# Patient Record
Sex: FEMALE | Race: WHITE | NOT HISPANIC OR LATINO | Employment: OTHER | URBAN - METROPOLITAN AREA
[De-identification: names, ages, dates, MRNs, and addresses within clinical notes are randomized per-mention and may not be internally consistent; named-entity substitution may affect disease eponyms.]

---

## 2017-04-19 ENCOUNTER — TRANSCRIBE ORDERS (OUTPATIENT)
Dept: ADMINISTRATIVE | Facility: HOSPITAL | Age: 72
End: 2017-04-19

## 2017-04-19 DIAGNOSIS — R20.0 NUMBNESS OF LEGS: Primary | ICD-10-CM

## 2017-04-19 DIAGNOSIS — R20.0 NUMBNESS OF FEET: ICD-10-CM

## 2017-04-25 ENCOUNTER — HOSPITAL ENCOUNTER (OUTPATIENT)
Dept: NEUROLOGY | Facility: AMBULATORY SURGERY CENTER | Age: 72
Discharge: HOME | End: 2017-04-25
Payer: MEDICARE

## 2017-04-25 DIAGNOSIS — G62.9 POLYNEUROPATHY: ICD-10-CM

## 2017-04-25 DIAGNOSIS — R20.0 NUMBNESS OF FEET: ICD-10-CM

## 2017-04-25 DIAGNOSIS — R20.0 NUMBNESS OF LEGS: ICD-10-CM

## 2017-04-25 PROCEDURE — 95910 NRV CNDJ TEST 7-8 STUDIES: CPT

## 2017-04-25 PROCEDURE — 95886 MUSC TEST DONE W/N TEST COMP: CPT

## 2017-05-14 ENCOUNTER — HOSPITAL ENCOUNTER (OUTPATIENT)
Dept: RADIOLOGY | Facility: HOSPITAL | Age: 72
Discharge: HOME | End: 2017-05-14
Attending: FAMILY MEDICINE
Payer: MEDICARE

## 2017-05-14 DIAGNOSIS — M54.50 LUMBAR PAIN: ICD-10-CM

## 2017-05-14 PROCEDURE — 72148 MRI LUMBAR SPINE W/O DYE: CPT

## 2018-05-04 ENCOUNTER — HOSPITAL ENCOUNTER (OUTPATIENT)
Facility: HOSPITAL | Age: 73
Setting detail: OUTPATIENT SURGERY
Discharge: HOME | End: 2018-05-04
Attending: COLON & RECTAL SURGERY | Admitting: COLON & RECTAL SURGERY
Payer: MEDICARE

## 2018-05-04 ENCOUNTER — ANESTHESIA EVENT (OUTPATIENT)
Dept: GASTROENTEROLOGY | Facility: HOSPITAL | Age: 73
End: 2018-05-04
Payer: MEDICARE

## 2018-05-04 ENCOUNTER — ANESTHESIA (OUTPATIENT)
Dept: GASTROENTEROLOGY | Facility: HOSPITAL | Age: 73
End: 2018-05-04
Payer: MEDICARE

## 2018-05-04 VITALS
RESPIRATION RATE: 20 BRPM | BODY MASS INDEX: 30.24 KG/M2 | HEART RATE: 68 BPM | WEIGHT: 150 LBS | SYSTOLIC BLOOD PRESSURE: 116 MMHG | TEMPERATURE: 97.8 F | OXYGEN SATURATION: 99 % | DIASTOLIC BLOOD PRESSURE: 58 MMHG | HEIGHT: 59 IN

## 2018-05-04 DIAGNOSIS — K62.5 HEMORRHAGE OF RECTUM AND ANUS: ICD-10-CM

## 2018-05-04 DIAGNOSIS — K62.89 ANAL OR RECTAL PAIN: ICD-10-CM

## 2018-05-04 PROCEDURE — 88305 TISSUE EXAM BY PATHOLOGIST: CPT | Performed by: PATHOLOGY

## 2018-05-04 RX ORDER — SODIUM CHLORIDE 9 MG/ML
100 INJECTION, SOLUTION INTRAVENOUS CONTINUOUS
Status: CANCELLED | OUTPATIENT
Start: 2018-05-04

## 2018-05-04 RX ORDER — ACETAMINOPHEN 325 MG/1
650 TABLET ORAL 2 TIMES DAILY
COMMUNITY
End: 2021-12-01 | Stop reason: HOSPADM

## 2018-05-04 RX ORDER — SODIUM CHLORIDE 9 MG/ML
INJECTION, SOLUTION INTRAVENOUS CONTINUOUS PRN
Status: DISCONTINUED | OUTPATIENT
Start: 2018-05-04 | End: 2018-05-04 | Stop reason: SURG

## 2018-05-04 RX ORDER — MONTELUKAST SODIUM 10 MG/1
10 TABLET ORAL
COMMUNITY

## 2018-05-04 RX ORDER — GABAPENTIN 300 MG/1
100 CAPSULE ORAL 2 TIMES DAILY
Status: ON HOLD | COMMUNITY
End: 2022-07-27 | Stop reason: ALTCHOICE

## 2018-05-04 RX ORDER — PROPOFOL 10 MG/ML
INJECTION, EMULSION INTRAVENOUS AS NEEDED
Status: DISCONTINUED | OUTPATIENT
Start: 2018-05-04 | End: 2018-05-04 | Stop reason: SURG

## 2018-05-04 RX ORDER — PROPOFOL 10 MG/ML
INJECTION, EMULSION INTRAVENOUS CONTINUOUS PRN
Status: DISCONTINUED | OUTPATIENT
Start: 2018-05-04 | End: 2018-05-04 | Stop reason: SURG

## 2018-05-04 RX ADMIN — SODIUM CHLORIDE: 0.9 INJECTION, SOLUTION INTRAVENOUS at 11:05

## 2018-05-04 RX ADMIN — SODIUM CHLORIDE: 0.9 INJECTION, SOLUTION INTRAVENOUS at 10:32

## 2018-05-04 RX ADMIN — PROPOFOL 60 MG: 10 INJECTION, EMULSION INTRAVENOUS at 10:35

## 2018-05-04 RX ADMIN — PROPOFOL 70 MCG/KG/MIN: 10 INJECTION, EMULSION INTRAVENOUS at 10:36

## 2018-05-04 RX ADMIN — PROPOFOL 30 MG: 10 INJECTION, EMULSION INTRAVENOUS at 10:48

## 2018-05-04 NOTE — H&P
History and Physical   Colon and Rectal Surgery   Jerrica Chatterjee 75 y o  female MRN: [id number]  Unit/Bed#: [facility] Encounter: [id number]  05/04/18   10:22 AM      No chief complaint on file  History of Present Illness   HPI:  Jerrica Chatterjee is a 75 y o  female who presents with h/o polyp  Historical Information   Past Medical History:   Diagnosis Date    Chronic kidney disease     Horse shoe kidney    Colitis     Hyperlipidemia      Past Surgical History:   Procedure Laterality Date    BACK SURGERY      2 rods placed in back    COLON SURGERY      JOINT REPLACEMENT Bilateral        Meds/Allergies     Prescriptions Prior to Admission   Medication    acetaminophen (TYLENOL) 325 mg tablet    gabapentin (NEURONTIN) 300 mg capsule    montelukast (SINGULAIR) 10 mg tablet       No current facility-administered medications for this encounter  No Known Allergies      Social History   History   Alcohol Use    Yes     Comment: once in awhile     History   Drug Use No     History   Smoking Status    Former Smoker   Smokeless Tobacco    Never Used         Family History: History reviewed  No pertinent family history  Objective     Current Vitals:   Blood Pressure: 136/65 (05/04/18 0903)  Pulse: 69 (05/04/18 0903)  Temperature: 97 8 °F (36 6 °C) (05/04/18 0903)  Temp Source: Temporal (05/04/18 0903)  Respirations: 20 (05/04/18 0903)  Height: 4' 11" (149 9 cm) (05/04/18 0903)  Weight - Scale: 68 kg (150 lb) (05/04/18 0903)  SpO2: 98 % (05/04/18 0903)  No intake or output data in the 24 hours ending 05/04/18 1022    Physical Exam:  General: No acute distress  Eyes: Normal   ENT: Normal   Neck: No JVD  Pulm: Normal in A&P  CV: NSR no murmur  Abdomen: Soft and normal on palpation, no mass, no tenderness, no guarding  Rectal: Normal sphincter tone, no perianal skin lesions  Extremities: Normal  Lymphatics: Normal        Lab Results: I have personally reviewed pertinent lab results  Imaging:  I have personally reviewed pertinent reports  Patient was consented by myself for procedure as explained earlier with all the risks and benefits described  All questions answered  ASSESSMENT:  Marcia Lock is a 75 y o  female who presents with h/o polyp        PLAN:    colonoscopy

## 2018-05-04 NOTE — OP NOTE
**** GI/ENDOSCOPY REPORT ****     PATIENT NAME: Mathew Soler ------ VISIT ID:     INTRODUCTION: Colonoscopy - A 75 female patient presents for an outpatient   Colonoscopy at [address] [place] [address]  PREVIOUS COLONOSCOPY:     INDICATIONS: Screening for personal history of polyps  CONSENT:  The benefits, risks, and alternatives to the procedure were   discussed and informed consent was obtained from the patient  PREPARATION: EKG, pulse, pulse oximetry and blood pressure were monitored   throughout the procedure  The patient was identified by myself both   verbally and by visual inspection of ID band  ASA Classification: Class 2   - Patient has mild to moderate systemic disturbance that may or may not be   related to the disorder requiring surgery  Airway Assessment   Classification: Airway class 1 - Visualization of the soft palate, fauces,   uvula, and posterior pillars  MEDICATIONS: Anesthesia-check records     RECTAL EXAM: Normal sphincter tone  No external hemorrhoids  No internal   hemorrhoids  PROCEDURE:  The endoscope was passed without difficulty through the anus   under direct visualization and advanced to the cecum, confirmed by   photographs  The quality of the preparation was  The scope was withdrawn   and the mucosa was carefully examined  The views were good  The patient's   toleration of the procedure was good  Cecal Intubation Time: 5 minutes(s)   Scope Withdrawal Time: 17 minutes(s)     FINDINGS:  Two sessile polyps, measuring between 5 and 10 mm in size, were   found in the hepatic flexure  All polyps were removed by hot biopsy   polypectomy and placed in jar 1  A single frond-like/villous, sessile   polyp, measuring between 5 and 10 mm in size, was found in the distal   transverse colon  The polyp was removed by hot biopsy polypectomy and   placed in jar 2  A single sessile polyp, measuring between 5 and 10 mm in   size, was found in the splenic flexure   The polyp was removed by hot   biopsy polypectomy and placed in jar 3  A single frond-like/villous,   sessile polyp, measuring between 5 and 10 mm in size, was found in the   descending colon  The polyp was removed by hot biopsy polypectomy and   placed in jar 4  There was evidence of mild diverticulosis in the sigmoid   colon  Otherwise, the colon appeared to be normal  Appendiceal opening   identified     COMPLICATIONS: There were no complications  IMPRESSIONS: Two sessile polyps found in the hepatic flexure; all polyps   removed by hot biopsy polypectomy  A single frond-like/villous, sessile   polyp found in the distal transverse colon; removed by hot biopsy   polypectomy  A single sessile polyp found in the splenic flexure; removed   by hot biopsy polypectomy  A single frond-like/villous, sessile polyp   found in the descending colon; removed by hot biopsy polypectomy  Mild   diverticulosis found in the sigmoid colon  Otherwise normal colonoscopy  RECOMMENDATIONS: Colonoscopy recommended in 3 years  Continue current   medications  Start high fiber diet  Call if any signs or symptoms of   abdominal pain, bleeding or diverticulitis  Daily: 81 mg aspirin, Vit  D,   five fruits daily, and exercise  Call if you are having any problems: Dr Matthews Factor [phone]  ESTIMATED BLOOD LOSS:     PATHOLOGY SPECIMENS: Two polyps removed by hot biopsy polypectomy (jar 1)     Single polyp removed by hot biopsy polypectomy (jar 2)   Single polyp   removed by hot biopsy polypectomy (jar 3)   Single polyp removed by hot   biopsy polypectomy (jar 4)    Yes     PROCEDURE CODES: 56149: Colonoscopy with removal of tumor(s), polyp(s), or   other lesion(s) by hot biopsy forceps or bipolar cautery     ICD-9 Codes: V12 72 Personal history of colonic polyps 211 3 Benign   neoplasm of colon 211 3 Benign neoplasm of colon 211 3 Benign neoplasm of   colon 211 3 Benign neoplasm of colon 562 10 Diverticulosis of colon   (without mention of hemorrhage)     ICD-10 Codes: Z86 010 Personal history of colonic polyps K63 5 Polyp of   colon K63 5 Polyp of colon K63 5 Polyp of colon K63 5 Polyp of colon K57   Diverticular disease of intestine     PERFORMED BY: ADRIEL Colin  on 05/04/2018  Version 1, electronically signed by ADRIEL Colin  on   05/04/2018 at 11:17

## 2018-05-04 NOTE — ANESTHESIA PREPROCEDURE EVALUATION
Review of Systems/Medical History  Patient summary reviewed        Cardiovascular  Hyperlipidemia,    Pulmonary       GI/Hepatic            Endo/Other     GYN       Hematology   Musculoskeletal       Neurology   Psychology           Physical Exam    Airway    Mallampati score: II  TM Distance: >3 FB  Neck ROM: full     Dental       Cardiovascular      Pulmonary      Other Findings        Anesthesia Plan  ASA Score- 2     Anesthesia Type- IV sedation with anesthesia with ASA Monitors  Additional Monitors:   Airway Plan:         Plan Factors-    Induction- intravenous  Postoperative Plan-     Informed Consent- Anesthetic plan and risks discussed with patient  Small amount of black coffee and jello to take pills this morning  More than 3 hours before  procedure  No dairy in coffee or any solids in jello  Will go ahead

## 2018-10-09 ENCOUNTER — TRANSCRIBE ORDERS (OUTPATIENT)
Dept: ADMINISTRATIVE | Facility: HOSPITAL | Age: 73
End: 2018-10-09

## 2018-10-09 DIAGNOSIS — M25.551 RIGHT HIP PAIN: Primary | ICD-10-CM

## 2018-10-11 ENCOUNTER — APPOINTMENT (OUTPATIENT)
Dept: LAB | Facility: HOSPITAL | Age: 73
End: 2018-10-11
Payer: MEDICARE

## 2018-10-11 DIAGNOSIS — M25.551 RIGHT HIP PAIN: ICD-10-CM

## 2018-10-11 LAB
CRP SERPL QL: <3 MG/L
ERYTHROCYTE [DISTWIDTH] IN BLOOD BY AUTOMATED COUNT: 12.8 % (ref 11.6–15.1)
ERYTHROCYTE [SEDIMENTATION RATE] IN BLOOD: 18 MM/HOUR (ref 0–20)
HCT VFR BLD AUTO: 39.9 % (ref 34.8–46.1)
HGB BLD-MCNC: 12.6 G/DL (ref 11.5–15.4)
MCH RBC QN AUTO: 31.3 PG (ref 26.8–34.3)
MCHC RBC AUTO-ENTMCNC: 31.6 G/DL (ref 31.4–37.4)
MCV RBC AUTO: 99 FL (ref 82–98)
PLATELET # BLD AUTO: 273 THOUSANDS/UL (ref 149–390)
PMV BLD AUTO: 8.8 FL (ref 8.9–12.7)
RBC # BLD AUTO: 4.03 MILLION/UL (ref 3.81–5.12)
URATE SERPL-MCNC: 3.9 MG/DL (ref 2–6.8)
WBC # BLD AUTO: 5.94 THOUSAND/UL (ref 4.31–10.16)

## 2018-10-11 PROCEDURE — 85027 COMPLETE CBC AUTOMATED: CPT

## 2018-10-11 PROCEDURE — 86430 RHEUMATOID FACTOR TEST QUAL: CPT

## 2018-10-11 PROCEDURE — 86618 LYME DISEASE ANTIBODY: CPT

## 2018-10-11 PROCEDURE — 86140 C-REACTIVE PROTEIN: CPT

## 2018-10-11 PROCEDURE — 85652 RBC SED RATE AUTOMATED: CPT

## 2018-10-11 PROCEDURE — 36415 COLL VENOUS BLD VENIPUNCTURE: CPT

## 2018-10-11 PROCEDURE — 86038 ANTINUCLEAR ANTIBODIES: CPT

## 2018-10-11 PROCEDURE — 84550 ASSAY OF BLOOD/URIC ACID: CPT

## 2018-10-12 LAB
B BURGDOR IGG SER IA-ACNC: 0.08
B BURGDOR IGM SER IA-ACNC: 0.35
RHEUMATOID FACT SER QL LA: NEGATIVE
RYE IGE QN: NEGATIVE

## 2018-11-11 ENCOUNTER — HOSPITAL ENCOUNTER (EMERGENCY)
Facility: HOSPITAL | Age: 73
Discharge: HOME | End: 2018-11-11
Attending: EMERGENCY MEDICINE | Admitting: EMERGENCY MEDICINE
Payer: MEDICARE

## 2018-11-11 ENCOUNTER — APPOINTMENT (EMERGENCY)
Dept: RADIOLOGY | Facility: HOSPITAL | Age: 73
End: 2018-11-11
Payer: MEDICARE

## 2018-11-11 VITALS
BODY MASS INDEX: 28.47 KG/M2 | HEIGHT: 60 IN | HEART RATE: 85 BPM | OXYGEN SATURATION: 98 % | TEMPERATURE: 97.4 F | SYSTOLIC BLOOD PRESSURE: 144 MMHG | DIASTOLIC BLOOD PRESSURE: 65 MMHG | WEIGHT: 145 LBS | RESPIRATION RATE: 16 BRPM

## 2018-11-11 DIAGNOSIS — M54.12 CERVICAL RADICULOPATHY: Primary | ICD-10-CM

## 2018-11-11 PROCEDURE — 72040 X-RAY EXAM NECK SPINE 2-3 VW: CPT

## 2018-11-11 PROCEDURE — 99283 EMERGENCY DEPT VISIT LOW MDM: CPT

## 2018-11-11 PROCEDURE — 73070 X-RAY EXAM OF ELBOW: CPT

## 2018-11-11 RX ORDER — METHYLPREDNISOLONE 4 MG/1
TABLET ORAL
Qty: 21 TABLET | Refills: 0 | Status: SHIPPED | OUTPATIENT
Start: 2018-11-11 | End: 2019-02-15

## 2018-11-11 NOTE — DISCHARGE INSTRUCTIONS
Cervical Radiculopathy   WHAT YOU NEED TO KNOW:   Cervical radiculopathy is a painful condition that happens when a spinal nerve in your neck is pinched or irritated  DISCHARGE INSTRUCTIONS:   Medicines: You may need any of the following:  · NSAIDs  help decrease swelling and pain  This medicine can be bought without a doctor's order  This medicine can cause stomach bleeding or kidney problems in certain people  If you take blood thinner medicine, always ask your healthcare provider if NSAIDs are safe for you  Always read the medicine label and follow the directions on it before using this medicine  · Prescription pain medicine  helps decrease pain  Do not wait until the pain is severe before you take this medicine  · Steroids  help decrease pain and swelling  These may be given as a pill or as an injection in your neck  You may need more than 1 injection if your symptoms do not improve after the first treatment  · Take your medicine as directed  Contact your healthcare provider if you think your medicine is not helping or if you have side effects  Tell him of her if you are allergic to any medicine  Keep a list of the medicines, vitamins, and herbs you take  Include the amounts, and when and why you take them  Bring the list or the pill bottles to follow-up visits  Carry your medicine list with you in case of an emergency  Follow up with your healthcare provider or spine specialist as directed:  Write down your questions so you remember to ask them during your visits  Physical therapy:  Your healthcare provider may suggest physical therapy to stretch and strengthen your muscles  Your physical therapist can teach you how to improve your posture and the way you hold your neck  He may also teach you how to be safely active and avoid further injury  He can also help you develop an exercise program that is safe for your back and neck  Self-care:   · Ice  helps decrease swelling and pain   Ice may also help prevent tissue damage  Use an ice pack, or put crushed ice in a plastic bag  Cover it with a towel and place it on your neck for 15 to 20 minutes every hour or as directed  · Rest  when you feel it is needed  Slowly start to do more each day  Return to your daily activities as directed  · Wear a soft collar  You may be given a soft collar to support your neck while you sleep  Wear the soft collar only as directed  · Do light stretches and regular exercise  Your healthcare provider may suggest light stretches to help decrease stiffness in your neck and arm as you recover  After your pain is controlled, you may benefit from regular exercise  Ask what type of exercise is safe for your back and neck  · Review your work area  A comfortable work area can help prevent neck strain  Ask your employer for an ergonomic review to check the position of your desk, chair, phone, and computer  Make any necessary adjustments for your comfort  Contact your healthcare provider or spine specialist if:   · You have a fever  · You are losing weight without trying  · Your pain is worse, even with medicine  · One or both hands feel more numb than before, or you cannot move your fingers well  · You have questions or concerns about your condition or care  © 2017 [employer]  Information is for End User's use only and may not be sold, redistributed or otherwise used for commercial purposes  All illustrations and images included in CareNotes® are the copyrighted property of [employer]  or Ted Oliveira  The above information is an  only  It is not intended as medical advice for individual conditions or treatments  Talk to your doctor, nurse or pharmacist before following any medical regimen to see if it is safe and effective for you

## 2018-11-11 NOTE — ED PROVIDER NOTES
History  Chief Complaint   Patient presents with    Arm Injury     Pt was on a cruise and injured left arm 1 month ago (10/16/18)  Has been unsing ice, tylenol and heating pad for pain and is not getting relief  Pt had xray on ship and was told it was negatinve  16-year-old female presents to the emergency department with complaints of left-sided arm pain  States she has been having pain in the left arm over the past month since injuring the arm on a cruise ship  Patient states that she was being wheeled onto the ship when the elevator door started to close and the person willing her chair wheeled her arm to the door  Did have x-rays at the time which were normal but patient is requesting to have new x-rays taken today  Has been applying ice as needed as well as taking Tylenol and using heating pad  States the pain is sometimes sharp in nature and radiates into his shoulder and neck as well as to the fingers  She denies chest pain  States she did not have any pain in the neck or arm prior to injury occurring  History provided by:  Patient   used: No        Prior to Admission Medications   Prescriptions Last Dose Informant Patient Reported? Taking?   acetaminophen (TYLENOL) 325 mg tablet   Yes No   Sig: Take 650 mg by mouth 2 (two) times a day   gabapentin (NEURONTIN) 300 mg capsule   Yes No   Sig: Take 100 mg by mouth 2 (two) times a day   montelukast (SINGULAIR) 10 mg tablet   Yes No   Sig: Take 10 mg by mouth daily      Facility-Administered Medications: None       Past Medical History:   Diagnosis Date    Chronic kidney disease     Horse shoe kidney    Colitis     Hyperlipidemia        Past Surgical History:   Procedure Laterality Date    BACK SURGERY      2 rods placed in back    COLON SURGERY      COLONOSCOPY N/A 5/4/2018    Procedure: COLONOSCOPY;  Surgeon: Sam Bacon MD;  Location: [facility];   Service: Colorectal    JOINT REPLACEMENT Bilateral History reviewed  No pertinent family history  I have reviewed and agree with the history as documented  Social History   Substance Use Topics    Smoking status: Former Smoker    Smokeless tobacco: Never Used    Alcohol use Yes      Comment: once in awhile        Review of Systems   Constitutional: Negative for chills and fever  HENT: Negative for congestion, dental problem and sore throat  Respiratory: Negative for cough  Cardiovascular: Negative for chest pain  Gastrointestinal: Negative for abdominal pain  Musculoskeletal: Positive for neck pain  Negative for back pain  Arm pain, Elbow pain   Skin: Negative for rash and wound  All other systems reviewed and are negative  Physical Exam  Physical Exam   Constitutional: She is oriented to person, place, and time  Vital signs are normal  She appears well-developed and well-nourished  HENT:   Head: Normocephalic and atraumatic  Cardiovascular: Normal rate and regular rhythm  Pulmonary/Chest: Effort normal and breath sounds normal  No respiratory distress  She has no wheezes  She has no rhonchi  She has no rales  Musculoskeletal:        Left elbow: She exhibits normal range of motion, no swelling, no effusion, no deformity and no laceration  Tenderness found  Medial epicondyle and lateral epicondyle tenderness noted  Cervical back: She exhibits decreased range of motion (with rotation to the left), tenderness and pain  She exhibits no bony tenderness, no swelling, no edema, no deformity, no laceration, no spasm and normal pulse  Neurological: She is alert and oriented to person, place, and time  Skin: Skin is warm and dry  Psychiatric: She has a normal mood and affect  Her behavior is normal    Nursing note and vitals reviewed        Vital Signs  ED Triage Vitals [11/11/18 1146]   Temperature Pulse Respirations Blood Pressure SpO2   (!) 97 4 °F (36 3 °C) 85 16 144/65 98 %      Temp Source Heart Rate Source Patient Position - Orthostatic VS BP Location FiO2 (%)   Tympanic Monitor Sitting Right arm --      Pain Score       5           Vitals:    11/11/18 1146   BP: 144/65   Pulse: 85   Patient Position - Orthostatic VS: Sitting       Visual Acuity      ED Medications  Medications - No data to display    Diagnostic Studies  Results Reviewed     None                 XR elbow 2 vw LEFT   ED Interpretation by Suki Soto PA-C (11/11 1245)   Degenerative changes  No compression fracture  XR cervical spine 2 or 3 views   ED Interpretation by Suki Soto PA-C (11/11 1245)   Degenerative changes  No fracture  Procedures  Procedures       Phone Contacts  ED Phone Contact    ED Course           Identification of Seniors at Risk      Most Recent Value   (ISAR) Identification of Seniors at Risk   Before the illness or injury that brought you to the Emergency, did you need someone to help you on a regular basis? 0 Filed at: 11/11/2018 1148   In the last 24 hours, have you needed more help than usual?  0 Filed at: 11/11/2018 1148   Have you been hospitalized for one or more nights during the past 6 months? 0 Filed at: 11/11/2018 1148   In general, do you see well?  0 [glasses] Filed at: 11/11/2018 1148   In general, do you have serious problems with your memory? 0 Filed at: 11/11/2018 1148   Do you take more than three different medications every day?  0 Filed at: 11/11/2018 1148   ISAR Score  0 Filed at: 11/11/2018 1148                          MDM  Number of Diagnoses or Management Options  Diagnosis management comments: Differential diagnosis includes but not limited to:  Medial epicondylitis, lateral epicondylitis, bursitis, radicular pain, cervical strain, cervical disc disease, arthritis           Amount and/or Complexity of Data Reviewed  Tests in the radiology section of CPT®: ordered      CritCare Time    Disposition  Final diagnoses:   Cervical radiculopathy     Time reflects when diagnosis was documented in both MDM as applicable and the Disposition within this note     Time User Action Codes Description Comment    11/11/2018 12:32 PM Shar Ninochristina Add [M54 12] Cervical radiculopathy       ED Disposition     None      Follow-up Information     Follow up With Specialties Details Why Contact Info Additional Information    Noni Alston DO Family Medicine Schedule an appointment as soon as possible for a visit  [address] [place] [address] [address]       [address] Orthopedic Surgery Schedule an appointment as soon as possible for a visit  [address] [facility], [id number]          Patient's Medications   Discharge Prescriptions    METHYLPREDNISOLONE (MEDROL) 4 MG TABLET    6 tb po on day 1; 5 tb po on day 2; 4 tb po on day 3; 3 tb po on day 4; 2 tb po on day 5; 1 tb po on day 6       Start Date: 11/11/2018End Date: --       Order Dose: --       Quantity: 21 tablet    Refills: 0     No discharge procedures on file      ED Provider  Electronically Signed by           Macarena Holcomb PA-C  11/11/18 2367

## 2019-01-24 ENCOUNTER — HOSPITAL ENCOUNTER (OUTPATIENT)
Dept: RADIOLOGY | Facility: HOSPITAL | Age: 74
Discharge: HOME | End: 2019-01-24
Payer: MEDICARE

## 2019-01-24 DIAGNOSIS — M25.551 RIGHT HIP PAIN: ICD-10-CM

## 2019-01-24 PROCEDURE — 78306 BONE IMAGING WHOLE BODY: CPT

## 2019-01-24 PROCEDURE — A9503 TC99M MEDRONATE: HCPCS

## 2019-02-15 ENCOUNTER — HOSPITAL ENCOUNTER (EMERGENCY)
Facility: HOSPITAL | Age: 74
Discharge: HOME | End: 2019-02-15
Attending: EMERGENCY MEDICINE | Admitting: EMERGENCY MEDICINE
Payer: MEDICARE

## 2019-02-15 ENCOUNTER — APPOINTMENT (EMERGENCY)
Dept: RADIOLOGY | Facility: HOSPITAL | Age: 74
End: 2019-02-15
Payer: MEDICARE

## 2019-02-15 VITALS
HEART RATE: 89 BPM | BODY MASS INDEX: 30.33 KG/M2 | WEIGHT: 154 LBS | DIASTOLIC BLOOD PRESSURE: 71 MMHG | OXYGEN SATURATION: 94 % | TEMPERATURE: 97.7 F | SYSTOLIC BLOOD PRESSURE: 163 MMHG | RESPIRATION RATE: 18 BRPM

## 2019-02-15 DIAGNOSIS — M79.671 RIGHT FOOT PAIN: ICD-10-CM

## 2019-02-15 DIAGNOSIS — S92.351A DISPLACED FRACTURE OF FIFTH METATARSAL BONE, RIGHT FOOT, INITIAL ENCOUNTER FOR CLOSED FRACTURE: Primary | ICD-10-CM

## 2019-02-15 PROCEDURE — 73630 X-RAY EXAM OF FOOT: CPT

## 2019-02-15 PROCEDURE — 99283 EMERGENCY DEPT VISIT LOW MDM: CPT

## 2019-02-15 PROCEDURE — 73610 X-RAY EXAM OF ANKLE: CPT

## 2019-02-15 RX ORDER — ACETAMINOPHEN 325 MG/1
975 TABLET ORAL ONCE
Status: COMPLETED | OUTPATIENT
Start: 2019-02-15 | End: 2019-02-15

## 2019-02-15 RX ORDER — TRAMADOL HYDROCHLORIDE 50 MG/1
50 TABLET ORAL EVERY 6 HOURS PRN
Qty: 8 TABLET | Refills: 0 | Status: SHIPPED | OUTPATIENT
Start: 2019-02-15 | End: 2019-02-25

## 2019-02-15 RX ADMIN — ACETAMINOPHEN 975 MG: 325 TABLET, FILM COATED ORAL at 18:07

## 2019-02-15 NOTE — ED PROVIDER NOTES
History  Chief Complaint   Patient presents with    Fall     Pt fell trying to go to the restroom this morning around 0400  Pt only c/o R leg pain  Pt denies any other injuries  19-year-old female with no pertinent past medical history who is presenting with right foot pain  Patient reports that she woke up this morning with a cramp in her right leg  This is a chronic issue for her  While she was walking to the bathroom, she felt significant pain in her right foot  She then lost her balance and fell into the wall  She did not fall to the ground  She reports hitting her head on a mirror  No loss of consciousness  Patient was able to ambulate to the bathroom with the assistance of her   Patient tried soaking her foot and also took over-the-counter pain medications without relief of her symptoms  Patient denies any back pain, hip pain, knee pain, or ankle pain  She is not aware of any specific trauma to her right foot  Review of systems otherwise negative  Assessment and plan:  19-year-old female presenting with right foot pain  On examination, she has significant tenderness and ecchymosis over the lateral metatarsals  Will plan to check an x-ray of the right foot  Prior to Admission Medications   Prescriptions Last Dose Informant Patient Reported?  Taking?   acetaminophen (TYLENOL) 325 mg tablet   Yes Yes   Sig: Take 650 mg by mouth 2 (two) times a day   gabapentin (NEURONTIN) 300 mg capsule   Yes Yes   Sig: Take 100 mg by mouth 2 (two) times a day   montelukast (SINGULAIR) 10 mg tablet   Yes Yes   Sig: Take 10 mg by mouth daily      Facility-Administered Medications: None       Past Medical History:   Diagnosis Date    Chronic kidney disease     Horse shoe kidney    Colitis     Hyperlipidemia        Past Surgical History:   Procedure Laterality Date    BACK SURGERY      2 rods placed in back    COLON SURGERY      COLONOSCOPY N/A 5/4/2018    Procedure: COLONOSCOPY;  Surgeon: Lenin Alvarado MD;  Location: [facility]; Service: Colorectal    JOINT REPLACEMENT Bilateral        History reviewed  No pertinent family history  I have reviewed and agree with the history as documented  Social History     Tobacco Use    Smoking status: Former Smoker    Smokeless tobacco: Never Used   Substance Use Topics    Alcohol use: Yes     Comment: once in [employer] Drug use: No        Review of Systems   Constitutional: Negative for diaphoresis, fever and unexpected weight change  HENT: Negative for congestion, rhinorrhea and sore throat  Eyes: Negative for pain, discharge and visual disturbance  Respiratory: Negative for cough, shortness of breath and wheezing  Cardiovascular: Negative for chest pain, palpitations and leg swelling  Gastrointestinal: Negative for abdominal pain, blood in stool, constipation, diarrhea, nausea and vomiting  Genitourinary: Negative for dysuria, flank pain and hematuria  Musculoskeletal: Negative for arthralgias and joint swelling  Right foot pain  Skin: Negative for rash and wound  Allergic/Immunologic: Negative for environmental allergies and food allergies  Neurological: Negative for dizziness, seizures, weakness and numbness  Hematological: Negative for adenopathy  Psychiatric/Behavioral: Negative for confusion and hallucinations  Physical Exam  ED Triage Vitals [02/15/19 1737]   Temperature Pulse Respirations Blood Pressure SpO2   97 7 °F (36 5 °C) 89 18 163/71 94 %      Temp Source Heart Rate Source Patient Position - Orthostatic VS BP Location FiO2 (%)   Tympanic Monitor Sitting Right arm --      Pain Score       --           Orthostatic Vital Signs  Vitals:    02/15/19 1737   BP: 163/71   Pulse: 89   Patient Position - Orthostatic VS: Sitting       Physical Exam   Constitutional: She is oriented to person, place, and time  She appears well-developed and well-nourished  No distress     HENT:   Head: Normocephalic and atraumatic  Right Ear: External ear normal    Left Ear: External ear normal    No signs of head trauma  Eyes: Pupils are equal, round, and reactive to light  Conjunctivae and EOM are normal    Neck: Normal range of motion  Neck supple  No midline cervical spine tenderness  Cardiovascular: Normal rate, regular rhythm and normal heart sounds  No murmur heard  Pulmonary/Chest: Effort normal and breath sounds normal  No respiratory distress  She has no wheezes  She has no rales  Abdominal: Soft  Bowel sounds are normal  She exhibits no distension  There is no tenderness  There is no guarding  Musculoskeletal: She exhibits edema and tenderness  She exhibits no deformity  Patient has tenderness to palpation of over the mid 4th and 5th metatarsals  There is associated edema and ecchymosis  Neurological: She is alert and oriented to person, place, and time  Patient is alert and oriented to time, person, place, and situation  Speech is fluent with no aphasia or dysarthria  CN II-XII are intact  Strength is 5/5 in the upper and lower extremities bilaterally  Sensation grossly intact  No dysmetria on finger to nose testing  No pronator drift  Skin: Skin is warm and dry  Capillary refill takes less than 2 seconds  Psychiatric: She has a normal mood and affect  Her behavior is normal  Thought content normal    Nursing note and vitals reviewed  ED Medications  Medications   acetaminophen (TYLENOL) tablet 975 mg (975 mg Oral Given 2/15/19 1807)       Diagnostic Studies  Results Reviewed     None                 XR foot 3+ views RIGHT   ED Interpretation by Danial Beck MD (02/15 2000)   Fracture of the 5th metatarsal       XR ankle 3+ views RIGHT   ED Interpretation by Danial Beck MD (02/15 1959)   Osteopenia  No fracture on my reading              Procedures  Procedures      Phone Consults  ED Phone Contact    ED Course           Identification of Seniors at Risk      Most Recent Value   (ISAR) Identification of Seniors at Risk   Before the illness or injury that brought you to the Emergency, did you need someone to help you on a regular basis? 0 Filed at: 02/15/2019 1741   In the last 24 hours, have you needed more help than usual?  0 Filed at: 02/15/2019 1741   Have you been hospitalized for one or more nights during the past 6 months? 0 Filed at: 02/15/2019 1741   In general, do you see well?  0 Filed at: 02/15/2019 1741   In general, do you have serious problems with your memory? 0 Filed at: 02/15/2019 1741   Do you take more than three different medications every day? 1 Filed at: 02/15/2019 1741   ISAR Score  1 Filed at: 02/15/2019 1741                          MDM  Number of Diagnoses or Management Options  Displaced fracture of fifth metatarsal bone, right foot, initial encounter for closed fracture: new and requires workup  Right foot pain: new and requires workup  Diagnosis management comments:     Right foot x-ray demonstrated a fracture of the distal 5th metatarsal with some displacement  Advised patient to remain nonweightbearing on the affected extremity  Patient has several walkers at home that she stated she would use  Provided patient with a surgical shoe for the right foot  Provided Podiatry follow-up and advised her to call to make an appointment for within 1 week  Return precautions were discussed with the patient  Patient verbalized understanding of the diagnosis, plan for treatment, need for follow-up, and return precautions         Amount and/or Complexity of Data Reviewed  Tests in the radiology section of CPT®: ordered and reviewed  Decide to obtain previous medical records or to obtain history from someone other than the patient: yes  Review and summarize past medical records: yes  Independent visualization of images, tracings, or specimens: yes    Risk of Complications, Morbidity, and/or Mortality  Presenting problems: low  Diagnostic procedures: minimal  Management options: minimal    Patient Progress  Patient progress: improved      Disposition  Final diagnoses:   Displaced fracture of fifth metatarsal bone, right foot, initial encounter for closed fracture   Right foot pain     Time reflects when diagnosis was documented in both MDM as applicable and the Disposition within this note     Time User Action Codes Description Comment    2/15/2019  8:16 PM Fine Land Add [R82 193F] Displaced fracture of fifth metatarsal bone, right foot, initial encounter for closed fracture     2/15/2019  8:16 PM Avis Talavera Add [R36 042] Right foot pain       ED Disposition     ED Disposition Condition Date/Time Comment    Discharge Good Fri Feb 15, 2019  8:16 PM Wendie Urbina discharge to home/self care  Follow-up Information     Follow up With Specialties Details Why Contact Info Additional Information    [facility] Podiatry Call in 1 day Please call Podiatry for follow-up within 1 week  [facility] Emergency Department Emergency Medicine Go to  If symptoms worsen  [address]  [phone]  ED, [address], [address] [facility], [address]          Discharge Medication List as of 2/15/2019  8:18 PM      START taking these medications    Details   traMADol (ULTRAM) 50 mg tablet Take 1 tablet (50 mg total) by mouth every 6 (six) hours as needed for moderate pain for up to 10 days, Starting Fri 2/15/2019, Until Mon 2/25/2019, Print         CONTINUE these medications which have NOT CHANGED    Details   acetaminophen (TYLENOL) 325 mg tablet Take 650 mg by mouth 2 (two) times a day, Historical Med      gabapentin (NEURONTIN) 300 mg capsule Take 100 mg by mouth 2 (two) times a day, Historical Med      montelukast (SINGULAIR) 10 mg tablet Take 10 mg by mouth daily, Historical Med           No discharge procedures on file      ED Provider  Attending physically available and evaluated Waldo Lopez I managed the patient along with the ED Attending      Electronically Signed by         Mckenna Bacon MD  02/16/19 9974

## 2019-02-15 NOTE — ED ATTENDING ATTESTATION
I, Bharat Flores DO, saw and evaluated the patient  I have discussed the patient with the resident/non-physician practitioner and agree with the resident's/non-physician practitioner's findings, Plan of Care, and MDM as documented in the resident's/non-physician practitioner's note, except where noted  All available labs and Radiology studies were reviewed  At this point I agree with the current assessment done in the Emergency Department  I have conducted an independent evaluation of this patient a history and physical is as follows:      Critical Care Time  Procedures     75 yr old fem to the ED for right foot injury while walking in her home  Now with pain in the  Foot and rad up the leg  Exm; ecchy and tender over 4/5th MT  No malleolar tenderness  Some mid to proximal fib tender  Pln xray

## 2019-02-16 NOTE — DISCHARGE INSTRUCTIONS
Please return to the ED with uncontrolled pain, worsening pain, fever, chills, or any other concerning symptoms  Please call Podiatry office and make an appointment for next week for evaluation  Avoid weight-bearing on right foot  Please use one of your walkers at home to ambulate

## 2019-04-19 ENCOUNTER — TRANSCRIBE ORDERS (OUTPATIENT)
Dept: LAB | Facility: HOSPITAL | Age: 74
End: 2019-04-19

## 2019-04-19 ENCOUNTER — APPOINTMENT (OUTPATIENT)
Dept: LAB | Facility: HOSPITAL | Age: 74
End: 2019-04-19
Attending: FAMILY MEDICINE
Payer: MEDICARE

## 2019-04-19 DIAGNOSIS — E78.49 OTHER HYPERLIPIDEMIA: Primary | ICD-10-CM

## 2019-04-19 DIAGNOSIS — E78.49 OTHER HYPERLIPIDEMIA: ICD-10-CM

## 2019-04-19 LAB
ALBUMIN SERPL BCP-MCNC: 3.6 G/DL (ref 3.5–5)
ALP SERPL-CCNC: 59 U/L (ref 46–116)
ALT SERPL W P-5'-P-CCNC: 22 U/L (ref 12–78)
ANION GAP SERPL CALCULATED.3IONS-SCNC: 2 MMOL/L (ref 4–13)
AST SERPL W P-5'-P-CCNC: 18 U/L (ref 5–45)
BASOPHILS # BLD AUTO: 0.06 THOUSANDS/ΜL (ref 0–0.1)
BASOPHILS NFR BLD AUTO: 1 % (ref 0–1)
BILIRUB SERPL-MCNC: 0.5 MG/DL (ref 0.2–1)
BUN SERPL-MCNC: 20 MG/DL (ref 5–25)
CALCIUM SERPL-MCNC: 8.3 MG/DL (ref 8.3–10.1)
CHLORIDE SERPL-SCNC: 107 MMOL/L (ref 100–108)
CHOLEST SERPL-MCNC: 158 MG/DL (ref 50–200)
CO2 SERPL-SCNC: 29 MMOL/L (ref 21–32)
CREAT SERPL-MCNC: 0.51 MG/DL (ref 0.6–1.3)
EOSINOPHIL # BLD AUTO: 0.41 THOUSAND/ΜL (ref 0–0.61)
EOSINOPHIL NFR BLD AUTO: 7 % (ref 0–6)
ERYTHROCYTE [DISTWIDTH] IN BLOOD BY AUTOMATED COUNT: 12.7 % (ref 11.6–15.1)
GFR SERPL CREATININE-BSD FRML MDRD: 94 ML/MIN/1.73SQ M
GLUCOSE P FAST SERPL-MCNC: 87 MG/DL (ref 65–99)
HCT VFR BLD AUTO: 39 % (ref 34.8–46.1)
HDLC SERPL-MCNC: 36 MG/DL (ref 40–60)
HGB BLD-MCNC: 12.2 G/DL (ref 11.5–15.4)
IMM GRANULOCYTES # BLD AUTO: 0.01 THOUSAND/UL (ref 0–0.2)
IMM GRANULOCYTES NFR BLD AUTO: 0 % (ref 0–2)
LDLC SERPL CALC-MCNC: 76 MG/DL (ref 0–100)
LYMPHOCYTES # BLD AUTO: 1.82 THOUSANDS/ΜL (ref 0.6–4.47)
LYMPHOCYTES NFR BLD AUTO: 32 % (ref 14–44)
MCH RBC QN AUTO: 31.4 PG (ref 26.8–34.3)
MCHC RBC AUTO-ENTMCNC: 31.3 G/DL (ref 31.4–37.4)
MCV RBC AUTO: 100 FL (ref 82–98)
MONOCYTES # BLD AUTO: 0.9 THOUSAND/ΜL (ref 0.17–1.22)
MONOCYTES NFR BLD AUTO: 16 % (ref 4–12)
NEUTROPHILS # BLD AUTO: 2.56 THOUSANDS/ΜL (ref 1.85–7.62)
NEUTS SEG NFR BLD AUTO: 44 % (ref 43–75)
NONHDLC SERPL-MCNC: 122 MG/DL
NRBC BLD AUTO-RTO: 0 /100 WBCS
PLATELET # BLD AUTO: 262 THOUSANDS/UL (ref 149–390)
PMV BLD AUTO: 9.1 FL (ref 8.9–12.7)
POTASSIUM SERPL-SCNC: 4.4 MMOL/L (ref 3.5–5.3)
PROT SERPL-MCNC: 6.9 G/DL (ref 6.4–8.2)
RBC # BLD AUTO: 3.89 MILLION/UL (ref 3.81–5.12)
SODIUM SERPL-SCNC: 138 MMOL/L (ref 136–145)
TRIGL SERPL-MCNC: 232 MG/DL
TSH SERPL DL<=0.05 MIU/L-ACNC: 2.77 UIU/ML (ref 0.36–3.74)
WBC # BLD AUTO: 5.76 THOUSAND/UL (ref 4.31–10.16)

## 2019-04-19 PROCEDURE — 80061 LIPID PANEL: CPT

## 2019-04-19 PROCEDURE — 36415 COLL VENOUS BLD VENIPUNCTURE: CPT

## 2019-04-19 PROCEDURE — 84443 ASSAY THYROID STIM HORMONE: CPT

## 2019-04-19 PROCEDURE — 85025 COMPLETE CBC W/AUTO DIFF WBC: CPT

## 2019-04-19 PROCEDURE — 80053 COMPREHEN METABOLIC PANEL: CPT

## 2019-06-27 ENCOUNTER — APPOINTMENT (EMERGENCY)
Dept: RADIOLOGY | Facility: HOSPITAL | Age: 74
End: 2019-06-27
Payer: MEDICARE

## 2019-06-27 ENCOUNTER — HOSPITAL ENCOUNTER (EMERGENCY)
Facility: HOSPITAL | Age: 74
Discharge: HOME | End: 2019-06-27
Attending: EMERGENCY MEDICINE | Admitting: EMERGENCY MEDICINE
Payer: MEDICARE

## 2019-06-27 VITALS
BODY MASS INDEX: 29.57 KG/M2 | SYSTOLIC BLOOD PRESSURE: 102 MMHG | DIASTOLIC BLOOD PRESSURE: 58 MMHG | TEMPERATURE: 98.2 F | OXYGEN SATURATION: 95 % | WEIGHT: 150.13 LBS | RESPIRATION RATE: 18 BRPM | HEART RATE: 72 BPM

## 2019-06-27 DIAGNOSIS — M25.552 ACUTE HIP PAIN, LEFT: Primary | ICD-10-CM

## 2019-06-27 PROCEDURE — 99283 EMERGENCY DEPT VISIT LOW MDM: CPT

## 2019-06-27 PROCEDURE — 99284 EMERGENCY DEPT VISIT MOD MDM: CPT | Performed by: PHYSICIAN ASSISTANT

## 2019-06-27 PROCEDURE — 72100 X-RAY EXAM L-S SPINE 2/3 VWS: CPT

## 2019-06-27 PROCEDURE — 73502 X-RAY EXAM HIP UNI 2-3 VIEWS: CPT

## 2019-06-27 RX ORDER — ACETAMINOPHEN 325 MG/1
325 TABLET ORAL EVERY 6 HOURS PRN
Qty: 30 TABLET | Refills: 0 | Status: SHIPPED | OUTPATIENT
Start: 2019-06-27 | End: 2022-08-01

## 2019-06-27 NOTE — DISCHARGE INSTRUCTIONS
Please refer to the attached information for strict return instructions  If symptoms worsen or new symptoms develop please return to the Er  Please follow up with orthopedics as soon as possible

## 2019-06-28 NOTE — ED PROVIDER NOTES
History  Chief Complaint   Patient presents with    Hip Pain     pt reports she had b/l hip replacement several years ago, left hip has been bothering her for about 3 weeks, difficulty ambulating     Kaylin Guillen is a 74 yo F, w distant history of bilateral hip arthroplasty and lumbar spine surgery, presenting with 3 weeks of L lateral and posterior hip pain that has occurred intermittently with ambulation and certain movements  States pain occasionally radiates down lateral L thigh to level of knee  Pt denies recent trauma or injury to region  No falls, no known inciting event  Pt denies history of osteoporosis  Describes chronic bilateral lower extremity radiculopathy, but denies new numbness/weakness  Pt ambulatory at home and in ED  Denies significant pain while at rest  States she has tried Aleve at home for this pain without significant relief  History provided by:  Patient   used: No    Hip Pain   Location:  L hip  Onset quality:  Gradual  Duration:  3 weeks  Timing:  Intermittent  Progression:  Waxing and waning  Chronicity:  New  Context:  Ambulation, movement  Relieved by:  Rest  Ineffective treatments:  Aleve  Associated symptoms: no abdominal pain, no chest pain, no congestion, no cough, no diarrhea, no fever, no nausea, no rash, no rhinorrhea, no shortness of breath, no sore throat, no vomiting and no wheezing        Prior to Admission Medications   Prescriptions Last Dose Informant Patient Reported?  Taking?   acetaminophen (TYLENOL) 325 mg tablet   Yes Yes   Sig: Take 650 mg by mouth 2 (two) times a day   gabapentin (NEURONTIN) 300 mg capsule   Yes Yes   Sig: Take 100 mg by mouth 2 (two) times a day   montelukast (SINGULAIR) 10 mg tablet   Yes Yes   Sig: Take 10 mg by mouth daily      Facility-Administered Medications: None       Past Medical History:   Diagnosis Date    Chronic kidney disease     Horse shoe kidney    Colitis     Hyperlipidemia        Past Surgical History: Procedure Laterality Date    BACK SURGERY      2 rods placed in back    COLON SURGERY      COLONOSCOPY N/A 5/4/2018    Procedure: COLONOSCOPY;  Surgeon: Ira Hilliard MD;  Location: [facility]; Service: Colorectal    JOINT REPLACEMENT Bilateral        History reviewed  No pertinent family history  I have reviewed and agree with the history as documented  Social History     Tobacco Use    Smoking status: Former Smoker    Smokeless tobacco: Never Used   Substance Use Topics    Alcohol use: Yes     Comment: once in [employer] Drug use: No        Review of Systems   Constitutional: Negative for activity change, chills and fever  HENT: Negative for congestion, rhinorrhea and sore throat  Eyes: Negative for photophobia and visual disturbance  Respiratory: Negative for cough, chest tightness, shortness of breath and wheezing  Cardiovascular: Negative for chest pain and palpitations  Gastrointestinal: Negative for abdominal pain, constipation, diarrhea, nausea and vomiting  No loss of bowel/bladder control   Genitourinary: Negative for difficulty urinating, enuresis, flank pain, frequency, hematuria and urgency  Musculoskeletal: Positive for arthralgias  Negative for back pain, gait problem, neck pain and neck stiffness  Skin: Negative for rash and wound  Neurological: Negative for dizziness, tremors, weakness, light-headedness and numbness  Physical Exam  Physical Exam   Constitutional: She is oriented to person, place, and time  She appears well-developed and well-nourished  No distress  HENT:   Head: Normocephalic and atraumatic  Right Ear: External ear normal    Left Ear: External ear normal    Nose: Nose normal    Mouth/Throat: Oropharynx is clear and moist  No oropharyngeal exudate  Eyes: Pupils are equal, round, and reactive to light  Conjunctivae and EOM are normal    Neck: Normal range of motion  Neck supple     Cardiovascular: Normal rate, regular rhythm, normal heart sounds and intact distal pulses  Exam reveals no gallop and no friction rub  No murmur heard  Pulmonary/Chest: Effort normal and breath sounds normal  No respiratory distress  She has no wheezes  She has no rales  Abdominal: Soft  She exhibits no distension and no mass  There is no tenderness  There is no guarding  Musculoskeletal: She exhibits tenderness  She exhibits no edema or deformity  No internal/external rotation or shortening of L leg as pt lies in bed; Normal passive and active ROM L hip in flexion/extension, abduction; slightly limited ROM L hip in passive and active abduction and internal/extenral rotation due to pain    TTP over L SI joint and PSIS; TTP over L greater trochanter; no deformity or edema, no discoloration or bruising   Lymphadenopathy:     She has no cervical adenopathy  Neurological: She is alert and oriented to person, place, and time  She displays normal reflexes  No sensory deficit  She exhibits normal muscle tone  Coordination normal    Skin: Skin is warm and dry  Capillary refill takes less than 2 seconds  No rash noted  She is not diaphoretic  No erythema  No pallor  Psychiatric: She has a normal mood and affect  Her behavior is normal  Judgment and thought content normal    Nursing note and vitals reviewed        Vital Signs  ED Triage Vitals [06/27/19 1541]   Temperature Pulse Respirations Blood Pressure SpO2   98 2 °F (36 8 °C) 78 18 107/61 94 %      Temp Source Heart Rate Source Patient Position - Orthostatic VS BP Location FiO2 (%)   Oral Monitor Sitting Right arm --      Pain Score       8           Vitals:    06/27/19 1541 06/27/19 1730   BP: 107/61 102/58   Pulse: 78 72   Patient Position - Orthostatic VS: Sitting Sitting         Visual Acuity      ED Medications  Medications - No data to display    Diagnostic Studies  Results Reviewed     None                 XR lumbar spine 2 or 3 views   Final Result by Gem Decker MD (06/27 1701) Intact fixation hardware  Moderate degenerative changes  No acute fracture  Workstation performed: [id number]         XR hip/pelv 2-3 vws left if performed   Final Result by Imelda Vasquez MD (06/27 1700)      Bilateral total hip arthroplasties without apparent complications  No acute fracture  Workstation performed: [id number]                    Procedures  Procedures       ED Course           Identification of Seniors at Risk      Most Recent Value   (ISAR) Identification of Seniors at Risk   Before the illness or injury that brought you to the Emergency, did you need someone to help you on a regular basis? 0 Filed at: 06/27/2019 1543   In the last 24 hours, have you needed more help than usual?  0 Filed at: 06/27/2019 1543   Have you been hospitalized for one or more nights during the past 6 months? 0 Filed at: 06/27/2019 1543   In general, do you see well?  0 Filed at: 06/27/2019 1543   In general, do you have serious problems with your memory? 0 Filed at: 06/27/2019 1543   Do you take more than three different medications every day? 1 Filed at: 06/27/2019 1543   ISAR Score  1 Filed at: 06/27/2019 1543                          MDM  Number of Diagnoses or Management Options  Acute hip pain, left:   Diagnosis management comments: Ddx includes not limited to: acute fracture, dislocation, arthritis/degenerative changes, hardware malfunction, SI joint dysfunction, trochanteric bursitis, IT band syndrome    No acute fractures, hardware in place on xray hip/pelvis, lumbar  Hx of bilateral LE neuropathy but no new neurologic symptoms or deficit by exam  Slightly limited ROM L hip in abduction/internal rotation, TTP lateral hip and L SI/PSIS region  Hx of CKD, will avoid chronic use of NSAIDs  Rx tylenol, topical voltaren  F/u with orthopedics  Pt understands return indications         Amount and/or Complexity of Data Reviewed  Tests in the radiology section of CPT®: ordered and reviewed    Patient Progress  Patient progress: stable      Disposition  Final diagnoses:   Acute hip pain, left     Time reflects when diagnosis was documented in both MDM as applicable and the Disposition within this note     Time User Action Codes Description Comment    6/27/2019  5:45 PM Jarrett Edge Add [M25 552] Acute hip pain, left       ED Disposition     ED Disposition Condition Date/Time Comment    Discharge Stable u Jun 27, 2019  5:45 PM Andrew Oliveros discharge to home/self care  Follow-up Information     Follow up With Specialties Details Why Contact Info Additional [address] [facility]  May also follow up with [facility]/orthopedic physician of your choice  [address] [facility], [address]          Discharge Medication List as of 6/27/2019  5:51 PM      START taking these medications    Details   !! acetaminophen (TYLENOL) 325 mg tablet Take 1 tablet (325 mg total) by mouth every 6 (six) hours as needed for mild pain or moderate pain, Starting Thu 6/27/2019, Print      diclofenac sodium (VOLTAREN) 1 % Apply 2 g topically 3 (three) times a day as needed (Pain), Starting Thu 6/27/2019, Print       !! - Potential duplicate medications found  Please discuss with provider  CONTINUE these medications which have NOT CHANGED    Details   !! acetaminophen (TYLENOL) 325 mg tablet Take 650 mg by mouth 2 (two) times a day, Historical Med      gabapentin (NEURONTIN) 300 mg capsule Take 100 mg by mouth 2 (two) times a day, Historical Med      montelukast (SINGULAIR) 10 mg tablet Take 10 mg by mouth daily, Historical Med       !! - Potential duplicate medications found  Please discuss with provider  No discharge procedures on file      ED Provider  Electronically Signed by           Monty Mcwilliams MAIKEL  06/28/19 1344

## 2019-08-11 ENCOUNTER — HOSPITAL ENCOUNTER (EMERGENCY)
Facility: HOSPITAL | Age: 74
Discharge: HOME | End: 2019-08-11
Attending: EMERGENCY MEDICINE | Admitting: EMERGENCY MEDICINE
Payer: MEDICARE

## 2019-08-11 ENCOUNTER — APPOINTMENT (EMERGENCY)
Dept: RADIOLOGY | Facility: HOSPITAL | Age: 74
End: 2019-08-11
Payer: MEDICARE

## 2019-08-11 VITALS
DIASTOLIC BLOOD PRESSURE: 60 MMHG | TEMPERATURE: 97.8 F | SYSTOLIC BLOOD PRESSURE: 120 MMHG | WEIGHT: 155 LBS | BODY MASS INDEX: 30.53 KG/M2 | RESPIRATION RATE: 20 BRPM | HEART RATE: 63 BPM | OXYGEN SATURATION: 99 %

## 2019-08-11 DIAGNOSIS — R11.0 NAUSEA: Primary | ICD-10-CM

## 2019-08-11 DIAGNOSIS — K59.00 CONSTIPATION: ICD-10-CM

## 2019-08-11 LAB
ALBUMIN SERPL BCP-MCNC: 4 G/DL (ref 3.5–5)
ALP SERPL-CCNC: 63 U/L (ref 46–116)
ALT SERPL W P-5'-P-CCNC: 27 U/L (ref 12–78)
ANION GAP SERPL CALCULATED.3IONS-SCNC: 4 MMOL/L (ref 4–13)
AST SERPL W P-5'-P-CCNC: 20 U/L (ref 5–45)
ATRIAL RATE: 65 BPM
BASOPHILS # BLD AUTO: 0.07 THOUSANDS/ΜL (ref 0–0.1)
BASOPHILS NFR BLD AUTO: 1 % (ref 0–1)
BILIRUB SERPL-MCNC: 0.42 MG/DL (ref 0.2–1)
BILIRUB UR QL STRIP: NEGATIVE
BUN SERPL-MCNC: 12 MG/DL (ref 5–25)
CALCIUM SERPL-MCNC: 9.3 MG/DL (ref 8.3–10.1)
CHLORIDE SERPL-SCNC: 106 MMOL/L (ref 100–108)
CLARITY UR: CLEAR
CO2 SERPL-SCNC: 29 MMOL/L (ref 21–32)
COLOR UR: YELLOW
COLOR, POC: NORMAL
CREAT SERPL-MCNC: 0.51 MG/DL (ref 0.6–1.3)
EOSINOPHIL # BLD AUTO: 0.5 THOUSAND/ΜL (ref 0–0.61)
EOSINOPHIL NFR BLD AUTO: 5 % (ref 0–6)
ERYTHROCYTE [DISTWIDTH] IN BLOOD BY AUTOMATED COUNT: 13.1 % (ref 11.6–15.1)
GFR SERPL CREATININE-BSD FRML MDRD: 94 ML/MIN/1.73SQ M
GLUCOSE SERPL-MCNC: 92 MG/DL (ref 65–140)
GLUCOSE UR STRIP-MCNC: NEGATIVE MG/DL
HCT VFR BLD AUTO: 42.1 % (ref 34.8–46.1)
HGB BLD-MCNC: 13.6 G/DL (ref 11.5–15.4)
HGB UR QL STRIP.AUTO: NEGATIVE
IMM GRANULOCYTES # BLD AUTO: 0.02 THOUSAND/UL (ref 0–0.2)
IMM GRANULOCYTES NFR BLD AUTO: 0 % (ref 0–2)
KETONES UR STRIP-MCNC: NEGATIVE MG/DL
LEUKOCYTE ESTERASE UR QL STRIP: NEGATIVE
LIPASE SERPL-CCNC: 92 U/L (ref 73–393)
LYMPHOCYTES # BLD AUTO: 2.04 THOUSANDS/ΜL (ref 0.6–4.47)
LYMPHOCYTES NFR BLD AUTO: 22 % (ref 14–44)
MCH RBC QN AUTO: 31.6 PG (ref 26.8–34.3)
MCHC RBC AUTO-ENTMCNC: 32.3 G/DL (ref 31.4–37.4)
MCV RBC AUTO: 98 FL (ref 82–98)
MONOCYTES # BLD AUTO: 0.89 THOUSAND/ΜL (ref 0.17–1.22)
MONOCYTES NFR BLD AUTO: 9 % (ref 4–12)
NEUTROPHILS # BLD AUTO: 5.92 THOUSANDS/ΜL (ref 1.85–7.62)
NEUTS SEG NFR BLD AUTO: 63 % (ref 43–75)
NITRITE UR QL STRIP: NEGATIVE
NRBC BLD AUTO-RTO: 0 /100 WBCS
P AXIS: 14 DEGREES
PH UR STRIP.AUTO: 7.5 [PH] (ref 4.5–8)
PLATELET # BLD AUTO: 279 THOUSANDS/UL (ref 149–390)
PMV BLD AUTO: 8.8 FL (ref 8.9–12.7)
POTASSIUM SERPL-SCNC: 4.5 MMOL/L (ref 3.5–5.3)
PR INTERVAL: 184 MS
PROT SERPL-MCNC: 7.7 G/DL (ref 6.4–8.2)
PROT UR STRIP-MCNC: NEGATIVE MG/DL
QRS AXIS: 81 DEGREES
QRSD INTERVAL: 74 MS
QT INTERVAL: 442 MS
QTC INTERVAL: 459 MS
RBC # BLD AUTO: 4.3 MILLION/UL (ref 3.81–5.12)
SODIUM SERPL-SCNC: 139 MMOL/L (ref 136–145)
SP GR UR STRIP.AUTO: 1.01 (ref 1–1.03)
T WAVE AXIS: 4 DEGREES
TROPONIN I SERPL-MCNC: <0.02 NG/ML
UROBILINOGEN UR QL STRIP.AUTO: 0.2 E.U./DL
VENTRICULAR RATE: 65 BPM
WBC # BLD AUTO: 9.44 THOUSAND/UL (ref 4.31–10.16)

## 2019-08-11 PROCEDURE — 84484 ASSAY OF TROPONIN QUANT: CPT | Performed by: EMERGENCY MEDICINE

## 2019-08-11 PROCEDURE — 96374 THER/PROPH/DIAG INJ IV PUSH: CPT

## 2019-08-11 PROCEDURE — 99284 EMERGENCY DEPT VISIT MOD MDM: CPT | Performed by: EMERGENCY MEDICINE

## 2019-08-11 PROCEDURE — 83690 ASSAY OF LIPASE: CPT | Performed by: EMERGENCY MEDICINE

## 2019-08-11 PROCEDURE — 99284 EMERGENCY DEPT VISIT MOD MDM: CPT

## 2019-08-11 PROCEDURE — 85025 COMPLETE CBC W/AUTO DIFF WBC: CPT | Performed by: EMERGENCY MEDICINE

## 2019-08-11 PROCEDURE — 80053 COMPREHEN METABOLIC PANEL: CPT | Performed by: EMERGENCY MEDICINE

## 2019-08-11 PROCEDURE — 74177 CT ABD & PELVIS W/CONTRAST: CPT

## 2019-08-11 PROCEDURE — 36415 COLL VENOUS BLD VENIPUNCTURE: CPT | Performed by: EMERGENCY MEDICINE

## 2019-08-11 PROCEDURE — 93005 ELECTROCARDIOGRAM TRACING: CPT

## 2019-08-11 PROCEDURE — 93010 ELECTROCARDIOGRAM REPORT: CPT | Performed by: INTERNAL MEDICINE

## 2019-08-11 PROCEDURE — 81003 URINALYSIS AUTO W/O SCOPE: CPT

## 2019-08-11 PROCEDURE — 96361 HYDRATE IV INFUSION ADD-ON: CPT

## 2019-08-11 RX ORDER — NAPROXEN SODIUM 220 MG
220 TABLET ORAL DAILY
COMMUNITY
End: 2020-06-07 | Stop reason: HOSPADM

## 2019-08-11 RX ORDER — SIMVASTATIN 20 MG
20 TABLET ORAL
COMMUNITY

## 2019-08-11 RX ORDER — PREGABALIN 100 MG/1
100 CAPSULE ORAL 2 TIMES DAILY
COMMUNITY

## 2019-08-11 RX ORDER — ONDANSETRON 2 MG/ML
4 INJECTION INTRAMUSCULAR; INTRAVENOUS ONCE
Status: COMPLETED | OUTPATIENT
Start: 2019-08-11 | End: 2019-08-11

## 2019-08-11 RX ORDER — ONDANSETRON 4 MG/1
4 TABLET, ORALLY DISINTEGRATING ORAL EVERY 6 HOURS PRN
Qty: 20 TABLET | Refills: 0 | Status: ON HOLD | OUTPATIENT
Start: 2019-08-11 | End: 2022-08-01 | Stop reason: CLARIF

## 2019-08-11 RX ADMIN — ONDANSETRON 4 MG: 2 INJECTION INTRAMUSCULAR; INTRAVENOUS at 11:27

## 2019-08-11 RX ADMIN — IOHEXOL 85 ML: 350 INJECTION, SOLUTION INTRAVENOUS at 12:19

## 2019-08-11 RX ADMIN — SODIUM CHLORIDE 1000 ML: 0.9 INJECTION, SOLUTION INTRAVENOUS at 11:26

## 2019-08-11 NOTE — ED ATTENDING ATTESTATION
Jaqueline Hicks MD, saw and evaluated the patient  I have discussed the patient with the resident/non-physician practitioner and agree with the resident's/non-physician practitioner's findings, Plan of Care, and MDM as documented in the resident's/non-physician practitioner's note, except where noted  All available labs and Radiology studies were reviewed  I was present for key portions of any procedure(s) performed by the resident/non-physician practitioner and I was immediately available to provide assistance  At this point I agree with the current assessment done in the Emergency Department  I have conducted an independent evaluation of this patient a history and physical is as follows:      Critical Care Time  Procedures     74 yo female with hld, ckd, colitis, colectomy, choley presents with crampy abdominal pain, nausea and unable to vomit  Pt with chest pain  Pt with small bm this morning but no gas since yesterday  Pt with generalized abdominal pain  Pt with chills, no fever  No sob  Pt with suprapubic pain for dysuria  Pt takes miralax  Vss, afebrile, lungs cta, rrr, abdomen soft distended, tender generalized, no rebound  Labs, urine, ct a/p, ivf, zofran,pain meds

## 2019-08-11 NOTE — DISCHARGE INSTRUCTIONS
Please continue to take Mirilax for your constipation  Please be sure to eat plenty of fruits and vegetables as these will help your bowel habits  You should follow-up with a GI doctor here locally, we have given you a referral to Dr Gayle Dumont who's office may be able to accommodate you as a new patient  You may take zofran which we have given you a prescription for as needed for nausea  You were found to have a recurrent ventral hernia on your CT scan  Please see our general surgery clinic for evaluation for this, as you may need an operation to repair it  Please also follow-up with your PCP to discuss your visit today  Please return to the ED if you experience severe abdominal pain, inability to pass flatus or severe constipation that doesn't respond to Mirilax, rectal bleeding or black/bloody stools, chest pain, difficulty breathing, or other symptoms that are concerning to you

## 2019-08-11 NOTE — ED PROVIDER NOTES
History  Chief Complaint   Patient presents with    Abdominal Pain     "i think my intestines are shutting down " pt c/o n/v/d and abd pain x2 days  76 yo F with h/o colitis s/p colectomy 7 years prior, cholecystectomy, HTN, HLD, CKD, presenting for crampy abdominal pain since this morning associated with nausea and belching  Is unable to vomit even after trying  Last bowel movement was this morning and was very small, is still passing flatus  These symptoms were concerning to her for a bowel obstruction, which is why patient is presenting today  Takes Mirilax every night to keep regular, and usually has a bowel movement a day  Overnight does remember feeling some chills, but no fevers reported  No recent diet changes, chest pain, SOB, dysuria, hematuria, or bloody/black stools  History provided by:  Patient   used: No    Abdominal Pain   Pain location:  Suprapubic  Pain quality: cramping    Pain quality: not aching    Pain radiates to:  Does not radiate  Pain severity:  Moderate  Onset quality:  Gradual  Duration:  1 day  Timing:  Intermittent  Progression:  Unchanged  Chronicity:  New  Context: retching    Context: not awakening from sleep, not diet changes, not eating and not suspicious food intake    Relieved by:  Nothing  Worsened by:  Nothing  Ineffective treatments: Bowel activity, not moving, belching, flatus, lying down and eating  Associated symptoms: constipation, flatus, hematuria and nausea    Associated symptoms: no anorexia, no chest pain, no chills, no cough, no diarrhea, no dysuria, no fatigue, no fever, no melena, no shortness of breath, no sore throat and no vomiting    Risk factors: multiple surgeries    Risk factors: no alcohol abuse, no aspirin use, not elderly, no NSAID use, not obese and no recent hospitalization        Prior to Admission Medications   Prescriptions Last Dose Informant Patient Reported?  Taking?   acetaminophen (TYLENOL) 325 mg tablet Not Taking at Unknown time  Yes No   Sig: Take 650 mg by mouth 2 (two) times a day   acetaminophen (TYLENOL) 325 mg tablet Not Taking at Unknown time  No No   Sig: Take 1 tablet (325 mg total) by mouth every 6 (six) hours as needed for mild pain or moderate pain   Patient not taking: Reported on 2019   diclofenac sodium (VOLTAREN) 1 % Not Taking at Unknown time  No No   Sig: Apply 2 g topically 3 (three) times a day as needed (Pain)   Patient not taking: Reported on 2019   gabapentin (NEURONTIN) 300 mg capsule Not Taking at Unknown time  Yes No   Sig: Take 100 mg by mouth 2 (two) times a day   montelukast (SINGULAIR) 10 mg tablet 2019 at Unknown time  Yes Yes   Sig: Take 10 mg by mouth daily   naproxen sodium (ALEVE) 220 MG tablet 2019 at Unknown time  Yes Yes   Sig: Take 220 mg by mouth daily   pregabalin (LYRICA) 75 mg capsule 2019 at Unknown time  Yes Yes   Sig: Take 75 mg by mouth 2 (two) times a day   simvastatin (ZOCOR) 20 mg tablet 8/10/2019 at Unknown time  Yes Yes   Sig: Take 20 mg by mouth daily at bedtime      Facility-Administered Medications: None       Past Medical History:   Diagnosis Date    Chronic kidney disease     Horse shoe kidney    Colitis     Hyperlipidemia        Past Surgical History:   Procedure Laterality Date    BACK SURGERY      2 rods placed in back    COLON SURGERY      COLONOSCOPY N/A 2018    Procedure: COLONOSCOPY;  Surgeon: Pau Catherine MD;  Location: [facility]; Service: Colorectal    JOINT REPLACEMENT Bilateral        History reviewed  No pertinent family history  I have reviewed and agree with the history as documented      Social History     Tobacco Use    Smoking status: Former Smoker     Last attempt to quit: 1970     Years since quittin 6    Smokeless tobacco: Never Used   Substance Use Topics    Alcohol use: Yes     Comment: once in awhile    Drug use: No        Review of Systems   Constitutional: Negative for chills, fatigue and fever    HENT: Negative for sore throat  Eyes: Negative for redness and visual disturbance  Respiratory: Negative for cough and shortness of breath  Cardiovascular: Negative for chest pain, palpitations and leg swelling  Gastrointestinal: Positive for abdominal pain, constipation, flatus and nausea  Negative for anorexia, diarrhea, melena and vomiting  Genitourinary: Positive for hematuria  Negative for difficulty urinating, dysuria and pelvic pain  Musculoskeletal: Negative for back pain  Skin: Negative for rash  Neurological: Negative for syncope, weakness and headaches  Psychiatric/Behavioral: Negative for confusion  Physical Exam  ED Triage Vitals   Temperature Pulse Respirations Blood Pressure SpO2   08/11/19 1030 08/11/19 1030 08/11/19 1030 08/11/19 1030 08/11/19 1030   97 8 °F (36 6 °C) 68 18 134/71 97 %      Temp Source Heart Rate Source Patient Position - Orthostatic VS BP Location FiO2 (%)   08/11/19 1030 08/11/19 1030 08/11/19 1030 08/11/19 1407 --   Oral Monitor Sitting Right arm       Pain Score       08/11/19 1030       No Pain             Orthostatic Vital Signs  Vitals:    08/11/19 1030 08/11/19 1407   BP: 134/71 120/60   Pulse: 68 63   Patient Position - Orthostatic VS: Sitting Lying       Physical Exam   Constitutional: She is oriented to person, place, and time  She appears well-developed and well-nourished  No distress  HENT:   Head: Normocephalic and atraumatic  Eyes: Pupils are equal, round, and reactive to light  Conjunctivae are normal    Neck: Normal range of motion  Cardiovascular: Normal rate, regular rhythm and normal heart sounds  No murmur heard  Pulmonary/Chest: Effort normal and breath sounds normal  No respiratory distress  She has no wheezes  She has no rales  Abdominal: Soft  Bowel sounds are normal  There is tenderness (generalilzed TTP)  Musculoskeletal: Normal range of motion     Neurological: She is alert and oriented to person, place, and time  No cranial nerve deficit or sensory deficit  She exhibits normal muscle tone  Coordination normal    Skin: Skin is warm and dry  No rash noted  Psychiatric: She has a normal mood and affect  Nursing note and vitals reviewed        ED Medications  Medications   ondansetron (ZOFRAN) injection 4 mg (4 mg Intravenous Given 8/11/19 1127)   sodium chloride 0 9 % bolus 1,000 mL (0 mL Intravenous Stopped 8/11/19 1407)   iohexol (OMNIPAQUE) 350 MG/ML injection (MULTI-DOSE) 85 mL (85 mL Intravenous Given 8/11/19 1219)       Diagnostic Studies  Results Reviewed     Procedure Component Value Units Date/Time    POCT urinalysis dipstick [[id number]]  (Normal) Resulted:  08/11/19 1300    Lab Status:  Final result Specimen:  Urine Updated:  08/11/19 1301     Color, UA see results    ED Urine Macroscopic [[id number]] Collected:  08/11/19 1248    Lab Status:  Final result Specimen:  Urine Updated:  08/11/19 1243     Color, UA Yellow     Clarity, UA Clear     pH, UA 7 5     Leukocytes, UA Negative     Nitrite, UA Negative     Protein, UA Negative mg/dl      Glucose, UA Negative mg/dl      Ketones, UA Negative mg/dl      Urobilinogen, UA 0 2 E U /dl      Bilirubin, UA Negative     Blood, UA Negative     Specific Gravity, UA 1 015    Narrative:       CLINITEK RESULT    Comprehensive metabolic panel [[id number]]  (Abnormal) Collected:  08/11/19 1133    Lab Status:  Final result Specimen:  Blood from Arm, Left Updated:  08/11/19 1202     Sodium 139 mmol/L      Potassium 4 5 mmol/L      Chloride 106 mmol/L      CO2 29 mmol/L      ANION GAP 4 mmol/L      BUN 12 mg/dL      Creatinine 0 51 mg/dL      Glucose 92 mg/dL      Calcium 9 3 mg/dL      AST 20 U/L      ALT 27 U/L      Alkaline Phosphatase 63 U/L      Total Protein 7 7 g/dL      Albumin 4 0 g/dL      Total Bilirubin 0 42 mg/dL      eGFR 94 ml/min/1 73sq m     Narrative:       [place] guidelines for Chronic Kidney Disease (CKD):     Stage 1 with normal or high GFR (GFR > 90 mL/min/1 73 square meters)    Stage 2 Mild CKD (GFR = 60-89 mL/min/1 73 square meters)    Stage 3A Moderate CKD (GFR = 45-59 mL/min/1 73 square meters)    Stage 3B Moderate CKD (GFR = 30-44 mL/min/1 73 square meters)    Stage 4 Severe CKD (GFR = 15-29 mL/min/1 73 square meters)    Stage 5 End Stage CKD (GFR <15 mL/min/1 73 square meters)  Note: GFR calculation is accurate only with a steady state creatinine    Lipase [[id number]]  (Normal) Collected:  08/11/19 1133    Lab Status:  Final result Specimen:  Blood from Arm, Left Updated:  08/11/19 1202     Lipase 92 u/L     Troponin I [[id number]]  (Normal) Collected:  08/11/19 1133    Lab Status:  Final result Specimen:  Blood from Arm, Left Updated:  08/11/19 1201     Troponin I <0 02 ng/mL     CBC and differential [[id number]]  (Abnormal) Collected:  08/11/19 1133    Lab Status:  Final result Specimen:  Blood from Arm, Left Updated:  08/11/19 1144     WBC 9 44 Thousand/uL      RBC 4 30 Million/uL      Hemoglobin 13 6 g/dL      Hematocrit 42 1 %      MCV 98 fL      MCH 31 6 pg      MCHC 32 3 g/dL      RDW 13 1 %      MPV 8 8 fL      Platelets 488 Thousands/uL      nRBC 0 /100 WBCs      Neutrophils Relative 63 %      Immat GRANS % 0 %      Lymphocytes Relative 22 %      Monocytes Relative 9 %      Eosinophils Relative 5 %      Basophils Relative 1 %      Neutrophils Absolute 5 92 Thousands/µL      Immature Grans Absolute 0 02 Thousand/uL      Lymphocytes Absolute 2 04 Thousands/µL      Monocytes Absolute 0 89 Thousand/µL      Eosinophils Absolute 0 50 Thousand/µL      Basophils Absolute 0 07 Thousands/µL                  CT abdomen pelvis with contrast   Final Result by Deven Valenzuela MD (08/11 1258)      Previous ventral hernia repair with recurrent hernia in the right lower quadrant containing small bowel but without induration or obstruction  Indeterminate left adrenal gland nodule measuring 1 3 cm   Dedicated nonemergent adrenal gland protocol CT scan could be obtained for further characterization  Horseshoe kidney  No hydronephrosis  Workstation performed: [id number]               Procedures  ECG 12 Lead Documentation Only  Date/Time: 8/11/2019 11:41 AM  Performed by: Hna Hewitt MD  Authorized by: Han Hewitt MD     Indications / Diagnosis:  Nausea  ECG reviewed by me, the ED Provider: yes    Patient location:  ED  Previous ECG:     Previous ECG:  Unavailable    Comparison to cardiac monitor: No    Interpretation:     Interpretation: non-specific    Rate:     ECG rate assessment: normal    Rhythm:     Rhythm: sinus rhythm    Ectopy:     Ectopy: none    QRS:     QRS axis:  Normal  Conduction:     Conduction: normal    ST segments:     ST segments:  Normal  T waves:     T waves: flattening and inverted      Flattening:  AVF, V4, V5 and V6    Inverted:  III and aVR  Comments:      Normal sinus with nonspecific T wave changes  ED Course           Identification of Seniors at Risk      Most Recent Value   (ISAR) Identification of Seniors at Risk   Before the illness or injury that brought you to the Emergency, did you need someone to help you on a regular basis? 0 Filed at: 08/11/2019 1109   In the last 24 hours, have you needed more help than usual?  0 Filed at: 08/11/2019 1109   Have you been hospitalized for one or more nights during the past 6 months? 0 Filed at: 08/11/2019 1109   In general, do you see well? 1 Filed at: 08/11/2019 1109   In general, do you have serious problems with your memory? 0 Filed at: 08/11/2019 1109   Do you take more than three different medications every day?   1 Filed at: 08/11/2019 1109   ISAR Score  2 Filed at: 08/11/2019 1109                          MDM  Number of Diagnoses or Management Options  Constipation: minor  Nausea: minor  Diagnosis management comments: 76 yo F with h/o colectomy and open cholecystectomy presenting for nausea, small bowel movement this morning, and some crampy abdominal pain most consistent with mild constipation given negative workup and improvement of symptoms with zofran and IVF  -CT abd/pelvis: neg for obstruction or severe fecal burden, however incidental ventral hernia (non-obstructing) at previous repair site seen  Patient informed and will follow-up with general surgery for this    -Adequate response to IVF and zofran while in ED   -New to area without GI follow-up here, given follow-up contact information and will make appointment for as soon as possible to establish care and manage ongoing constipation  -Given zofran prescription as needed for nausea at home  -Continue Mirilax daily to prevent constipation    -Discharged to home with return precautions discussed and in discharge paperwork          Amount and/or Complexity of Data Reviewed  Clinical lab tests: ordered and reviewed  Tests in the radiology section of CPT®: ordered and reviewed  Tests in the medicine section of CPT®: ordered and reviewed  Decide to obtain previous medical records or to obtain history from someone other than the patient: yes  Obtain history from someone other than the patient: yes  Review and summarize past medical records: yes  Discuss the patient with other providers: yes  Independent visualization of images, tracings, or specimens: yes    Risk of Complications, Morbidity, and/or Mortality  Presenting problems: low  Diagnostic procedures: minimal  Management options: minimal    Patient Progress  Patient progress: resolved      Disposition  Final diagnoses:   Nausea   Constipation     Time reflects when diagnosis was documented in both MDM as applicable and the Disposition within this note     Time User Action Codes Description Comment    8/11/2019  1:32 PM Guadalupe Kimble Add [R11 0] Nausea     8/11/2019  1:32 PM Guadalupe Kimble Add [K59 00] Constipation       ED Disposition     ED Disposition Condition Date/Time Comment    Discharge Good Sun Aug 11, 2019  1:32 PM Doc Proper discharge to home/self care  Follow-up Information     Follow up With Specialties Details Why Contact Info Additional [address] [facility] [address] General Surgery Schedule an appointment as soon as possible for a visit in 2 days To be evaluated for ventral hernia shown on your CT scan  [address] [facility] [address], [address] [place] [address] Sagar Ma, [address] [place] [address], [id number]    James Villalta MD Colon and Rectal Surgery Schedule an appointment as soon as possible for a visit in 1 week For reevaluation as we discussed  [address] [facility]       Victoria Rashid DO Family Medicine Schedule an appointment as soon as possible for a visit in 1 week For reevaluation as we discussed  [address]             Discharge Medication List as of 8/11/2019  1:44 PM      START taking these medications    Details   ondansetron (ZOFRAN-ODT) 4 mg disintegrating tablet Take 1 tablet (4 mg total) by mouth every 6 (six) hours as needed for nausea or vomiting, Starting Sun 8/11/2019, Print         CONTINUE these medications which have NOT CHANGED    Details   montelukast (SINGULAIR) 10 mg tablet Take 10 mg by mouth daily, Historical Med      !! acetaminophen (TYLENOL) 325 mg tablet Take 650 mg by mouth 2 (two) times a day, Historical Med      !! acetaminophen (TYLENOL) 325 mg tablet Take 1 tablet (325 mg total) by mouth every 6 (six) hours as needed for mild pain or moderate pain, Starting u 6/27/2019, Print      diclofenac sodium (VOLTAREN) 1 % Apply 2 g topically 3 (three) times a day as needed (Pain), Starting u 6/27/2019, Print      gabapentin (NEURONTIN) 300 mg capsule Take 100 mg by mouth 2 (two) times a day, Historical Med       !! - Potential duplicate medications found  Please discuss with provider          No discharge procedures on file  ED Provider  Attending physically available and evaluated Tico Darnell I managed the patient along with the ED Attending      Electronically Signed by         Fiona Taylor MD  08/11/19 0381

## 2019-10-14 ENCOUNTER — TRANSCRIBE ORDERS (OUTPATIENT)
Dept: ADMINISTRATIVE | Facility: HOSPITAL | Age: 74
End: 2019-10-14

## 2019-10-14 DIAGNOSIS — R07.9 CHEST PAIN, UNSPECIFIED TYPE: Primary | ICD-10-CM

## 2019-10-17 ENCOUNTER — HOSPITAL ENCOUNTER (OUTPATIENT)
Dept: NON INVASIVE DIAGNOSTICS | Facility: CLINIC | Age: 74
Discharge: HOME | End: 2019-10-17
Payer: MEDICARE

## 2019-10-17 DIAGNOSIS — R07.9 CHEST PAIN, UNSPECIFIED TYPE: ICD-10-CM

## 2019-10-17 LAB
CHEST PAIN STATEMENT: NORMAL
MAX DIASTOLIC BP: 70 MMHG
MAX HEART RATE: 118 BPM
MAX PREDICTED HEART RATE: 145 BPM
MAX. SYSTOLIC BP: 152 MMHG
PROTOCOL NAME: NORMAL
REASON FOR TERMINATION: NORMAL
TARGET HR FORMULA: NORMAL
TEST INDICATION: NORMAL
TIME IN EXERCISE PHASE: NORMAL

## 2019-10-17 PROCEDURE — 78452 HT MUSCLE IMAGE SPECT MULT: CPT | Performed by: INTERNAL MEDICINE

## 2019-10-17 PROCEDURE — 93018 CV STRESS TEST I&R ONLY: CPT | Performed by: INTERNAL MEDICINE

## 2019-10-17 PROCEDURE — A9502 TC99M TETROFOSMIN: HCPCS

## 2019-10-17 PROCEDURE — 93017 CV STRESS TEST TRACING ONLY: CPT

## 2019-10-17 PROCEDURE — 78452 HT MUSCLE IMAGE SPECT MULT: CPT

## 2019-10-17 PROCEDURE — 93016 CV STRESS TEST SUPVJ ONLY: CPT | Performed by: INTERNAL MEDICINE

## 2019-10-17 RX ADMIN — REGADENOSON 0.4 MG: 0.08 INJECTION, SOLUTION INTRAVENOUS at 13:35

## 2020-05-28 ENCOUNTER — HOSPITAL ENCOUNTER (EMERGENCY)
Facility: HOSPITAL | Age: 75
Discharge: HOME | End: 2020-05-28
Attending: EMERGENCY MEDICINE | Admitting: EMERGENCY MEDICINE
Payer: MEDICARE

## 2020-05-28 ENCOUNTER — APPOINTMENT (EMERGENCY)
Dept: RADIOLOGY | Facility: HOSPITAL | Age: 75
End: 2020-05-28
Payer: MEDICARE

## 2020-05-28 VITALS
WEIGHT: 155 LBS | HEIGHT: 60 IN | HEART RATE: 66 BPM | OXYGEN SATURATION: 97 % | TEMPERATURE: 98.6 F | BODY MASS INDEX: 30.43 KG/M2 | RESPIRATION RATE: 18 BRPM | DIASTOLIC BLOOD PRESSURE: 63 MMHG | SYSTOLIC BLOOD PRESSURE: 120 MMHG

## 2020-05-28 DIAGNOSIS — M54.9 BACK PAIN: Primary | ICD-10-CM

## 2020-05-28 LAB
ANION GAP SERPL CALCULATED.3IONS-SCNC: 2 MMOL/L (ref 4–13)
BACTERIA UR QL AUTO: NORMAL /HPF
BASOPHILS # BLD AUTO: 0.06 THOUSANDS/ΜL (ref 0–0.1)
BASOPHILS NFR BLD AUTO: 1 % (ref 0–1)
BILIRUB UR QL STRIP: NEGATIVE
BUN SERPL-MCNC: 9 MG/DL (ref 5–25)
CALCIUM SERPL-MCNC: 8.8 MG/DL (ref 8.3–10.1)
CHLORIDE SERPL-SCNC: 104 MMOL/L (ref 100–108)
CLARITY UR: CLEAR
CO2 SERPL-SCNC: 29 MMOL/L (ref 21–32)
COLOR UR: YELLOW
CREAT SERPL-MCNC: 0.57 MG/DL (ref 0.6–1.3)
EOSINOPHIL # BLD AUTO: 0.07 THOUSAND/ΜL (ref 0–0.61)
EOSINOPHIL NFR BLD AUTO: 1 % (ref 0–6)
ERYTHROCYTE [DISTWIDTH] IN BLOOD BY AUTOMATED COUNT: 13.5 % (ref 11.6–15.1)
GFR SERPL CREATININE-BSD FRML MDRD: 90 ML/MIN/1.73SQ M
GLUCOSE SERPL-MCNC: 87 MG/DL (ref 65–140)
GLUCOSE UR STRIP-MCNC: NEGATIVE MG/DL
HCT VFR BLD AUTO: 42.7 % (ref 34.8–46.1)
HGB BLD-MCNC: 14 G/DL (ref 11.5–15.4)
HGB UR QL STRIP.AUTO: NEGATIVE
IMM GRANULOCYTES # BLD AUTO: 0.01 THOUSAND/UL (ref 0–0.2)
IMM GRANULOCYTES NFR BLD AUTO: 0 % (ref 0–2)
KETONES UR STRIP-MCNC: NEGATIVE MG/DL
LEUKOCYTE ESTERASE UR QL STRIP: ABNORMAL
LYMPHOCYTES # BLD AUTO: 1.59 THOUSANDS/ΜL (ref 0.6–4.47)
LYMPHOCYTES NFR BLD AUTO: 28 % (ref 14–44)
MCH RBC QN AUTO: 31.9 PG (ref 26.8–34.3)
MCHC RBC AUTO-ENTMCNC: 32.8 G/DL (ref 31.4–37.4)
MCV RBC AUTO: 97 FL (ref 82–98)
MONOCYTES # BLD AUTO: 1.19 THOUSAND/ΜL (ref 0.17–1.22)
MONOCYTES NFR BLD AUTO: 21 % (ref 4–12)
NEUTROPHILS # BLD AUTO: 2.75 THOUSANDS/ΜL (ref 1.85–7.62)
NEUTS SEG NFR BLD AUTO: 49 % (ref 43–75)
NITRITE UR QL STRIP: NEGATIVE
NON-SQ EPI CELLS URNS QL MICRO: NORMAL /HPF
NRBC BLD AUTO-RTO: 0 /100 WBCS
PH UR STRIP.AUTO: 7 [PH] (ref 4.5–8)
PLATELET # BLD AUTO: 255 THOUSANDS/UL (ref 149–390)
PMV BLD AUTO: 8.9 FL (ref 8.9–12.7)
POTASSIUM SERPL-SCNC: 4.2 MMOL/L (ref 3.5–5.3)
PROT UR STRIP-MCNC: NEGATIVE MG/DL
RBC # BLD AUTO: 4.39 MILLION/UL (ref 3.81–5.12)
RBC #/AREA URNS AUTO: NORMAL /HPF
SODIUM SERPL-SCNC: 135 MMOL/L (ref 136–145)
SP GR UR STRIP.AUTO: 1.01 (ref 1–1.03)
UROBILINOGEN UR QL STRIP.AUTO: 0.2 E.U./DL
WBC # BLD AUTO: 5.67 THOUSAND/UL (ref 4.31–10.16)
WBC #/AREA URNS AUTO: NORMAL /HPF

## 2020-05-28 PROCEDURE — 51798 US URINE CAPACITY MEASURE: CPT

## 2020-05-28 PROCEDURE — 80048 BASIC METABOLIC PNL TOTAL CA: CPT | Performed by: EMERGENCY MEDICINE

## 2020-05-28 PROCEDURE — 99284 EMERGENCY DEPT VISIT MOD MDM: CPT

## 2020-05-28 PROCEDURE — 74176 CT ABD & PELVIS W/O CONTRAST: CPT

## 2020-05-28 PROCEDURE — 36415 COLL VENOUS BLD VENIPUNCTURE: CPT | Performed by: EMERGENCY MEDICINE

## 2020-05-28 PROCEDURE — 96375 TX/PRO/DX INJ NEW DRUG ADDON: CPT

## 2020-05-28 PROCEDURE — 85025 COMPLETE CBC W/AUTO DIFF WBC: CPT | Performed by: EMERGENCY MEDICINE

## 2020-05-28 PROCEDURE — 96374 THER/PROPH/DIAG INJ IV PUSH: CPT

## 2020-05-28 PROCEDURE — 99284 EMERGENCY DEPT VISIT MOD MDM: CPT | Performed by: EMERGENCY MEDICINE

## 2020-05-28 PROCEDURE — 81001 URINALYSIS AUTO W/SCOPE: CPT

## 2020-05-28 RX ORDER — NAPROXEN 375 MG/1
375 TABLET ORAL 2 TIMES DAILY WITH MEALS
Qty: 20 TABLET | Refills: 0 | Status: ON HOLD | OUTPATIENT
Start: 2020-05-28 | End: 2022-08-01 | Stop reason: CLARIF

## 2020-05-28 RX ORDER — KETOROLAC TROMETHAMINE 30 MG/ML
15 INJECTION, SOLUTION INTRAMUSCULAR; INTRAVENOUS ONCE
Status: COMPLETED | OUTPATIENT
Start: 2020-05-28 | End: 2020-05-28

## 2020-05-28 RX ORDER — ACETAMINOPHEN 325 MG/1
975 TABLET ORAL ONCE
Status: COMPLETED | OUTPATIENT
Start: 2020-05-28 | End: 2020-05-28

## 2020-05-28 RX ORDER — OXYCODONE HYDROCHLORIDE 5 MG/1
5 TABLET ORAL EVERY 6 HOURS PRN
Qty: 10 TABLET | Refills: 0 | Status: SHIPPED | OUTPATIENT
Start: 2020-05-28 | End: 2021-12-01 | Stop reason: HOSPADM

## 2020-05-28 RX ORDER — LIDOCAINE 50 MG/G
1 PATCH TOPICAL ONCE
Status: DISCONTINUED | OUTPATIENT
Start: 2020-05-28 | End: 2020-05-28 | Stop reason: HOSPADM

## 2020-05-28 RX ORDER — FENTANYL CITRATE 50 UG/ML
50 INJECTION, SOLUTION INTRAMUSCULAR; INTRAVENOUS ONCE
Status: COMPLETED | OUTPATIENT
Start: 2020-05-28 | End: 2020-05-28

## 2020-05-28 RX ORDER — LIDOCAINE 50 MG/G
1 PATCH TOPICAL DAILY
Qty: 6 PATCH | Refills: 0 | Status: SHIPPED | OUTPATIENT
Start: 2020-05-28

## 2020-05-28 RX ADMIN — LIDOCAINE 1 PATCH: 50 PATCH TOPICAL at 11:12

## 2020-05-28 RX ADMIN — KETOROLAC TROMETHAMINE 15 MG: 30 INJECTION, SOLUTION INTRAMUSCULAR at 12:30

## 2020-05-28 RX ADMIN — ACETAMINOPHEN 975 MG: 325 TABLET ORAL at 11:11

## 2020-05-28 RX ADMIN — FENTANYL CITRATE 50 MCG: 50 INJECTION INTRAMUSCULAR; INTRAVENOUS at 11:30

## 2020-06-06 ENCOUNTER — HOSPITAL ENCOUNTER (OUTPATIENT)
Facility: HOSPITAL | Age: 75
Setting detail: OBSERVATION
Discharge: HOME | End: 2020-06-07
Attending: EMERGENCY MEDICINE | Admitting: INTERNAL MEDICINE
Payer: MEDICARE

## 2020-06-06 ENCOUNTER — APPOINTMENT (EMERGENCY)
Dept: RADIOLOGY | Facility: HOSPITAL | Age: 75
End: 2020-06-06
Payer: MEDICARE

## 2020-06-06 DIAGNOSIS — R11.0 NAUSEA: ICD-10-CM

## 2020-06-06 DIAGNOSIS — R10.84 GENERALIZED ABDOMINAL PAIN: ICD-10-CM

## 2020-06-06 DIAGNOSIS — U07.1 COVID-19: Primary | ICD-10-CM

## 2020-06-06 DIAGNOSIS — R07.9 CHEST PAIN, UNSPECIFIED TYPE: ICD-10-CM

## 2020-06-06 LAB
ALBUMIN SERPL BCP-MCNC: 2.9 G/DL (ref 3.5–5)
ALP SERPL-CCNC: 50 U/L (ref 46–116)
ALT SERPL W P-5'-P-CCNC: 44 U/L (ref 12–78)
ANION GAP SERPL CALCULATED.3IONS-SCNC: 5 MMOL/L (ref 4–13)
AST SERPL W P-5'-P-CCNC: 57 U/L (ref 5–45)
ATRIAL RATE: 71 BPM
ATRIAL RATE: 75 BPM
BACTERIA UR QL AUTO: ABNORMAL /HPF
BASOPHILS # BLD AUTO: 0.01 THOUSANDS/ΜL (ref 0–0.1)
BASOPHILS NFR BLD AUTO: 0 % (ref 0–1)
BILIRUB SERPL-MCNC: 0.31 MG/DL (ref 0.2–1)
BILIRUB UR QL STRIP: NEGATIVE
BUN SERPL-MCNC: 13 MG/DL (ref 5–25)
CALCIUM SERPL-MCNC: 8.6 MG/DL (ref 8.3–10.1)
CHLORIDE SERPL-SCNC: 103 MMOL/L (ref 100–108)
CLARITY UR: CLEAR
CO2 SERPL-SCNC: 27 MMOL/L (ref 21–32)
COLOR UR: YELLOW
CREAT SERPL-MCNC: 0.59 MG/DL (ref 0.6–1.3)
EOSINOPHIL # BLD AUTO: 0.01 THOUSAND/ΜL (ref 0–0.61)
EOSINOPHIL NFR BLD AUTO: 0 % (ref 0–6)
ERYTHROCYTE [DISTWIDTH] IN BLOOD BY AUTOMATED COUNT: 13.2 % (ref 11.6–15.1)
GFR SERPL CREATININE-BSD FRML MDRD: 89 ML/MIN/1.73SQ M
GLUCOSE SERPL-MCNC: 98 MG/DL (ref 65–140)
GLUCOSE UR STRIP-MCNC: NEGATIVE MG/DL
HCT VFR BLD AUTO: 37.6 % (ref 34.8–46.1)
HGB BLD-MCNC: 12.2 G/DL (ref 11.5–15.4)
HGB UR QL STRIP.AUTO: NEGATIVE
HYALINE CASTS #/AREA URNS LPF: ABNORMAL /LPF
IMM GRANULOCYTES # BLD AUTO: 0.03 THOUSAND/UL (ref 0–0.2)
IMM GRANULOCYTES NFR BLD AUTO: 1 % (ref 0–2)
KETONES UR STRIP-MCNC: NEGATIVE MG/DL
LEUKOCYTE ESTERASE UR QL STRIP: ABNORMAL
LIPASE SERPL-CCNC: 73 U/L (ref 73–393)
LYMPHOCYTES # BLD AUTO: 1.57 THOUSANDS/ΜL (ref 0.6–4.47)
LYMPHOCYTES NFR BLD AUTO: 25 % (ref 14–44)
MCH RBC QN AUTO: 31 PG (ref 26.8–34.3)
MCHC RBC AUTO-ENTMCNC: 32.4 G/DL (ref 31.4–37.4)
MCV RBC AUTO: 95 FL (ref 82–98)
MONOCYTES # BLD AUTO: 0.67 THOUSAND/ΜL (ref 0.17–1.22)
MONOCYTES NFR BLD AUTO: 11 % (ref 4–12)
NEUTROPHILS # BLD AUTO: 4.01 THOUSANDS/ΜL (ref 1.85–7.62)
NEUTS SEG NFR BLD AUTO: 63 % (ref 43–75)
NITRITE UR QL STRIP: NEGATIVE
NON-SQ EPI CELLS URNS QL MICRO: ABNORMAL /HPF
NRBC BLD AUTO-RTO: 0 /100 WBCS
NT-PROBNP SERPL-MCNC: 327 PG/ML
P AXIS: 1 DEGREES
P AXIS: 48 DEGREES
PH UR STRIP.AUTO: 6 [PH] (ref 4.5–8)
PLATELET # BLD AUTO: 221 THOUSANDS/UL (ref 149–390)
PMV BLD AUTO: 9.2 FL (ref 8.9–12.7)
POTASSIUM SERPL-SCNC: 4.6 MMOL/L (ref 3.5–5.3)
PR INTERVAL: 168 MS
PR INTERVAL: 170 MS
PROCALCITONIN SERPL-MCNC: <0.05 NG/ML
PROT SERPL-MCNC: 7 G/DL (ref 6.4–8.2)
PROT UR STRIP-MCNC: ABNORMAL MG/DL
QRS AXIS: 2 DEGREES
QRS AXIS: 8 DEGREES
QRSD INTERVAL: 78 MS
QRSD INTERVAL: 80 MS
QT INTERVAL: 374 MS
QT INTERVAL: 412 MS
QTC INTERVAL: 417 MS
QTC INTERVAL: 447 MS
RBC # BLD AUTO: 3.94 MILLION/UL (ref 3.81–5.12)
RBC #/AREA URNS AUTO: ABNORMAL /HPF
SARS-COV-2 RNA RESP QL NAA+PROBE: POSITIVE
SODIUM SERPL-SCNC: 135 MMOL/L (ref 136–145)
SP GR UR STRIP.AUTO: <=1.005 (ref 1–1.03)
T WAVE AXIS: -4 DEGREES
T WAVE AXIS: 9 DEGREES
TROPONIN I SERPL-MCNC: 0.03 NG/ML
TROPONIN I SERPL-MCNC: <0.02 NG/ML
UROBILINOGEN UR QL STRIP.AUTO: 0.2 E.U./DL
VENTRICULAR RATE: 71 BPM
VENTRICULAR RATE: 75 BPM
WBC # BLD AUTO: 6.3 THOUSAND/UL (ref 4.31–10.16)
WBC #/AREA URNS AUTO: ABNORMAL /HPF

## 2020-06-06 PROCEDURE — 93005 ELECTROCARDIOGRAM TRACING: CPT

## 2020-06-06 PROCEDURE — 81001 URINALYSIS AUTO W/SCOPE: CPT

## 2020-06-06 PROCEDURE — 84484 ASSAY OF TROPONIN QUANT: CPT | Performed by: INTERNAL MEDICINE

## 2020-06-06 PROCEDURE — 74174 CTA ABD&PLVS W/CONTRAST: CPT

## 2020-06-06 PROCEDURE — 71275 CT ANGIOGRAPHY CHEST: CPT

## 2020-06-06 PROCEDURE — 80053 COMPREHEN METABOLIC PANEL: CPT | Performed by: EMERGENCY MEDICINE

## 2020-06-06 PROCEDURE — 83520 IMMUNOASSAY QUANT NOS NONAB: CPT | Performed by: EMERGENCY MEDICINE

## 2020-06-06 PROCEDURE — 83880 ASSAY OF NATRIURETIC PEPTIDE: CPT | Performed by: EMERGENCY MEDICINE

## 2020-06-06 PROCEDURE — 85025 COMPLETE CBC W/AUTO DIFF WBC: CPT | Performed by: EMERGENCY MEDICINE

## 2020-06-06 PROCEDURE — 94762 N-INVAS EAR/PLS OXIMTRY CONT: CPT

## 2020-06-06 PROCEDURE — 84145 PROCALCITONIN (PCT): CPT | Performed by: INTERNAL MEDICINE

## 2020-06-06 PROCEDURE — 84484 ASSAY OF TROPONIN QUANT: CPT | Performed by: EMERGENCY MEDICINE

## 2020-06-06 PROCEDURE — 83520 IMMUNOASSAY QUANT NOS NONAB: CPT | Performed by: INTERNAL MEDICINE

## 2020-06-06 PROCEDURE — 36415 COLL VENOUS BLD VENIPUNCTURE: CPT

## 2020-06-06 PROCEDURE — 99285 EMERGENCY DEPT VISIT HI MDM: CPT | Performed by: EMERGENCY MEDICINE

## 2020-06-06 PROCEDURE — 99285 EMERGENCY DEPT VISIT HI MDM: CPT

## 2020-06-06 PROCEDURE — 93010 ELECTROCARDIOGRAM REPORT: CPT | Performed by: INTERNAL MEDICINE

## 2020-06-06 PROCEDURE — U0003 INFECTIOUS AGENT DETECTION BY NUCLEIC ACID (DNA OR RNA); SEVERE ACUTE RESPIRATORY SYNDROME CORONAVIRUS 2 (SARS-COV-2) (CORONAVIRUS DISEASE [COVID-19]), AMPLIFIED PROBE TECHNIQUE, MAKING USE OF HIGH THROUGHPUT TECHNOLOGIES AS DESCRIBED BY CMS-2020-01-R: HCPCS | Performed by: EMERGENCY MEDICINE

## 2020-06-06 PROCEDURE — 83690 ASSAY OF LIPASE: CPT | Performed by: EMERGENCY MEDICINE

## 2020-06-06 RX ORDER — MONTELUKAST SODIUM 10 MG/1
10 TABLET ORAL DAILY
Status: DISCONTINUED | OUTPATIENT
Start: 2020-06-07 | End: 2020-06-07 | Stop reason: HOSPADM

## 2020-06-06 RX ORDER — ACETAMINOPHEN 325 MG/1
650 TABLET ORAL 2 TIMES DAILY
Status: DISCONTINUED | OUTPATIENT
Start: 2020-06-06 | End: 2020-06-07 | Stop reason: HOSPADM

## 2020-06-06 RX ORDER — MELATONIN
2000 DAILY
Status: DISCONTINUED | OUTPATIENT
Start: 2020-06-07 | End: 2020-06-06

## 2020-06-06 RX ORDER — LIDOCAINE 50 MG/G
1 PATCH TOPICAL DAILY
Status: DISCONTINUED | OUTPATIENT
Start: 2020-06-07 | End: 2020-06-07 | Stop reason: HOSPADM

## 2020-06-06 RX ORDER — ONDANSETRON 2 MG/ML
4 INJECTION INTRAMUSCULAR; INTRAVENOUS EVERY 6 HOURS PRN
Status: DISCONTINUED | OUTPATIENT
Start: 2020-06-06 | End: 2020-06-07

## 2020-06-06 RX ORDER — OXYCODONE HYDROCHLORIDE 10 MG/1
10 TABLET ORAL EVERY 4 HOURS PRN
Status: DISCONTINUED | OUTPATIENT
Start: 2020-06-06 | End: 2020-06-07 | Stop reason: HOSPADM

## 2020-06-06 RX ORDER — GABAPENTIN 100 MG/1
100 CAPSULE ORAL 2 TIMES DAILY
Status: DISCONTINUED | OUTPATIENT
Start: 2020-06-06 | End: 2020-06-07 | Stop reason: HOSPADM

## 2020-06-06 RX ORDER — OXYCODONE HYDROCHLORIDE 5 MG/1
5 TABLET ORAL EVERY 6 HOURS PRN
Status: DISCONTINUED | OUTPATIENT
Start: 2020-06-06 | End: 2020-06-07 | Stop reason: HOSPADM

## 2020-06-06 RX ORDER — ONDANSETRON 4 MG/1
4 TABLET, ORALLY DISINTEGRATING ORAL EVERY 6 HOURS PRN
Status: DISCONTINUED | OUTPATIENT
Start: 2020-06-06 | End: 2020-06-07 | Stop reason: HOSPADM

## 2020-06-06 RX ORDER — MELATONIN
2000 DAILY
Status: DISCONTINUED | OUTPATIENT
Start: 2020-06-07 | End: 2020-06-07 | Stop reason: HOSPADM

## 2020-06-06 RX ORDER — PRAVASTATIN SODIUM 40 MG
40 TABLET ORAL
Status: DISCONTINUED | OUTPATIENT
Start: 2020-06-07 | End: 2020-06-07 | Stop reason: HOSPADM

## 2020-06-06 RX ADMIN — ACETAMINOPHEN 650 MG: 325 TABLET ORAL at 21:28

## 2020-06-06 RX ADMIN — GABAPENTIN 100 MG: 100 CAPSULE ORAL at 21:27

## 2020-06-06 RX ADMIN — IOHEXOL 100 ML: 350 INJECTION, SOLUTION INTRAVENOUS at 15:40

## 2020-06-07 VITALS
SYSTOLIC BLOOD PRESSURE: 114 MMHG | WEIGHT: 160 LBS | OXYGEN SATURATION: 98 % | RESPIRATION RATE: 16 BRPM | HEIGHT: 59 IN | HEART RATE: 62 BPM | BODY MASS INDEX: 32.25 KG/M2 | DIASTOLIC BLOOD PRESSURE: 51 MMHG | TEMPERATURE: 97.6 F

## 2020-06-07 PROBLEM — E78.2 MIXED HYPERLIPIDEMIA: Status: ACTIVE | Noted: 2020-06-07

## 2020-06-07 PROBLEM — M48.00 SPINAL STENOSIS: Status: ACTIVE | Noted: 2020-06-07

## 2020-06-07 PROBLEM — R07.89 OTHER CHEST PAIN: Status: ACTIVE | Noted: 2020-06-07

## 2020-06-07 PROBLEM — U07.1 COVID-19: Status: ACTIVE | Noted: 2020-06-07

## 2020-06-07 LAB
ALBUMIN SERPL BCP-MCNC: 2.7 G/DL (ref 3.5–5)
ALP SERPL-CCNC: 52 U/L (ref 46–116)
ALT SERPL W P-5'-P-CCNC: 45 U/L (ref 12–78)
ANION GAP SERPL CALCULATED.3IONS-SCNC: 6 MMOL/L (ref 4–13)
AST SERPL W P-5'-P-CCNC: 41 U/L (ref 5–45)
BASOPHILS # BLD MANUAL: 0 THOUSAND/UL (ref 0–0.1)
BASOPHILS NFR MAR MANUAL: 0 % (ref 0–1)
BILIRUB SERPL-MCNC: 0.41 MG/DL (ref 0.2–1)
BUN SERPL-MCNC: 10 MG/DL (ref 5–25)
CALCIUM SERPL-MCNC: 8.6 MG/DL (ref 8.3–10.1)
CHLORIDE SERPL-SCNC: 106 MMOL/L (ref 100–108)
CO2 SERPL-SCNC: 26 MMOL/L (ref 21–32)
CREAT SERPL-MCNC: 0.5 MG/DL (ref 0.6–1.3)
EOSINOPHIL # BLD MANUAL: 0.05 THOUSAND/UL (ref 0–0.4)
EOSINOPHIL NFR BLD MANUAL: 1 % (ref 0–6)
ERYTHROCYTE [DISTWIDTH] IN BLOOD BY AUTOMATED COUNT: 13.4 % (ref 11.6–15.1)
GFR SERPL CREATININE-BSD FRML MDRD: 94 ML/MIN/1.73SQ M
GLUCOSE SERPL-MCNC: 85 MG/DL (ref 65–140)
HCT VFR BLD AUTO: 38.7 % (ref 34.8–46.1)
HGB BLD-MCNC: 12.3 G/DL (ref 11.5–15.4)
LYMPHOCYTES # BLD AUTO: 0.51 THOUSAND/UL (ref 0.6–4.47)
LYMPHOCYTES # BLD AUTO: 11 % (ref 14–44)
MAGNESIUM SERPL-MCNC: 2.5 MG/DL (ref 1.6–2.6)
MCH RBC QN AUTO: 30.6 PG (ref 26.8–34.3)
MCHC RBC AUTO-ENTMCNC: 31.8 G/DL (ref 31.4–37.4)
MCV RBC AUTO: 96 FL (ref 82–98)
MONOCYTES # BLD AUTO: 0.37 THOUSAND/UL (ref 0–1.22)
MONOCYTES NFR BLD: 8 % (ref 4–12)
NEUTROPHILS # BLD MANUAL: 3.11 THOUSAND/UL (ref 1.85–7.62)
NEUTS SEG NFR BLD AUTO: 67 % (ref 43–75)
NRBC BLD AUTO-RTO: 0 /100 WBCS
PHOSPHATE SERPL-MCNC: 3.3 MG/DL (ref 2.3–4.1)
PLATELET # BLD AUTO: 244 THOUSANDS/UL (ref 149–390)
PLATELET BLD QL SMEAR: ADEQUATE
PMV BLD AUTO: 9.2 FL (ref 8.9–12.7)
POTASSIUM SERPL-SCNC: 3.9 MMOL/L (ref 3.5–5.3)
PROT SERPL-MCNC: 6.9 G/DL (ref 6.4–8.2)
RBC # BLD AUTO: 4.02 MILLION/UL (ref 3.81–5.12)
RBC MORPH BLD: NORMAL
SODIUM SERPL-SCNC: 138 MMOL/L (ref 136–145)
TROPONIN I SERPL-MCNC: <0.02 NG/ML
VARIANT LYMPHS # BLD AUTO: 13 %
WBC # BLD AUTO: 4.64 THOUSAND/UL (ref 4.31–10.16)

## 2020-06-07 PROCEDURE — 85007 BL SMEAR W/DIFF WBC COUNT: CPT | Performed by: INTERNAL MEDICINE

## 2020-06-07 PROCEDURE — 94762 N-INVAS EAR/PLS OXIMTRY CONT: CPT

## 2020-06-07 PROCEDURE — 99238 HOSP IP/OBS DSCHRG MGMT 30/<: CPT | Performed by: INTERNAL MEDICINE

## 2020-06-07 PROCEDURE — 84100 ASSAY OF PHOSPHORUS: CPT | Performed by: INTERNAL MEDICINE

## 2020-06-07 PROCEDURE — 80053 COMPREHEN METABOLIC PANEL: CPT | Performed by: INTERNAL MEDICINE

## 2020-06-07 PROCEDURE — 83735 ASSAY OF MAGNESIUM: CPT | Performed by: INTERNAL MEDICINE

## 2020-06-07 PROCEDURE — 85027 COMPLETE CBC AUTOMATED: CPT | Performed by: INTERNAL MEDICINE

## 2020-06-07 PROCEDURE — 84484 ASSAY OF TROPONIN QUANT: CPT | Performed by: INTERNAL MEDICINE

## 2020-06-07 RX ORDER — MELATONIN
2000 DAILY
Qty: 30 TABLET | Refills: 0 | Status: ON HOLD | OUTPATIENT
Start: 2020-06-08 | End: 2022-08-01 | Stop reason: CLARIF

## 2020-06-07 RX ORDER — ONDANSETRON 2 MG/ML
4 INJECTION INTRAMUSCULAR; INTRAVENOUS EVERY 6 HOURS PRN
Status: DISCONTINUED | OUTPATIENT
Start: 2020-06-07 | End: 2020-06-07 | Stop reason: HOSPADM

## 2020-06-07 RX ADMIN — ACETAMINOPHEN 650 MG: 325 TABLET ORAL at 09:40

## 2020-06-07 RX ADMIN — LIDOCAINE 1 PATCH: 50 PATCH TOPICAL at 09:40

## 2020-06-07 RX ADMIN — MELATONIN 2000 UNITS: at 09:40

## 2020-06-07 RX ADMIN — GABAPENTIN 100 MG: 100 CAPSULE ORAL at 09:40

## 2020-06-07 RX ADMIN — ENOXAPARIN SODIUM 40 MG: 40 INJECTION SUBCUTANEOUS at 09:40

## 2020-06-07 RX ADMIN — MONTELUKAST 10 MG: 10 TABLET, FILM COATED ORAL at 09:40

## 2020-06-09 LAB
IL6 SERPL-MCNC: 38.9 PG/ML (ref 0–15.5)
IL6 SERPL-MCNC: 61.5 PG/ML (ref 0–15.5)

## 2021-02-03 ENCOUNTER — IMMUNIZATIONS (OUTPATIENT)
Dept: FAMILY MEDICINE CLINIC | Facility: HOSPITAL | Age: 76
End: 2021-02-03

## 2021-02-03 DIAGNOSIS — Z23 ENCOUNTER FOR IMMUNIZATION: Primary | ICD-10-CM

## 2021-02-03 PROCEDURE — 91300 SARS-COV-2 / COVID-19 MRNA VACCINE (PFIZER-BIONTECH) 30 MCG: CPT

## 2021-02-03 PROCEDURE — 0001A SARS-COV-2 / COVID-19 MRNA VACCINE (PFIZER-BIONTECH) 30 MCG: CPT

## 2021-02-24 ENCOUNTER — IMMUNIZATIONS (OUTPATIENT)
Dept: FAMILY MEDICINE CLINIC | Facility: HOSPITAL | Age: 76
End: 2021-02-24

## 2021-02-24 DIAGNOSIS — Z23 ENCOUNTER FOR IMMUNIZATION: Primary | ICD-10-CM

## 2021-02-24 PROCEDURE — 91300 SARS-COV-2 / COVID-19 MRNA VACCINE (PFIZER-BIONTECH) 30 MCG: CPT

## 2021-02-24 PROCEDURE — 0002A SARS-COV-2 / COVID-19 MRNA VACCINE (PFIZER-BIONTECH) 30 MCG: CPT

## 2021-02-26 ENCOUNTER — HOSPITAL ENCOUNTER (EMERGENCY)
Facility: HOSPITAL | Age: 76
Discharge: HOME | End: 2021-02-27
Attending: EMERGENCY MEDICINE
Payer: MEDICARE

## 2021-02-26 DIAGNOSIS — K57.92 DIVERTICULITIS: Primary | ICD-10-CM

## 2021-02-26 DIAGNOSIS — R10.9 ABDOMINAL PAIN: ICD-10-CM

## 2021-02-26 LAB
ALBUMIN SERPL BCP-MCNC: 3.8 G/DL (ref 3.5–5)
ALP SERPL-CCNC: 61 U/L (ref 46–116)
ALT SERPL W P-5'-P-CCNC: 27 U/L (ref 12–78)
ANION GAP SERPL CALCULATED.3IONS-SCNC: 2 MMOL/L (ref 4–13)
AST SERPL W P-5'-P-CCNC: 24 U/L (ref 5–45)
BASOPHILS # BLD AUTO: 0.04 THOUSANDS/ΜL (ref 0–0.1)
BASOPHILS NFR BLD AUTO: 0 % (ref 0–1)
BILIRUB SERPL-MCNC: 0.39 MG/DL (ref 0.2–1)
BUN SERPL-MCNC: 12 MG/DL (ref 5–25)
CALCIUM SERPL-MCNC: 9.3 MG/DL (ref 8.3–10.1)
CHLORIDE SERPL-SCNC: 105 MMOL/L (ref 100–108)
CO2 SERPL-SCNC: 31 MMOL/L (ref 21–32)
CREAT SERPL-MCNC: 0.58 MG/DL (ref 0.6–1.3)
EOSINOPHIL # BLD AUTO: 0.11 THOUSAND/ΜL (ref 0–0.61)
EOSINOPHIL NFR BLD AUTO: 1 % (ref 0–6)
ERYTHROCYTE [DISTWIDTH] IN BLOOD BY AUTOMATED COUNT: 12.4 % (ref 11.6–15.1)
GFR SERPL CREATININE-BSD FRML MDRD: 89 ML/MIN/1.73SQ M
GLUCOSE SERPL-MCNC: 111 MG/DL (ref 65–140)
HCT VFR BLD AUTO: 43.9 % (ref 34.8–46.1)
HGB BLD-MCNC: 14.2 G/DL (ref 11.5–15.4)
IMM GRANULOCYTES # BLD AUTO: 0.05 THOUSAND/UL (ref 0–0.2)
IMM GRANULOCYTES NFR BLD AUTO: 0 % (ref 0–2)
LIPASE SERPL-CCNC: 63 U/L (ref 73–393)
LYMPHOCYTES # BLD AUTO: 1.1 THOUSANDS/ΜL (ref 0.6–4.47)
LYMPHOCYTES NFR BLD AUTO: 10 % (ref 14–44)
MCH RBC QN AUTO: 31.8 PG (ref 26.8–34.3)
MCHC RBC AUTO-ENTMCNC: 32.3 G/DL (ref 31.4–37.4)
MCV RBC AUTO: 98 FL (ref 82–98)
MONOCYTES # BLD AUTO: 0.71 THOUSAND/ΜL (ref 0.17–1.22)
MONOCYTES NFR BLD AUTO: 6 % (ref 4–12)
NEUTROPHILS # BLD AUTO: 9.53 THOUSANDS/ΜL (ref 1.85–7.62)
NEUTS SEG NFR BLD AUTO: 83 % (ref 43–75)
NRBC BLD AUTO-RTO: 0 /100 WBCS
PLATELET # BLD AUTO: 299 THOUSANDS/UL (ref 149–390)
PMV BLD AUTO: 8.8 FL (ref 8.9–12.7)
POTASSIUM SERPL-SCNC: 4.2 MMOL/L (ref 3.5–5.3)
PROT SERPL-MCNC: 7.6 G/DL (ref 6.4–8.2)
RBC # BLD AUTO: 4.46 MILLION/UL (ref 3.81–5.12)
SODIUM SERPL-SCNC: 138 MMOL/L (ref 136–145)
WBC # BLD AUTO: 11.54 THOUSAND/UL (ref 4.31–10.16)

## 2021-02-26 PROCEDURE — 96375 TX/PRO/DX INJ NEW DRUG ADDON: CPT

## 2021-02-26 PROCEDURE — 83690 ASSAY OF LIPASE: CPT | Performed by: EMERGENCY MEDICINE

## 2021-02-26 PROCEDURE — 96374 THER/PROPH/DIAG INJ IV PUSH: CPT

## 2021-02-26 PROCEDURE — 99284 EMERGENCY DEPT VISIT MOD MDM: CPT

## 2021-02-26 PROCEDURE — 80053 COMPREHEN METABOLIC PANEL: CPT | Performed by: EMERGENCY MEDICINE

## 2021-02-26 PROCEDURE — 36415 COLL VENOUS BLD VENIPUNCTURE: CPT | Performed by: EMERGENCY MEDICINE

## 2021-02-26 PROCEDURE — 99285 EMERGENCY DEPT VISIT HI MDM: CPT | Performed by: EMERGENCY MEDICINE

## 2021-02-26 PROCEDURE — 85025 COMPLETE CBC W/AUTO DIFF WBC: CPT | Performed by: EMERGENCY MEDICINE

## 2021-02-26 RX ORDER — ONDANSETRON 2 MG/ML
4 INJECTION INTRAMUSCULAR; INTRAVENOUS ONCE
Status: COMPLETED | OUTPATIENT
Start: 2021-02-26 | End: 2021-02-26

## 2021-02-26 RX ORDER — MORPHINE SULFATE 4 MG/ML
4 INJECTION, SOLUTION INTRAMUSCULAR; INTRAVENOUS ONCE
Status: COMPLETED | OUTPATIENT
Start: 2021-02-26 | End: 2021-02-26

## 2021-02-26 RX ADMIN — IOHEXOL 50 ML: 240 INJECTION, SOLUTION INTRATHECAL; INTRAVASCULAR; INTRAVENOUS; ORAL at 22:34

## 2021-02-26 RX ADMIN — ONDANSETRON 4 MG: 2 INJECTION INTRAMUSCULAR; INTRAVENOUS at 20:39

## 2021-02-26 RX ADMIN — MORPHINE SULFATE 4 MG: 4 INJECTION INTRAVENOUS at 20:39

## 2021-02-27 ENCOUNTER — APPOINTMENT (EMERGENCY)
Dept: RADIOLOGY | Facility: HOSPITAL | Age: 76
End: 2021-02-27
Payer: MEDICARE

## 2021-02-27 VITALS
OXYGEN SATURATION: 98 % | SYSTOLIC BLOOD PRESSURE: 150 MMHG | RESPIRATION RATE: 20 BRPM | TEMPERATURE: 97.6 F | BODY MASS INDEX: 32.25 KG/M2 | HEART RATE: 73 BPM | WEIGHT: 160 LBS | DIASTOLIC BLOOD PRESSURE: 73 MMHG | HEIGHT: 59 IN

## 2021-02-27 PROCEDURE — G1004 CDSM NDSC: HCPCS

## 2021-02-27 PROCEDURE — 74177 CT ABD & PELVIS W/CONTRAST: CPT

## 2021-02-27 RX ORDER — AMOXICILLIN AND CLAVULANATE POTASSIUM 875; 125 MG/1; MG/1
1 TABLET, FILM COATED ORAL ONCE
Status: COMPLETED | OUTPATIENT
Start: 2021-02-27 | End: 2021-02-27

## 2021-02-27 RX ORDER — AMOXICILLIN AND CLAVULANATE POTASSIUM 875; 125 MG/1; MG/1
1 TABLET, FILM COATED ORAL EVERY 12 HOURS
Qty: 20 TABLET | Refills: 0 | Status: SHIPPED | OUTPATIENT
Start: 2021-02-27 | End: 2021-03-09

## 2021-02-27 RX ADMIN — IOHEXOL 100 ML: 350 INJECTION, SOLUTION INTRAVENOUS at 00:09

## 2021-02-27 RX ADMIN — AMOXICILLIN AND CLAVULANATE POTASSIUM 1 TABLET: 875; 125 TABLET, FILM COATED ORAL at 01:15

## 2021-02-27 NOTE — ED CARE HANDOFF
Emergency Department Sign Out Note        Sign out and transfer of care from Rocio Montoya  See Separate Emergency Department note  The patient, Rozena Libman, was evaluated by the previous provider for abdominal pain  Workup Completed:  CTAP and labs    ED Course / Workup Pending (followup): Patient is currently feeling better  CT abdomen pelvis with contrast   Final Result      Thickening of scattered loops of small bowel with surrounding inflammatory changes likely representing enteritis (infectious versus inflammatory less likely ischemic)  Follow-up with gastroenterology is recommended  Colonic diverticulosis with minimal inflammation in the pelvis which may represent acute diverticulitis  The study was marked in [facility] for immediate notification              Workstation performed: [id number]           Results Reviewed     Procedure Component Value Units Date/Time    CMP [[id number]]  (Abnormal) Collected: 02/26/21 2038    Lab Status: Final result Specimen: Blood from Arm, Left Updated: 02/26/21 2114     Sodium 138 mmol/L      Potassium 4 2 mmol/L      Chloride 105 mmol/L      CO2 31 mmol/L      ANION GAP 2 mmol/L      BUN 12 mg/dL      Creatinine 0 58 mg/dL      Glucose 111 mg/dL      Calcium 9 3 mg/dL      AST 24 U/L      ALT 27 U/L      Alkaline Phosphatase 61 U/L      Total Protein 7 6 g/dL      Albumin 3 8 g/dL      Total Bilirubin 0 39 mg/dL      eGFR 89 ml/min/1 73sq m     Narrative:      [place] guidelines for Chronic Kidney Disease (CKD):     Stage 1 with normal or high GFR (GFR > 90 mL/min/1 73 square meters)    Stage 2 Mild CKD (GFR = 60-89 mL/min/1 73 square meters)    Stage 3A Moderate CKD (GFR = 45-59 mL/min/1 73 square meters)    Stage 3B Moderate CKD (GFR = 30-44 mL/min/1 73 square meters)    Stage 4 Severe CKD (GFR = 15-29 mL/min/1 73 square meters)    Stage 5 End Stage CKD (GFR <15 mL/min/1 73 square meters)  Note: GFR calculation is accurate only with a steady state creatinine    Lipase [[id number]]  (Abnormal) Collected: 02/26/21 2038    Lab Status: Final result Specimen: Blood from Arm, Left Updated: 02/26/21 2114     Lipase 63 u/L     CBC and differential [[id number]]  (Abnormal) Collected: 02/26/21 2038    Lab Status: Final result Specimen: Blood from Arm, Left Updated: 02/26/21 2108     WBC 11 54 Thousand/uL      RBC 4 46 Million/uL      Hemoglobin 14 2 g/dL      Hematocrit 43 9 %      MCV 98 fL      MCH 31 8 pg      MCHC 32 3 g/dL      RDW 12 4 %      MPV 8 8 fL      Platelets 221 Thousands/uL      nRBC 0 /100 WBCs      Neutrophils Relative 83 %      Immat GRANS % 0 %      Lymphocytes Relative 10 %      Monocytes Relative 6 %      Eosinophils Relative 1 %      Basophils Relative 0 %      Neutrophils Absolute 9 53 Thousands/µL      Immature Grans Absolute 0 05 Thousand/uL      Lymphocytes Absolute 1 10 Thousands/µL      Monocytes Absolute 0 71 Thousand/µL      Eosinophils Absolute 0 11 Thousand/µL      Basophils Absolute 0 04 Thousands/µL                                           ED Course as of Feb 27 0052 Fri Feb 26, 2021   2351 68 yo F with h/o SBO with small intestine resection in 2011; symptoms today reminiscent of SBO; CTAP pending        Procedures  MDM abdominal pain resolved; possible diverticulitis on CTAP; will DC on oral antibiotics    Disposition  Final diagnoses:   None     ED Disposition     None      Follow-up Information    None       Patient's Medications   Discharge Prescriptions    No medications on file     No discharge procedures on file         ED Provider  Electronically Signed by     Loraine Beth MD  02/27/21 6907

## 2021-02-27 NOTE — ED ATTENDING ATTESTATION
2/26/2021  ISebastián DO, saw and evaluated the patient  I have discussed the patient with the resident/non-physician practitioner and agree with the resident's/non-physician practitioner's findings, Plan of Care, and MDM as documented in the resident's/non-physician practitioner's note, except where noted  All available labs and Radiology studies were reviewed  I was present for key portions of any procedure(s) performed by the resident/non-physician practitioner and I was immediately available to provide assistance  At this point I agree with the current assessment done in the Emergency Department  I have conducted an independent evaluation of this patient a history and physical is as follows:    Patient presents for evaluation of diffuse abdominal pain associated with nausea and forced vomiting for the past couple of days  She states the symptoms feel similar to when she had bowel obstruction in 2011 for which she had 6 ft of small bowel resected in [place]  She does not recall any mass or inciting cause  Any PO intake increases pain and nausea  She has been dry heaving and forces herself to vomit to decompress her stomach for increased comfort  Pain started after having a watery, explosive episode of diarrhea  She does not think there was any blood in it  She has not had a bowel movement since then and has no urge  She has noted some blood in her vomit  No change in pain with urination  No recent travel or similar sick contacts  ROS: Denies f/c, CP, SOB or dysuria  12 system ROS o/w negative  PE: NAD, appears mildly uncomfortable, alert; PERRL, EOMI; MMM, no posterior oropharyngeal exudate, edema or erythema; HRR, no murmur; lungs CTA w/o w/r/r, POx 97% on RA (nl); abdomen soft, mildly distended, moderate TTP over lower abdomen w/o r/g/r or other peritoneal signs, higher pitched BS in all 4 quadrants; (-) LE edema, FROM extremities x4; skin p/w/d; CNs appear GI/NF, oriented  DDx: Abdominal pain - small-bowel obstruction, ileus, less likely bowel ischemia, acute appendicitis or pancreatitis or  A/P: Will check CT a/p, abdominal labs, treat symptoms, reevaluate for further work up and disposition            ED Course         Critical Care Time  Procedures

## 2021-02-27 NOTE — ED PROVIDER NOTES
History  Chief Complaint   Patient presents with    Abdominal Pain     pt reports abd pain  hx of SBO     69-year-old female with history of CKD, hyperlipidemia, multiple bowel obstructions, prior bowel resection, presents for abdominal pain and nausea and vomiting  Patient says pain started last night, constant since onset, localized across lower abdomen, worse in left lower quadrant, sharp, bloating/distended sensation, feels identical to prior bowel obstruction, nonradiating, no modifying factors  She has also had nausea with several episodes of vomiting since last night  Says last episode had small streak of bright red blood  No coffee-ground emesis  She had some loose stools yesterday  Last bowel movement was last night  Today she is not passing flatus  Denies fever, chills, cough, chest pain, SOB, headache, any other complaints  Prior to Admission Medications   Prescriptions Last Dose Informant Patient Reported?  Taking?   acetaminophen (TYLENOL) 325 mg tablet   Yes No   Sig: Take 650 mg by mouth 2 (two) times a day   acetaminophen (TYLENOL) 325 mg tablet   No No   Sig: Take 1 tablet (325 mg total) by mouth every 6 (six) hours as needed for mild pain or moderate pain   Patient not taking: Reported on 8/11/2019   cholecalciferol (VITAMIN D3) 1,000 units tablet   No No   Sig: Take 2 tablets (2,000 Units total) by mouth daily   gabapentin (NEURONTIN) 300 mg capsule   Yes No   Sig: Take 100 mg by mouth 2 (two) times a day   lidocaine (LIDODERM) 5 %   No No   Sig: Apply 1 patch topically daily Remove & Discard patch within 12 hours or as directed by MD   montelukast (SINGULAIR) 10 mg tablet   Yes No   Sig: Take 10 mg by mouth daily   naproxen (NAPROSYN) 375 mg tablet   No No   Sig: Take 1 tablet (375 mg total) by mouth 2 (two) times a day with meals   ondansetron (ZOFRAN-ODT) 4 mg disintegrating tablet   No No   Sig: Take 1 tablet (4 mg total) by mouth every 6 (six) hours as needed for nausea or vomiting   oxyCODONE (ROXICODONE) 5 mg immediate release tablet   No No   Sig: Take 1 tablet (5 mg total) by mouth every 6 (six) hours as needed for moderate pain for up to 10 dosesMax Daily Amount: 20 mg   pregabalin (LYRICA) 75 mg capsule   Yes No   Sig: Take 75 mg by mouth 2 (two) times a day   simvastatin (ZOCOR) 20 mg tablet   Yes No   Sig: Take 20 mg by mouth daily at bedtime      Facility-Administered Medications: None       Past Medical History:   Diagnosis Date    Chronic kidney disease     Horse shoe kidney    Colitis     Hyperlipidemia        Past Surgical History:   Procedure Laterality Date    BACK SURGERY      2 rods placed in back    COLON SURGERY      COLONOSCOPY N/A 2018    Procedure: COLONOSCOPY;  Surgeon: Jude Saleem MD;  Location: [facility]; Service: Colorectal    JOINT REPLACEMENT Bilateral        No family history on file  I have reviewed and agree with the history as documented  E-Cigarette/Vaping    E-Cigarette Use Never User      E-Cigarette/Vaping Substances    Nicotine No     THC No     CBD No     Flavoring No     Other No     Unknown No      Social History     Tobacco Use    Smoking status: Former Smoker     Quit date:      Years since quittin 1    Smokeless tobacco: Never Used   Substance Use Topics    Alcohol use: Yes     Comment: once in [employer] Drug use: No        Review of Systems   Constitutional: Negative  Negative for chills and fever  HENT: Negative  Negative for rhinorrhea  Eyes: Negative  Respiratory: Negative  Negative for cough and shortness of breath  Cardiovascular: Negative  Negative for chest pain and leg swelling  Gastrointestinal: Positive for abdominal distention, abdominal pain, diarrhea, nausea and vomiting  Genitourinary: Negative  Negative for dysuria, flank pain and frequency  Musculoskeletal: Negative  Negative for back pain and neck pain  Skin: Negative  Negative for rash     Neurological: Negative  Negative for light-headedness and headaches  All other systems reviewed and are negative  Physical Exam  ED Triage Vitals [02/26/21 1717]   Temperature Pulse Respirations Blood Pressure SpO2   97 7 °F (36 5 °C) 82 18 147/96 96 %      Temp Source Heart Rate Source Patient Position - Orthostatic VS BP Location FiO2 (%)   Tympanic Monitor Sitting Left arm --      Pain Score       8             Orthostatic Vital Signs  Vitals:    02/26/21 1909 02/26/21 2103 02/26/21 2110 02/26/21 2245   BP: 168/67 95/52 100/55 150/73   Pulse: 75 81  73   Patient Position - Orthostatic VS: Sitting Sitting  Sitting       Physical Exam  Vitals signs and nursing note reviewed  Constitutional:       General: She is not in acute distress  Appearance: She is well-developed  She is obese  HENT:      Head: Normocephalic and atraumatic  Mouth/Throat:      Mouth: Mucous membranes are moist    Eyes:      Pupils: Pupils are equal, round, and reactive to light  Neck:      Musculoskeletal: Normal range of motion and neck supple  Cardiovascular:      Rate and Rhythm: Normal rate and regular rhythm  Pulses:           Radial pulses are 2+ on the right side and 2+ on the left side  Heart sounds: Normal heart sounds  No murmur  No friction rub  No gallop  Pulmonary:      Effort: Pulmonary effort is normal  No respiratory distress  Breath sounds: Normal breath sounds  No stridor  No wheezing, rhonchi or rales  Abdominal:      General: Bowel sounds are decreased  There is distension  Palpations: Abdomen is soft  Tenderness: There is abdominal tenderness in the right lower quadrant, suprapubic area and left lower quadrant  There is no right CVA tenderness, left CVA tenderness, guarding or rebound  Negative signs include Babb's sign  Musculoskeletal: Normal range of motion  General: No swelling or tenderness  Right lower leg: No edema  Left lower leg: No edema     Skin: General: Skin is warm and dry  Capillary Refill: Capillary refill takes less than 2 seconds  Neurological:      Mental Status: She is alert and oriented to person, place, and time  Cranial Nerves: No cranial nerve deficit        Comments: Clear fluent speech         ED Medications  Medications   amoxicillin-clavulanate (AUGMENTIN) 875-125 mg per tablet 1 tablet (has no administration in time range)   ondansetron (ZOFRAN) injection 4 mg (4 mg Intravenous Given 2/26/21 2039)   morphine (PF) 4 mg/mL injection 4 mg (4 mg Intravenous Given 2/26/21 2039)   iohexol (OMNIPAQUE) 240 MG/ML solution 50 mL (50 mL Oral Given 2/26/21 2234)   iohexol (OMNIPAQUE) 350 MG/ML injection (MULTI-DOSE) 100 mL (100 mL Intravenous Given 2/27/21 0009)       Diagnostic Studies  Results Reviewed     Procedure Component Value Units Date/Time    CMP [[id number]]  (Abnormal) Collected: 02/26/21 2038    Lab Status: Final result Specimen: Blood from Arm, Left Updated: 02/26/21 2114     Sodium 138 mmol/L      Potassium 4 2 mmol/L      Chloride 105 mmol/L      CO2 31 mmol/L      ANION GAP 2 mmol/L      BUN 12 mg/dL      Creatinine 0 58 mg/dL      Glucose 111 mg/dL      Calcium 9 3 mg/dL      AST 24 U/L      ALT 27 U/L      Alkaline Phosphatase 61 U/L      Total Protein 7 6 g/dL      Albumin 3 8 g/dL      Total Bilirubin 0 39 mg/dL      eGFR 89 ml/min/1 73sq m     Narrative:      [place] guidelines for Chronic Kidney Disease (CKD):     Stage 1 with normal or high GFR (GFR > 90 mL/min/1 73 square meters)    Stage 2 Mild CKD (GFR = 60-89 mL/min/1 73 square meters)    Stage 3A Moderate CKD (GFR = 45-59 mL/min/1 73 square meters)    Stage 3B Moderate CKD (GFR = 30-44 mL/min/1 73 square meters)    Stage 4 Severe CKD (GFR = 15-29 mL/min/1 73 square meters)    Stage 5 End Stage CKD (GFR <15 mL/min/1 73 square meters)  Note: GFR calculation is accurate only with a steady state creatinine    Lipase [[id number]] (Abnormal) Collected: 02/26/21 2038    Lab Status: Final result Specimen: Blood from Arm, Left Updated: 02/26/21 2114     Lipase 63 u/L     CBC and differential [[id number]]  (Abnormal) Collected: 02/26/21 2038    Lab Status: Final result Specimen: Blood from Arm, Left Updated: 02/26/21 2108     WBC 11 54 Thousand/uL      RBC 4 46 Million/uL      Hemoglobin 14 2 g/dL      Hematocrit 43 9 %      MCV 98 fL      MCH 31 8 pg      MCHC 32 3 g/dL      RDW 12 4 %      MPV 8 8 fL      Platelets 086 Thousands/uL      nRBC 0 /100 WBCs      Neutrophils Relative 83 %      Immat GRANS % 0 %      Lymphocytes Relative 10 %      Monocytes Relative 6 %      Eosinophils Relative 1 %      Basophils Relative 0 %      Neutrophils Absolute 9 53 Thousands/µL      Immature Grans Absolute 0 05 Thousand/uL      Lymphocytes Absolute 1 10 Thousands/µL      Monocytes Absolute 0 71 Thousand/µL      Eosinophils Absolute 0 11 Thousand/µL      Basophils Absolute 0 04 Thousands/µL                  CT abdomen pelvis with contrast   Final Result by Tahmina Ross DO (02/27 0032)      Thickening of scattered loops of small bowel with surrounding inflammatory changes likely representing enteritis (infectious versus inflammatory less likely ischemic)  Follow-up with gastroenterology is recommended  Colonic diverticulosis with minimal inflammation in the pelvis which may represent acute diverticulitis  The study was marked in [facility] for immediate notification  Workstation performed: [id number]               Procedures  Procedures      ED Course                                       MDM  Number of Diagnoses or Management Options  Abdominal pain:   Diverticulitis:   Diagnosis management comments: 76-year-old female with history of CKD, hyperlipidemia, multiple bowel obstructions, prior bowel resection, presents for abdominal pain and nausea and vomiting    Within the differential consider bowel obstruction, diverticulitis, appendicitis, gastroenteritis, colitis  Will obtain abdominal labs and imaging to evaluate  Will treat symptomatically  Final assessment:  Workup reveals possible diverticulitis without evidence of obstruction  Will send home on Augmentin with colorectal follow-up  Patient understands that she is a complicated patient due to surgical history and at risk for developing complications from diverticulitis   She understands she must return if she develops worsening symptoms  Strict ED return precautions provided should symptoms worsen and patient can otherwise follow up outpatient  Patient expresses an understanding and agreement with the plan and remains in good condition for discharge  Amount and/or Complexity of Data Reviewed  Clinical lab tests: reviewed        Disposition  Final diagnoses:   Diverticulitis   Abdominal pain     Time reflects when diagnosis was documented in both MDM as applicable and the Disposition within this note     Time User Action Codes Description Comment    2/27/2021 12:54 AM Luz Baxter Add [K57 92] Diverticulitis     2/27/2021 12:54 AM Luz Baxter Add [R10 9] Abdominal pain       ED Disposition     ED Disposition Condition Date/Time Comment    Discharge Stable Sat Feb 27, 2021  1:10 AM Fernando Xiong discharge to home/self care              Follow-up Information     Follow up With Specialties Details Why Contact Info Additional Information    Law Conti MD Colon and Rectal Surgery Call in 1 day To make an appointment [address] [facility]  [address] [facility] Emergency Department Emergency Medicine Go to  If symptoms worsen [address]  [address] Emergency Department, [address] [place] [address], [address]   [phone]          Patient's Medications   Discharge Prescriptions    AMOXICILLIN-CLAVULANATE (AUGMENTIN) 875-125 MG PER TABLET    Take 1 tablet by mouth every 12 (twelve) hours for 10 days       Start Date: 2/27/2021 End Date: 3/9/2021       Order Dose: 1 tablet       Quantity: 20 tablet    Refills: 0     No discharge procedures on file  PDMP Review     None           ED Provider  Attending physically available and evaluated Tonio Almonte I managed the patient along with the ED Attending      Electronically Signed by         Delbert Durand MD  02/27/21 0110

## 2021-09-24 ENCOUNTER — HOSPITAL ENCOUNTER (EMERGENCY)
Facility: HOSPITAL | Age: 76
Discharge: HOME | End: 2021-09-25
Attending: EMERGENCY MEDICINE | Admitting: EMERGENCY MEDICINE
Payer: MEDICARE

## 2021-09-24 ENCOUNTER — APPOINTMENT (EMERGENCY)
Dept: RADIOLOGY | Facility: HOSPITAL | Age: 76
End: 2021-09-24
Payer: MEDICARE

## 2021-09-24 VITALS
TEMPERATURE: 97.2 F | HEIGHT: 60 IN | BODY MASS INDEX: 31.41 KG/M2 | OXYGEN SATURATION: 97 % | DIASTOLIC BLOOD PRESSURE: 61 MMHG | SYSTOLIC BLOOD PRESSURE: 128 MMHG | RESPIRATION RATE: 16 BRPM | HEART RATE: 67 BPM | WEIGHT: 160 LBS

## 2021-09-24 DIAGNOSIS — K56.609 SMALL BOWEL OBSTRUCTION (HCC): Primary | ICD-10-CM

## 2021-09-24 LAB
ALBUMIN SERPL BCP-MCNC: 4.2 G/DL (ref 3.5–5)
ALP SERPL-CCNC: 71 U/L (ref 46–116)
ALT SERPL W P-5'-P-CCNC: 30 U/L (ref 12–78)
ANION GAP SERPL CALCULATED.3IONS-SCNC: 4 MMOL/L (ref 4–13)
AST SERPL W P-5'-P-CCNC: 25 U/L (ref 5–45)
BASOPHILS # BLD AUTO: 0.04 THOUSANDS/ΜL (ref 0–0.1)
BASOPHILS NFR BLD AUTO: 0 % (ref 0–1)
BILIRUB SERPL-MCNC: 0.55 MG/DL (ref 0.2–1)
BUN SERPL-MCNC: 18 MG/DL (ref 5–25)
CALCIUM SERPL-MCNC: 10.4 MG/DL (ref 8.3–10.1)
CHLORIDE SERPL-SCNC: 103 MMOL/L (ref 100–108)
CO2 SERPL-SCNC: 30 MMOL/L (ref 21–32)
CREAT SERPL-MCNC: 0.54 MG/DL (ref 0.6–1.3)
EOSINOPHIL # BLD AUTO: 0.03 THOUSAND/ΜL (ref 0–0.61)
EOSINOPHIL NFR BLD AUTO: 0 % (ref 0–6)
ERYTHROCYTE [DISTWIDTH] IN BLOOD BY AUTOMATED COUNT: 13.5 % (ref 11.6–15.1)
GFR SERPL CREATININE-BSD FRML MDRD: 91 ML/MIN/1.73SQ M
GLUCOSE SERPL-MCNC: 140 MG/DL (ref 65–140)
HCT VFR BLD AUTO: 45.4 % (ref 34.8–46.1)
HGB BLD-MCNC: 14.7 G/DL (ref 11.5–15.4)
IMM GRANULOCYTES # BLD AUTO: 0.06 THOUSAND/UL (ref 0–0.2)
IMM GRANULOCYTES NFR BLD AUTO: 1 % (ref 0–2)
LACTATE SERPL-SCNC: 1.1 MMOL/L (ref 0.5–2)
LIPASE SERPL-CCNC: 79 U/L (ref 73–393)
LYMPHOCYTES # BLD AUTO: 0.94 THOUSANDS/ΜL (ref 0.6–4.47)
LYMPHOCYTES NFR BLD AUTO: 8 % (ref 14–44)
MCH RBC QN AUTO: 31.1 PG (ref 26.8–34.3)
MCHC RBC AUTO-ENTMCNC: 32.4 G/DL (ref 31.4–37.4)
MCV RBC AUTO: 96 FL (ref 82–98)
MONOCYTES # BLD AUTO: 0.68 THOUSAND/ΜL (ref 0.17–1.22)
MONOCYTES NFR BLD AUTO: 6 % (ref 4–12)
NEUTROPHILS # BLD AUTO: 10.47 THOUSANDS/ΜL (ref 1.85–7.62)
NEUTS SEG NFR BLD AUTO: 85 % (ref 43–75)
NRBC BLD AUTO-RTO: 0 /100 WBCS
PLATELET # BLD AUTO: 275 THOUSANDS/UL (ref 149–390)
PMV BLD AUTO: 8.9 FL (ref 8.9–12.7)
POTASSIUM SERPL-SCNC: 3.9 MMOL/L (ref 3.5–5.3)
PROT SERPL-MCNC: 8.8 G/DL (ref 6.4–8.2)
RBC # BLD AUTO: 4.72 MILLION/UL (ref 3.81–5.12)
SODIUM SERPL-SCNC: 137 MMOL/L (ref 136–145)
WBC # BLD AUTO: 12.22 THOUSAND/UL (ref 4.31–10.16)

## 2021-09-24 PROCEDURE — 93005 ELECTROCARDIOGRAM TRACING: CPT

## 2021-09-24 PROCEDURE — 83605 ASSAY OF LACTIC ACID: CPT | Performed by: EMERGENCY MEDICINE

## 2021-09-24 PROCEDURE — 99284 EMERGENCY DEPT VISIT MOD MDM: CPT

## 2021-09-24 PROCEDURE — 96375 TX/PRO/DX INJ NEW DRUG ADDON: CPT

## 2021-09-24 PROCEDURE — 74177 CT ABD & PELVIS W/CONTRAST: CPT

## 2021-09-24 PROCEDURE — 99285 EMERGENCY DEPT VISIT HI MDM: CPT | Performed by: EMERGENCY MEDICINE

## 2021-09-24 PROCEDURE — 36415 COLL VENOUS BLD VENIPUNCTURE: CPT | Performed by: EMERGENCY MEDICINE

## 2021-09-24 PROCEDURE — 85025 COMPLETE CBC W/AUTO DIFF WBC: CPT | Performed by: EMERGENCY MEDICINE

## 2021-09-24 PROCEDURE — 96366 THER/PROPH/DIAG IV INF ADDON: CPT

## 2021-09-24 PROCEDURE — 96365 THER/PROPH/DIAG IV INF INIT: CPT

## 2021-09-24 PROCEDURE — 80053 COMPREHEN METABOLIC PANEL: CPT | Performed by: EMERGENCY MEDICINE

## 2021-09-24 PROCEDURE — 83690 ASSAY OF LIPASE: CPT | Performed by: EMERGENCY MEDICINE

## 2021-09-24 RX ORDER — SODIUM CHLORIDE, SODIUM GLUCONATE, SODIUM ACETATE, POTASSIUM CHLORIDE, MAGNESIUM CHLORIDE, SODIUM PHOSPHATE, DIBASIC, AND POTASSIUM PHOSPHATE .53; .5; .37; .037; .03; .012; .00082 G/100ML; G/100ML; G/100ML; G/100ML; G/100ML; G/100ML; G/100ML
1000 INJECTION, SOLUTION INTRAVENOUS ONCE
Status: COMPLETED | OUTPATIENT
Start: 2021-09-24 | End: 2021-09-25

## 2021-09-24 RX ORDER — ONDANSETRON 2 MG/ML
4 INJECTION INTRAMUSCULAR; INTRAVENOUS ONCE
Status: COMPLETED | OUTPATIENT
Start: 2021-09-24 | End: 2021-09-24

## 2021-09-24 RX ORDER — FENTANYL CITRATE 50 UG/ML
25 INJECTION, SOLUTION INTRAMUSCULAR; INTRAVENOUS ONCE
Status: COMPLETED | OUTPATIENT
Start: 2021-09-24 | End: 2021-09-24

## 2021-09-24 RX ADMIN — FENTANYL CITRATE 25 MCG: 0.05 INJECTION, SOLUTION INTRAMUSCULAR; INTRAVENOUS at 22:04

## 2021-09-24 RX ADMIN — IOHEXOL 100 ML: 350 INJECTION, SOLUTION INTRAVENOUS at 23:22

## 2021-09-24 RX ADMIN — ONDANSETRON 4 MG: 2 INJECTION INTRAMUSCULAR; INTRAVENOUS at 22:04

## 2021-09-24 RX ADMIN — SODIUM CHLORIDE, SODIUM GLUCONATE, SODIUM ACETATE, POTASSIUM CHLORIDE, MAGNESIUM CHLORIDE, SODIUM PHOSPHATE, DIBASIC, AND POTASSIUM PHOSPHATE 1000 ML: .53; .5; .37; .037; .03; .012; .00082 INJECTION, SOLUTION INTRAVENOUS at 22:03

## 2021-09-25 LAB
ATRIAL RATE: 64 BPM
P AXIS: 47 DEGREES
PR INTERVAL: 170 MS
QRS AXIS: 85 DEGREES
QRSD INTERVAL: 78 MS
QT INTERVAL: 466 MS
QTC INTERVAL: 480 MS
T WAVE AXIS: -20 DEGREES
VENTRICULAR RATE: 64 BPM

## 2021-09-25 PROCEDURE — 93010 ELECTROCARDIOGRAM REPORT: CPT | Performed by: INTERNAL MEDICINE

## 2021-09-25 PROCEDURE — 96366 THER/PROPH/DIAG IV INF ADDON: CPT

## 2021-09-25 PROCEDURE — 99204 OFFICE O/P NEW MOD 45 MIN: CPT | Performed by: SURGERY

## 2021-09-26 ENCOUNTER — APPOINTMENT (EMERGENCY)
Dept: RADIOLOGY | Facility: HOSPITAL | Age: 76
End: 2021-09-26
Payer: MEDICARE

## 2021-09-26 ENCOUNTER — HOSPITAL ENCOUNTER (EMERGENCY)
Facility: HOSPITAL | Age: 76
Discharge: HOME | End: 2021-09-26
Attending: EMERGENCY MEDICINE | Admitting: EMERGENCY MEDICINE
Payer: MEDICARE

## 2021-09-26 VITALS
BODY MASS INDEX: 32.25 KG/M2 | HEART RATE: 56 BPM | TEMPERATURE: 98.3 F | SYSTOLIC BLOOD PRESSURE: 157 MMHG | OXYGEN SATURATION: 97 % | HEIGHT: 59 IN | DIASTOLIC BLOOD PRESSURE: 65 MMHG | WEIGHT: 160 LBS | RESPIRATION RATE: 18 BRPM

## 2021-09-26 DIAGNOSIS — R10.9 ABDOMINAL PAIN: ICD-10-CM
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
## 2021-09-26 DIAGNOSIS — K59.00 CONSTIPATION: Primary | ICD-10-CM

## 2021-09-26 LAB
ANION GAP SERPL CALCULATED.3IONS-SCNC: 2 MMOL/L (ref 4–13)
BASOPHILS # BLD AUTO: 0.04 THOUSANDS/ΜL (ref 0–0.1)
BASOPHILS NFR BLD AUTO: 1 % (ref 0–1)
BUN SERPL-MCNC: 11 MG/DL (ref 5–25)
CALCIUM SERPL-MCNC: 9 MG/DL (ref 8.3–10.1)
CHLORIDE SERPL-SCNC: 107 MMOL/L (ref 100–108)
CO2 SERPL-SCNC: 31 MMOL/L (ref 21–32)
CREAT SERPL-MCNC: 0.46 MG/DL (ref 0.6–1.3)
EOSINOPHIL # BLD AUTO: 0.17 THOUSAND/ΜL (ref 0–0.61)
EOSINOPHIL NFR BLD AUTO: 3 % (ref 0–6)
ERYTHROCYTE [DISTWIDTH] IN BLOOD BY AUTOMATED COUNT: 13.5 % (ref 11.6–15.1)
GFR SERPL CREATININE-BSD FRML MDRD: 96 ML/MIN/1.73SQ M
GLUCOSE SERPL-MCNC: 90 MG/DL (ref 65–140)
HCT VFR BLD AUTO: 40.7 % (ref 34.8–46.1)
HGB BLD-MCNC: 13 G/DL (ref 11.5–15.4)
IMM GRANULOCYTES # BLD AUTO: 0.01 THOUSAND/UL (ref 0–0.2)
IMM GRANULOCYTES NFR BLD AUTO: 0 % (ref 0–2)
LACTATE SERPL-SCNC: 1.3 MMOL/L (ref 0.5–2)
LYMPHOCYTES # BLD AUTO: 1.87 THOUSANDS/ΜL (ref 0.6–4.47)
LYMPHOCYTES NFR BLD AUTO: 28 % (ref 14–44)
MAGNESIUM SERPL-MCNC: 2.5 MG/DL (ref 1.6–2.6)
MCH RBC QN AUTO: 31.4 PG (ref 26.8–34.3)
MCHC RBC AUTO-ENTMCNC: 31.9 G/DL (ref 31.4–37.4)
MCV RBC AUTO: 98 FL (ref 82–98)
MONOCYTES # BLD AUTO: 0.82 THOUSAND/ΜL (ref 0.17–1.22)
MONOCYTES NFR BLD AUTO: 12 % (ref 4–12)
NEUTROPHILS # BLD AUTO: 3.76 THOUSANDS/ΜL (ref 1.85–7.62)
NEUTS SEG NFR BLD AUTO: 56 % (ref 43–75)
NRBC BLD AUTO-RTO: 0 /100 WBCS
PHOSPHATE SERPL-MCNC: 2.8 MG/DL (ref 2.3–4.1)
PLATELET # BLD AUTO: 251 THOUSANDS/UL (ref 149–390)
PMV BLD AUTO: 8.8 FL (ref 8.9–12.7)
POTASSIUM SERPL-SCNC: 4.3 MMOL/L (ref 3.5–5.3)
RBC # BLD AUTO: 4.14 MILLION/UL (ref 3.81–5.12)
SODIUM SERPL-SCNC: 140 MMOL/L (ref 136–145)
WBC # BLD AUTO: 6.67 THOUSAND/UL (ref 4.31–10.16)

## 2021-09-26 PROCEDURE — 85025 COMPLETE CBC W/AUTO DIFF WBC: CPT

## 2021-09-26 PROCEDURE — 99213 OFFICE O/P EST LOW 20 MIN: CPT | Performed by: SURGERY

## 2021-09-26 PROCEDURE — 83605 ASSAY OF LACTIC ACID: CPT

## 2021-09-26 PROCEDURE — 80048 BASIC METABOLIC PNL TOTAL CA: CPT

## 2021-09-26 PROCEDURE — 36415 COLL VENOUS BLD VENIPUNCTURE: CPT

## 2021-09-26 PROCEDURE — 84100 ASSAY OF PHOSPHORUS: CPT

## 2021-09-26 PROCEDURE — 83735 ASSAY OF MAGNESIUM: CPT

## 2021-09-26 PROCEDURE — 99284 EMERGENCY DEPT VISIT MOD MDM: CPT

## 2021-09-26 PROCEDURE — 74018 RADEX ABDOMEN 1 VIEW: CPT

## 2021-09-26 PROCEDURE — 99284 EMERGENCY DEPT VISIT MOD MDM: CPT | Performed by: EMERGENCY MEDICINE

## 2021-09-26 RX ORDER — POLYETHYLENE GLYCOL 3350 17 G/17G
17 POWDER, FOR SOLUTION ORAL DAILY
Qty: 119 G | Refills: 0 | Status: ON HOLD | OUTPATIENT
Start: 2021-09-26 | End: 2021-11-27 | Stop reason: ALTCHOICE

## 2021-09-26 RX ORDER — DOCUSATE SODIUM 250 MG
250 CAPSULE ORAL DAILY
Qty: 7 CAPSULE | Refills: 0 | Status: SHIPPED | OUTPATIENT
Start: 2021-09-26 | End: 2021-12-01 | Stop reason: HOSPADM

## 2021-11-26 ENCOUNTER — HOSPITAL ENCOUNTER (INPATIENT)
Facility: HOSPITAL | Age: 76
LOS: 4 days | Discharge: HOME HEALTH | DRG: 336 | End: 2021-12-01
Attending: EMERGENCY MEDICINE | Admitting: SURGERY
Payer: MEDICARE

## 2021-11-26 ENCOUNTER — APPOINTMENT (EMERGENCY)
Dept: RADIOLOGY | Facility: HOSPITAL | Age: 76
DRG: 336 | End: 2021-11-26
Payer: MEDICARE

## 2021-11-26 DIAGNOSIS — R07.9 CHEST PAIN, UNSPECIFIED: ICD-10-CM

## 2021-11-26 DIAGNOSIS — K56.609 SMALL BOWEL OBSTRUCTION (HCC): Primary | ICD-10-CM

## 2021-11-26 DIAGNOSIS — R77.8 ELEVATED TROPONIN: ICD-10-CM

## 2021-11-26 LAB
ALBUMIN SERPL BCP-MCNC: 3.8 G/DL (ref 3.5–5)
ALP SERPL-CCNC: 70 U/L (ref 46–116)
ALT SERPL W P-5'-P-CCNC: 23 U/L (ref 12–78)
ANION GAP SERPL CALCULATED.3IONS-SCNC: 5 MMOL/L (ref 4–13)
AST SERPL W P-5'-P-CCNC: 19 U/L (ref 5–45)
ATRIAL RATE: 84 BPM
BASOPHILS # BLD AUTO: 0.03 THOUSANDS/ΜL (ref 0–0.1)
BASOPHILS NFR BLD AUTO: 0 % (ref 0–1)
BILIRUB SERPL-MCNC: 0.67 MG/DL (ref 0.2–1)
BUN SERPL-MCNC: 12 MG/DL (ref 5–25)
CALCIUM SERPL-MCNC: 10.2 MG/DL (ref 8.3–10.1)
CARDIAC TROPONIN I PNL SERPL HS: 17 NG/L
CHLORIDE SERPL-SCNC: 103 MMOL/L (ref 100–108)
CO2 SERPL-SCNC: 28 MMOL/L (ref 21–32)
CREAT SERPL-MCNC: 0.58 MG/DL (ref 0.6–1.3)
EOSINOPHIL # BLD AUTO: 0.18 THOUSAND/ΜL (ref 0–0.61)
EOSINOPHIL NFR BLD AUTO: 2 % (ref 0–6)
ERYTHROCYTE [DISTWIDTH] IN BLOOD BY AUTOMATED COUNT: 12.9 % (ref 11.6–15.1)
GFR SERPL CREATININE-BSD FRML MDRD: 89 ML/MIN/1.73SQ M
GLUCOSE SERPL-MCNC: 112 MG/DL (ref 65–140)
HCT VFR BLD AUTO: 43.2 % (ref 34.8–46.1)
HGB BLD-MCNC: 13.8 G/DL (ref 11.5–15.4)
IMM GRANULOCYTES # BLD AUTO: 0.04 THOUSAND/UL (ref 0–0.2)
IMM GRANULOCYTES NFR BLD AUTO: 0 % (ref 0–2)
LYMPHOCYTES # BLD AUTO: 1.79 THOUSANDS/ΜL (ref 0.6–4.47)
LYMPHOCYTES NFR BLD AUTO: 16 % (ref 14–44)
MCH RBC QN AUTO: 30.9 PG (ref 26.8–34.3)
MCHC RBC AUTO-ENTMCNC: 31.9 G/DL (ref 31.4–37.4)
MCV RBC AUTO: 97 FL (ref 82–98)
MONOCYTES # BLD AUTO: 1.14 THOUSAND/ΜL (ref 0.17–1.22)
MONOCYTES NFR BLD AUTO: 11 % (ref 4–12)
NEUTROPHILS # BLD AUTO: 7.71 THOUSANDS/ΜL (ref 1.85–7.62)
NEUTS SEG NFR BLD AUTO: 71 % (ref 43–75)
NRBC BLD AUTO-RTO: 0 /100 WBCS
P AXIS: 82 DEGREES
PLATELET # BLD AUTO: 302 THOUSANDS/UL (ref 149–390)
PMV BLD AUTO: 9.1 FL (ref 8.9–12.7)
POTASSIUM SERPL-SCNC: 4 MMOL/L (ref 3.5–5.3)
PR INTERVAL: 160 MS
PROT SERPL-MCNC: 8.1 G/DL (ref 6.4–8.2)
QRS AXIS: 83 DEGREES
QRSD INTERVAL: 80 MS
QT INTERVAL: 406 MS
QTC INTERVAL: 479 MS
RBC # BLD AUTO: 4.47 MILLION/UL (ref 3.81–5.12)
SODIUM SERPL-SCNC: 136 MMOL/L (ref 136–145)
T WAVE AXIS: 14 DEGREES
VENTRICULAR RATE: 84 BPM
WBC # BLD AUTO: 10.89 THOUSAND/UL (ref 4.31–10.16)

## 2021-11-26 PROCEDURE — 96374 THER/PROPH/DIAG INJ IV PUSH: CPT

## 2021-11-26 PROCEDURE — 93005 ELECTROCARDIOGRAM TRACING: CPT

## 2021-11-26 PROCEDURE — 99285 EMERGENCY DEPT VISIT HI MDM: CPT | Performed by: EMERGENCY MEDICINE

## 2021-11-26 PROCEDURE — 80053 COMPREHEN METABOLIC PANEL: CPT | Performed by: EMERGENCY MEDICINE

## 2021-11-26 PROCEDURE — 36415 COLL VENOUS BLD VENIPUNCTURE: CPT

## 2021-11-26 PROCEDURE — 99285 EMERGENCY DEPT VISIT HI MDM: CPT

## 2021-11-26 PROCEDURE — 96375 TX/PRO/DX INJ NEW DRUG ADDON: CPT

## 2021-11-26 PROCEDURE — 1123F ACP DISCUSS/DSCN MKR DOCD: CPT | Performed by: INTERNAL MEDICINE

## 2021-11-26 PROCEDURE — 74177 CT ABD & PELVIS W/CONTRAST: CPT

## 2021-11-26 PROCEDURE — 85025 COMPLETE CBC W/AUTO DIFF WBC: CPT | Performed by: EMERGENCY MEDICINE

## 2021-11-26 PROCEDURE — G1004 CDSM NDSC: HCPCS

## 2021-11-26 PROCEDURE — 93010 ELECTROCARDIOGRAM REPORT: CPT | Performed by: INTERNAL MEDICINE

## 2021-11-26 PROCEDURE — 84484 ASSAY OF TROPONIN QUANT: CPT | Performed by: EMERGENCY MEDICINE

## 2021-11-26 PROCEDURE — 71045 X-RAY EXAM CHEST 1 VIEW: CPT

## 2021-11-26 RX ORDER — FENTANYL CITRATE 50 UG/ML
25 INJECTION, SOLUTION INTRAMUSCULAR; INTRAVENOUS ONCE
Status: COMPLETED | OUTPATIENT
Start: 2021-11-26 | End: 2021-11-26

## 2021-11-26 RX ORDER — ONDANSETRON 2 MG/ML
4 INJECTION INTRAMUSCULAR; INTRAVENOUS ONCE
Status: COMPLETED | OUTPATIENT
Start: 2021-11-26 | End: 2021-11-26

## 2021-11-26 RX ADMIN — IOHEXOL 100 ML: 350 INJECTION, SOLUTION INTRAVENOUS at 22:17

## 2021-11-26 RX ADMIN — ONDANSETRON 4 MG: 2 INJECTION INTRAMUSCULAR; INTRAVENOUS at 22:36

## 2021-11-26 RX ADMIN — FENTANYL CITRATE 25 MCG: 50 INJECTION INTRAMUSCULAR; INTRAVENOUS at 22:36

## 2021-11-27 ENCOUNTER — ANESTHESIA EVENT (INPATIENT)
Dept: PERIOP | Facility: HOSPITAL | Age: 76
DRG: 336 | End: 2021-11-27
Payer: MEDICARE

## 2021-11-27 ENCOUNTER — ANESTHESIA (INPATIENT)
Dept: PERIOP | Facility: HOSPITAL | Age: 76
DRG: 336 | End: 2021-11-27
Payer: MEDICARE

## 2021-11-27 PROBLEM — K56.51 INTESTINAL ADHESIONS WITH PARTIAL OBSTRUCTION (HCC): Status: ACTIVE | Noted: 2021-11-27

## 2021-11-27 LAB
2HR DELTA HS TROPONIN: 1 NG/L
4HR DELTA HS TROPONIN: 3 NG/L
ABO GROUP BLD: NORMAL
ABO GROUP BLD: NORMAL
ANION GAP SERPL CALCULATED.3IONS-SCNC: 7 MMOL/L (ref 4–13)
APTT PPP: 27 SECONDS (ref 23–37)
BASOPHILS # BLD AUTO: 0.04 THOUSANDS/ΜL (ref 0–0.1)
BASOPHILS NFR BLD AUTO: 0 % (ref 0–1)
BLD GP AB SCN SERPL QL: NEGATIVE
BUN SERPL-MCNC: 9 MG/DL (ref 5–25)
CALCIUM SERPL-MCNC: 9.4 MG/DL (ref 8.3–10.1)
CARDIAC TROPONIN I PNL SERPL HS: 18 NG/L
CARDIAC TROPONIN I PNL SERPL HS: 20 NG/L
CHLORIDE SERPL-SCNC: 106 MMOL/L (ref 100–108)
CO2 SERPL-SCNC: 26 MMOL/L (ref 21–32)
CREAT SERPL-MCNC: 0.58 MG/DL (ref 0.6–1.3)
EOSINOPHIL # BLD AUTO: 0.14 THOUSAND/ΜL (ref 0–0.61)
EOSINOPHIL NFR BLD AUTO: 2 % (ref 0–6)
ERYTHROCYTE [DISTWIDTH] IN BLOOD BY AUTOMATED COUNT: 13.2 % (ref 11.6–15.1)
GFR SERPL CREATININE-BSD FRML MDRD: 89 ML/MIN/1.73SQ M
GLUCOSE SERPL-MCNC: 101 MG/DL (ref 65–140)
GLUCOSE SERPL-MCNC: 110 MG/DL (ref 65–140)
GLUCOSE SERPL-MCNC: 130 MG/DL (ref 65–140)
GLUCOSE SERPL-MCNC: 149 MG/DL (ref 65–140)
HCT VFR BLD AUTO: 40.3 % (ref 34.8–46.1)
HGB BLD-MCNC: 13.4 G/DL (ref 11.5–15.4)
IMM GRANULOCYTES # BLD AUTO: 0.04 THOUSAND/UL (ref 0–0.2)
IMM GRANULOCYTES NFR BLD AUTO: 0 % (ref 0–2)
INR PPP: 1.01 (ref 0.84–1.19)
LACTATE SERPL-SCNC: 1 MMOL/L (ref 0.5–2)
LYMPHOCYTES # BLD AUTO: 1.92 THOUSANDS/ΜL (ref 0.6–4.47)
LYMPHOCYTES NFR BLD AUTO: 21 % (ref 14–44)
MAGNESIUM SERPL-MCNC: 2.2 MG/DL (ref 1.6–2.6)
MCH RBC QN AUTO: 31.2 PG (ref 26.8–34.3)
MCHC RBC AUTO-ENTMCNC: 33.3 G/DL (ref 31.4–37.4)
MCV RBC AUTO: 94 FL (ref 82–98)
MONOCYTES # BLD AUTO: 1.28 THOUSAND/ΜL (ref 0.17–1.22)
MONOCYTES NFR BLD AUTO: 14 % (ref 4–12)
NEUTROPHILS # BLD AUTO: 5.56 THOUSANDS/ΜL (ref 1.85–7.62)
NEUTS SEG NFR BLD AUTO: 63 % (ref 43–75)
NRBC BLD AUTO-RTO: 0 /100 WBCS
PHOSPHATE SERPL-MCNC: 4.3 MG/DL (ref 2.3–4.1)
PLATELET # BLD AUTO: 276 THOUSANDS/UL (ref 149–390)
PMV BLD AUTO: 8.8 FL (ref 8.9–12.7)
POTASSIUM SERPL-SCNC: 4.1 MMOL/L (ref 3.5–5.3)
PROTHROMBIN TIME: 12.9 SECONDS (ref 11.6–14.5)
RBC # BLD AUTO: 4.29 MILLION/UL (ref 3.81–5.12)
RH BLD: POSITIVE
RH BLD: POSITIVE
SODIUM SERPL-SCNC: 139 MMOL/L (ref 136–145)
SPECIMEN EXPIRATION DATE: NORMAL
WBC # BLD AUTO: 8.98 THOUSAND/UL (ref 4.31–10.16)

## 2021-11-27 PROCEDURE — 86900 BLOOD TYPING SEROLOGIC ABO: CPT | Performed by: STUDENT IN AN ORGANIZED HEALTH CARE EDUCATION/TRAINING PROGRAM

## 2021-11-27 PROCEDURE — 85025 COMPLETE CBC W/AUTO DIFF WBC: CPT | Performed by: STUDENT IN AN ORGANIZED HEALTH CARE EDUCATION/TRAINING PROGRAM

## 2021-11-27 PROCEDURE — 86850 RBC ANTIBODY SCREEN: CPT | Performed by: STUDENT IN AN ORGANIZED HEALTH CARE EDUCATION/TRAINING PROGRAM

## 2021-11-27 PROCEDURE — 80048 BASIC METABOLIC PNL TOTAL CA: CPT | Performed by: STUDENT IN AN ORGANIZED HEALTH CARE EDUCATION/TRAINING PROGRAM

## 2021-11-27 PROCEDURE — 36415 COLL VENOUS BLD VENIPUNCTURE: CPT

## 2021-11-27 PROCEDURE — 84100 ASSAY OF PHOSPHORUS: CPT | Performed by: STUDENT IN AN ORGANIZED HEALTH CARE EDUCATION/TRAINING PROGRAM

## 2021-11-27 PROCEDURE — 0WPF0JZ REMOVAL OF SYNTHETIC SUBSTITUTE FROM ABDOMINAL WALL, OPEN APPROACH: ICD-10-PCS | Performed by: SURGERY

## 2021-11-27 PROCEDURE — 86901 BLOOD TYPING SEROLOGIC RH(D): CPT | Performed by: STUDENT IN AN ORGANIZED HEALTH CARE EDUCATION/TRAINING PROGRAM

## 2021-11-27 PROCEDURE — 85610 PROTHROMBIN TIME: CPT | Performed by: STUDENT IN AN ORGANIZED HEALTH CARE EDUCATION/TRAINING PROGRAM

## 2021-11-27 PROCEDURE — 99222 1ST HOSP IP/OBS MODERATE 55: CPT | Performed by: SURGERY

## 2021-11-27 PROCEDURE — 83735 ASSAY OF MAGNESIUM: CPT | Performed by: STUDENT IN AN ORGANIZED HEALTH CARE EDUCATION/TRAINING PROGRAM

## 2021-11-27 PROCEDURE — NC001 PR NO CHARGE: Performed by: SURGERY

## 2021-11-27 PROCEDURE — 85730 THROMBOPLASTIN TIME PARTIAL: CPT | Performed by: STUDENT IN AN ORGANIZED HEALTH CARE EDUCATION/TRAINING PROGRAM

## 2021-11-27 PROCEDURE — 44005 FREEING OF BOWEL ADHESION: CPT | Performed by: SURGERY

## 2021-11-27 PROCEDURE — 84484 ASSAY OF TROPONIN QUANT: CPT | Performed by: EMERGENCY MEDICINE

## 2021-11-27 PROCEDURE — 83605 ASSAY OF LACTIC ACID: CPT | Performed by: STUDENT IN AN ORGANIZED HEALTH CARE EDUCATION/TRAINING PROGRAM

## 2021-11-27 PROCEDURE — 82948 REAGENT STRIP/BLOOD GLUCOSE: CPT

## 2021-11-27 PROCEDURE — 0DN80ZZ RELEASE SMALL INTESTINE, OPEN APPROACH: ICD-10-PCS | Performed by: SURGERY

## 2021-11-27 RX ORDER — ACETAMINOPHEN 160 MG/5ML
650 SUSPENSION, ORAL (FINAL DOSE FORM) ORAL EVERY 4 HOURS PRN
Status: DISCONTINUED | OUTPATIENT
Start: 2021-11-27 | End: 2021-11-29

## 2021-11-27 RX ORDER — MORPHINE SULFATE 4 MG/ML
2 INJECTION, SOLUTION INTRAMUSCULAR; INTRAVENOUS
Status: DISCONTINUED | OUTPATIENT
Start: 2021-11-27 | End: 2021-11-27 | Stop reason: HOSPADM

## 2021-11-27 RX ORDER — HEPARIN SODIUM 5000 [USP'U]/ML
5000 INJECTION, SOLUTION INTRAVENOUS; SUBCUTANEOUS EVERY 8 HOURS SCHEDULED
Status: DISCONTINUED | OUTPATIENT
Start: 2021-11-27 | End: 2021-12-01 | Stop reason: HOSPADM

## 2021-11-27 RX ORDER — SUCCINYLCHOLINE/SOD CL,ISO/PF 100 MG/5ML
SYRINGE (ML) INTRAVENOUS AS NEEDED
Status: DISCONTINUED | OUTPATIENT
Start: 2021-11-27 | End: 2021-11-27

## 2021-11-27 RX ORDER — LIDOCAINE HYDROCHLORIDE 10 MG/ML
INJECTION, SOLUTION EPIDURAL; INFILTRATION; INTRACAUDAL; PERINEURAL AS NEEDED
Status: DISCONTINUED | OUTPATIENT
Start: 2021-11-27 | End: 2021-11-27

## 2021-11-27 RX ORDER — FENTANYL CITRATE 50 UG/ML
INJECTION, SOLUTION INTRAMUSCULAR; INTRAVENOUS AS NEEDED
Status: DISCONTINUED | OUTPATIENT
Start: 2021-11-27 | End: 2021-11-27

## 2021-11-27 RX ORDER — ROCURONIUM BROMIDE 10 MG/ML
INJECTION, SOLUTION INTRAVENOUS AS NEEDED
Status: DISCONTINUED | OUTPATIENT
Start: 2021-11-27 | End: 2021-11-27

## 2021-11-27 RX ORDER — ALBUTEROL SULFATE 2.5 MG/3ML
2.5 SOLUTION RESPIRATORY (INHALATION) ONCE AS NEEDED
Status: DISCONTINUED | OUTPATIENT
Start: 2021-11-27 | End: 2021-11-27 | Stop reason: HOSPADM

## 2021-11-27 RX ORDER — GLYCOPYRROLATE 0.2 MG/ML
INJECTION INTRAMUSCULAR; INTRAVENOUS AS NEEDED
Status: DISCONTINUED | OUTPATIENT
Start: 2021-11-27 | End: 2021-11-27

## 2021-11-27 RX ORDER — DEXAMETHASONE SODIUM PHOSPHATE 10 MG/ML
INJECTION, SOLUTION INTRAMUSCULAR; INTRAVENOUS AS NEEDED
Status: DISCONTINUED | OUTPATIENT
Start: 2021-11-27 | End: 2021-11-27

## 2021-11-27 RX ORDER — PROPOFOL 10 MG/ML
INJECTION, EMULSION INTRAVENOUS AS NEEDED
Status: DISCONTINUED | OUTPATIENT
Start: 2021-11-27 | End: 2021-11-27

## 2021-11-27 RX ORDER — MAGNESIUM HYDROXIDE 1200 MG/15ML
LIQUID ORAL AS NEEDED
Status: DISCONTINUED | OUTPATIENT
Start: 2021-11-27 | End: 2021-11-27 | Stop reason: HOSPADM

## 2021-11-27 RX ORDER — MORPHINE SULFATE 10 MG/ML
6 INJECTION, SOLUTION INTRAMUSCULAR; INTRAVENOUS EVERY 4 HOURS PRN
Status: DISCONTINUED | OUTPATIENT
Start: 2021-11-27 | End: 2021-11-29

## 2021-11-27 RX ORDER — PROMETHAZINE HYDROCHLORIDE 25 MG/ML
12.5 INJECTION, SOLUTION INTRAMUSCULAR; INTRAVENOUS ONCE AS NEEDED
Status: DISCONTINUED | OUTPATIENT
Start: 2021-11-27 | End: 2021-11-27 | Stop reason: HOSPADM

## 2021-11-27 RX ORDER — NEOSTIGMINE METHYLSULFATE 1 MG/ML
INJECTION INTRAVENOUS AS NEEDED
Status: DISCONTINUED | OUTPATIENT
Start: 2021-11-27 | End: 2021-11-27

## 2021-11-27 RX ORDER — SODIUM CHLORIDE, SODIUM LACTATE, POTASSIUM CHLORIDE, CALCIUM CHLORIDE 600; 310; 30; 20 MG/100ML; MG/100ML; MG/100ML; MG/100ML
125 INJECTION, SOLUTION INTRAVENOUS CONTINUOUS
Status: DISCONTINUED | OUTPATIENT
Start: 2021-11-27 | End: 2021-11-28

## 2021-11-27 RX ORDER — LABETALOL 20 MG/4 ML (5 MG/ML) INTRAVENOUS SYRINGE
5
Status: DISCONTINUED | OUTPATIENT
Start: 2021-11-27 | End: 2021-11-27 | Stop reason: HOSPADM

## 2021-11-27 RX ORDER — FENTANYL CITRATE/PF 50 MCG/ML
25 SYRINGE (ML) INJECTION
Status: COMPLETED | OUTPATIENT
Start: 2021-11-27 | End: 2021-11-27

## 2021-11-27 RX ORDER — SODIUM CHLORIDE 9 MG/ML
INJECTION, SOLUTION INTRAVENOUS CONTINUOUS PRN
Status: DISCONTINUED | OUTPATIENT
Start: 2021-11-27 | End: 2021-11-27

## 2021-11-27 RX ORDER — MORPHINE SULFATE 4 MG/ML
4 INJECTION, SOLUTION INTRAMUSCULAR; INTRAVENOUS EVERY 4 HOURS PRN
Status: DISCONTINUED | OUTPATIENT
Start: 2021-11-27 | End: 2021-11-29

## 2021-11-27 RX ORDER — ONDANSETRON 2 MG/ML
INJECTION INTRAMUSCULAR; INTRAVENOUS AS NEEDED
Status: DISCONTINUED | OUTPATIENT
Start: 2021-11-27 | End: 2021-11-27

## 2021-11-27 RX ORDER — ACETAMINOPHEN 325 MG/1
650 TABLET ORAL EVERY 6 HOURS PRN
Status: DISCONTINUED | OUTPATIENT
Start: 2021-11-27 | End: 2021-11-28

## 2021-11-27 RX ORDER — ONDANSETRON 2 MG/ML
4 INJECTION INTRAMUSCULAR; INTRAVENOUS ONCE AS NEEDED
Status: DISCONTINUED | OUTPATIENT
Start: 2021-11-27 | End: 2021-11-27 | Stop reason: HOSPADM

## 2021-11-27 RX ORDER — MEPERIDINE HYDROCHLORIDE 25 MG/ML
12.5 INJECTION INTRAMUSCULAR; INTRAVENOUS; SUBCUTANEOUS ONCE
Status: DISCONTINUED | OUTPATIENT
Start: 2021-11-27 | End: 2021-11-27 | Stop reason: HOSPADM

## 2021-11-27 RX ADMIN — SODIUM CHLORIDE: 0.9 INJECTION, SOLUTION INTRAVENOUS at 09:19

## 2021-11-27 RX ADMIN — HEPARIN SODIUM 5000 UNITS: 5000 INJECTION INTRAVENOUS; SUBCUTANEOUS at 13:15

## 2021-11-27 RX ADMIN — FENTANYL CITRATE 50 MCG: 50 INJECTION INTRAMUSCULAR; INTRAVENOUS at 09:15

## 2021-11-27 RX ADMIN — NEOSTIGMINE METHYLSULFATE 3 MG: 1 INJECTION INTRAVENOUS at 10:40

## 2021-11-27 RX ADMIN — Medication 25 MCG: at 11:21

## 2021-11-27 RX ADMIN — SODIUM CHLORIDE, SODIUM LACTATE, POTASSIUM CHLORIDE, AND CALCIUM CHLORIDE 125 ML/HR: .6; .31; .03; .02 INJECTION, SOLUTION INTRAVENOUS at 00:38

## 2021-11-27 RX ADMIN — FENTANYL CITRATE 50 MCG: 50 INJECTION INTRAMUSCULAR; INTRAVENOUS at 09:46

## 2021-11-27 RX ADMIN — SODIUM CHLORIDE, SODIUM LACTATE, POTASSIUM CHLORIDE, AND CALCIUM CHLORIDE 125 ML/HR: .6; .31; .03; .02 INJECTION, SOLUTION INTRAVENOUS at 21:07

## 2021-11-27 RX ADMIN — Medication 100 MG: at 09:16

## 2021-11-27 RX ADMIN — MORPHINE SULFATE 2 MG: 2 INJECTION, SOLUTION INTRAMUSCULAR; INTRAVENOUS at 13:15

## 2021-11-27 RX ADMIN — PHENYLEPHRINE HYDROCHLORIDE 50 MCG/MIN: 10 INJECTION INTRAVENOUS at 09:33

## 2021-11-27 RX ADMIN — GLYCOPYRROLATE 0.4 MG: 0.2 INJECTION, SOLUTION INTRAMUSCULAR; INTRAVENOUS at 10:40

## 2021-11-27 RX ADMIN — Medication 25 MCG: at 11:00

## 2021-11-27 RX ADMIN — Medication 25 MCG: at 11:59

## 2021-11-27 RX ADMIN — ONDANSETRON 4 MG: 2 INJECTION INTRAMUSCULAR; INTRAVENOUS at 10:19

## 2021-11-27 RX ADMIN — DEXAMETHASONE SODIUM PHOSPHATE 10 MG: 10 INJECTION, SOLUTION INTRAMUSCULAR; INTRAVENOUS at 09:50

## 2021-11-27 RX ADMIN — SODIUM CHLORIDE, SODIUM LACTATE, POTASSIUM CHLORIDE, AND CALCIUM CHLORIDE 125 ML/HR: .6; .31; .03; .02 INJECTION, SOLUTION INTRAVENOUS at 12:00

## 2021-11-27 RX ADMIN — Medication 25 MCG: at 11:07

## 2021-11-27 RX ADMIN — CEFOXITIN SODIUM 2000 MG: 1 POWDER, FOR SOLUTION INTRAVENOUS at 09:19

## 2021-11-27 RX ADMIN — ROCURONIUM BROMIDE 30 MG: 50 INJECTION, SOLUTION INTRAVENOUS at 09:24

## 2021-11-27 RX ADMIN — ROCURONIUM BROMIDE 10 MG: 50 INJECTION, SOLUTION INTRAVENOUS at 09:56

## 2021-11-27 RX ADMIN — LIDOCAINE HYDROCHLORIDE 50 MG: 10 INJECTION, SOLUTION EPIDURAL; INFILTRATION; INTRACAUDAL; PERINEURAL at 09:16

## 2021-11-27 RX ADMIN — PROPOFOL 120 MG: 10 INJECTION, EMULSION INTRAVENOUS at 09:16

## 2021-11-27 RX ADMIN — MORPHINE SULFATE 2 MG: 2 INJECTION, SOLUTION INTRAMUSCULAR; INTRAVENOUS at 21:07

## 2021-11-27 RX ADMIN — HEPARIN SODIUM 5000 UNITS: 5000 INJECTION INTRAVENOUS; SUBCUTANEOUS at 21:10

## 2021-11-28 LAB
ANION GAP SERPL CALCULATED.3IONS-SCNC: 5 MMOL/L (ref 4–13)
BASOPHILS # BLD AUTO: 0.01 THOUSANDS/ΜL (ref 0–0.1)
BASOPHILS NFR BLD AUTO: 0 % (ref 0–1)
BUN SERPL-MCNC: 9 MG/DL (ref 5–25)
CALCIUM SERPL-MCNC: 8.6 MG/DL (ref 8.3–10.1)
CHLORIDE SERPL-SCNC: 107 MMOL/L (ref 100–108)
CO2 SERPL-SCNC: 29 MMOL/L (ref 21–32)
CREAT SERPL-MCNC: 0.48 MG/DL (ref 0.6–1.3)
EOSINOPHIL # BLD AUTO: 0 THOUSAND/ΜL (ref 0–0.61)
EOSINOPHIL NFR BLD AUTO: 0 % (ref 0–6)
ERYTHROCYTE [DISTWIDTH] IN BLOOD BY AUTOMATED COUNT: 13.2 % (ref 11.6–15.1)
GFR SERPL CREATININE-BSD FRML MDRD: 95 ML/MIN/1.73SQ M
GLUCOSE SERPL-MCNC: 112 MG/DL (ref 65–140)
GLUCOSE SERPL-MCNC: 128 MG/DL (ref 65–140)
GLUCOSE SERPL-MCNC: 99 MG/DL (ref 65–140)
HCT VFR BLD AUTO: 36 % (ref 34.8–46.1)
HGB BLD-MCNC: 11.5 G/DL (ref 11.5–15.4)
IMM GRANULOCYTES # BLD AUTO: 0.05 THOUSAND/UL (ref 0–0.2)
IMM GRANULOCYTES NFR BLD AUTO: 1 % (ref 0–2)
LYMPHOCYTES # BLD AUTO: 1.27 THOUSANDS/ΜL (ref 0.6–4.47)
LYMPHOCYTES NFR BLD AUTO: 12 % (ref 14–44)
MCH RBC QN AUTO: 31.3 PG (ref 26.8–34.3)
MCHC RBC AUTO-ENTMCNC: 31.9 G/DL (ref 31.4–37.4)
MCV RBC AUTO: 98 FL (ref 82–98)
MONOCYTES # BLD AUTO: 1.62 THOUSAND/ΜL (ref 0.17–1.22)
MONOCYTES NFR BLD AUTO: 16 % (ref 4–12)
NEUTROPHILS # BLD AUTO: 7.42 THOUSANDS/ΜL (ref 1.85–7.62)
NEUTS SEG NFR BLD AUTO: 71 % (ref 43–75)
NRBC BLD AUTO-RTO: 0 /100 WBCS
PLATELET # BLD AUTO: 243 THOUSANDS/UL (ref 149–390)
PMV BLD AUTO: 8.7 FL (ref 8.9–12.7)
POTASSIUM SERPL-SCNC: 4.2 MMOL/L (ref 3.5–5.3)
RBC # BLD AUTO: 3.67 MILLION/UL (ref 3.81–5.12)
SODIUM SERPL-SCNC: 141 MMOL/L (ref 136–145)
WBC # BLD AUTO: 10.37 THOUSAND/UL (ref 4.31–10.16)

## 2021-11-28 PROCEDURE — 82948 REAGENT STRIP/BLOOD GLUCOSE: CPT

## 2021-11-28 PROCEDURE — 85025 COMPLETE CBC W/AUTO DIFF WBC: CPT | Performed by: STUDENT IN AN ORGANIZED HEALTH CARE EDUCATION/TRAINING PROGRAM

## 2021-11-28 PROCEDURE — 99024 POSTOP FOLLOW-UP VISIT: CPT | Performed by: SURGERY

## 2021-11-28 PROCEDURE — 80048 BASIC METABOLIC PNL TOTAL CA: CPT | Performed by: STUDENT IN AN ORGANIZED HEALTH CARE EDUCATION/TRAINING PROGRAM

## 2021-11-28 RX ORDER — DEXTROSE, SODIUM CHLORIDE, AND POTASSIUM CHLORIDE 5; .45; .15 G/100ML; G/100ML; G/100ML
75 INJECTION INTRAVENOUS CONTINUOUS
Status: DISCONTINUED | OUTPATIENT
Start: 2021-11-28 | End: 2021-11-29

## 2021-11-28 RX ORDER — ONDANSETRON 2 MG/ML
4 INJECTION INTRAMUSCULAR; INTRAVENOUS EVERY 6 HOURS PRN
Status: DISCONTINUED | OUTPATIENT
Start: 2021-11-28 | End: 2021-12-01 | Stop reason: HOSPADM

## 2021-11-28 RX ADMIN — SODIUM CHLORIDE, SODIUM LACTATE, POTASSIUM CHLORIDE, AND CALCIUM CHLORIDE 125 ML/HR: .6; .31; .03; .02 INJECTION, SOLUTION INTRAVENOUS at 05:05

## 2021-11-28 RX ADMIN — HEPARIN SODIUM 5000 UNITS: 5000 INJECTION INTRAVENOUS; SUBCUTANEOUS at 05:05

## 2021-11-28 RX ADMIN — MORPHINE SULFATE 2 MG: 2 INJECTION, SOLUTION INTRAMUSCULAR; INTRAVENOUS at 10:25

## 2021-11-28 RX ADMIN — DEXTROSE, SODIUM CHLORIDE, AND POTASSIUM CHLORIDE 100 ML/HR: 5; .45; .15 INJECTION INTRAVENOUS at 20:18

## 2021-11-28 RX ADMIN — HEPARIN SODIUM 5000 UNITS: 5000 INJECTION INTRAVENOUS; SUBCUTANEOUS at 13:30

## 2021-11-28 RX ADMIN — HEPARIN SODIUM 5000 UNITS: 5000 INJECTION INTRAVENOUS; SUBCUTANEOUS at 21:21

## 2021-11-28 RX ADMIN — DEXTROSE, SODIUM CHLORIDE, AND POTASSIUM CHLORIDE 100 ML/HR: 5; .45; .15 INJECTION INTRAVENOUS at 10:25

## 2021-11-28 RX ADMIN — MORPHINE SULFATE 2 MG: 2 INJECTION, SOLUTION INTRAMUSCULAR; INTRAVENOUS at 20:17

## 2021-11-29 LAB
GLUCOSE SERPL-MCNC: 101 MG/DL (ref 65–140)
GLUCOSE SERPL-MCNC: 103 MG/DL (ref 65–140)
GLUCOSE SERPL-MCNC: 132 MG/DL (ref 65–140)
GLUCOSE SERPL-MCNC: 96 MG/DL (ref 65–140)

## 2021-11-29 PROCEDURE — 97535 SELF CARE MNGMENT TRAINING: CPT

## 2021-11-29 PROCEDURE — 97166 OT EVAL MOD COMPLEX 45 MIN: CPT

## 2021-11-29 PROCEDURE — 97163 PT EVAL HIGH COMPLEX 45 MIN: CPT

## 2021-11-29 PROCEDURE — 99024 POSTOP FOLLOW-UP VISIT: CPT | Performed by: SURGERY

## 2021-11-29 PROCEDURE — 82948 REAGENT STRIP/BLOOD GLUCOSE: CPT

## 2021-11-29 RX ORDER — MORPHINE SULFATE 4 MG/ML
4 INJECTION, SOLUTION INTRAMUSCULAR; INTRAVENOUS EVERY 4 HOURS PRN
Status: DISCONTINUED | OUTPATIENT
Start: 2021-11-29 | End: 2021-12-01 | Stop reason: HOSPADM

## 2021-11-29 RX ORDER — PREGABALIN 100 MG/1
100 CAPSULE ORAL 2 TIMES DAILY
Status: DISCONTINUED | OUTPATIENT
Start: 2021-11-29 | End: 2021-12-01 | Stop reason: HOSPADM

## 2021-11-29 RX ORDER — PREGABALIN 100 MG/1
100 CAPSULE ORAL DAILY
Status: DISCONTINUED | OUTPATIENT
Start: 2021-11-29 | End: 2021-11-29

## 2021-11-29 RX ORDER — OXYCODONE HYDROCHLORIDE 5 MG/1
2.5 TABLET ORAL EVERY 4 HOURS PRN
Status: DISCONTINUED | OUTPATIENT
Start: 2021-11-29 | End: 2021-12-01 | Stop reason: HOSPADM

## 2021-11-29 RX ORDER — DEXTROSE, SODIUM CHLORIDE, AND POTASSIUM CHLORIDE 5; .45; .15 G/100ML; G/100ML; G/100ML
50 INJECTION INTRAVENOUS CONTINUOUS
Status: DISCONTINUED | OUTPATIENT
Start: 2021-11-29 | End: 2021-11-30

## 2021-11-29 RX ORDER — OXYCODONE HYDROCHLORIDE 5 MG/1
5 TABLET ORAL EVERY 4 HOURS PRN
Status: DISCONTINUED | OUTPATIENT
Start: 2021-11-29 | End: 2021-12-01 | Stop reason: HOSPADM

## 2021-11-29 RX ADMIN — HEPARIN SODIUM 5000 UNITS: 5000 INJECTION INTRAVENOUS; SUBCUTANEOUS at 05:22

## 2021-11-29 RX ADMIN — PREGABALIN 100 MG: 100 CAPSULE ORAL at 10:38

## 2021-11-29 RX ADMIN — PREGABALIN 100 MG: 100 CAPSULE ORAL at 17:33

## 2021-11-29 RX ADMIN — MORPHINE SULFATE 4 MG: 4 INJECTION INTRAVENOUS at 04:02

## 2021-11-29 RX ADMIN — DEXTROSE, SODIUM CHLORIDE, AND POTASSIUM CHLORIDE 100 ML/HR: 5; .45; .15 INJECTION INTRAVENOUS at 05:24

## 2021-11-29 RX ADMIN — DEXTROSE, SODIUM CHLORIDE, AND POTASSIUM CHLORIDE 50 ML/HR: 5; .45; .15 INJECTION INTRAVENOUS at 10:38

## 2021-11-29 RX ADMIN — HEPARIN SODIUM 5000 UNITS: 5000 INJECTION INTRAVENOUS; SUBCUTANEOUS at 17:33

## 2021-11-30 LAB
ANION GAP SERPL CALCULATED.3IONS-SCNC: 7 MMOL/L (ref 4–13)
BUN SERPL-MCNC: 7 MG/DL (ref 5–25)
CALCIUM SERPL-MCNC: 8.8 MG/DL (ref 8.3–10.1)
CHLORIDE SERPL-SCNC: 107 MMOL/L (ref 100–108)
CO2 SERPL-SCNC: 26 MMOL/L (ref 21–32)
CREAT SERPL-MCNC: 0.41 MG/DL (ref 0.6–1.3)
ERYTHROCYTE [DISTWIDTH] IN BLOOD BY AUTOMATED COUNT: 13.4 % (ref 11.6–15.1)
GFR SERPL CREATININE-BSD FRML MDRD: 100 ML/MIN/1.73SQ M
GLUCOSE SERPL-MCNC: 104 MG/DL (ref 65–140)
HCT VFR BLD AUTO: 36.6 % (ref 34.8–46.1)
HGB BLD-MCNC: 11.6 G/DL (ref 11.5–15.4)
MAGNESIUM SERPL-MCNC: 2 MG/DL (ref 1.6–2.6)
MCH RBC QN AUTO: 31.5 PG (ref 26.8–34.3)
MCHC RBC AUTO-ENTMCNC: 31.7 G/DL (ref 31.4–37.4)
MCV RBC AUTO: 100 FL (ref 82–98)
PLATELET # BLD AUTO: 284 THOUSANDS/UL (ref 149–390)
PMV BLD AUTO: 8.8 FL (ref 8.9–12.7)
POTASSIUM SERPL-SCNC: 4.2 MMOL/L (ref 3.5–5.3)
RBC # BLD AUTO: 3.68 MILLION/UL (ref 3.81–5.12)
SODIUM SERPL-SCNC: 140 MMOL/L (ref 136–145)
WBC # BLD AUTO: 7.69 THOUSAND/UL (ref 4.31–10.16)

## 2021-11-30 PROCEDURE — 97110 THERAPEUTIC EXERCISES: CPT

## 2021-11-30 PROCEDURE — 99024 POSTOP FOLLOW-UP VISIT: CPT | Performed by: SURGERY

## 2021-11-30 PROCEDURE — 85027 COMPLETE CBC AUTOMATED: CPT | Performed by: PHYSICIAN ASSISTANT

## 2021-11-30 PROCEDURE — 80048 BASIC METABOLIC PNL TOTAL CA: CPT | Performed by: PHYSICIAN ASSISTANT

## 2021-11-30 PROCEDURE — 97116 GAIT TRAINING THERAPY: CPT

## 2021-11-30 PROCEDURE — 83735 ASSAY OF MAGNESIUM: CPT | Performed by: PHYSICIAN ASSISTANT

## 2021-11-30 PROCEDURE — 97530 THERAPEUTIC ACTIVITIES: CPT

## 2021-11-30 RX ORDER — PRAVASTATIN SODIUM 40 MG
40 TABLET ORAL
Status: DISCONTINUED | OUTPATIENT
Start: 2021-11-30 | End: 2021-12-01 | Stop reason: HOSPADM

## 2021-11-30 RX ORDER — MONTELUKAST SODIUM 10 MG/1
10 TABLET ORAL
Status: DISCONTINUED | OUTPATIENT
Start: 2021-11-30 | End: 2021-12-01 | Stop reason: HOSPADM

## 2021-11-30 RX ADMIN — HEPARIN SODIUM 5000 UNITS: 5000 INJECTION INTRAVENOUS; SUBCUTANEOUS at 05:46

## 2021-11-30 RX ADMIN — HEPARIN SODIUM 5000 UNITS: 5000 INJECTION INTRAVENOUS; SUBCUTANEOUS at 13:15

## 2021-11-30 RX ADMIN — MONTELUKAST 10 MG: 10 TABLET, FILM COATED ORAL at 21:09

## 2021-11-30 RX ADMIN — PREGABALIN 100 MG: 100 CAPSULE ORAL at 08:30

## 2021-11-30 RX ADMIN — HEPARIN SODIUM 5000 UNITS: 5000 INJECTION INTRAVENOUS; SUBCUTANEOUS at 00:11

## 2021-11-30 RX ADMIN — PREGABALIN 100 MG: 100 CAPSULE ORAL at 17:48

## 2021-11-30 RX ADMIN — PRAVASTATIN SODIUM 40 MG: 40 TABLET ORAL at 17:48

## 2021-11-30 RX ADMIN — HEPARIN SODIUM 5000 UNITS: 5000 INJECTION INTRAVENOUS; SUBCUTANEOUS at 21:09

## 2021-12-01 VITALS
HEART RATE: 81 BPM | DIASTOLIC BLOOD PRESSURE: 62 MMHG | HEIGHT: 59 IN | SYSTOLIC BLOOD PRESSURE: 103 MMHG | OXYGEN SATURATION: 96 % | TEMPERATURE: 98 F | BODY MASS INDEX: 32.25 KG/M2 | WEIGHT: 160 LBS | RESPIRATION RATE: 16 BRPM

## 2021-12-01 PROCEDURE — 99024 POSTOP FOLLOW-UP VISIT: CPT | Performed by: SURGERY

## 2021-12-01 PROCEDURE — NC001 PR NO CHARGE: Performed by: SURGERY

## 2021-12-01 RX ADMIN — HEPARIN SODIUM 5000 UNITS: 5000 INJECTION INTRAVENOUS; SUBCUTANEOUS at 04:36

## 2021-12-01 RX ADMIN — PREGABALIN 100 MG: 100 CAPSULE ORAL at 08:19

## 2021-12-06 ENCOUNTER — TELEPHONE (OUTPATIENT)
Dept: SURGERY | Facility: CLINIC | Age: 76
End: 2021-12-06

## 2021-12-13 ENCOUNTER — OFFICE VISIT (OUTPATIENT)
Dept: SURGERY | Facility: CLINIC | Age: 76
End: 2021-12-13

## 2021-12-13 VITALS — TEMPERATURE: 97.7 F | HEIGHT: 59 IN | WEIGHT: 159 LBS | BODY MASS INDEX: 32.05 KG/M2

## 2021-12-13 DIAGNOSIS — K43.6 VENTRAL HERNIA WITH OBSTRUCTION AND WITHOUT GANGRENE: Primary | ICD-10-CM

## 2021-12-13 PROCEDURE — 99024 POSTOP FOLLOW-UP VISIT: CPT | Performed by: SURGERY

## 2022-07-05 ENCOUNTER — APPOINTMENT (OUTPATIENT)
Dept: LAB | Facility: HOSPITAL | Age: 77
End: 2022-07-05
Attending: SURGERY
Payer: MEDICARE

## 2022-07-05 ENCOUNTER — PREP FOR PROCEDURE (OUTPATIENT)
Dept: SURGERY | Facility: CLINIC | Age: 77
End: 2022-07-05

## 2022-07-05 ENCOUNTER — CONSULT (OUTPATIENT)
Dept: SURGERY | Facility: CLINIC | Age: 77
End: 2022-07-05
Payer: MEDICARE

## 2022-07-05 VITALS
HEIGHT: 60 IN | WEIGHT: 165 LBS | BODY MASS INDEX: 32.39 KG/M2 | HEART RATE: 69 BPM | DIASTOLIC BLOOD PRESSURE: 66 MMHG | SYSTOLIC BLOOD PRESSURE: 140 MMHG

## 2022-07-05 DIAGNOSIS — K43.2 INCISIONAL HERNIA, WITHOUT OBSTRUCTION OR GANGRENE: Primary | ICD-10-CM

## 2022-07-05 DIAGNOSIS — K43.2 INCISIONAL HERNIA: ICD-10-CM

## 2022-07-05 DIAGNOSIS — K43.2 INCISIONAL HERNIA: Primary | ICD-10-CM

## 2022-07-05 LAB
ALBUMIN SERPL BCP-MCNC: 3.8 G/DL (ref 3.5–5)
ALP SERPL-CCNC: 63 U/L (ref 46–116)
ALT SERPL W P-5'-P-CCNC: 29 U/L (ref 12–78)
ANION GAP SERPL CALCULATED.3IONS-SCNC: 4 MMOL/L (ref 4–13)
AST SERPL W P-5'-P-CCNC: 28 U/L (ref 5–45)
ATRIAL RATE: 70 BPM
BILIRUB SERPL-MCNC: 0.26 MG/DL (ref 0.2–1)
BUN SERPL-MCNC: 11 MG/DL (ref 5–25)
CALCIUM SERPL-MCNC: 9.6 MG/DL (ref 8.3–10.1)
CHLORIDE SERPL-SCNC: 105 MMOL/L (ref 100–108)
CO2 SERPL-SCNC: 30 MMOL/L (ref 21–32)
CREAT SERPL-MCNC: 0.55 MG/DL (ref 0.6–1.3)
ERYTHROCYTE [DISTWIDTH] IN BLOOD BY AUTOMATED COUNT: 14.1 % (ref 11.6–15.1)
GFR SERPL CREATININE-BSD FRML MDRD: 90 ML/MIN/1.73SQ M
GLUCOSE SERPL-MCNC: 91 MG/DL (ref 65–140)
HCT VFR BLD AUTO: 40.4 % (ref 34.8–46.1)
HGB BLD-MCNC: 13.1 G/DL (ref 11.5–15.4)
MCH RBC QN AUTO: 31.2 PG (ref 26.8–34.3)
MCHC RBC AUTO-ENTMCNC: 32.4 G/DL (ref 31.4–37.4)
MCV RBC AUTO: 96 FL (ref 82–98)
P AXIS: 20 DEGREES
PLATELET # BLD AUTO: 278 THOUSANDS/UL (ref 149–390)
PMV BLD AUTO: 9.2 FL (ref 8.9–12.7)
POTASSIUM SERPL-SCNC: 4.5 MMOL/L (ref 3.5–5.3)
PR INTERVAL: 178 MS
PROT SERPL-MCNC: 8 G/DL (ref 6.4–8.2)
QRS AXIS: -6 DEGREES
QRSD INTERVAL: 82 MS
QT INTERVAL: 424 MS
QTC INTERVAL: 457 MS
RBC # BLD AUTO: 4.2 MILLION/UL (ref 3.81–5.12)
SODIUM SERPL-SCNC: 139 MMOL/L (ref 136–145)
T WAVE AXIS: -1 DEGREES
VENTRICULAR RATE: 70 BPM
WBC # BLD AUTO: 6.02 THOUSAND/UL (ref 4.31–10.16)

## 2022-07-05 PROCEDURE — 99214 OFFICE O/P EST MOD 30 MIN: CPT | Performed by: SURGERY

## 2022-07-05 PROCEDURE — 85027 COMPLETE CBC AUTOMATED: CPT

## 2022-07-05 PROCEDURE — 80053 COMPREHEN METABOLIC PANEL: CPT

## 2022-07-05 PROCEDURE — 93005 ELECTROCARDIOGRAM TRACING: CPT

## 2022-07-05 PROCEDURE — 93010 ELECTROCARDIOGRAM REPORT: CPT | Performed by: INTERNAL MEDICINE

## 2022-07-05 PROCEDURE — 36415 COLL VENOUS BLD VENIPUNCTURE: CPT

## 2022-07-05 NOTE — H&P (VIEW-ONLY)
Assessment/Plan:  Patient has a history for small-bowel obstruction secondary to mesh in December  She underwent resection of the mesh to liberate the small bowel  Operative note was reviewed  Dense intestinal adhesions were present throughout the abdomen  She presents with incisional hernia  This is been enlarging over the last few months  She would like to have it repaired as she is afraid of recurrent intestinal obstructions  She denies fevers or chills  Examination reveals a lower abdominal incisional hernia between her lower midline new incision, and bilateral paramedian previous incisions  She has had numerous intestinal surgeries  This measures 6 cm at the hernia orifice and 10 cm for hernia sac  This is reducible  I explained that she has dense intestinal adhesions in the repair may be become difficult  She is at risk for intestinal laceration or serosal tear  I asked her to obtain a bowel preparation preoperatively  She is agreeable  I explained that an underlay mesh would be ideal, but would we may be forced to do an overlay mesh for repair  She is agreeable  Risks and benefits explained  All questions answered  There are no diagnoses linked to this encounter  Subjective:      Patient ID: Jodi Wells is a 65 y o  female  Patient presents for ventral hernia consult  States she has had a bulge and achy pain on her right lower abdomen for 3 months  Limits her activities  The following portions of the patient's history were reviewed and updated as appropriate:     She  has a past medical history of Chronic kidney disease, Colitis, and Hyperlipidemia  She  has a past surgical history that includes Joint replacement (Bilateral); Colon surgery; Back surgery; Colonoscopy (N/A, 5/4/2018); LAPAROTOMY (N/A, 11/27/2021); and Abdominal adhesion surgery (N/A, 11/27/2021)  Her family history includes No Known Problems in her father and mother    She  reports that she quit smoking about 52 years ago  She has never used smokeless tobacco  She reports current alcohol use  She reports that she does not use drugs  Current Outpatient Medications   Medication Sig Dispense Refill    acetaminophen (TYLENOL) 325 mg tablet Take 1 tablet (325 mg total) by mouth every 6 (six) hours as needed for mild pain or moderate pain (Patient not taking: Reported on 8/11/2019) 30 tablet 0    cholecalciferol (VITAMIN D3) 1,000 units tablet Take 2 tablets (2,000 Units total) by mouth daily (Patient not taking: Reported on 11/27/2021 ) 30 tablet 0    gabapentin (NEURONTIN) 300 mg capsule Take 100 mg by mouth 2 (two) times a day (Patient not taking: Reported on 11/27/2021 )      lidocaine (LIDODERM) 5 % Apply 1 patch topically daily Remove & Discard patch within 12 hours or as directed by MD (Patient not taking: Reported on 11/27/2021 ) 6 patch 0    montelukast (SINGULAIR) 10 mg tablet Take 10 mg by mouth daily at bedtime        naproxen (NAPROSYN) 375 mg tablet Take 1 tablet (375 mg total) by mouth 2 (two) times a day with meals (Patient not taking: Reported on 11/27/2021 ) 20 tablet 0    ondansetron (ZOFRAN-ODT) 4 mg disintegrating tablet Take 1 tablet (4 mg total) by mouth every 6 (six) hours as needed for nausea or vomiting (Patient not taking: Reported on 11/27/2021 ) 20 tablet 0    pregabalin (LYRICA) 75 mg capsule Take 100 mg by mouth 2 (two) times a day        simvastatin (ZOCOR) 20 mg tablet Take 20 mg by mouth daily at bedtime       No current facility-administered medications for this visit  She is allergic to hydromorphone       Review of Systems   Constitutional: Negative  Negative for activity change  HENT: Negative  Eyes: Negative  Respiratory: Negative  Cardiovascular: Negative  Gastrointestinal: Positive for abdominal pain  Endocrine: Negative  Genitourinary: Negative  Musculoskeletal: Positive for back pain and gait problem  Skin: Negative  Allergic/Immunologic: Negative  Psychiatric/Behavioral: Negative for agitation, behavioral problems and confusion  The patient is not nervous/anxious  Objective:      /66   Pulse 69   Ht 4' 11 75" (1 518 m)   Wt 74 8 kg (165 lb)   BMI 32 49 kg/m²          Physical Exam  Constitutional:       Appearance: Normal appearance  She is well-developed  HENT:      Head: Normocephalic and atraumatic  Nose: Nose normal    Eyes:      Extraocular Movements: Extraocular movements intact  Conjunctiva/sclera: Conjunctivae normal    Cardiovascular:      Rate and Rhythm: Normal rate and regular rhythm  Heart sounds: Normal heart sounds  Pulmonary:      Effort: Pulmonary effort is normal       Breath sounds: Normal breath sounds  Abdominal:      General: Abdomen is flat  Hernia: A hernia (Incisional hernia located in the lower abdomen  This is reducible ) is present  Musculoskeletal:      Cervical back: Normal range of motion  Right lower leg: No edema  Left lower leg: No edema  Skin:     General: Skin is warm and dry  Neurological:      Mental Status: She is alert and oriented to person, place, and time     Psychiatric:         Mood and Affect: Mood normal

## 2022-07-05 NOTE — PROGRESS NOTES
Assessment/Plan:  Patient has a history for small-bowel obstruction secondary to mesh in December  She underwent resection of the mesh to liberate the small bowel  Operative note was reviewed  Dense intestinal adhesions were present throughout the abdomen  She presents with incisional hernia  This is been enlarging over the last few months  She would like to have it repaired as she is afraid of recurrent intestinal obstructions  She denies fevers or chills  Examination reveals a lower abdominal incisional hernia between her lower midline new incision, and bilateral paramedian previous incisions  She has had numerous intestinal surgeries  This measures 6 cm at the hernia orifice and 10 cm for hernia sac  This is reducible  I explained that she has dense intestinal adhesions in the repair may be become difficult  She is at risk for intestinal laceration or serosal tear  I asked her to obtain a bowel preparation preoperatively  She is agreeable  I explained that an underlay mesh would be ideal, but would we may be forced to do an overlay mesh for repair  She is agreeable  Risks and benefits explained  All questions answered  There are no diagnoses linked to this encounter  Subjective:      Patient ID: Luis Laughlin is a 65 y o  female  Patient presents for ventral hernia consult  States she has had a bulge and achy pain on her right lower abdomen for 3 months  Limits her activities  The following portions of the patient's history were reviewed and updated as appropriate:     She  has a past medical history of Chronic kidney disease, Colitis, and Hyperlipidemia  She  has a past surgical history that includes Joint replacement (Bilateral); Colon surgery; Back surgery; Colonoscopy (N/A, 5/4/2018); LAPAROTOMY (N/A, 11/27/2021); and Abdominal adhesion surgery (N/A, 11/27/2021)  Her family history includes No Known Problems in her father and mother    She  reports that she quit smoking about 52 years ago  She has never used smokeless tobacco  She reports current alcohol use  She reports that she does not use drugs  Current Outpatient Medications   Medication Sig Dispense Refill    acetaminophen (TYLENOL) 325 mg tablet Take 1 tablet (325 mg total) by mouth every 6 (six) hours as needed for mild pain or moderate pain (Patient not taking: Reported on 8/11/2019) 30 tablet 0    cholecalciferol (VITAMIN D3) 1,000 units tablet Take 2 tablets (2,000 Units total) by mouth daily (Patient not taking: Reported on 11/27/2021 ) 30 tablet 0    gabapentin (NEURONTIN) 300 mg capsule Take 100 mg by mouth 2 (two) times a day (Patient not taking: Reported on 11/27/2021 )      lidocaine (LIDODERM) 5 % Apply 1 patch topically daily Remove & Discard patch within 12 hours or as directed by MD (Patient not taking: Reported on 11/27/2021 ) 6 patch 0    montelukast (SINGULAIR) 10 mg tablet Take 10 mg by mouth daily at bedtime        naproxen (NAPROSYN) 375 mg tablet Take 1 tablet (375 mg total) by mouth 2 (two) times a day with meals (Patient not taking: Reported on 11/27/2021 ) 20 tablet 0    ondansetron (ZOFRAN-ODT) 4 mg disintegrating tablet Take 1 tablet (4 mg total) by mouth every 6 (six) hours as needed for nausea or vomiting (Patient not taking: Reported on 11/27/2021 ) 20 tablet 0    pregabalin (LYRICA) 75 mg capsule Take 100 mg by mouth 2 (two) times a day        simvastatin (ZOCOR) 20 mg tablet Take 20 mg by mouth daily at bedtime       No current facility-administered medications for this visit  She is allergic to hydromorphone       Review of Systems   Constitutional: Negative  Negative for activity change  HENT: Negative  Eyes: Negative  Respiratory: Negative  Cardiovascular: Negative  Gastrointestinal: Positive for abdominal pain  Endocrine: Negative  Genitourinary: Negative  Musculoskeletal: Positive for back pain and gait problem  Skin: Negative  Allergic/Immunologic: Negative  Psychiatric/Behavioral: Negative for agitation, behavioral problems and confusion  The patient is not nervous/anxious  Objective:      /66   Pulse 69   Ht 4' 11 75" (1 518 m)   Wt 74 8 kg (165 lb)   BMI 32 49 kg/m²          Physical Exam  Constitutional:       Appearance: Normal appearance  She is well-developed  HENT:      Head: Normocephalic and atraumatic  Nose: Nose normal    Eyes:      Extraocular Movements: Extraocular movements intact  Conjunctiva/sclera: Conjunctivae normal    Cardiovascular:      Rate and Rhythm: Normal rate and regular rhythm  Heart sounds: Normal heart sounds  Pulmonary:      Effort: Pulmonary effort is normal       Breath sounds: Normal breath sounds  Abdominal:      General: Abdomen is flat  Hernia: A hernia (Incisional hernia located in the lower abdomen  This is reducible ) is present  Musculoskeletal:      Cervical back: Normal range of motion  Right lower leg: No edema  Left lower leg: No edema  Skin:     General: Skin is warm and dry  Neurological:      Mental Status: She is alert and oriented to person, place, and time     Psychiatric:         Mood and Affect: Mood normal

## 2022-07-08 NOTE — PRE-PROCEDURE INSTRUCTIONS
Pre-Surgery Instructions:   Medication Instructions    acetaminophen (TYLENOL) 325 mg tablet Instructions provided by MD    bimatoprost (LUMIGAN) 0 01 % ophthalmic drops Take night before surgery    Brimonidine Tartrate 0 025 % SOLN Take day of surgery   montelukast (SINGULAIR) 10 mg tablet Take night before surgery    pregabalin (LYRICA) 100 mg capsule Take day of surgery   simvastatin (ZOCOR) 20 mg tablet Take night before surgery      Reviewed with patient, in detail, instructions from "My Surgical Experience"  Instructed to avoid all  OTC vitamins/supplements and NSAIDS for one week prior to surgery per anesthesia guidelines  Tylenol ok to take PRN  Advised patient that Placido Davis will call with surgery arrival time and hospital directions the business day prior to surgery  Advised patient nothing eat or drink after midnight prior to surgery  Instructed to call surgeon's office in meantime with any new illnesses/exposure  Patient verbalized understanding of current visitor restrictions/masking guidelines and advised that he/she can confirm these at time of arrival call with Placido Davis  Patient verbalized understanding and knows to call surgeon's office with any additional questions prior to surgery

## 2022-07-25 NOTE — DISCHARGE INSTR - AVS FIRST PAGE
Please call the office when you leave to schedule an appointment for 1 week  Please call [phone]                      [address], [address], [facility], [address]  Off of [address] between [facility] and [facility]  Activity:    May lift 10 lb as many times as desired the 1st week,       20 lb in 2 weeks,       30 lb in 3 weeks  Walking is encouraged  Normal daily activities including climbing steps are okay  Do not engage in strenuous activity ( sit-ups or crutches) or contact sports for 4-6 weeks post-operatively    Return to Work:   Okay to return to work when you feel well if you desire  Diet:   You may return to a healthy diet  Please avoid roughage-salads, raw vegetables,    steak and pork chops for 1 month  These items are difficult to digest     Wound Care: Your wound is closed with dissolvable stitches and glue  It is okay to shower  Wash incision gently with soap and water and pat dry  Do not soak incisions in bath water or swim for two weeks  Do not apply any creams or ointments  Pain Medication:   Please take as directed if needed  May use Advil or Motrin in addition  Recall, the pain medicine and anesthesia is associated with constipation  No driving while taking narcotic pain medications  Other: It is normal to developed a healing ridge / firm incision after surgery  This is your body making scar tissue  It is a good sign  Constipation is very common after general anesthesia  Please use milk of magnesia as needed in order to help prevent constipation  It is normal to get bruising after surgery  If you have questions after discharge please call the office      If you have increased pain, fever >101 5, increased drainage, redness or a bad smell at your surgery site, please call us immediately or come directly to the Emergency Room

## 2022-07-26 ENCOUNTER — ANESTHESIA EVENT (OUTPATIENT)
Dept: PERIOP | Facility: HOSPITAL | Age: 77
DRG: 336 | End: 2022-07-26
Payer: MEDICARE

## 2022-07-27 ENCOUNTER — HOSPITAL ENCOUNTER (INPATIENT)
Facility: HOSPITAL | Age: 77
LOS: 5 days | Discharge: HOME | DRG: 336 | End: 2022-08-01
Attending: SURGERY | Admitting: SURGERY
Payer: MEDICARE

## 2022-07-27 ENCOUNTER — ANESTHESIA (OUTPATIENT)
Dept: PERIOP | Facility: HOSPITAL | Age: 77
DRG: 336 | End: 2022-07-27
Payer: MEDICARE

## 2022-07-27 DIAGNOSIS — K43.2 INCISIONAL HERNIA, WITHOUT OBSTRUCTION OR GANGRENE: Primary | ICD-10-CM

## 2022-07-27 DIAGNOSIS — I48.91 ATRIAL FIBRILLATION, UNSPECIFIED TYPE (HCC): ICD-10-CM

## 2022-07-27 DIAGNOSIS — K66.0 INTESTINAL ADHESIONS: ICD-10-CM

## 2022-07-27 DIAGNOSIS — R07.89 OTHER CHEST PAIN: ICD-10-CM

## 2022-07-27 PROBLEM — K56.51 INTESTINAL ADHESIONS WITH PARTIAL OBSTRUCTION (HCC): Status: RESOLVED | Noted: 2021-11-27 | Resolved: 2022-07-27

## 2022-07-27 PROCEDURE — 0DN80ZZ RELEASE SMALL INTESTINE, OPEN APPROACH: ICD-10-PCS | Performed by: SURGERY

## 2022-07-27 PROCEDURE — C9290 INJ, BUPIVACAINE LIPOSOME: HCPCS | Performed by: SURGERY

## 2022-07-27 PROCEDURE — 0WQF0ZZ REPAIR ABDOMINAL WALL, OPEN APPROACH: ICD-10-PCS | Performed by: SURGERY

## 2022-07-27 PROCEDURE — 49565 PR REPAIR RECURR INCIS HERNIA,REDUC: CPT | Performed by: SURGERY

## 2022-07-27 RX ORDER — OXYCODONE HYDROCHLORIDE 5 MG/1
2.5 TABLET ORAL EVERY 4 HOURS PRN
Status: DISCONTINUED | OUTPATIENT
Start: 2022-07-27 | End: 2022-08-01 | Stop reason: HOSPADM

## 2022-07-27 RX ORDER — HEPARIN SODIUM 5000 [USP'U]/ML
5000 INJECTION, SOLUTION INTRAVENOUS; SUBCUTANEOUS EVERY 8 HOURS SCHEDULED
Status: DISCONTINUED | OUTPATIENT
Start: 2022-07-27 | End: 2022-08-01 | Stop reason: HOSPADM

## 2022-07-27 RX ORDER — ONDANSETRON 2 MG/ML
4 INJECTION INTRAMUSCULAR; INTRAVENOUS ONCE AS NEEDED
Status: DISCONTINUED | OUTPATIENT
Start: 2022-07-27 | End: 2022-07-27 | Stop reason: HOSPADM

## 2022-07-27 RX ORDER — BUPIVACAINE HYDROCHLORIDE AND EPINEPHRINE 2.5; 5 MG/ML; UG/ML
INJECTION, SOLUTION EPIDURAL; INFILTRATION; INTRACAUDAL; PERINEURAL AS NEEDED
Status: DISCONTINUED | OUTPATIENT
Start: 2022-07-27 | End: 2022-07-27 | Stop reason: HOSPADM

## 2022-07-27 RX ORDER — OXYCODONE HYDROCHLORIDE AND ACETAMINOPHEN 5; 325 MG/1; MG/1
1 TABLET ORAL EVERY 4 HOURS PRN
Qty: 20 TABLET | Refills: 0 | Status: SHIPPED | OUTPATIENT
Start: 2022-07-27 | End: 2022-08-17

## 2022-07-27 RX ORDER — KETOROLAC TROMETHAMINE 30 MG/ML
INJECTION, SOLUTION INTRAMUSCULAR; INTRAVENOUS AS NEEDED
Status: DISCONTINUED | OUTPATIENT
Start: 2022-07-27 | End: 2022-07-27

## 2022-07-27 RX ORDER — BUPIVACAINE HYDROCHLORIDE 2.5 MG/ML
INJECTION, SOLUTION EPIDURAL; INFILTRATION; INTRACAUDAL
Status: DISCONTINUED | OUTPATIENT
Start: 2022-07-27 | End: 2022-07-27

## 2022-07-27 RX ORDER — CEFAZOLIN SODIUM 1 G/50ML
1000 SOLUTION INTRAVENOUS ONCE
Status: DISCONTINUED | OUTPATIENT
Start: 2022-07-27 | End: 2022-07-27

## 2022-07-27 RX ORDER — OXYCODONE HYDROCHLORIDE 5 MG/1
5 TABLET ORAL EVERY 4 HOURS PRN
Status: DISCONTINUED | OUTPATIENT
Start: 2022-07-27 | End: 2022-08-01 | Stop reason: HOSPADM

## 2022-07-27 RX ORDER — CEFAZOLIN SODIUM 1 G/50ML
SOLUTION INTRAVENOUS AS NEEDED
Status: DISCONTINUED | OUTPATIENT
Start: 2022-07-27 | End: 2022-07-27

## 2022-07-27 RX ORDER — ONDANSETRON 2 MG/ML
INJECTION INTRAMUSCULAR; INTRAVENOUS AS NEEDED
Status: DISCONTINUED | OUTPATIENT
Start: 2022-07-27 | End: 2022-07-27

## 2022-07-27 RX ORDER — ONDANSETRON 2 MG/ML
4 INJECTION INTRAMUSCULAR; INTRAVENOUS EVERY 6 HOURS PRN
Status: DISCONTINUED | OUTPATIENT
Start: 2022-07-27 | End: 2022-07-27

## 2022-07-27 RX ORDER — MAGNESIUM HYDROXIDE 1200 MG/15ML
LIQUID ORAL AS NEEDED
Status: DISCONTINUED | OUTPATIENT
Start: 2022-07-27 | End: 2022-07-27 | Stop reason: HOSPADM

## 2022-07-27 RX ORDER — FENTANYL CITRATE/PF 50 MCG/ML
25 SYRINGE (ML) INJECTION
Status: DISCONTINUED | OUTPATIENT
Start: 2022-07-27 | End: 2022-07-27 | Stop reason: HOSPADM

## 2022-07-27 RX ORDER — PROPOFOL 10 MG/ML
INJECTION, EMULSION INTRAVENOUS AS NEEDED
Status: DISCONTINUED | OUTPATIENT
Start: 2022-07-27 | End: 2022-07-27

## 2022-07-27 RX ORDER — BRIMONIDINE TARTRATE 2 MG/ML
1 SOLUTION/ DROPS OPHTHALMIC 2 TIMES DAILY
Status: DISCONTINUED | OUTPATIENT
Start: 2022-07-27 | End: 2022-08-01 | Stop reason: HOSPADM

## 2022-07-27 RX ORDER — FENTANYL CITRATE 50 UG/ML
INJECTION, SOLUTION INTRAMUSCULAR; INTRAVENOUS AS NEEDED
Status: DISCONTINUED | OUTPATIENT
Start: 2022-07-27 | End: 2022-07-27

## 2022-07-27 RX ORDER — ACETAMINOPHEN 325 MG/1
975 TABLET ORAL EVERY 8 HOURS SCHEDULED
Status: DISCONTINUED | OUTPATIENT
Start: 2022-07-27 | End: 2022-08-01 | Stop reason: HOSPADM

## 2022-07-27 RX ORDER — DEXAMETHASONE SODIUM PHOSPHATE 10 MG/ML
INJECTION, SOLUTION INTRAMUSCULAR; INTRAVENOUS AS NEEDED
Status: DISCONTINUED | OUTPATIENT
Start: 2022-07-27 | End: 2022-07-27

## 2022-07-27 RX ORDER — NEOSTIGMINE METHYLSULFATE 1 MG/ML
INJECTION INTRAVENOUS AS NEEDED
Status: DISCONTINUED | OUTPATIENT
Start: 2022-07-27 | End: 2022-07-27

## 2022-07-27 RX ORDER — ROCURONIUM BROMIDE 10 MG/ML
INJECTION, SOLUTION INTRAVENOUS AS NEEDED
Status: DISCONTINUED | OUTPATIENT
Start: 2022-07-27 | End: 2022-07-27

## 2022-07-27 RX ORDER — CEFAZOLIN SODIUM 2 G/50ML
2000 SOLUTION INTRAVENOUS EVERY 8 HOURS
Status: DISCONTINUED | OUTPATIENT
Start: 2022-07-27 | End: 2022-08-01

## 2022-07-27 RX ORDER — ONDANSETRON 2 MG/ML
4 INJECTION INTRAMUSCULAR; INTRAVENOUS EVERY 4 HOURS PRN
Status: DISCONTINUED | OUTPATIENT
Start: 2022-07-28 | End: 2022-08-01 | Stop reason: HOSPADM

## 2022-07-27 RX ORDER — GLYCOPYRROLATE 0.2 MG/ML
INJECTION INTRAMUSCULAR; INTRAVENOUS AS NEEDED
Status: DISCONTINUED | OUTPATIENT
Start: 2022-07-27 | End: 2022-07-27

## 2022-07-27 RX ORDER — LIDOCAINE HYDROCHLORIDE 10 MG/ML
INJECTION, SOLUTION EPIDURAL; INFILTRATION; INTRACAUDAL; PERINEURAL AS NEEDED
Status: DISCONTINUED | OUTPATIENT
Start: 2022-07-27 | End: 2022-07-27

## 2022-07-27 RX ORDER — LIDOCAINE 50 MG/G
1 PATCH TOPICAL DAILY
Status: DISCONTINUED | OUTPATIENT
Start: 2022-07-28 | End: 2022-08-01 | Stop reason: HOSPADM

## 2022-07-27 RX ORDER — SODIUM CHLORIDE, SODIUM LACTATE, POTASSIUM CHLORIDE, CALCIUM CHLORIDE 600; 310; 30; 20 MG/100ML; MG/100ML; MG/100ML; MG/100ML
125 INJECTION, SOLUTION INTRAVENOUS CONTINUOUS
Status: DISCONTINUED | OUTPATIENT
Start: 2022-07-27 | End: 2022-07-29

## 2022-07-27 RX ORDER — SCOLOPAMINE TRANSDERMAL SYSTEM 1 MG/1
1 PATCH, EXTENDED RELEASE TRANSDERMAL
Status: DISCONTINUED | OUTPATIENT
Start: 2022-07-28 | End: 2022-08-01 | Stop reason: HOSPADM

## 2022-07-27 RX ORDER — PREGABALIN 100 MG/1
100 CAPSULE ORAL 2 TIMES DAILY
Status: DISCONTINUED | OUTPATIENT
Start: 2022-07-27 | End: 2022-08-01 | Stop reason: HOSPADM

## 2022-07-27 RX ORDER — MONTELUKAST SODIUM 10 MG/1
10 TABLET ORAL
Status: DISCONTINUED | OUTPATIENT
Start: 2022-07-27 | End: 2022-08-01 | Stop reason: HOSPADM

## 2022-07-27 RX ADMIN — PREGABALIN 100 MG: 100 CAPSULE ORAL at 20:15

## 2022-07-27 RX ADMIN — CEFAZOLIN SODIUM 2000 MG: 2 SOLUTION INTRAVENOUS at 20:16

## 2022-07-27 RX ADMIN — SODIUM CHLORIDE, POTASSIUM CHLORIDE, SODIUM LACTATE AND CALCIUM CHLORIDE: 600; 310; 30; 20 INJECTION, SOLUTION INTRAVENOUS at 11:21

## 2022-07-27 RX ADMIN — OXYCODONE HYDROCHLORIDE 5 MG: 5 TABLET ORAL at 20:15

## 2022-07-27 RX ADMIN — SODIUM CHLORIDE, POTASSIUM CHLORIDE, SODIUM LACTATE AND CALCIUM CHLORIDE 125 ML/HR: 600; 310; 30; 20 INJECTION, SOLUTION INTRAVENOUS at 20:16

## 2022-07-27 RX ADMIN — Medication 25 MCG: at 15:42

## 2022-07-27 RX ADMIN — PHENYLEPHRINE HYDROCHLORIDE 20 MCG/MIN: 10 INJECTION INTRAVENOUS at 11:42

## 2022-07-27 RX ADMIN — Medication 25 MCG: at 15:51

## 2022-07-27 RX ADMIN — FENTANYL CITRATE 50 MCG: 50 INJECTION INTRAMUSCULAR; INTRAVENOUS at 12:42

## 2022-07-27 RX ADMIN — ROCURONIUM BROMIDE 10 MG: 50 INJECTION, SOLUTION INTRAVENOUS at 13:16

## 2022-07-27 RX ADMIN — BIMATOPROST 1 DROP: 0.1 SOLUTION/ DROPS OPHTHALMIC at 21:48

## 2022-07-27 RX ADMIN — ONDANSETRON 4 MG: 2 INJECTION INTRAMUSCULAR; INTRAVENOUS at 21:10

## 2022-07-27 RX ADMIN — LIDOCAINE HYDROCHLORIDE 30 MG: 10 INJECTION, SOLUTION EPIDURAL; INFILTRATION; INTRACAUDAL at 11:28

## 2022-07-27 RX ADMIN — KETOROLAC TROMETHAMINE 15 MG: 30 INJECTION, SOLUTION INTRAMUSCULAR; INTRAVENOUS at 12:31

## 2022-07-27 RX ADMIN — BRIMONIDINE TARTRATE 1 DROP: 2 SOLUTION/ DROPS OPHTHALMIC at 20:21

## 2022-07-27 RX ADMIN — DEXAMETHASONE SODIUM PHOSPHATE 4 MG: 10 INJECTION, SOLUTION INTRAMUSCULAR; INTRAVENOUS at 11:35

## 2022-07-27 RX ADMIN — CEFAZOLIN SODIUM 1000 MG: 1 SOLUTION INTRAVENOUS at 11:32

## 2022-07-27 RX ADMIN — HEPARIN SODIUM 5000 UNITS: 5000 INJECTION INTRAVENOUS; SUBCUTANEOUS at 21:48

## 2022-07-27 RX ADMIN — PROPOFOL 150 MG: 10 INJECTION, EMULSION INTRAVENOUS at 11:28

## 2022-07-27 RX ADMIN — Medication 100 MCG: at 13:38

## 2022-07-27 RX ADMIN — ACETAMINOPHEN 975 MG: 325 TABLET ORAL at 21:48

## 2022-07-27 RX ADMIN — GLYCOPYRROLATE 0.4 MG: 0.2 INJECTION INTRAMUSCULAR; INTRAVENOUS at 14:22

## 2022-07-27 RX ADMIN — ROCURONIUM BROMIDE 40 MG: 50 INJECTION, SOLUTION INTRAVENOUS at 11:29

## 2022-07-27 RX ADMIN — FENTANYL CITRATE 50 MCG: 50 INJECTION INTRAMUSCULAR; INTRAVENOUS at 11:27

## 2022-07-27 RX ADMIN — SODIUM CHLORIDE, POTASSIUM CHLORIDE, SODIUM LACTATE AND CALCIUM CHLORIDE 125 ML/HR: 600; 310; 30; 20 INJECTION, SOLUTION INTRAVENOUS at 17:43

## 2022-07-27 RX ADMIN — MONTELUKAST SODIUM 10 MG: 10 TABLET, FILM COATED ORAL at 21:48

## 2022-07-27 RX ADMIN — NEOSTIGMINE METHYLSULFATE 3 MG: 1 INJECTION INTRAVENOUS at 14:22

## 2022-07-27 RX ADMIN — BUPIVACAINE HYDROCHLORIDE 25 ML: 2.5 INJECTION, SOLUTION EPIDURAL; INFILTRATION; INTRACAUDAL at 14:22

## 2022-07-27 RX ADMIN — Medication 200 MCG: at 11:36

## 2022-07-27 RX ADMIN — BUPIVACAINE 20 ML: 13.3 INJECTION, SUSPENSION, LIPOSOMAL INFILTRATION at 14:22

## 2022-07-27 RX ADMIN — SODIUM CHLORIDE, POTASSIUM CHLORIDE, SODIUM LACTATE AND CALCIUM CHLORIDE 1000 ML: 600; 310; 30; 20 INJECTION, SOLUTION INTRAVENOUS at 23:53

## 2022-07-27 RX ADMIN — Medication 100 MCG: at 13:53

## 2022-07-27 RX ADMIN — ONDANSETRON 4 MG: 2 INJECTION INTRAMUSCULAR; INTRAVENOUS at 14:11

## 2022-07-27 RX ADMIN — ROCURONIUM BROMIDE 10 MG: 50 INJECTION, SOLUTION INTRAVENOUS at 12:38

## 2022-07-27 RX ADMIN — SODIUM CHLORIDE, POTASSIUM CHLORIDE, SODIUM LACTATE AND CALCIUM CHLORIDE: 600; 310; 30; 20 INJECTION, SOLUTION INTRAVENOUS at 12:50

## 2022-07-27 NOTE — ANESTHESIA PREPROCEDURE EVALUATION
Procedure:  REPAIR HERNIA INCISIONAL, LYSIS OF ADHESIONS, EXPLANTATION OF MESH, REPAIR OF ENTEROTOMY (N/A Abdomen)    Relevant Problems   CARDIO   (+) Mixed hyperlipidemia   (+) Other chest pain      GI/HEPATIC   (+) Intestinal adhesions with partial obstruction (HCC)      Other   (+) Incisional hernia, without obstruction or gangrene             Anesthesia Plan  ASA Score- 2     Anesthesia Type- general with ASA Monitors  Patient reason for not using neuraxial anesthesia or peripheral nerve block: With TAP Block  Additional Monitors:   Airway Plan: NTT  Plan Factors-Exercise tolerance (METS): >4 METS  Chart reviewed  EKG reviewed  Imaging results reviewed  Existing labs reviewed  Patient summary reviewed  Patient is not a current smoker  Obstructive sleep apnea risk education given perioperatively  Induction- intravenous  Postoperative Plan- Plan for postoperative opioid use  Informed Consent- Anesthetic plan and risks discussed with patient  I personally reviewed this patient with the CRNA  Discussed and agreed on the Anesthesia Plan with the CRNA  Evita Johnson

## 2022-07-27 NOTE — INTERVAL H&P NOTE
H&P reviewed  After examining the patient I find no changes in the patients condition since the H&P had been written      Vitals:    07/27/22 1032   BP: (!) 105/40   Pulse: 56   Resp: 16   Temp: 98 6 °F (37 °C)   SpO2: 98%

## 2022-07-27 NOTE — ANESTHESIA PROCEDURE NOTES
Peripheral Block    Patient location during procedure: OR  Start time: 7/27/2022 2:22 PM  Reason for block: at surgeon's request and post-op pain management  Staffing  Anesthesiologist: Chon Alvares MD  Preanesthetic Checklist  Completed: patient identified, IV checked, surgical consent, monitors and equipment checked and timeout performed  Peripheral Block  Patient position: supine  Prep: ChloraPrep  Patient monitoring: continuous pulse ox, frequent blood pressure checks, heart rate and cardiac monitor  Anesthesia block type: Subcostal TAP  Laterality: right  Injection technique: single-shot  Procedures: ultrasound guided, Ultrasound guidance required for the procedure to increase accuracy and safety of medication placement and decrease risk of complications  Ultrasound permanent image savedbupivacaine (PF) (MARCAINE) 0 25 % 10 mL - Perineural   25 mL - 7/27/2022 2:22:00 PM  Needle  Needle type: Stimuplex   Needle gauge: 20g  Needle length: 4in  Needle localization: ultrasound guidance  Test dose: negative  Assessment  Injection assessment: incremental injection, local visualized surrounding nerve on ultrasound, no paresthesia on injection and negative aspiration for heme  Paresthesia pain: none  Heart rate change: no  Slow fractionated injection: yes  Post-procedure:  site cleaned  patient tolerated the procedure well with no immediate complications  Additional Notes  With Exparel 20 mL  Plain local anesthetic and Exparel admixed  Total volume divided 30 mL right subcostal and 15 mL traditional TAP plane given unique location of incisional site relative to blocks' anticipated dermatomal coverages

## 2022-07-27 NOTE — OP NOTE
OPERATIVE REPORT  PATIENT NAME: Kodi Palomares    :  1945  MRN: [id number]  Pt Location: BE OR ROOM 07    SURGERY DATE: 2022    Surgeon(s) and Role:     * Sia Bishop MD - Primary     * Caryn Maki MD - Assisting    Preop Diagnosis:  Incisional hernia [K43 2]    Post-Op Diagnosis Codes:     * Incisional hernia [K43 2], intestinal adhesions, previous hernia repair with mesh    Procedure(s) (LRB):  REPAIR HERNIA INCISIONAL, LYSIS OF ADHESIONS, EXPLANTATION OF MESH, REPAIR OF ENTEROTOMY (N/A)    Specimen(s):  * No specimens in log *    Estimated Blood Loss:   100 mL    Drains:  Closed/Suction Drain Right Abdomen 10 Fr  (Active)   Site Description Unable to view 22   Dressing Status Clean;Dry; Intact 22   Drainage Appearance Bloody 22   Status To bulb suction 22   Number of days: 0       Anesthesia Type:   General    Operative Indications:  Incisional hernia [K43 2]      Independent, nonsmoker, ASA 3, wound class 3, BMI 33, weight 164, height 60   Mixed hyperlipidemia   (+) Other chest pain       GI/HEPATIC   (+) Intestinal adhesions with partial obstruction (HCC)       Other   (+) Incisional hernia, without obstruction or gangrene                       Complications:   None    Procedure and Technique:  Patient was identified visually and via armband  Placed in supine position  After anesthesia the abdomen was prepped and draped in a sterile fashion  Antibiotics provided  A thrombotic pumps in place  Time-out was completed  Incision was created over the right hypogastric region over the previous incisional hernia repairs  Patient has a history for numerous incisional hernias repaired with mesh  Last operative note demonstrated a dense intestinal adhesions  Their operation need to be abbreviated secondary to inability to advance  The same adhesions were found  There is no evidence of omentum of the opposed against the overlying mesh  Polypropylene was firmly adherent to small bowel  In addition there was a comp ablation previous mesh the plane polypropylene as well as combination mass associated with [place] a polypropylene  Extensive lysis of adhesions was completed proximally 3 in in all directions underneath the fascial planes  Again dissection was tedious and difficult secondary to intestine firmly adherent to overlying mesh  Approximately at the level of the umbilicus at the 45:30 a m  Location, the enterotomy was made into the small intestine  This was the unavoidable secondary to dense adhesions  The small bowel was adherent to the [place] mesh  The small bowel had protruded around the mesh to the anterior abdominal wall  In doing so an enterotomy was made in this atypical plane  There was some bubbles of enteric fluid although there was no gross spillage  He has a lysis of this loop of bowel was completed and then was primarily repaired with interrupted Lembert 3-0 PDS suture  This created a sound repair  Additional easy lysis was completed  It was decided not to place a mesh secondary to come 10 minutes a shin  Was decided not to place Seprafilm secondary to the enterotomy  The wound was closed with several figure-of-eight 1  PDS suture in a transverse fascia  The wound in subcutaneous tissue was irrigated numerous times  A few vertical mattress nylon sutures were placed in the skin  This was then followed by wound guillermo  A FABIEN drain was placed at the inferior aspect of this wound  At this point the procedure was terminated  It anesthesia completed a tap block  She was then awakened anesthesia transfer the recovery room stable condition  Sponge instrument count correct x2     I was present for the entire procedure    Patient Disposition:  PACU       SIGNATURE: Sia Bishop MD  DATE: July 27, 2022  TIME: 2:57 PM

## 2022-07-27 NOTE — ANESTHESIA POSTPROCEDURE EVALUATION
Post-Op Assessment Note    CV Status:  Stable  Pain Score: 0    Pain management: adequate     Mental Status:  Awake   Hydration Status:  Euvolemic   PONV Controlled:  Controlled   Airway Patency:  Patent      Post Op Vitals Reviewed: Yes      Staff: CRNA, Anesthesiologist         No complications documented      BP  105/61   Temp   98 8   Pulse  76   Resp   16   SpO2   99%

## 2022-07-28 ENCOUNTER — HOME HEALTH ADMISSION (OUTPATIENT)
Dept: HOME HEALTH SERVICES | Facility: HOME HEALTHCARE | Age: 77
End: 2022-07-28
Payer: MEDICARE

## 2022-07-28 LAB
ABO GROUP BLD: NORMAL
ANION GAP SERPL CALCULATED.3IONS-SCNC: 4 MMOL/L (ref 4–13)
ANION GAP SERPL CALCULATED.3IONS-SCNC: 4 MMOL/L (ref 4–13)
BASOPHILS # BLD AUTO: 0.01 THOUSANDS/ΜL (ref 0–0.1)
BASOPHILS NFR BLD AUTO: 0 % (ref 0–1)
BLD GP AB SCN SERPL QL: NEGATIVE
BUN SERPL-MCNC: 13 MG/DL (ref 5–25)
BUN SERPL-MCNC: 15 MG/DL (ref 5–25)
CALCIUM SERPL-MCNC: 7.9 MG/DL (ref 8.3–10.1)
CALCIUM SERPL-MCNC: 8.1 MG/DL (ref 8.3–10.1)
CHLORIDE SERPL-SCNC: 109 MMOL/L (ref 96–108)
CHLORIDE SERPL-SCNC: 111 MMOL/L (ref 96–108)
CO2 SERPL-SCNC: 26 MMOL/L (ref 21–32)
CO2 SERPL-SCNC: 27 MMOL/L (ref 21–32)
CREAT SERPL-MCNC: 0.6 MG/DL (ref 0.6–1.3)
CREAT SERPL-MCNC: 0.64 MG/DL (ref 0.6–1.3)
EOSINOPHIL # BLD AUTO: 0 THOUSAND/ΜL (ref 0–0.61)
EOSINOPHIL NFR BLD AUTO: 0 % (ref 0–6)
ERYTHROCYTE [DISTWIDTH] IN BLOOD BY AUTOMATED COUNT: 13.8 % (ref 11.6–15.1)
GFR SERPL CREATININE-BSD FRML MDRD: 85 ML/MIN/1.73SQ M
GFR SERPL CREATININE-BSD FRML MDRD: 87 ML/MIN/1.73SQ M
GLUCOSE SERPL-MCNC: 117 MG/DL (ref 65–140)
GLUCOSE SERPL-MCNC: 124 MG/DL (ref 65–140)
HCT VFR BLD AUTO: 23.8 % (ref 34.8–46.1)
HCT VFR BLD AUTO: 24.3 % (ref 34.8–46.1)
HCT VFR BLD AUTO: 25.1 % (ref 34.8–46.1)
HCT VFR BLD AUTO: 25.8 % (ref 34.8–46.1)
HGB BLD-MCNC: 7.3 G/DL (ref 11.5–15.4)
HGB BLD-MCNC: 7.7 G/DL (ref 11.5–15.4)
HGB BLD-MCNC: 7.9 G/DL (ref 11.5–15.4)
HGB BLD-MCNC: 8.1 G/DL (ref 11.5–15.4)
IMM GRANULOCYTES # BLD AUTO: 0.04 THOUSAND/UL (ref 0–0.2)
IMM GRANULOCYTES NFR BLD AUTO: 0 % (ref 0–2)
INR PPP: 1.26 (ref 0.84–1.19)
LACTATE SERPL-SCNC: 1.7 MMOL/L (ref 0.5–2)
LACTATE SERPL-SCNC: 3 MMOL/L (ref 0.5–2)
LACTATE SERPL-SCNC: 3.2 MMOL/L (ref 0.5–2)
LYMPHOCYTES # BLD AUTO: 1.36 THOUSANDS/ΜL (ref 0.6–4.47)
LYMPHOCYTES NFR BLD AUTO: 13 % (ref 14–44)
MAGNESIUM SERPL-MCNC: 1.7 MG/DL (ref 1.6–2.6)
MCH RBC QN AUTO: 31.6 PG (ref 26.8–34.3)
MCHC RBC AUTO-ENTMCNC: 31.7 G/DL (ref 31.4–37.4)
MCV RBC AUTO: 100 FL (ref 82–98)
MONOCYTES # BLD AUTO: 0.96 THOUSAND/ΜL (ref 0.17–1.22)
MONOCYTES NFR BLD AUTO: 9 % (ref 4–12)
NEUTROPHILS # BLD AUTO: 7.99 THOUSANDS/ΜL (ref 1.85–7.62)
NEUTS SEG NFR BLD AUTO: 78 % (ref 43–75)
NRBC BLD AUTO-RTO: 0 /100 WBCS
PHOSPHATE SERPL-MCNC: 3 MG/DL (ref 2.3–4.1)
PLATELET # BLD AUTO: 205 THOUSANDS/UL (ref 149–390)
PMV BLD AUTO: 9.1 FL (ref 8.9–12.7)
POTASSIUM SERPL-SCNC: 3.8 MMOL/L (ref 3.5–5.3)
POTASSIUM SERPL-SCNC: 4.1 MMOL/L (ref 3.5–5.3)
PROTHROMBIN TIME: 16.1 SECONDS (ref 11.6–14.5)
RBC # BLD AUTO: 2.44 MILLION/UL (ref 3.81–5.12)
RH BLD: POSITIVE
SODIUM SERPL-SCNC: 139 MMOL/L (ref 135–147)
SODIUM SERPL-SCNC: 142 MMOL/L (ref 135–147)
SPECIMEN EXPIRATION DATE: NORMAL
WBC # BLD AUTO: 10.36 THOUSAND/UL (ref 4.31–10.16)

## 2022-07-28 PROCEDURE — 97167 OT EVAL HIGH COMPLEX 60 MIN: CPT

## 2022-07-28 PROCEDURE — 86923 COMPATIBILITY TEST ELECTRIC: CPT

## 2022-07-28 PROCEDURE — 99232 SBSQ HOSP IP/OBS MODERATE 35: CPT | Performed by: NURSE PRACTITIONER

## 2022-07-28 PROCEDURE — 83605 ASSAY OF LACTIC ACID: CPT | Performed by: STUDENT IN AN ORGANIZED HEALTH CARE EDUCATION/TRAINING PROGRAM

## 2022-07-28 PROCEDURE — NC001 PR NO CHARGE: Performed by: NURSE PRACTITIONER

## 2022-07-28 PROCEDURE — 85610 PROTHROMBIN TIME: CPT | Performed by: PHYSICIAN ASSISTANT

## 2022-07-28 PROCEDURE — 80048 BASIC METABOLIC PNL TOTAL CA: CPT | Performed by: STUDENT IN AN ORGANIZED HEALTH CARE EDUCATION/TRAINING PROGRAM

## 2022-07-28 PROCEDURE — 80048 BASIC METABOLIC PNL TOTAL CA: CPT

## 2022-07-28 PROCEDURE — 83735 ASSAY OF MAGNESIUM: CPT

## 2022-07-28 PROCEDURE — 85025 COMPLETE CBC W/AUTO DIFF WBC: CPT | Performed by: STUDENT IN AN ORGANIZED HEALTH CARE EDUCATION/TRAINING PROGRAM

## 2022-07-28 PROCEDURE — 86850 RBC ANTIBODY SCREEN: CPT | Performed by: STUDENT IN AN ORGANIZED HEALTH CARE EDUCATION/TRAINING PROGRAM

## 2022-07-28 PROCEDURE — 99024 POSTOP FOLLOW-UP VISIT: CPT | Performed by: SURGERY

## 2022-07-28 PROCEDURE — 86901 BLOOD TYPING SEROLOGIC RH(D): CPT | Performed by: STUDENT IN AN ORGANIZED HEALTH CARE EDUCATION/TRAINING PROGRAM

## 2022-07-28 PROCEDURE — 84100 ASSAY OF PHOSPHORUS: CPT

## 2022-07-28 PROCEDURE — 93005 ELECTROCARDIOGRAM TRACING: CPT

## 2022-07-28 PROCEDURE — 85014 HEMATOCRIT: CPT | Performed by: STUDENT IN AN ORGANIZED HEALTH CARE EDUCATION/TRAINING PROGRAM

## 2022-07-28 PROCEDURE — 85018 HEMOGLOBIN: CPT | Performed by: STUDENT IN AN ORGANIZED HEALTH CARE EDUCATION/TRAINING PROGRAM

## 2022-07-28 PROCEDURE — 86900 BLOOD TYPING SEROLOGIC ABO: CPT | Performed by: STUDENT IN AN ORGANIZED HEALTH CARE EDUCATION/TRAINING PROGRAM

## 2022-07-28 RX ORDER — METOPROLOL TARTRATE 5 MG/5ML
5 INJECTION INTRAVENOUS ONCE
Status: COMPLETED | OUTPATIENT
Start: 2022-07-28 | End: 2022-07-28

## 2022-07-28 RX ADMIN — SCOPOLAMINE 1 PATCH: 1.5 PATCH, EXTENDED RELEASE TRANSDERMAL at 00:45

## 2022-07-28 RX ADMIN — HEPARIN SODIUM 5000 UNITS: 5000 INJECTION INTRAVENOUS; SUBCUTANEOUS at 13:59

## 2022-07-28 RX ADMIN — SODIUM CHLORIDE, SODIUM LACTATE, POTASSIUM CHLORIDE, AND CALCIUM CHLORIDE 1000 ML: .6; .31; .03; .02 INJECTION, SOLUTION INTRAVENOUS at 01:45

## 2022-07-28 RX ADMIN — METOROPROLOL TARTRATE 5 MG: 5 INJECTION, SOLUTION INTRAVENOUS at 20:07

## 2022-07-28 RX ADMIN — CEFAZOLIN SODIUM 2000 MG: 2 SOLUTION INTRAVENOUS at 11:30

## 2022-07-28 RX ADMIN — HEPARIN SODIUM 5000 UNITS: 5000 INJECTION INTRAVENOUS; SUBCUTANEOUS at 05:09

## 2022-07-28 RX ADMIN — MONTELUKAST SODIUM 10 MG: 10 TABLET, FILM COATED ORAL at 21:32

## 2022-07-28 RX ADMIN — CEFAZOLIN SODIUM 2000 MG: 2 SOLUTION INTRAVENOUS at 02:53

## 2022-07-28 RX ADMIN — SODIUM CHLORIDE, POTASSIUM CHLORIDE, SODIUM LACTATE AND CALCIUM CHLORIDE 125 ML/HR: 600; 310; 30; 20 INJECTION, SOLUTION INTRAVENOUS at 13:59

## 2022-07-28 RX ADMIN — BRIMONIDINE TARTRATE 1 DROP: 2 SOLUTION/ DROPS OPHTHALMIC at 17:45

## 2022-07-28 RX ADMIN — HEPARIN SODIUM 5000 UNITS: 5000 INJECTION INTRAVENOUS; SUBCUTANEOUS at 21:34

## 2022-07-28 RX ADMIN — BRIMONIDINE TARTRATE 1 DROP: 2 SOLUTION/ DROPS OPHTHALMIC at 11:35

## 2022-07-28 RX ADMIN — ACETAMINOPHEN 975 MG: 325 TABLET ORAL at 05:09

## 2022-07-28 RX ADMIN — PREGABALIN 100 MG: 100 CAPSULE ORAL at 10:07

## 2022-07-28 RX ADMIN — ACETAMINOPHEN 975 MG: 325 TABLET ORAL at 21:32

## 2022-07-28 RX ADMIN — BIMATOPROST 1 DROP: 0.1 SOLUTION/ DROPS OPHTHALMIC at 21:36

## 2022-07-28 RX ADMIN — CEFAZOLIN SODIUM 2000 MG: 2 SOLUTION INTRAVENOUS at 20:05

## 2022-07-28 RX ADMIN — ACETAMINOPHEN 975 MG: 325 TABLET ORAL at 13:59

## 2022-07-28 RX ADMIN — PREGABALIN 100 MG: 100 CAPSULE ORAL at 17:45

## 2022-07-28 NOTE — PLAN OF CARE
Problem: OCCUPATIONAL THERAPY ADULT  Goal: Performs self-care activities at highest level of function for planned discharge setting  See evaluation for individualized goals  Description: Treatment Interventions: ADL retraining, Functional transfer training, Endurance training, Patient/family training, Energy conservation, Activityengagement, Compensatory technique education, Equipment evaluation/education          See flowsheet documentation for full assessment, interventions and recommendations  Note: Limitation: Decreased ADL status, Decreased endurance, Decreased high-level ADLs, Decreased self-care trans  Prognosis: Good  Assessment: Pt is a 65 y o  female admitted 7/27/22 with incisional hernia  Pt underwent hernia repair, lysis of adhesions, explanation of mesh, and enterotomy repair 7/27/22, POD #1 w active OT eval and treat orders  Pt w abdominal binder and FABIEN drain at this time, as well as active OT eval and treat orders  PMH includes  has a past medical history of Chronic kidney disease, Colitis, and Hyperlipidemia  Pt lives alone in a [facility] with 2 BLAS, reports walk in shower with grab bars, standard toilet with grab bars  Pta, pt was independent w/ ADL/IADL and used RW for functional mobility, was driving  Also has WC and SPC if needed  Currently, pt is S for UB ADL, min A for LB ADL and completed initial transfers/FM with Min A (Progressed to S) w RW for support   Pt is limited at this time 2* decreased endurance/activtiy tolerance, decreased ADL/High-level ADL status, decreased self-care trans, limited home support and is a fall risk  This impacts pt's ability to complete UB and LB dressing and bathing, toileting, transfers, functional mobility, community mobility, home and health maintenance, and safe engagement in typical daily routine  The patient's raw score on the AM-PAC Daily Activity inpatient short form is 21, standardized score is 44 27, greater than 39 4   Patients at this level are likely to benefit from discharge to home  Please refer to the recommendation of the Occupational Therapist for safe discharge planning  From OT standpoint, pt should D/C to STR when medically stable  Pt will benefit from continued acute OT services 3-5x/wk for 10-14 days to meet goals       OT Discharge Recommendation: Home with home health rehabilitation

## 2022-07-28 NOTE — PLAN OF CARE
Problem: MOBILITY - ADULT  Goal: Maintain or return to baseline ADL function  Description: INTERVENTIONS:  -  Assess patient's ability to carry out ADLs; assess patient's baseline for ADL function and identify physical deficits which impact ability to perform ADLs (bathing, care of mouth/teeth, toileting, grooming, dressing, etc )  - Assess/evaluate cause of self-care deficits   - Assess range of motion  - Assess patient's mobility; develop plan if impaired  - Assess patient's need for assistive devices and provide as appropriate  - Encourage maximum independence but intervene and supervise when necessary  - Involve family in performance of ADLs  - Assess for home care needs following discharge   - Consider OT consult to assist with ADL evaluation and planning for discharge  - Provide patient education as appropriate  Outcome: Progressing  Goal: Maintains/Returns to pre admission functional level  Description: INTERVENTIONS:  - Perform BMAT or MOVE assessment daily    - Set and communicate daily mobility goal to care team and patient/family/caregiver  - Collaborate with rehabilitation services on mobility goals if consulted  - Perform Range of Motion  times a day  - Reposition patient every hours    - Dangle patient times a day  - Stand patient  times a day  - Ambulate patient times a day  - Out of bed to chair  times a day   - Out of bed for meals times a day  - Out of bed for toileting  - Record patient progress and toleration of activity level   Outcome: Progressing     Problem: Potential for Falls  Goal: Patient will remain free of falls  Description: INTERVENTIONS:  - Educate patient/family on patient safety including physical limitations  - Instruct patient to call for assistance with activity   - Consult OT/PT to assist with strengthening/mobility   - Keep Call bell within reach  - Keep bed low and locked with side rails adjusted as appropriate  - Keep care items and personal belongings within reach  - Initiate and maintain comfort rounds  - Make Fall Risk Sign visible to staff  - Offer Toileting every  Hours, in advance of need  - Initiate/Maintain alarm  - Obtain necessary fall risk management equipment  - Apply yellow socks and bracelet for high fall risk patients  - Consider moving patient to room near nurses station  Outcome: Progressing

## 2022-07-28 NOTE — PROGRESS NOTES
Progress Note - General Surgery  : General Surgery Resident on [facility]     Magi Serna 65 y o  female MRN: [id number]  Unit/Bed#: [facility] 512-01 Encounter: [id number]    Assessment:  65 y o  female s/p [address], [address], [address], REPAIR OF ENTEROTOMY on 7/27  Overnight, she experienced nausea and vomiting which improved with zofran and scopolamine patch  BP soft, down to 75/44 at 0121, given 2 1L saline bolus's and responded with a BP of 101/48 at 0500  HR stayed in the 60's overnight  Will continue to monitor vitals closely  Afebrile, no tachycardia or desaturations, /48    WBC 10 36, hemoglobin 7 7 from 7 9, 1 7 from 3 0 from 3 2  Plan:  -Monitor vitals, transfuse 1 unit of RBCs if BP drops  -Follow up hemoglobin at 9am and PM  Daily hemoglobin starting 7/29  -Clear liquid diet as tolerated  -Continue Ancef  -Monitor FABIEN drain output and character  -Monitor for nausea  -Encourage ambulation  -Encourage IS  -Strict I's and O's  -Pain control  -DVT prophylaxis    Subjective/Objective     Subjective: Patient was examined at bedside  She expressed no acute concerns for the team today  She is feeling much better this morning then over night  She denies nausea and vomiting at this time, only one episode of emesis yesterday evening  She is passing gas today, was not yesterday, still no bowel movements  Tolerated PO intake yesterday  She has not urinated since surgery  Denies SOB, fever and chills  Objective:     Physical Exam:  GEN: NAD  HEENT: MMM  Lung: Normal effort  Ab: Soft, mild incisional and RUQ tenderness, no distension   Extrem: No CCE   Neuro: A+Ox3    Vitals: Temp:  [97 3 °F (36 3 °C)-98 8 °F (37 1 °C)] 98 3 °F (36 8 °C)  HR:  [54-79] 76  Resp:  [9-28] 20  BP: ()/(37-68) 108/51  Body mass index is 33 31 kg/m²      I/O       07/26 0701 07/27 0700 07/27 0701 07/28 0700    I V  (mL/kg)  1800 (24 1)    Total Intake(mL/kg) 1800 (24 1)    Drains  100    Blood  100    Total Output  200    Net  +1600                  Lab, Imaging and other studies: I have personally reviewed pertinent reports    , CBC with diff:   Lab Results   Component Value Date    WBC 10 36 (H) 07/28/2022    HGB 7 7 (L) 07/28/2022    HCT 24 3 (L) 07/28/2022     (H) 07/28/2022     07/28/2022    MCH 31 6 07/28/2022    MCHC 31 7 07/28/2022    RDW 13 8 07/28/2022    MPV 9 1 07/28/2022    NRBC 0 07/28/2022   , BMP/CMP:   Lab Results   Component Value Date    SODIUM 139 07/28/2022    K 4 1 07/28/2022     (H) 07/28/2022    CO2 26 07/28/2022    BUN 15 07/28/2022    CREATININE 0 60 07/28/2022    CALCIUM 7 9 (L) 07/28/2022    EGFR 87 07/28/2022     VTE Pharmacologic Prophylaxis: Heparin  VTE Mechanical Prophylaxis: sequential compression device    UpdateTime 0600    Catalina Bermudez  7/28/2022 9:17 AM

## 2022-07-28 NOTE — PROGRESS NOTES
Peripheral Nerve Block Follow-up Note - Acute Pain Service    Boris Vernon 65 y o  female MRN: [id number]  Unit/Bed#: [facility] 12-46 Encounter: [id number]      Assessment:   Principal Problem:    Incisional hernia, without obstruction or gangrene  Active Problems:    Intestinal adhesions    Boris Vernon is a 65y o  year old female who is postop day 1 from repair hernia incisional, lysis of adhesions, explantation of mesh and repair enterotomy on 07/27/2021  Overnight had nausea and vomiting that responded was Zofran and scopolamine patch and hypotension with systolic blood pressure in the 70s, improvement status post fluid bolus  Received right subcostal TAP single shot block with plain local anesthetic and Exparel yesterday for postoperative pain  Plan:   · Tylenol 975 mg every 8 hours scheduled  · Lidocaine patch daily, on for 12 hours off for 12 hours  · Apply to back   · Patient has a history of previous spinal surgery  · Lyrica 100 mg twice a day  · Patient is on Lyrica at home for chronic back pain and peripheral neuropathy  · Estimated Creatinine Clearance: 72 2 mL/min (by C-G formula based on SCr of 0 6 mg/dL)  · Oxycodone 2 5 mg every 4 hours as needed for moderate pain  · Oxycodone 5 mg every 4 hours as needed for severe pain  · If pain remains controlled in the next 24 hours would discontinue oxycodone 5 mg dose  · Bowel regimen per Surgical Service    Pain well controlled on current analgesic regimen, minimal use of opioids as needed  Continue to deescalate opioid regimen as outlined above  Would recommend to continue Tylenol, home dose of Lyrica and low-dose oxycodone 2 5 mg every 6 hours as needed for moderate or severe pain at time of discharge  Treatment recommendations and plan of care discussed with bedside nursing staff and Primary Care Service  APS will sign off at this time  Thank you for the consult   All opioids and other analgesics to be written at discretion of primary team  Please contact Acute Pain Service - [facility] via [facility] from [phone] with additional questions or concerns  See [place] or Shell for additional contacts and after hours information  Pain History  Current pain location(s): Right Lower Quad  Pain Scale:   0-7  Quality: aching  24 hour history:  Patient is resting in bed, no acute distress  Minimal abdominal pain is present  Reports mild increase in abdominal/incisional pain with sitting up or movement  Overall feels pain is controlled; reported mild increase in pain overnight that improved with p r n  Oxycodone  Tolerating current analgesic regimen, no nausea or vomiting  Opioid requirement previous 24 hours: Oxycodone 5mg     Meds/Allergies   all current active meds have been reviewed, current meds:   Current Facility-Administered Medications   Medication Dose Route Frequency    acetaminophen (TYLENOL) tablet 975 mg  975 mg Oral Q8H [address]    bimatoprost (LUMIGAN) 0 01 % ophthalmic solution 1 drop  1 drop Both Eyes HS    brimonidine tartrate 0 2 % ophthalmic solution 1 drop  1 drop Both Eyes BID    ceFAZolin (ANCEF) IVPB (premix in dextrose) 2,000 mg 50 mL  2,000 mg Intravenous Q8H    heparin (porcine) subcutaneous injection 5,000 Units  5,000 Units Subcutaneous Q8H [address]    lactated ringers infusion  125 mL/hr Intravenous Continuous    lidocaine (LIDODERM) 5 % patch 1 patch  1 patch Topical Daily    montelukast (SINGULAIR) tablet 10 mg  10 mg Oral HS    ondansetron (ZOFRAN) injection 4 mg  4 mg Intravenous Q4H PRN    oxyCODONE (ROXICODONE) IR tablet 2 5 mg  2 5 mg Oral Q4H PRN    oxyCODONE (ROXICODONE) IR tablet 5 mg  5 mg Oral Q4H PRN    pregabalin (LYRICA) capsule 100 mg  100 mg Oral BID    scopolamine (TRANSDERM-SCOP) 1 mg/3 days TD 72 hr patch 1 patch  1 patch Transdermal Q72H    and PTA meds:   Prior to Admission Medications   Prescriptions Last Dose Informant Patient Reported? Taking?    Brimonidine Tartrate 0 025 % SOLN 7/26/2022 at Unknown time  Yes Yes   Sig: Apply to eye 2 (two) times a day   acetaminophen (TYLENOL) 325 mg tablet Past Week at Unknown time  No Yes   Sig: Take 1 tablet (325 mg total) by mouth every 6 (six) hours as needed for mild pain or moderate pain   bimatoprost (LUMIGAN) 0 01 % ophthalmic drops 2022 at Unknown time  Yes Yes   Si drop daily at bedtime   cholecalciferol (VITAMIN D3) 1,000 units tablet Unknown at Unknown time  No No   Sig: Take 2 tablets (2,000 Units total) by mouth daily   Patient not taking: No sig reported   lidocaine (LIDODERM) 5 % Past Month at Unknown time  No Yes   Sig: Apply 1 patch topically daily Remove & Discard patch within 12 hours or as directed by MD   montelukast (SINGULAIR) 10 mg tablet Unknown at Unknown time  Yes No   Sig: Take 10 mg by mouth daily at bedtime     naproxen (NAPROSYN) 375 mg tablet   No No   Sig: Take 1 tablet (375 mg total) by mouth 2 (two) times a day with meals   Patient not taking: Reported on 2021    ondansetron (ZOFRAN-ODT) 4 mg disintegrating tablet   No No   Sig: Take 1 tablet (4 mg total) by mouth every 6 (six) hours as needed for nausea or vomiting   Patient not taking: Reported on 2021    pregabalin (LYRICA) 100 mg capsule 2022 at 0500  Yes Yes   Sig: Take 100 mg by mouth 2 (two) times a day     simvastatin (ZOCOR) 20 mg tablet 2022 at Unknown time  Yes Yes   Sig: Take 20 mg by mouth daily at bedtime      Facility-Administered Medications: None       Allergies   Allergen Reactions    Hydromorphone Other (See Comments)     "it stopped my heart"  hallucinations         Objective     Temp:  [97 3 °F (36 3 °C)-98 8 °F (37 1 °C)] 98 3 °F (36 8 °C)  HR:  [54-82] 82  Resp:  [9-28] 20  BP: ()/(37-68) 97/45    Physical Exam  Vitals reviewed  Constitutional:       General: She is awake  She is not in acute distress  Appearance: Normal appearance  She is not ill-appearing, toxic-appearing or diaphoretic     HENT:      Head: Normocephalic and atraumatic  Nose: Nose normal  No congestion or rhinorrhea  Mouth/Throat:      Mouth: Mucous membranes are moist    Eyes:      Extraocular Movements: Extraocular movements intact  Pulmonary:      Effort: Pulmonary effort is normal  No tachypnea, bradypnea or respiratory distress  Abdominal:      Palpations: Abdomen is soft  Comments: Right lower quad FABIEN  Abdominal incision with ABD pad; small amount of bloody drainage noted on pad and abdominal binder   Skin:     General: Skin is warm and dry  Coloration: Skin is not pale  Findings: No rash  Neurological:      Mental Status: She is alert and oriented to person, place, and time  Mental status is at baseline  Sensory: No sensory deficit  Motor: No tremor  Psychiatric:         Attention and Perception: Attention normal          Mood and Affect: Mood normal  Mood is not anxious  Speech: Speech normal          Behavior: Behavior normal  Behavior is cooperative  Cognition and Memory: Cognition and memory normal            Lab Results:   Results from last 7 days   Lab Units 07/28/22  0921 07/28/22  0447   WBC Thousand/uL  --  10 36*   HEMOGLOBIN g/dL 7 3* 7 7*   HEMATOCRIT % 23 8* 24 3*   PLATELETS Thousands/uL  --  205      Results from last 7 days   Lab Units 07/28/22  0447   POTASSIUM mmol/L 4 1   CHLORIDE mmol/L 109*   CO2 mmol/L 26   BUN mg/dL 15   CREATININE mg/dL 0 60   CALCIUM mg/dL 7 9*       Imaging Studies: I have personally reviewed pertinent reports  Please note that the APS provides consultative services regarding pain management only  With the exception of ketamine, peripheral nerve catheters, and epidural infusions (and except when indicated), final decisions regarding starting or changing doses of analgesic medications are at the discretion of the consulting service  Off hours consultation and/or medication management is generally not available      RAYMON Latham  Acute Pain Service

## 2022-07-28 NOTE — NURSING NOTE
Level of care update due to low bp, possible bleeding post  Incisional hernia   Pt transferred to room 512, report given to receiving nurse

## 2022-07-28 NOTE — OCCUPATIONAL THERAPY NOTE
Occupational Therapy Evaluation     Patient Name: Meagan SPENCER Date: 7/28/2022  Problem List  Principal Problem:    Incisional hernia, without obstruction or gangrene  Active Problems:    Intestinal adhesions    Past Medical History  Past Medical History:   Diagnosis Date    Chronic kidney disease     Horse shoe kidney    Colitis     Hyperlipidemia      Past Surgical History  Past Surgical History:   Procedure Laterality Date    ABDOMINAL ADHESION SURGERY N/A 11/27/2021    Procedure: LYSIS ADHESIONS;  Surgeon: Tutu Tolentino MD;  [facility];  Service: General    BACK SURGERY      2 rods placed in back    COLON SURGERY      COLONOSCOPY N/A 5/4/2018    Procedure: COLONOSCOPY;  Surgeon: Landrum Kayser, MD;  Location: [facility]; Service: Colorectal    JOINT REPLACEMENT Bilateral     LAPAROTOMY N/A 11/27/2021    Procedure: LAPAROTOMY EXPLORATORY; EXPLANTATION OF MESH;  Surgeon: Tutu Tolentino MD;  [facility];  Service: General    [place] REPAIR INCISIONAL HERNIA,REDUCIBLE N/A 7/27/2022    Procedure: REPAIR HERNIA INCISIONAL, LYSIS OF ADHESIONS, EXPLANTATION OF MESH, REPAIR OF ENTEROTOMY;  Surgeon: Kd Young MD;  [facility];  Service: General           07/28/22 1355   OT Last Visit   OT Visit Date 07/28/22   Note Type   Note type Evaluation   Restrictions/Precautions   Weight Bearing Precautions Per Order No   Other Precautions Fall Risk;Multiple lines  (s/p hernia repair, has abdominal binder, FABIEN drainx1)   Pain Assessment   Pain Assessment Tool 0-10   Pain Score No Pain   Home Living   Type of [address] [place] [address] One level;Stairs to enter with rails; Performs ADLs on one level  (2 BLAS)   Bathroom Shower/Tub Walk-in shower   Bathroom Toilet Standard   Bathroom Equipment Grab bars in shower;Grab bars around toilet;Commode   [address] Walker;Sock aid;Long-handled shoehorn;Reacher  (used pta), SPC, WC    Prior Function Level of [place] Independent with ADLs and functional mobility   Lives With (S)  Alone   Receives Help From Family   ADL Assistance Independent   IADLs Independent   Falls in the last 6 months 0   Vocational Retired   Lifestyle   Autonomy pta, pt was I W ADL/IADL, used RW mobility  +    Reciprocal Relationships reports supportive nephew and sister   Service to Others retired CNA   Intrinsic Gratification spending time w her Mary Jane Jerrye, gardening   Psychosocial   Psychosocial (WDL) WDL   Subjective   Subjective "I'm feeling well"   ADL   Where Assessed Edge of bed   Eating Assistance 6  Modified independent   Grooming Assistance 6  [address] 5  Supervision/Setup   LB [address] 4  [address]  [address] 4  Minimal Assistance   Bed Mobility   Supine to Sit 4  Minimal assistance   Additional items Assist x 1; Increased time required;Verbal cues   Sit to Supine 3  Moderate assistance   Additional items Assist x 2; Increased time required;LE management;Verbal cues   Additional Comments min A to achieve EOB, mod Ax2 for LE mgmt and boost  Pt edu on log rolling, G carryover   Transfers   Sit to Stand 4  Minimal assistance   Additional items Assist x 1; Increased time required;Verbal cues   Stand to Sit 4  Minimal assistance   Additional items Assist x 1; Increased time required;Verbal cues   Additional Comments c rw   Functional Mobility   Functional Mobility 4  Minimal assistance   Additional Comments ax1 c rw, progressed to S   Additional items Rolling walker   Balance   Static Sitting Fair +   Dynamic Sitting Fair   Static Standing Fair -   Dynamic Standing Fair -   Ambulatory Poor +   Activity Tolerance   Activity Tolerance Patient tolerated treatment well   Nurse Made Aware ok per RN   RUE Assessment   RUE Assessment WFL   LUE Assessment   LUE Assessment WFL   Hand Function   Gross Motor Coordination Functional   Fine Motor Coordination Functional   Cognition   Overall Cognitive Status WFL   Arousal/Participation Alert; Responsive; Cooperative   Attention Within functional limits   Orientation Level Oriented X4   Memory Within functional limits   Following Commands Follows all commands and directions without difficulty   Comments very pleasant and cooperative, G safety awareness and insight to condition t/o session  Assessment   Limitation Decreased ADL status; Decreased endurance;Decreased high-level ADLs; Decreased self-care trans   Prognosis Good   Assessment Pt is a 65 y o  female admitted 7/27/22 with incisional hernia  Pt underwent hernia repair, lysis of adhesions, explanation of mesh, and enterotomy repair 7/27/22, POD #1 w active OT eval and treat orders  Pt w abdominal binder and FABIEN drain at this time, as well as active OT eval and treat orders  PMH includes  has a past medical history of Chronic kidney disease, Colitis, and Hyperlipidemia  Pt lives alone in a [facility] with 2 BLAS, reports walk in shower with grab bars, standard toilet with grab bars  Pta, pt was independent w/ ADL/IADL and used RW for functional mobility, was driving  Also has WC and SPC if needed  Currently, pt is S for UB ADL, min A for LB ADL and completed initial transfers/FM with Min A (Progressed to S) w RW for support   Pt is limited at this time 2* decreased endurance/activtiy tolerance, decreased ADL/High-level ADL status, decreased self-care trans, limited home support and is a fall risk  This impacts pt's ability to complete UB and LB dressing and bathing, toileting, transfers, functional mobility, community mobility, home and health maintenance, and safe engagement in typical daily routine  The patient's raw score on the AM-PAC Daily Activity inpatient short form is 21, standardized score is 44 27, greater than 39 4   Patients at this level are likely to benefit from discharge to home  Please refer to the recommendation of the Occupational Therapist for safe discharge planning  From OT standpoint, pt should D/C to STR when medically stable  Pt will benefit from continued acute OT services 3-5x/wk for 10-14 days to meet goals  Goals   Patient Goals to go home   LTG Time Frame 10-14   Long Term Goal #1 see below   Plan   Treatment Interventions ADL retraining;Functional transfer training; Endurance training;Patient/family training;Energy conservation; Activityengagement; Compensatory technique education;Equipment evaluation/education   Goal Expiration Date 08/11/22   OT Frequency 3-5x/wk   Recommendation   OT Discharge Recommendation Home with home health rehabilitation   AM-PAC Daily Activity Inpatient   Lower Body Dressing 3   Bathing 3   Toileting 3   Upper Body Dressing 4   Grooming 4   Eating 4   Daily Activity Raw Score 21   Daily Activity Standardized Score (Calc for Raw Score >=11) 44 27   AM-PAC Applied Cognition Inpatient   Following a Speech/Presentation 4   Understanding Ordinary Conversation 4   Taking Medications 4   Remembering Where Things Are Placed or Put Away 4   Remembering List of 4-5 Errands 4   Taking Care of Complicated Tasks 4   Applied Cognition Raw Score 24   Applied Cognition Standardized Score 62 21     Pt will complete functional mobility with Mod I using appropriate DME as needed  Pt will complete UB dressing and bathing with Mod I using appropriate DME as needed  Pt will complete LB dressing and bathing with Mod I using appropriate DME as needed  Pt will complete transfers with Mod I using appropriate DME as needed  Pt will complete toileting with Mod I using appropriate DME as needed  Pt will complete home maintenance task with Mod I using appropriate DME as needed  Pt will utilize energy conservation techniques throughout functional activity/ADL s/p skilled education       Pt will demonstrate increased safety awareness during functional tasks/ADL's s/p skilled education  Pt will increase activity tolerance to 30 minutes in order to complete ADL's/ functional tasks, using appropriate DME as needed  Pt will inc UE ROM +10 degrees b/l in order to increase overall ability to engage in typical daily routine  Pt will inc UE strength +1 MMT in order to increase overall ability to engage in typical daily routine         JANET Huang, OTR/L

## 2022-07-28 NOTE — CASE MANAGEMENT
Case Management Assessment    Patient name Niki Rodríguez  Location [address] [place] [address] [address] [id number] MRN [id number]  : 1945 Date 2022       Current Admission Date: 2022  Current Admission Diagnosis:Incisional hernia, without obstruction or gangrene   Patient Active Problem List    Diagnosis Date Noted    Incisional hernia, without obstruction or gangrene 2022    Intestinal adhesions 2021    Spinal stenosis 2020    Mixed hyperlipidemia 2020    COVID-19 2020    Other chest pain 2020      LOS (days): 1  Geometric Mean LOS (GMLOS) (days): 5 90  Days to GMLOS:     OBJECTIVE:    Risk of Unplanned Readmission Score: 7 4     Current admission status: Outpatient Procedure    Preferred Pharmacy:   [facility] - [id number]  Phone: [phone] Fax: [phone]    Primary Care Provider: Red Lambert DO    Primary Insurance: MEDICARE  Secondary Insurance: Charleen Aguirre 20:  [address], [address]  Representative - Friend   Primary Phone: [phone] (Home)               Advance Directives  Does patient have a [address]?: Yes (requested copy)  Does patient have Advance Directives?: Yes  Advance Directives: Living will, Power of  for health care (requested copy)  Primary Contact: Winsome Lauren    Readmission Root Cause  30 Day Readmission: No    Patient Information  Admitted from[de-identified] Home  Mental Status: Alert  During Assessment patient was accompanied by: Not accompanied during assessment  Assessment information provided by[de-identified] Patient  Primary Caregiver: Self  Support Systems: Family members  [place] of Residence: [address] [place] [address] do you live in?: [employer] entry access options   Select all that apply : Stairs  Number of steps to enter home : 2  Type of Current Residence: [place]  In the last 12 months, was there a time when you were not able to pay the mortgage or rent on time?: No  In the last 12 months, how many places have you lived?: 1  In the last 12 months, was there a time when you did not have a steady place to sleep or slept in a shelter (including now)?: No  Homeless/housing insecurity resource given?: N/A  Living Arrangements: Lives Alone  Is patient a ?: No    Activities of Daily Living Prior to Admission  Functional Status: Independent  Completes ADLs independently?: Yes  Ambulates independently?: Yes  Does patient use assisted devices?: Yes  Assisted Devices (DME) used: Straight Tasha Koyanagi  Does patient currently own DME?: Yes  What DME does the patient currently own?: Straight Tasha Koyanagi, Wheelchair, Shower Chair (does not use the wc, uses walker hs)  Does patient have a history of Outpatient Therapy (PT/OT)?: No  Does the patient have a history of Short-Term Rehab?: Yes (12 years ago)  Does patient have a history of HHC?: Yes [facility])  Does patient currently have [place] ?: No    Patient Information Continued  Income Source: Pension/[facility]  Does patient have prescription coverage?: Yes  Within the past 12 months, you worried that your food would run out before you got the money to buy more : Never true  Within the past 12 months, the food you bought just didn't last and you didn't have money to get more : Never true  Food insecurity resource given?: N/A  Does patient receive dialysis treatments?: No  Does patient have a history of substance abuse?: No  Does patient have a history of Mental Health Diagnosis?: No    Means of Transportation  [facility] Self  In the past 12 months, has lack of transportation kept you from medical appointments or from getting medications?: No  In the past 12 months, has lack of transportation kept you from meetings, work, or from getting things needed for daily living?: No  Was application for public transport provided?: N/A

## 2022-07-29 ENCOUNTER — APPOINTMENT (INPATIENT)
Dept: NON INVASIVE DIAGNOSTICS | Facility: HOSPITAL | Age: 77
DRG: 336 | End: 2022-07-29
Payer: MEDICARE

## 2022-07-29 LAB
ANION GAP SERPL CALCULATED.3IONS-SCNC: 5 MMOL/L (ref 4–13)
AORTIC ROOT: 2.7 CM
AORTIC VALVE MEAN VELOCITY: 17.1 M/S
APICAL FOUR CHAMBER EJECTION FRACTION: 64 %
ASCENDING AORTA: 2.4 CM
ATRIAL RATE: 65 BPM
AV LVOT MEAN GRADIENT: 3 MMHG
AV LVOT PEAK GRADIENT: 5 MMHG
AV MEAN GRADIENT: 13 MMHG
AV PEAK GRADIENT: 22 MMHG
AV VALVE AREA: 1.37 CM2
AV VELOCITY RATIO: 0.49
BASOPHILS # BLD AUTO: 0.06 THOUSANDS/ΜL (ref 0–0.1)
BASOPHILS NFR BLD AUTO: 1 % (ref 0–1)
BUN SERPL-MCNC: 12 MG/DL (ref 5–25)
CALCIUM SERPL-MCNC: 8.4 MG/DL (ref 8.3–10.1)
CHLORIDE SERPL-SCNC: 111 MMOL/L (ref 96–108)
CHOLEST SERPL-MCNC: 119 MG/DL
CO2 SERPL-SCNC: 26 MMOL/L (ref 21–32)
CREAT SERPL-MCNC: 0.57 MG/DL (ref 0.6–1.3)
DOP CALC AO PEAK VEL: 2.35 M/S
DOP CALC AO VTI: 51.6 CM
DOP CALC LVOT AREA: 2.83 CM2
DOP CALC LVOT DIAMETER: 1.9 CM
DOP CALC LVOT PEAK VEL VTI: 25 CM
DOP CALC LVOT PEAK VEL: 1.15 M/S
DOP CALC LVOT STROKE INDEX: 32.9 ML/M2
DOP CALC LVOT STROKE VOLUME: 70.85 CM3
E WAVE DECELERATION TIME: 160 MS
EOSINOPHIL # BLD AUTO: 0.31 THOUSAND/ΜL (ref 0–0.61)
EOSINOPHIL NFR BLD AUTO: 3 % (ref 0–6)
ERYTHROCYTE [DISTWIDTH] IN BLOOD BY AUTOMATED COUNT: 14.4 % (ref 11.6–15.1)
FRACTIONAL SHORTENING: 35 % (ref 28–44)
GFR SERPL CREATININE-BSD FRML MDRD: 89 ML/MIN/1.73SQ M
GLUCOSE SERPL-MCNC: 98 MG/DL (ref 65–140)
HCT VFR BLD AUTO: 25.2 % (ref 34.8–46.1)
HDLC SERPL-MCNC: 30 MG/DL
HGB BLD-MCNC: 7.6 G/DL (ref 11.5–15.4)
IMM GRANULOCYTES # BLD AUTO: 0.04 THOUSAND/UL (ref 0–0.2)
IMM GRANULOCYTES NFR BLD AUTO: 0 % (ref 0–2)
INR PPP: 1.13 (ref 0.84–1.19)
INTERVENTRICULAR SEPTUM IN DIASTOLE (PARASTERNAL SHORT AXIS VIEW): 1.3 CM
INTERVENTRICULAR SEPTUM: 1.3 CM (ref 0.6–1.1)
IVC: 21 MM
LAAS-AP2: 22.7 CM2
LAAS-AP4: 21.5 CM2
LDLC SERPL CALC-MCNC: 46 MG/DL (ref 0–100)
LEFT ATRIUM SIZE: 5.1 CM
LEFT INTERNAL DIMENSION IN SYSTOLE: 2.6 CM (ref 2.1–4)
LEFT VENTRICULAR INTERNAL DIMENSION IN DIASTOLE: 4 CM (ref 3.5–6)
LEFT VENTRICULAR POSTERIOR WALL IN END DIASTOLE: 1.2 CM
LEFT VENTRICULAR STROKE VOLUME: 47 ML
LVSV (TEICH): 47 ML
LYMPHOCYTES # BLD AUTO: 2.39 THOUSANDS/ΜL (ref 0.6–4.47)
LYMPHOCYTES NFR BLD AUTO: 25 % (ref 14–44)
MAGNESIUM SERPL-MCNC: 2.2 MG/DL (ref 1.6–2.6)
MCH RBC QN AUTO: 31.3 PG (ref 26.8–34.3)
MCHC RBC AUTO-ENTMCNC: 30.2 G/DL (ref 31.4–37.4)
MCV RBC AUTO: 104 FL (ref 82–98)
MONOCYTES # BLD AUTO: 1.35 THOUSAND/ΜL (ref 0.17–1.22)
MONOCYTES NFR BLD AUTO: 14 % (ref 4–12)
MV E'TISSUE VEL-SEP: 9 CM/S
MV PEAK A VEL: 0.64 M/S
MV PEAK E VEL: 122 CM/S
MV STENOSIS PRESSURE HALF TIME: 46 MS
MV VALVE AREA P 1/2 METHOD: 4.78 CM2
NEUTROPHILS # BLD AUTO: 5.37 THOUSANDS/ΜL (ref 1.85–7.62)
NEUTS SEG NFR BLD AUTO: 57 % (ref 43–75)
NRBC BLD AUTO-RTO: 0 /100 WBCS
PLATELET # BLD AUTO: 208 THOUSANDS/UL (ref 149–390)
PMV BLD AUTO: 9.3 FL (ref 8.9–12.7)
POTASSIUM SERPL-SCNC: 4 MMOL/L (ref 3.5–5.3)
PROTHROMBIN TIME: 14.8 SECONDS (ref 11.6–14.5)
QRS AXIS: 25 DEGREES
QRSD INTERVAL: 84 MS
QT INTERVAL: 352 MS
QTC INTERVAL: 478 MS
RA PRESSURE ESTIMATED: 8 MMHG
RBC # BLD AUTO: 2.43 MILLION/UL (ref 3.81–5.12)
RIGHT ATRIAL 2D VOLUME: 35 ML
RIGHT ATRIUM AREA SYSTOLE A4C: 15.5 CM2
RIGHT VENTRICLE ID DIMENSION: 2.8 CM
RV PSP: 59 MMHG
SL CV LEFT ATRIUM LENGTH A2C: 5.5 CM
SL CV LV EF: 65
SL CV PED ECHO LEFT VENTRICLE DIASTOLIC VOLUME (MOD BIPLANE) 2D: 71 ML
SL CV PED ECHO LEFT VENTRICLE SYSTOLIC VOLUME (MOD BIPLANE) 2D: 24 ML
SODIUM SERPL-SCNC: 142 MMOL/L (ref 135–147)
T WAVE AXIS: -53 DEGREES
TR MAX PG: 51 MMHG
TR PEAK VELOCITY: 3.6 M/S
TRICUSPID VALVE PEAK REGURGITATION VELOCITY: 3.58 M/S
TRIGL SERPL-MCNC: 216 MG/DL
TSH SERPL DL<=0.05 MIU/L-ACNC: 2 UIU/ML (ref 0.45–4.5)
VENTRICULAR RATE: 111 BPM
WBC # BLD AUTO: 9.52 THOUSAND/UL (ref 4.31–10.16)

## 2022-07-29 PROCEDURE — 84443 ASSAY THYROID STIM HORMONE: CPT | Performed by: NURSE PRACTITIONER

## 2022-07-29 PROCEDURE — 97163 PT EVAL HIGH COMPLEX 45 MIN: CPT

## 2022-07-29 PROCEDURE — 93306 TTE W/DOPPLER COMPLETE: CPT | Performed by: INTERNAL MEDICINE

## 2022-07-29 PROCEDURE — 80048 BASIC METABOLIC PNL TOTAL CA: CPT | Performed by: PHYSICIAN ASSISTANT

## 2022-07-29 PROCEDURE — 80061 LIPID PANEL: CPT | Performed by: NURSE PRACTITIONER

## 2022-07-29 PROCEDURE — 85610 PROTHROMBIN TIME: CPT | Performed by: PHYSICIAN ASSISTANT

## 2022-07-29 PROCEDURE — 99024 POSTOP FOLLOW-UP VISIT: CPT | Performed by: SURGERY

## 2022-07-29 PROCEDURE — 85025 COMPLETE CBC W/AUTO DIFF WBC: CPT | Performed by: PHYSICIAN ASSISTANT

## 2022-07-29 PROCEDURE — 99223 1ST HOSP IP/OBS HIGH 75: CPT | Performed by: INTERNAL MEDICINE

## 2022-07-29 PROCEDURE — 93306 TTE W/DOPPLER COMPLETE: CPT

## 2022-07-29 PROCEDURE — 83735 ASSAY OF MAGNESIUM: CPT

## 2022-07-29 PROCEDURE — 93010 ELECTROCARDIOGRAM REPORT: CPT | Performed by: INTERNAL MEDICINE

## 2022-07-29 RX ORDER — MAGNESIUM SULFATE HEPTAHYDRATE 40 MG/ML
2 INJECTION, SOLUTION INTRAVENOUS ONCE
Status: COMPLETED | OUTPATIENT
Start: 2022-07-29 | End: 2022-07-29

## 2022-07-29 RX ORDER — POTASSIUM CHLORIDE 20 MEQ/1
40 TABLET, EXTENDED RELEASE ORAL ONCE
Status: COMPLETED | OUTPATIENT
Start: 2022-07-29 | End: 2022-07-29

## 2022-07-29 RX ADMIN — CEFAZOLIN SODIUM 2000 MG: 2 SOLUTION INTRAVENOUS at 12:26

## 2022-07-29 RX ADMIN — OXYCODONE HYDROCHLORIDE 2.5 MG: 5 TABLET ORAL at 17:26

## 2022-07-29 RX ADMIN — ACETAMINOPHEN 975 MG: 325 TABLET ORAL at 21:17

## 2022-07-29 RX ADMIN — HEPARIN SODIUM 5000 UNITS: 5000 INJECTION INTRAVENOUS; SUBCUTANEOUS at 05:21

## 2022-07-29 RX ADMIN — CEFAZOLIN SODIUM 2000 MG: 2 SOLUTION INTRAVENOUS at 19:15

## 2022-07-29 RX ADMIN — BRIMONIDINE TARTRATE 1 DROP: 2 SOLUTION/ DROPS OPHTHALMIC at 09:34

## 2022-07-29 RX ADMIN — MONTELUKAST SODIUM 10 MG: 10 TABLET, FILM COATED ORAL at 21:17

## 2022-07-29 RX ADMIN — CEFAZOLIN SODIUM 2000 MG: 2 SOLUTION INTRAVENOUS at 02:35

## 2022-07-29 RX ADMIN — PREGABALIN 100 MG: 100 CAPSULE ORAL at 17:19

## 2022-07-29 RX ADMIN — ACETAMINOPHEN 975 MG: 325 TABLET ORAL at 05:21

## 2022-07-29 RX ADMIN — HEPARIN SODIUM 5000 UNITS: 5000 INJECTION INTRAVENOUS; SUBCUTANEOUS at 21:18

## 2022-07-29 RX ADMIN — ACETAMINOPHEN 975 MG: 325 TABLET ORAL at 15:30

## 2022-07-29 RX ADMIN — MAGNESIUM SULFATE HEPTAHYDRATE 2 G: 40 INJECTION, SOLUTION INTRAVENOUS at 02:35

## 2022-07-29 RX ADMIN — BIMATOPROST 1 DROP: 0.1 SOLUTION/ DROPS OPHTHALMIC at 21:18

## 2022-07-29 RX ADMIN — POTASSIUM CHLORIDE 40 MEQ: 1500 TABLET, EXTENDED RELEASE ORAL at 02:33

## 2022-07-29 RX ADMIN — HEPARIN SODIUM 5000 UNITS: 5000 INJECTION INTRAVENOUS; SUBCUTANEOUS at 15:30

## 2022-07-29 RX ADMIN — PREGABALIN 100 MG: 100 CAPSULE ORAL at 09:34

## 2022-07-29 RX ADMIN — BRIMONIDINE TARTRATE 1 DROP: 2 SOLUTION/ DROPS OPHTHALMIC at 17:20

## 2022-07-29 NOTE — RESTORATIVE TECHNICIAN NOTE
Restorative Technician Note      Patient Name: Luis Laughlin     Note Type: Mobility  Patient Position Upon Consult: Supine  Activity Performed: Dangled; Repositioned  Patient Position at End of Consult: Seated edge of bed;  All needs within reach; Bed/Chair alarm activated

## 2022-07-29 NOTE — CONSULTS
Consultation - Cardiology Team 1  Sedrick Morgan 65 y o  female MRN: [id number]  Unit/Bed#: [place] 512-01 Encounter: [id number]  07/29/22  10:55 AM    Assessment/ Plan:  1  New onset atrial fibrillation  - likely in the setting of #2 with no prior history - confirmed by EKG  - developed yesterday w/ RVR - telemetry demonstrating atrial fibrillation with RVR from approximately 3-9pm  - s/p Lopressor 5 mg IV x1 around 8:00 p m  with subsequent conversion to NSR - remains in NSR with HR 70s this morning  - patient persistently hypotensive overnight - would hold off on beta-blocker currently  - last ischemic evaluation (pharmacologic stress test, 10/17/19) - negative for ischemia  - [id number] 3 (age, sex) - consider Eliquis 5 mg p o  B i d  for anticoagulation; patient undecided if she would like to pursue [facility]  - obtain echo to assess LVEF and any wall motion/valvular abnormalities - no prior studies to compare  - can consider outpatient event monitor for further rhythm surveillance  - if reoccurrence, consider amiodarone  - check TSH, free T4  - continue to monitor on telemetry    2  Recurrent ventral hernia  - s/p repair w/ VINNY, mesh explantation, and enterotomy repair x1 - POD #2  - general surgery following    3  Dyslipidemia  - lipid panel (04/19/2019) - / triglycerides 232/ HDL 36/ LDL 76; recheck now  - maintained on simvastatin 20 mg p o  Daily outpatient    4  CKD  - creatinine on arrival 0 60 > 0 57 this morning; baseline  - avoid nephrotoxic agents    5   Acute postoperative blood loss anemia  - a m H/H 7 6/25 2  - recommend transfusing for hgb < 7  - monitor closely    History of Present Illness   Physician Requesting Consult: Claude Wei MD    Reason for Consult / Principal Problem:  New onset atrial fibrillation with RVR    HPI: Sedrick Morgan is a 65y o  year old female with past medical history of dyslipidemia ,CKD, and recurrent ventral hernia who presents to [facility] on 7/27 for an elective hernia repair  Postoperatively, patient developed atrial fibrillation with RVR captured on telemetry  Patient received 1 dose of Lopressor 5 mg IV with conversion to NSR around 9:00 p m  last evening  Cardiology was consulted for rhythm management  Upon examination, patient is sitting up comfortably side of her bed  Denies any symptoms of shortness of breath, palpitations, lightheadedness, dizziness, or chest pain when she was in atrial fibrillation  She denies any prior history of cardiac arrhythmia, CAD, MI, or stroke  Notes that her  had atrial fibrillation, so she understand the need for anticoagulation, but would like more time to consider a blood thinner  At baseline, patient is independent and uses a walker to ambulate  She denies any anginal symptoms with exertion or at rest  She does not follow with a cardiologist outpatient  Inpatient consult to Cardiology  Consult performed by: RAYMON León  Consult ordered by: Liana Pablo MD      EKG: Atrial fibrillation,   Results for orders placed or performed during the hospital encounter of 07/27/22 (from the past 1000 hour(s))   ECG 12 lead    Collection Time: 07/28/22  6:04 PM   Result Value    Ventricular Rate 111    Atrial Rate 65    IL Interval     QRSD Interval 84    QT Interval 352    QTC Interval 478    P Axis     QRS Axis 25    T Wave Axis -53    Narrative    Atrial fibrillation  Low voltage QRS  Abnormal ECG    Confirmed by Yuliya Arshad ([id number]) on 7/29/2022 4:50:22 AM     *Note: Due to a large number of results and/or encounters for the requested time period, some results have not been displayed  A complete set of results can be found in Results Review  Review of Systems   Constitutional: Negative for chills, diaphoresis, fatigue and fever  HENT: Negative  Eyes: Negative  Respiratory: Negative for chest tightness and shortness of breath      Cardiovascular: Negative for chest pain, palpitations and leg swelling  Gastrointestinal: Negative  Endocrine: Negative  Genitourinary: Negative  Musculoskeletal: Negative  Skin: Negative  Allergic/Immunologic: Negative  Neurological: Negative for dizziness, syncope, weakness and light-headedness  Hematological: Negative  Psychiatric/Behavioral: Negative  Historical Information   Past Medical History:   Diagnosis Date    Chronic kidney disease     Horse shoe kidney    Colitis     Hyperlipidemia      Past Surgical History:   Procedure Laterality Date    ABDOMINAL ADHESION SURGERY N/A 2021    Procedure: LYSIS ADHESIONS;  Surgeon: Tutu Tolentino MD;  [facility];  Service: General    BACK SURGERY      2 rods placed in back    COLON SURGERY      COLONOSCOPY N/A 2018    Procedure: COLONOSCOPY;  Surgeon: Landrum Kayser, MD;  Location: [facility];   Service: Colorectal    JOINT REPLACEMENT Bilateral     LAPAROTOMY N/A 2021    Procedure: LAPAROTOMY EXPLORATORY; EXPLANTATION OF MESH;  Surgeon: Tutu Tolentino MD;  [facility];  Service: General    TX REPAIR INCISIONAL HERNIA,REDUCIBLE N/A 2022    Procedure: REPAIR HERNIA INCISIONAL, LYSIS OF ADHESIONS, EXPLANTATION OF MESH, REPAIR OF ENTEROTOMY;  Surgeon: Kd Young MD;  [facility];  Service: General     Social History     Substance and Sexual Activity   Alcohol Use Yes    Comment: once in awhile     Social History     Substance and Sexual Activity   Drug Use No     Social History     Tobacco Use   Smoking Status Former Smoker    Quit date: 1970    Years since quittin 6   Smokeless Tobacco Never Used     Family History:   Family History   Problem Relation Age of Onset    No Known Problems Mother     No Known Problems Father      Meds/Allergies   all current active meds have been reviewed  Allergies   Allergen Reactions    Hydromorphone Other (See Comments)     "it stopped my heart"  hallucinations       Objective   Vitals: Blood pressure 106/59, pulse 69, temperature 98 °F (36 7 °C), temperature source Oral, resp  rate 18, height 4' 11" (1 499 m), weight 74 8 kg (164 lb 14 5 oz), SpO2 98 %  , Body mass index is 33 31 kg/m² ,   Orthostatic Blood Pressures    Flowsheet Row Most Recent Value   Blood Pressure 106/59 filed at 07/29/2022 0700   Patient Position - Orthostatic VS Lying filed at 07/29/2022 7434        Systolic (32SDO), DJI:849 , Min:91 , EOH:997     Diastolic (52NWU), PHM:28, Min:45, Max:66      Intake/Output Summary (Last 24 hours) at 7/29/2022 1055  Last data filed at 7/29/2022 0900  Gross per 24 hour   Intake 3985 83 ml   Output 1890 ml   Net 2095 83 ml     Invasive Devices  Report    Peripheral Intravenous Line  Duration           Peripheral IV 07/27/22 Left;Ventral (anterior) Forearm 1 day    Peripheral IV 07/27/22 Right;Ventral (anterior) Forearm 1 day          Drain  Duration           Closed/Suction Drain Right Abdomen 10 Fr  1 day                Physical Exam  Constitutional:       General: She is not in acute distress  Appearance: She is obese  She is not ill-appearing  HENT:      Head: Normocephalic and atraumatic  Nose: Nose normal       Mouth/Throat:      Mouth: Mucous membranes are moist       Pharynx: Oropharynx is clear  Eyes:      Pupils: Pupils are equal, round, and reactive to light  Cardiovascular:      Rate and Rhythm: Normal rate and regular rhythm  Pulses: Normal pulses  Heart sounds: Normal heart sounds  No murmur heard  Pulmonary:      Effort: Pulmonary effort is normal       Breath sounds: Normal breath sounds  No wheezing, rhonchi or rales  Abdominal:      Palpations: Abdomen is soft  Comments: FABIEN drain/abd binder in place   Musculoskeletal:         General: Normal range of motion  Cervical back: Normal range of motion  Right lower leg: No edema  Left lower leg: No edema  Skin:     General: Skin is warm and dry        Capillary Refill: Capillary refill takes less than 2 seconds  Neurological:      General: No focal deficit present  Mental Status: She is alert and oriented to person, place, and time     Psychiatric:         Mood and Affect: Mood normal          Behavior: Behavior normal       Lab Results:   Troponins:     CBC with diff:   Results from last 7 days   Lab Units 07/29/22  0525 07/28/22  1407 07/28/22  0921 07/28/22  0447 07/28/22  0111   WBC Thousand/uL 9 52  --   --  10 36*  --    HEMOGLOBIN g/dL 7 6* 8 1* 7 3* 7 7* 7 9*   HEMATOCRIT % 25 2* 25 8* 23 8* 24 3* 25 1*   MCV fL 104*  --   --  100*  --    PLATELETS Thousands/uL 208  --   --  205  --    MCH pg 31 3  --   --  31 6  --    MCHC g/dL 30 2*  --   --  31 7  --    RDW % 14 4  --   --  13 8  --    MPV fL 9 3  --   --  9 1  --    NRBC AUTO /100 WBCs 0  --   --  0  --      CMP:   Results from last 7 days   Lab Units 07/29/22  0525 07/28/22  2036 07/28/22  0447   POTASSIUM mmol/L 4 0 3 8 4 1   CHLORIDE mmol/L 111* 111* 109*   CO2 mmol/L 26 27 26   BUN mg/dL 12 13 15   CREATININE mg/dL 0 57* 0 64 0 60   CALCIUM mg/dL 8 4 8 1* 7 9*   EGFR ml/min/1 73sq m 89 85 87

## 2022-07-29 NOTE — PROGRESS NOTES
Progress Note - Trinity Myrick 65 y o  female MRN: [id number]    Unit/Bed#: [facility] 512-01 Encounter: [id number]      Assessment:  Trinity Myrick is a 65 y o  female who presents with recurrent ventral hernia s/p repair w/VINNY, mesh explantation, and enterotomy repair x1 on 7/27  Afebrile  New onset afib w/rvr overnight responded to 1x dose lopressor - resolved now  Hgb 7 6 from 8 1    Plan:  CLD, advance as tolerated  Pain and nausea control PRN  PT OT  DVT ppx  Dispo planning      Subjective: Tolerating diet, no nausea or emesis  No fevers or chills  Passing flatus, no BM  No CP or SOB    Objective:     Vitals: Blood pressure 119/56, pulse 79, temperature 98 2 °F (36 8 °C), temperature source Oral, resp  rate 16, height 4' 11" (1 499 m), weight 74 8 kg (164 lb 14 5 oz), SpO2 97 %  ,Body mass index is 33 31 kg/m²  Intake/Output Summary (Last 24 hours) at 7/29/2022 0549  Last data filed at 7/29/2022 0400  Gross per 24 hour   Intake 3675 83 ml   Output 1890 ml   Net 1785 83 ml       Physical Exam:   General - no acute distress, responsive and cooperative  CV - warm, regular rate  Pulm - normal work of breathing, no respiratory distress  Abd - soft, nondistended, appropriately tender, guillermo x3 (removed bedside), incision c/d/i, FABIEN sang but lightening up, binder in place  Neuro - m/s grossly intact, cn grossly intact  Ext - moving all extremities       Invasive Devices  Report    Peripheral Intravenous Line  Duration           Peripheral IV 07/27/22 Left;Ventral (anterior) Forearm 1 day    Peripheral IV 07/27/22 Right;Ventral (anterior) Forearm 1 day          Drain  Duration           Closed/Suction Drain Right Abdomen 10 Fr  1 day                Lab, Imaging and other studies: I have personally reviewed pertinent reports      VTE Pharmacologic Prophylaxis: Heparin  VTE Mechanical Prophylaxis: sequential compression device

## 2022-07-29 NOTE — RESTORATIVE TECHNICIAN NOTE
Restorative Technician Note      Patient Name: Sulma Calix     Note Type: Mobility  Patient Position Upon Consult: Seated edge of bed  Activity Performed: Ambulated  Assistive Device: Roller walker  Patient Position at End of Consult: Supine;  All needs within reach

## 2022-07-29 NOTE — PLAN OF CARE
Problem: PHYSICAL THERAPY ADULT  Goal: Performs mobility at highest level of function for planned discharge setting  See evaluation for individualized goals  Description: Treatment/Interventions: Functional transfer training, LE strengthening/ROM, Elevations, Therapeutic exercise, Endurance training, Patient/family training, Equipment eval/education, Bed mobility, Gait training, Spoke to nursing  Equipment Recommended: Jennifer Pinzon (pt owns)       See flowsheet documentation for full assessment, interventions and recommendations  Note: Prognosis: Good  Problem List: Decreased strength, Impaired balance, Decreased mobility  Assessment: Pt is a 65 y o  female who presents for high complexity PT evaluation at [place] [address] with active PT eval and treat orders  Pt was admitted for incisional hernia on 07/05/2022 that she wanted to have repaired  Pt is s/p incisional hernia repair, lysis of adhesions, explantation of mesh, and repair of enterotomy on 07/27/2022  Pt  has a past medical history of Chronic kidney disease, Colitis, and Hyperlipidemia    Pt resides alone in a [facility] with 2 [facility]  Pt requires supervision-MinAx1 for bed mobility and supervision for functional transfers and ambulation at this time due to deficits in LE strength and balance  Pt uses a cane at baseline but will benefit from the use of RW for mobility  Pt will benefit from skilled PT to address these deficits and reach PLOF  PT to continue to follow pt and recommending discharge to home with HHPT  The patient's AM-PAC Basic Mobility Inpatient Short Form Raw Score is 19  A Raw score of greater than 16 suggests the patient may benefit from discharge to home with HHPT  Please also refer to the recommendation of the Physical Therapist for safe discharge planning  Barriers to Discharge: None     PT Discharge Recommendation: Home with home health rehabilitation    See flowsheet documentation for full assessment

## 2022-07-29 NOTE — PHYSICAL THERAPY NOTE
Physical Therapy Evaluation     Patient's Name: Niki Rodríguez    Admitting Diagnosis  Incisional hernia [K43 2]    Problem List  Patient Active Problem List   Diagnosis    Spinal stenosis    Mixed hyperlipidemia    COVID-19    Other chest pain    Intestinal adhesions    Incisional hernia, without obstruction or gangrene       Past Medical History  Past Medical History:   Diagnosis Date    Chronic kidney disease     Horse shoe kidney    Colitis     Hyperlipidemia        Past Surgical History  Past Surgical History:   Procedure Laterality Date    ABDOMINAL ADHESION SURGERY N/A 11/27/2021    Procedure: LYSIS ADHESIONS;  Surgeon: Shayy Watts MD;  [facility];  Service: General    BACK SURGERY      2 rods placed in back    COLON SURGERY      COLONOSCOPY N/A 5/4/2018    Procedure: COLONOSCOPY;  Surgeon: Roxane Hensley MD;  [facility]; Service: Colorectal    JOINT REPLACEMENT Bilateral     LAPAROTOMY N/A 11/27/2021    Procedure: LAPAROTOMY EXPLORATORY; EXPLANTATION OF MESH;  Surgeon: Shayy Watts MD;  [facility];  Service: General    MT REPAIR INCISIONAL HERNIA,REDUCIBLE N/A 7/27/2022    Procedure: REPAIR HERNIA INCISIONAL, LYSIS OF ADHESIONS, EXPLANTATION OF MESH, REPAIR OF ENTEROTOMY;  Surgeon: Bibiana Odonnell MD;  [facility];  Service: General          07/29/22 0834   PT Last Visit   PT Visit Date 07/29/22   Note Type   Note type Evaluation   End of Consult   Patient Position at End of Consult Supine; All needs within reach   Pain Assessment   Pain Assessment Tool 0-10   Pain Score No Pain   Restrictions/Precautions   Weight Bearing Precautions Per Order No   Other Precautions Multiple lines;Telemetry; Fall Risk   Home Living   Type of [address] [place] [address] One level;Stairs to enter with rails  (2 BLAS)   Bathroom Shower/Tub Walk-in shower   Bathroom Toilet Standard   Bathroom Equipment Grab bars in shower;Grab bars around [facility] Walker;Cane  (uses cane PTA)   Prior Function   Level of [place] Independent with ADLs and functional mobility   Lives With Alone   Receives Help From Other (Comment)  (Pt reports she will be having a personal care aide coming to help out throughout the week; she was unsure of how often )   ADL Assistance Independent   IADLs Independent   Falls in the last 6 months 0   Vocational Retired   General   Family/Caregiver Present No   Cognition   Overall Cognitive Status WFL   Arousal/Participation Cooperative   Attention Within functional limits   Orientation Level Oriented X4   Memory Within functional limits   Following Commands Follows one step commands without difficulty   Comments Pt w/ good safety awareness throughout  Subjective   Subjective Pt pleasant and agreeable to participate in therapy session; willing to ambulate again after walking with restorative tech but she did not want to sit in the bedside chair as she finds it uncomfortable  RLE Assessment   RLE Assessment   (functionally 4-/5)   LLE Assessment   LLE Assessment   (functionally 4-/5)   Bed Mobility   Supine to Sit 5  Supervision   Additional items Increased time required;Verbal cues   Sit to Supine 4  Minimal assistance   Additional items Assist x 1;HOB elevated; Increased time required;Verbal cues;LE management   Additional Comments Pt supine in bed upon PT consult  Pt left supine in bed with all needs within reach  Transfers   Sit to Stand 5  Supervision   Additional items Increased time required;Verbal cues   Stand to Sit 5  Supervision   Additional items Increased time required;Verbal cues   Additional Comments Pt transfers w/ RW and verbal cues for hand placement  Ambulation/Elevation   Gait pattern Improper Weight shift; Antalgic; Forward Flexion; Short stride; Step through pattern   Gait Assistance 5  Supervision   Additional items Verbal cues; Tactile cues   Assistive Device Rolling walker   Distance 100 ft x 2 in hallway and back to room   Ambulation/Elevation Additional Comments Pt ambulates w/ RW  Verbal cues for hand placement and some tactile cues for direction due to busy hallway  Balance   Static Sitting Fair +   Dynamic Sitting Fair   Static Standing Fair -   Dynamic Standing Fair -   Ambulatory Poor +   Endurance Deficit   Endurance Deficit No   Activity Tolerance   Activity Tolerance Patient tolerated treatment well   Nurse Made Aware RN cleared pt to be seen by PT  Assessment   Prognosis Good   Problem List Decreased strength; Impaired balance;Decreased mobility   Assessment Pt is a 65 y o  female who presents for high complexity PT evaluation at [place] [address] with active PT eval and treat orders  Pt was admitted for incisional hernia on 07/05/2022 that she wanted to have repaired  Pt is s/p incisional hernia repair, lysis of adhesions, explantation of mesh, and repair of enterotomy on 07/27/2022  Pt  has a past medical history of Chronic kidney disease, Colitis, and Hyperlipidemia    Pt resides alone in a 1  with 2 [facility]  Pt requires supervision-MinAx1 for bed mobility and supervision for functional transfers and ambulation at this time due to deficits in LE strength and balance  Pt uses a cane at baseline but will benefit from the use of RW for mobility  Pt will benefit from skilled PT to address these deficits and reach PLOF  PT to continue to follow pt and recommending discharge to home with HHPT  The patient's AM-PAC Basic Mobility Inpatient Short Form Raw Score is 19  A Raw score of greater than 16 suggests the patient may benefit from discharge to home with HHPT  Please also refer to the recommendation of the Physical Therapist for safe discharge planning  Barriers to Discharge None   Goals   Patient Goals to go home   STG Expiration Date 08/12/22   Short Term Goal #1 1  Pt will perform bed mobility with I for increased independence and decreased need for caregiver support   2  Pt will perform functional transfers with Mod I for increased independence and decreased need for caregiver support  3  Pt will ambulate 300 ft with Mod I and LRAD for increased ability to move around in the home and community  4  Pt will be able to negotiate one flight of steps with Mod I for increased ability to move around in the home and the community  5  Pt will increase balance by one grade for improved mobility and decreased risk of falls  6  Pt will increase b/l LE strength by one grade for increased ease with transfers and ambulation  Plan   Treatment/Interventions Functional transfer training;LE strengthening/ROM; Elevations; Therapeutic exercise; Endurance training;Patient/family training;Equipment eval/education; Bed mobility;Gait training;Spoke to nursing   PT Frequency 3-5x/wk   Recommendation   PT Discharge Recommendation Home with home health rehabilitation   Equipment Recommended Jil Brown  (pt owns)   Walker Package Recommended Wheeled walker   Change/add to [employer]? No   AM-PAC Basic Mobility Inpatient   Turning in Bed Without Bedrails 4   Lying on Back to Sitting on Edge of Flat Bed 3   Moving Bed to Chair 3   Standing Up From Chair 3   Walk in Room 3   Climb 3-5 Stairs 3   Basic Mobility Inpatient Raw Score 19   Basic Mobility Standardized Score 42 48   Highest Level Of Mobility   JH-HLM Goal 6: Walk 10 steps or more   JH-HLM Achieved 7: Walk 25 feet or more   Modified [place] Scale   Modified [place] Scale 3   End of Consult   Patient Position at End of Consult Supine; All needs within reach         Donald Loera , SPT

## 2022-07-30 LAB
ABO GROUP BLD BPU: NORMAL
ABO GROUP BLD BPU: NORMAL
ANION GAP SERPL CALCULATED.3IONS-SCNC: 3 MMOL/L (ref 4–13)
BASOPHILS # BLD AUTO: 0.04 THOUSANDS/ΜL (ref 0–0.1)
BASOPHILS NFR BLD AUTO: 1 % (ref 0–1)
BPU ID: NORMAL
BPU ID: NORMAL
BUN SERPL-MCNC: 8 MG/DL (ref 5–25)
CALCIUM SERPL-MCNC: 7.9 MG/DL (ref 8.3–10.1)
CHLORIDE SERPL-SCNC: 115 MMOL/L (ref 96–108)
CO2 SERPL-SCNC: 27 MMOL/L (ref 21–32)
CREAT SERPL-MCNC: 0.46 MG/DL (ref 0.6–1.3)
CROSSMATCH: NORMAL
CROSSMATCH: NORMAL
EOSINOPHIL # BLD AUTO: 0.47 THOUSAND/ΜL (ref 0–0.61)
EOSINOPHIL NFR BLD AUTO: 6 % (ref 0–6)
ERYTHROCYTE [DISTWIDTH] IN BLOOD BY AUTOMATED COUNT: 14.7 % (ref 11.6–15.1)
GFR SERPL CREATININE-BSD FRML MDRD: 95 ML/MIN/1.73SQ M
GLUCOSE SERPL-MCNC: 89 MG/DL (ref 65–140)
HCT VFR BLD AUTO: 21.9 % (ref 34.8–46.1)
HCT VFR BLD AUTO: 26.6 % (ref 34.8–46.1)
HGB BLD-MCNC: 6.8 G/DL (ref 11.5–15.4)
HGB BLD-MCNC: 8.3 G/DL (ref 11.5–15.4)
IMM GRANULOCYTES # BLD AUTO: 0.04 THOUSAND/UL (ref 0–0.2)
IMM GRANULOCYTES NFR BLD AUTO: 1 % (ref 0–2)
LYMPHOCYTES # BLD AUTO: 1.93 THOUSANDS/ΜL (ref 0.6–4.47)
LYMPHOCYTES NFR BLD AUTO: 25 % (ref 14–44)
MCH RBC QN AUTO: 31.3 PG (ref 26.8–34.3)
MCHC RBC AUTO-ENTMCNC: 31.1 G/DL (ref 31.4–37.4)
MCV RBC AUTO: 101 FL (ref 82–98)
MONOCYTES # BLD AUTO: 1.12 THOUSAND/ΜL (ref 0.17–1.22)
MONOCYTES NFR BLD AUTO: 15 % (ref 4–12)
NEUTROPHILS # BLD AUTO: 4.08 THOUSANDS/ΜL (ref 1.85–7.62)
NEUTS SEG NFR BLD AUTO: 52 % (ref 43–75)
NRBC BLD AUTO-RTO: 0 /100 WBCS
PLATELET # BLD AUTO: 164 THOUSANDS/UL (ref 149–390)
PMV BLD AUTO: 9.3 FL (ref 8.9–12.7)
POTASSIUM SERPL-SCNC: 3.9 MMOL/L (ref 3.5–5.3)
RBC # BLD AUTO: 2.17 MILLION/UL (ref 3.81–5.12)
SODIUM SERPL-SCNC: 145 MMOL/L (ref 135–147)
UNIT DISPENSE STATUS: NORMAL
UNIT DISPENSE STATUS: NORMAL
UNIT PRODUCT CODE: NORMAL
UNIT PRODUCT CODE: NORMAL
UNIT PRODUCT VOLUME: 300 ML
UNIT PRODUCT VOLUME: 350 ML
UNIT RH: NORMAL
UNIT RH: NORMAL
WBC # BLD AUTO: 7.68 THOUSAND/UL (ref 4.31–10.16)

## 2022-07-30 PROCEDURE — 85018 HEMOGLOBIN: CPT

## 2022-07-30 PROCEDURE — 99024 POSTOP FOLLOW-UP VISIT: CPT | Performed by: SURGERY

## 2022-07-30 PROCEDURE — 99233 SBSQ HOSP IP/OBS HIGH 50: CPT | Performed by: INTERNAL MEDICINE

## 2022-07-30 PROCEDURE — 80048 BASIC METABOLIC PNL TOTAL CA: CPT | Performed by: NURSE PRACTITIONER

## 2022-07-30 PROCEDURE — 85025 COMPLETE CBC W/AUTO DIFF WBC: CPT | Performed by: NURSE PRACTITIONER

## 2022-07-30 PROCEDURE — 30233N1 TRANSFUSION OF NONAUTOLOGOUS RED BLOOD CELLS INTO PERIPHERAL VEIN, PERCUTANEOUS APPROACH: ICD-10-PCS | Performed by: SURGERY

## 2022-07-30 PROCEDURE — P9040 RBC LEUKOREDUCED IRRADIATED: HCPCS

## 2022-07-30 PROCEDURE — 85014 HEMATOCRIT: CPT

## 2022-07-30 RX ORDER — METOPROLOL TARTRATE 5 MG/5ML
5 INJECTION INTRAVENOUS ONCE
Status: DISCONTINUED | OUTPATIENT
Start: 2022-07-30 | End: 2022-08-01 | Stop reason: HOSPADM

## 2022-07-30 RX ADMIN — CEFAZOLIN SODIUM 2000 MG: 2 SOLUTION INTRAVENOUS at 03:10

## 2022-07-30 RX ADMIN — ACETAMINOPHEN 975 MG: 325 TABLET ORAL at 22:46

## 2022-07-30 RX ADMIN — MONTELUKAST SODIUM 10 MG: 10 TABLET, FILM COATED ORAL at 22:47

## 2022-07-30 RX ADMIN — CEFAZOLIN SODIUM 2000 MG: 2 SOLUTION INTRAVENOUS at 11:05

## 2022-07-30 RX ADMIN — CEFAZOLIN SODIUM 2000 MG: 2 SOLUTION INTRAVENOUS at 19:59

## 2022-07-30 RX ADMIN — HEPARIN SODIUM 5000 UNITS: 5000 INJECTION INTRAVENOUS; SUBCUTANEOUS at 22:48

## 2022-07-30 RX ADMIN — PREGABALIN 100 MG: 100 CAPSULE ORAL at 08:01

## 2022-07-30 RX ADMIN — BRIMONIDINE TARTRATE 1 DROP: 2 SOLUTION/ DROPS OPHTHALMIC at 08:01

## 2022-07-30 RX ADMIN — BRIMONIDINE TARTRATE 1 DROP: 2 SOLUTION/ DROPS OPHTHALMIC at 17:15

## 2022-07-30 RX ADMIN — Medication 12.5 MG: at 22:47

## 2022-07-30 RX ADMIN — BIMATOPROST 1 DROP: 0.1 SOLUTION/ DROPS OPHTHALMIC at 22:49

## 2022-07-30 RX ADMIN — ACETAMINOPHEN 975 MG: 325 TABLET ORAL at 14:47

## 2022-07-30 RX ADMIN — PREGABALIN 100 MG: 100 CAPSULE ORAL at 17:15

## 2022-07-30 RX ADMIN — ACETAMINOPHEN 975 MG: 325 TABLET ORAL at 05:15

## 2022-07-30 RX ADMIN — Medication 1 SPRAY: at 04:34

## 2022-07-30 RX ADMIN — HEPARIN SODIUM 5000 UNITS: 5000 INJECTION INTRAVENOUS; SUBCUTANEOUS at 05:15

## 2022-07-30 RX ADMIN — HEPARIN SODIUM 5000 UNITS: 5000 INJECTION INTRAVENOUS; SUBCUTANEOUS at 14:47

## 2022-07-30 NOTE — PROGRESS NOTES
Progress Note - Cardiology   Annette Singh 65 y o  female MRN: [id number]  Unit/Bed#: [facility] 12-46 Encounter: [id number]    Assessment:  Principal Problem:    Incisional hernia, without obstruction or gangrene  Active Problems:    Intestinal adhesions  Postoperative atrial fibrillation  Mild-to-moderate aortic stenosis  Moderate to severe pulmonary hypertension  Plan:    Because blood pressure is borderline, continue off of any demetrius blocking agents  If blood pressure is stable over the next 24 hours I will plan to add a low dose of metoprolol  Given brief AFib, anemia requiring blood transfusion, I recommend against anticoagulation for the time being  Pulmonary hypertension and mild-to-moderate aortic stenosis will need outpatient follow-up  Currently, her volume status appears acceptable  As she is going to get the blood transfusion, she may need p r n  Lasix  Continue to monitor for this  Will need ongoing outpatient follow-up  She can be seen in the [address] [place] office  Subjective/Objective     Subjective:     Patient denies any chest pain or palpitations  She feels fatigued  She is currently getting a blood transfusion for slight worsening of the anemia  No arrhythmias last 24 hours on telemetry  Blood pressure is a little bit better than yesterday  Echocardiogram reviewed  EF is preserved  Mild to moderate aortic stenosis and there is pulmonary hypertension present      Objective:    Vitals: /53   Pulse 70   Temp 98 3 °F (36 8 °C) (Oral)   Resp 18   Ht 4' 11" (1 499 m)   Wt 74 4 kg (164 lb)   SpO2 99%   BMI 33 12 kg/m²   Vitals:    07/27/22 1032 07/29/22 1615   Weight: 74 8 kg (164 lb 14 5 oz) 74 4 kg (164 lb)     Orthostatic Blood Pressures    Flowsheet Row Most Recent Value   Blood Pressure 111/53 filed at 07/30/2022 0900   Patient Position - Orthostatic VS Lying filed at 07/30/2022 0319          Intake/Output Summary (Last 24 hours) at 7/30/2022 0935  Last data filed at 7/30/2022 0745  Gross per 24 hour   Intake 360 ml   Output 1170 ml   Net -810 ml     Physical Exam:   General appearance: pale  Head: Normocephalic, without obvious abnormality, atraumatic  Neck: no carotid bruit, no JVD and supple, symmetrical, trachea midline  Lungs: clear to auscultation bilaterally  Normal air entry  Normal effort  Heart: S1, S2 regular  + 2/6 RASHAUN  Abdomen: appropriate post operatively  Extremities: no edema  Pulses: 2+ and symmetric bilaterally  Skin: Skin color, texture, turgor normal  No rashes or lesions  Neurologic: Grossly normal  Alert and oriented      Medications:    Current Facility-Administered Medications:     acetaminophen (TYLENOL) tablet 975 mg, 975 mg, Oral, Q8H [facility], Braeden Buckley MD, 975 mg at 07/30/22 0515    bimatoprost (LUMIGAN) 0 01 % ophthalmic solution 1 drop, 1 drop, Both Eyes, HS, Braeden Buckley MD, 1 drop at 07/29/22 2118    brimonidine tartrate 0 2 % ophthalmic solution 1 drop, 1 drop, Both Eyes, BID, Braeden Buckley MD, 1 drop at 07/30/22 0801    ceFAZolin (ANCEF) IVPB (premix in dextrose) 2,000 mg 50 mL, 2,000 mg, Intravenous, Q8H, Braeden Buckley MD, Last Rate: 100 mL/hr at 07/30/22 0310, 2,000 mg at 07/30/22 0310    heparin (porcine) subcutaneous injection 5,000 Units, 5,000 Units, Subcutaneous, Q8H [facility], Braeden Buckley MD, 5,000 Units at 07/30/22 0515    lidocaine (LIDODERM) 5 % patch 1 patch, 1 patch, Topical, Daily, Braeden Buckley MD    montelukast (SINGULAIR) tablet 10 mg, 10 mg, Oral, HS, Braeden Buckley MD, 10 mg at 07/29/22 2117    ondansetron (ZOFRAN) injection 4 mg, 4 mg, Intravenous, Q4H PRN, Braeden Buckley MD    oxyCODONE (ROXICODONE) IR tablet 2 5 mg, 2 5 mg, Oral, Q4H PRN, Braeden Buckley MD, 2 5 mg at 07/29/22 1726    oxyCODONE (ROXICODONE) IR tablet 5 mg, 5 mg, Oral, Q4H PRN, Braeden Buckley MD, 5 mg at 07/27/22 2015    phenol (CHLORASEPTIC) 1 4 % mucosal liquid 1 spray, 1 spray, Mouth/Throat, Q2H PRN, Nino Villatoro MD, 1 spray at 07/30/22 0434    pregabalin (Inessa Barlow) capsule 100 mg, 100 mg, Oral, BID, Brianda Lyons MD, 100 mg at 07/30/22 0801    scopolamine (TRANSDERM-SCOP) 1 mg/3 days TD 72 hr patch 1 patch, 1 patch, Transdermal, Q72H, Brianda Lyons MD, 1 patch at 07/28/22 0045    Lab Results:      Results from last 7 days   Lab Units 07/30/22  0434 07/29/22  0525 07/28/22  1407 07/28/22  0921 07/28/22  0447   WBC Thousand/uL 7 68 9 52  --   --  10 36*   HEMOGLOBIN g/dL 6 8* 7 6* 8 1*   < > 7 7*   HEMATOCRIT % 21 9* 25 2* 25 8*   < > 24 3*   PLATELETS Thousands/uL 164 208  --   --  205    < > = values in this interval not displayed  Results from last 7 days   Lab Units 07/29/22  0525   TRIGLYCERIDES mg/dL 216*   HDL mg/dL 30*     Results from last 7 days   Lab Units 07/30/22  0434 07/29/22  0525 07/28/22  2036   SODIUM mmol/L 145 142 142   POTASSIUM mmol/L 3 9 4 0 3 8   CHLORIDE mmol/L 115* 111* 111*   CO2 mmol/L 27 26 27   BUN mg/dL 8 12 13   CREATININE mg/dL 0 46* 0 57* 0 64   CALCIUM mg/dL 7 9* 8 4 8 1*     Results from last 7 days   Lab Units 07/29/22  0525 07/28/22  0921   INR  1 13 1 26*     Results from last 7 days   Lab Units 07/29/22  0525 07/28/22  2036   MAGNESIUM mg/dL 2 2 1 7     Telemetry: Personally reviewed  No afib last 24 hours  Echo:   Left Ventricle: Left ventricular cavity size is normal  Wall thickness is mildly increased  There is mild concentric hypertrophy  The left ventricular ejection fraction is 65% by visual estimation  Systolic function is normal  Although no diagnostic regional wall motion abnormality was identified, this possibility cannot be completely excluded on the basis of this study  Diastolic function is moderately abnormal, consistent with grade II (pseudonormal) relaxation  Left atrial filling pressure is elevated    Aortic Valve: There is mild to moderate stenosis  The aortic valve peak velocity is 2 35 m/s  The aortic valve mean gradient is 13 mmHg  The DVI is 0 49    Tricuspid Valve: There is mild regurgitation   The right ventricular systolic pressure is moderately to severely elevated  The estimated right ventricular systolic pressure is 17 82 mmHg    No prior studies for comparison

## 2022-07-30 NOTE — PROGRESS NOTES
Progress Note - Sulma Calix 65 y o  female MRN: [id number]    Unit/Bed#: [facility] 512-01 Encounter: [id number]      Assessment:  Sulma Calix is a 65 y o  female who presents with recurrent ventral hernia s/p repair w/VINNY, mesh explantation, and enterotomy repair x1 on 7/27  Hgb 6 8 from 7 6 from 8 1    Plan:  Transfuse 1 unit PRBC, post-transfusion H/H check  Surgical soft diet, no carbonation  Pain and nausea control PRN  PT OT following  DVT ppx  Dispo planning - home with home health  Cards recs Zio patch at discharge, outpatient follow up, no anticoagulation at this time    Subjective:   NAEO  Tolerating her diet without N/V  No heart palpitations, dizziness, or lightheadedness  Passing gas  Urinating on her own  Objective:     Vitals: Blood pressure 116/55, pulse 71, temperature 98 1 °F (36 7 °C), temperature source Oral, resp  rate 20, height 4' 11" (1 499 m), weight 74 4 kg (164 lb), SpO2 97 %  ,Body mass index is 33 12 kg/m²  Intake/Output Summary (Last 24 hours) at 7/30/2022 0700  Last data filed at 7/30/2022 0631  Gross per 24 hour   Intake 360 ml   Output 1170 ml   Net -810 ml       Physical Exam:   General - no acute distress, responsive and cooperative  CV - warm, regular rate  Pulm - normal work of breathing, no respiratory distress  Abd - soft, nondistended, appropriately tender, incision c/d/i, FABIEN SS, 20cc output, binder in place  Neuro - m/s grossly intact, cn grossly intact  Ext - moving all extremities       Invasive Devices  Report    Peripheral Intravenous Line  Duration           Peripheral IV 07/27/22 Left;Ventral (anterior) Forearm 2 days    Peripheral IV 07/27/22 Right;Ventral (anterior) Forearm 2 days          Drain  Duration           Closed/Suction Drain Right Abdomen 10 Fr  2 days                Lab, Imaging and other studies: I have personally reviewed pertinent reports      VTE Pharmacologic Prophylaxis: Heparin  VTE Mechanical Prophylaxis: sequential compression device

## 2022-07-30 NOTE — DISCHARGE SUMMARY
Discharge Summary - General Surgery   Meagan Badillo 65 y o  female MRN: [id number]  Unit/Bed#: [facility] 223-82 Encounter: [id number]    Admission Date: 7/27/2022     Discharge Date: 8/1/2022    Admitting Diagnosis: Incisional hernia [K43 2]    Discharge Diagnosis: Incisional hernia and post-operative atrial fibrillation  Attending and Service: Dr Mena Schwarz  Imaging and Procedures Performed:     Incisional hernia repair, lysis of adhesions, explantation of mesh, repair of enterotomy on 07/27/2022  Hospital Course: Meagan Badillo is a 76-year-old female who presented as an outpatient to the surgical clinic with history of small-bowel obstruction secondary to mesh and now an incisional hernia  After evaluation as an outpatient and discussion regarding management options, she agreed to proceed with elective operative intervention for hernia repair  She was admitted on 07/27/2022 and underwent incisional hernia repair, lysis of adhesions, explantation of mesh, repair of enterotomy  Her hospital course was complicated by hypotension and anemia that was responsive to transfusion of packed red blood cell transfusions as well as IV fluid resuscitation  She has no known cardiac history, but had new-onset atrial fibrillation that responded appropriately to metoprolol  Cardiology was consulted and proceeded with cardiac workup including an echocardiogram, which she was found to have mild-to-moderate aortic stenosis and moderate-to-severe pulmonary hypertension  She was evaluated by the therapy services postoperatively and recommended home health therapy  Case Management assisted with disposition planning  Once stable from a cardiac/medical standpoint as well as a postoperative standpoint, she was deemed stable for discharge on 08/01/2022  Prior to discharge, her surgical FABIEN drain was removed  On discharge, she was tolerating her diet, ambulating, and pain was well-controlled   The patient is instructed to follow-up with the patient's primary care provider within the next 2 weeks to review the events of the recent hospitalization  The patient is instructed to follow-up with the Acute Care Surgical Services in 2 weeks  The patient is also instructed to follow up with Cardiology as an outpatient for ongoing workup and management of her atrial fibrillation  The patient is instructed to follow the provided discharge instructions  Condition at Discharge: stable     Discharge instructions/Information to patient and family:   See after visit summary for information provided to patient and family  Provisions for Follow-Up Care:  See after visit summary for information related to follow-up care and any pertinent home health orders  Disposition: Home    Planned Readmission: No    Discharge Statement   I spent 20 minutes discharging the patient  This time was spent on the day of discharge  I had direct contact with the patient on the day of discharge  Additional documentation is required if more than 30 minutes were spent on discharge  Discharge Medications:  See after visit summary for reconciled discharge medications provided to patient and family      Anu Fisher PA-C  8/1/2022  09:08 AM

## 2022-07-31 ENCOUNTER — TELEPHONE (OUTPATIENT)
Dept: CARDIOLOGY CLINIC | Facility: CLINIC | Age: 77
End: 2022-07-31

## 2022-07-31 DIAGNOSIS — I48.0 PAF (PAROXYSMAL ATRIAL FIBRILLATION) (HCC): Primary | ICD-10-CM

## 2022-07-31 LAB
ABO GROUP BLD BPU: NORMAL
ALBUMIN SERPL BCP-MCNC: 2.3 G/DL (ref 3.5–5)
ALP SERPL-CCNC: 42 U/L (ref 46–116)
ALT SERPL W P-5'-P-CCNC: 12 U/L (ref 12–78)
ANION GAP SERPL CALCULATED.3IONS-SCNC: 3 MMOL/L (ref 4–13)
AST SERPL W P-5'-P-CCNC: 25 U/L (ref 5–45)
BASOPHILS # BLD AUTO: 0.05 THOUSANDS/ΜL (ref 0–0.1)
BASOPHILS NFR BLD AUTO: 1 % (ref 0–1)
BILIRUB SERPL-MCNC: 0.35 MG/DL (ref 0.2–1)
BPU ID: NORMAL
BUN SERPL-MCNC: 7 MG/DL (ref 5–25)
CALCIUM ALBUM COR SERPL-MCNC: 9.2 MG/DL (ref 8.3–10.1)
CALCIUM SERPL-MCNC: 7.8 MG/DL (ref 8.3–10.1)
CHLORIDE SERPL-SCNC: 116 MMOL/L (ref 96–108)
CO2 SERPL-SCNC: 27 MMOL/L (ref 21–32)
CREAT SERPL-MCNC: 0.38 MG/DL (ref 0.6–1.3)
CROSSMATCH: NORMAL
EOSINOPHIL # BLD AUTO: 0.46 THOUSAND/ΜL (ref 0–0.61)
EOSINOPHIL NFR BLD AUTO: 6 % (ref 0–6)
ERYTHROCYTE [DISTWIDTH] IN BLOOD BY AUTOMATED COUNT: 15.7 % (ref 11.6–15.1)
GFR SERPL CREATININE-BSD FRML MDRD: 101 ML/MIN/1.73SQ M
GLUCOSE SERPL-MCNC: 95 MG/DL (ref 65–140)
HCT VFR BLD AUTO: 24.5 % (ref 34.8–46.1)
HCT VFR BLD AUTO: 26.6 % (ref 34.8–46.1)
HGB BLD-MCNC: 7.7 G/DL (ref 11.5–15.4)
HGB BLD-MCNC: 8.1 G/DL (ref 11.5–15.4)
IMM GRANULOCYTES # BLD AUTO: 0.03 THOUSAND/UL (ref 0–0.2)
IMM GRANULOCYTES NFR BLD AUTO: 0 % (ref 0–2)
LYMPHOCYTES # BLD AUTO: 1.51 THOUSANDS/ΜL (ref 0.6–4.47)
LYMPHOCYTES NFR BLD AUTO: 19 % (ref 14–44)
MCH RBC QN AUTO: 31.4 PG (ref 26.8–34.3)
MCHC RBC AUTO-ENTMCNC: 31.4 G/DL (ref 31.4–37.4)
MCV RBC AUTO: 100 FL (ref 82–98)
MONOCYTES # BLD AUTO: 1.07 THOUSAND/ΜL (ref 0.17–1.22)
MONOCYTES NFR BLD AUTO: 14 % (ref 4–12)
NEUTROPHILS # BLD AUTO: 4.71 THOUSANDS/ΜL (ref 1.85–7.62)
NEUTS SEG NFR BLD AUTO: 60 % (ref 43–75)
NRBC BLD AUTO-RTO: 1 /100 WBCS
PLATELET # BLD AUTO: 200 THOUSANDS/UL (ref 149–390)
PMV BLD AUTO: 9.3 FL (ref 8.9–12.7)
POTASSIUM SERPL-SCNC: 3.6 MMOL/L (ref 3.5–5.3)
PROT SERPL-MCNC: 5.5 G/DL (ref 6.4–8.4)
RBC # BLD AUTO: 2.45 MILLION/UL (ref 3.81–5.12)
SODIUM SERPL-SCNC: 146 MMOL/L (ref 135–147)
UNIT DISPENSE STATUS: NORMAL
UNIT PRODUCT CODE: NORMAL
UNIT PRODUCT VOLUME: 350 ML
UNIT RH: NORMAL
WBC # BLD AUTO: 7.83 THOUSAND/UL (ref 4.31–10.16)

## 2022-07-31 PROCEDURE — 85018 HEMOGLOBIN: CPT

## 2022-07-31 PROCEDURE — 85014 HEMATOCRIT: CPT

## 2022-07-31 PROCEDURE — 85025 COMPLETE CBC W/AUTO DIFF WBC: CPT

## 2022-07-31 PROCEDURE — 80053 COMPREHEN METABOLIC PANEL: CPT

## 2022-07-31 PROCEDURE — 99233 SBSQ HOSP IP/OBS HIGH 50: CPT | Performed by: INTERNAL MEDICINE

## 2022-07-31 PROCEDURE — 99024 POSTOP FOLLOW-UP VISIT: CPT | Performed by: SURGERY

## 2022-07-31 RX ORDER — MINERAL OIL 100 G/100G
1 OIL RECTAL ONCE
Status: COMPLETED | OUTPATIENT
Start: 2022-07-31 | End: 2022-07-31

## 2022-07-31 RX ADMIN — BRIMONIDINE TARTRATE 1 DROP: 2 SOLUTION/ DROPS OPHTHALMIC at 17:10

## 2022-07-31 RX ADMIN — MINERAL OIL 1 ENEMA: 100 ENEMA RECTAL at 07:54

## 2022-07-31 RX ADMIN — HEPARIN SODIUM 5000 UNITS: 5000 INJECTION INTRAVENOUS; SUBCUTANEOUS at 13:58

## 2022-07-31 RX ADMIN — Medication 12.5 MG: at 08:40

## 2022-07-31 RX ADMIN — PREGABALIN 100 MG: 100 CAPSULE ORAL at 08:40

## 2022-07-31 RX ADMIN — ACETAMINOPHEN 975 MG: 325 TABLET ORAL at 06:37

## 2022-07-31 RX ADMIN — METOPROLOL TARTRATE 25 MG: 25 TABLET, FILM COATED ORAL at 21:36

## 2022-07-31 RX ADMIN — MONTELUKAST SODIUM 10 MG: 10 TABLET, FILM COATED ORAL at 21:37

## 2022-07-31 RX ADMIN — CEFAZOLIN SODIUM 2000 MG: 2 SOLUTION INTRAVENOUS at 12:44

## 2022-07-31 RX ADMIN — HEPARIN SODIUM 5000 UNITS: 5000 INJECTION INTRAVENOUS; SUBCUTANEOUS at 21:37

## 2022-07-31 RX ADMIN — PREGABALIN 100 MG: 100 CAPSULE ORAL at 17:10

## 2022-07-31 RX ADMIN — ACETAMINOPHEN 975 MG: 325 TABLET ORAL at 13:57

## 2022-07-31 RX ADMIN — BRIMONIDINE TARTRATE 1 DROP: 2 SOLUTION/ DROPS OPHTHALMIC at 08:40

## 2022-07-31 RX ADMIN — CEFAZOLIN SODIUM 2000 MG: 2 SOLUTION INTRAVENOUS at 21:37

## 2022-07-31 RX ADMIN — ONDANSETRON 4 MG: 2 INJECTION INTRAMUSCULAR; INTRAVENOUS at 07:52

## 2022-07-31 RX ADMIN — HEPARIN SODIUM 5000 UNITS: 5000 INJECTION INTRAVENOUS; SUBCUTANEOUS at 06:37

## 2022-07-31 RX ADMIN — BIMATOPROST 1 DROP: 0.1 SOLUTION/ DROPS OPHTHALMIC at 21:42

## 2022-07-31 RX ADMIN — CEFAZOLIN SODIUM 2000 MG: 2 SOLUTION INTRAVENOUS at 02:46

## 2022-07-31 RX ADMIN — ACETAMINOPHEN 975 MG: 325 TABLET ORAL at 21:36

## 2022-07-31 NOTE — PROGRESS NOTES
Progress Note - Cardiology   Neymar Grey 65 y o  female MRN: [id number]  Unit/Bed#: [facility] 12-46 Encounter: [id number]    Assessment:  Principal Problem:    Incisional hernia, without obstruction or gangrene  Active Problems:    Intestinal adhesions  Postoperative atrial fibrillation  Mild-to-moderate aortic stenosis  Moderate to severe pulmonary hypertension  Plan:  She started a low dose of metoprolol last night  BP has remained stable  With the recurrence of afib, increase the metoprolol to 25mg twice a day  Typically, I would recommend anticoagulation with the recurrence of afib  However, with the anemia requiring transfusion yesterday after her surgery, I'm reluctant to do so currently  Recommend monitoring 24 hours on telemetry  If recurrence of afib, start amiodarone 200mg TID x 1 week, then 200 BID x 1 week, then 200 daily  Outpatient zio patch will be arranged, I will message the office  PRN lasix  Subjective/Objective     Subjective:  She had more afib yesterday  She didn't feel it, but had long episodes on telemetry  Heart rates were actually reasonably controlled  She's back in sinus currently  Her complaint is of constipation      Objective:    Vitals: /72   Pulse 68   Temp 98 2 °F (36 8 °C) (Oral)   Resp 18   Ht 4' 11" (1 499 m)   Wt 74 4 kg (164 lb)   SpO2 98%   BMI 33 12 kg/m²   Vitals:    07/27/22 1032 07/29/22 1615   Weight: 74 8 kg (164 lb 14 5 oz) 74 4 kg (164 lb)     Orthostatic Blood Pressures    Flowsheet Row Most Recent Value   Blood Pressure 157/72 filed at 07/31/2022 0725   Patient Position - Orthostatic VS Lying filed at 07/31/2022 0230          Intake/Output Summary (Last 24 hours) at 7/31/2022 0950  Last data filed at 7/31/2022 5688  Gross per 24 hour   Intake 1011 25 ml   Output 3370 ml   Net -2358 75 ml     Physical Exam:  GEN: Neymar Grey appears well, alert and oriented x 3, pleasant and cooperative   HEENT: pupils equal, round, and reactive to light; extraocular muscles intact  NECK: supple, no carotid bruits   HEART: regular rhythm, normal S1 and S2, no murmurs, clicks, gallops or rubs   LUNGS: clear to auscultation bilaterally; no wheezes, rales, or rhonchi   ABDOMEN: appropriate abdomen  Drain    EXTREMITIES: peripheral pulses normal; no clubbing, cyanosis, or edema  NEURO: no focal findings   SKIN: normal without suspicious lesions on exposed skin    Medications:    Current Facility-Administered Medications:     acetaminophen (TYLENOL) tablet 975 mg, 975 mg, Oral, Q8H [address], Andrae Jurado MD, 975 mg at 07/31/22 0637    bimatoprost (LUMIGAN) 0 01 % ophthalmic solution 1 drop, 1 drop, Both Eyes, HS, Andrae Jurado MD, 1 drop at 07/30/22 2249    brimonidine tartrate 0 2 % ophthalmic solution 1 drop, 1 drop, Both Eyes, BID, Andrae Jurado MD, 1 drop at 07/31/22 0840    ceFAZolin (ANCEF) IVPB (premix in dextrose) 2,000 mg 50 mL, 2,000 mg, Intravenous, Q8H, Andrae Jurado MD, Stopped at 07/31/22 0348    heparin (porcine) subcutaneous injection 5,000 Units, 5,000 Units, Subcutaneous, Q8H [address], Andrae Jurado MD, 5,000 Units at 07/31/22 3426    lidocaine (LIDODERM) 5 % patch 1 patch, 1 patch, Topical, Daily, Andrae Jurado MD    metoprolol (LOPRESSOR) injection 5 mg, 5 mg, Intravenous, Once, Dianne Boudreaux MD    metoprolol tartrate (LOPRESSOR) partial tablet 12 5 mg, 12 5 mg, Oral, Once, Wai Badillo MD    metoprolol tartrate (LOPRESSOR) tablet 25 mg, 25 mg, Oral, Q12H [address], Wai Baidllo MD    montelukast (SINGULAIR) tablet 10 mg, 10 mg, Oral, HS, Andrae Jurado MD, 10 mg at 07/30/22 2247    ondansetron (ZOFRAN) injection 4 mg, 4 mg, Intravenous, Q4H PRN, Andrae Jurado MD, 4 mg at 07/31/22 0752    oxyCODONE (ROXICODONE) IR tablet 2 5 mg, 2 5 mg, Oral, Q4H PRN, Andrae Jurado MD, 2 5 mg at 07/29/22 1726    oxyCODONE (ROXICODONE) IR tablet 5 mg, 5 mg, Oral, Q4H PRN, Andrae Jurado MD, 5 mg at 07/27/22 2015    phenol (CHLORASEPTIC) 1 4 % mucosal liquid 1 spray, 1 spray, Mouth/Throat, Q2H PRN, Echo Ortiz MD, 1 spray at 07/30/22 0434    pregabalin (LYRICA) capsule 100 mg, 100 mg, Oral, BID, Amna Ram MD, 100 mg at 07/31/22 0840    scopolamine (TRANSDERM-SCOP) 1 mg/3 days TD 72 hr patch 1 patch, 1 patch, Transdermal, Q72H, Amna Ram MD, 1 patch at 07/28/22 0045    Lab Results:      Results from last 7 days   Lab Units 07/31/22  0547 07/30/22  1252 07/30/22  0434 07/29/22  0525   WBC Thousand/uL 7 83  --  7 68 9 52   HEMOGLOBIN g/dL 7 7* 8 3* 6 8* 7 6*   HEMATOCRIT % 24 5* 26 6* 21 9* 25 2*   PLATELETS Thousands/uL 200  --  164 208     Results from last 7 days   Lab Units 07/29/22  0525   TRIGLYCERIDES mg/dL 216*   HDL mg/dL 30*     Results from last 7 days   Lab Units 07/31/22  0547 07/30/22  0434 07/29/22  0525   SODIUM mmol/L 146 145 142   POTASSIUM mmol/L 3 6 3 9 4 0   CHLORIDE mmol/L 116* 115* 111*   CO2 mmol/L 27 27 26   BUN mg/dL 7 8 12   CREATININE mg/dL 0 38* 0 46* 0 57*   CALCIUM mg/dL 7 8* 7 9* 8 4   ALK PHOS U/L 42*  --   --    ALT U/L 12  --   --    AST U/L 25  --   --      Results from last 7 days   Lab Units 07/29/22  0525 07/28/22  0921   INR  1 13 1 26*     Results from last 7 days   Lab Units 07/29/22  0525 07/28/22  2036   MAGNESIUM mg/dL 2 2 1 7     Telemetry: Personally reviewed  No afib last 24 hours  Echo:   Left Ventricle: Left ventricular cavity size is normal  Wall thickness is mildly increased  There is mild concentric hypertrophy  The left ventricular ejection fraction is 65% by visual estimation  Systolic function is normal  Although no diagnostic regional wall motion abnormality was identified, this possibility cannot be completely excluded on the basis of this study  Diastolic function is moderately abnormal, consistent with grade II (pseudonormal) relaxation  Left atrial filling pressure is elevated    Aortic Valve: There is mild to moderate stenosis  The aortic valve peak velocity is 2 35 m/s  The aortic valve mean gradient is 13 mmHg   The DVI is 0 49     Tricuspid Valve: There is mild regurgitation  The right ventricular systolic pressure is moderately to severely elevated  The estimated right ventricular systolic pressure is 27 82 mmHg    No prior studies for comparison

## 2022-07-31 NOTE — PROGRESS NOTES
Progress Note - Neftali Maurice 65 y o  female MRN: [id number]    Unit/Bed#: [facility] 512-01 Encounter: [id number]    Assessment:  Neftali Maurice is a 65 y o  female who presents with recurrent ventral hernia s/p repair w/VINNY, mesh explantation, and enterotomy repair x1 on 7/27  Hgb now 7 7 from 8 3 after 1 unit PRBC due to Hgb 6 8 in Am 7/30  Plan:  Mineral oil enema  Surgical soft diet, no carbonation  Pain and nausea control PRN  PT OT following  DVT ppx  Dispo planning - home with home health  Cards recs Zio patch at discharge, outpatient follow up, no anticoagulation at this time    Subjective:   NAEO  Says she did not sleep at all overnight  Feels like she needs to have a bowel movement but cannot go, wants to try an enema  Making good urine, drain with 70 cc output  Got 1 unit PRBC and feels good this morning  Objective:     Vitals: Blood pressure 146/71, pulse 92, temperature 98 1 °F (36 7 °C), temperature source Oral, resp  rate 18, height 4' 11" (1 499 m), weight 74 4 kg (164 lb), SpO2 98 %  ,Body mass index is 33 12 kg/m²  Intake/Output Summary (Last 24 hours) at 7/31/2022 0658  Last data filed at 7/31/2022 2013  Gross per 24 hour   Intake 1191 25 ml   Output 3670 ml   Net -2478 75 ml       Physical Exam:   General - no acute distress, responsive and cooperative  CV - warm, regular rate  Pulm - normal work of breathing, no respiratory distress  Abd - soft, mildly distended and tympanic in upper quadrants, non-tender, incisions c/d/i, FABIEN SS, 70cc output, binder in place  Neuro - m/s grossly intact, cn grossly intact  Ext - moving all extremities       Invasive Devices  Report    Peripheral Intravenous Line  Duration           Peripheral IV 07/31/22 Proximal;Right;Ventral (anterior) Forearm <1 day          Drain  Duration           Closed/Suction Drain Right Abdomen 10 Fr  3 days                Lab, Imaging and other studies: I have personally reviewed pertinent reports      VTE Pharmacologic Prophylaxis: Heparin  VTE Mechanical Prophylaxis: sequential compression device

## 2022-08-01 ENCOUNTER — TELEPHONE (OUTPATIENT)
Dept: CARDIOLOGY CLINIC | Facility: CLINIC | Age: 77
End: 2022-08-01

## 2022-08-01 VITALS
OXYGEN SATURATION: 98 % | HEART RATE: 76 BPM | TEMPERATURE: 98.1 F | SYSTOLIC BLOOD PRESSURE: 169 MMHG | WEIGHT: 164 LBS | DIASTOLIC BLOOD PRESSURE: 72 MMHG | RESPIRATION RATE: 18 BRPM | HEIGHT: 59 IN | BODY MASS INDEX: 33.06 KG/M2

## 2022-08-01 LAB
HCT VFR BLD AUTO: 26.5 % (ref 34.8–46.1)
HGB BLD-MCNC: 8.3 G/DL (ref 11.5–15.4)

## 2022-08-01 PROCEDURE — 85014 HEMATOCRIT: CPT

## 2022-08-01 PROCEDURE — NC001 PR NO CHARGE: Performed by: SURGERY

## 2022-08-01 PROCEDURE — 99232 SBSQ HOSP IP/OBS MODERATE 35: CPT | Performed by: INTERNAL MEDICINE

## 2022-08-01 PROCEDURE — 85018 HEMOGLOBIN: CPT

## 2022-08-01 PROCEDURE — 99024 POSTOP FOLLOW-UP VISIT: CPT | Performed by: PHYSICIAN ASSISTANT

## 2022-08-01 PROCEDURE — 99024 POSTOP FOLLOW-UP VISIT: CPT | Performed by: SURGERY

## 2022-08-01 RX ORDER — ACETAMINOPHEN 325 MG/1
650 TABLET ORAL EVERY 4 HOURS PRN
Refills: 0
Start: 2022-08-01

## 2022-08-01 RX ADMIN — BRIMONIDINE TARTRATE 1 DROP: 2 SOLUTION/ DROPS OPHTHALMIC at 09:29

## 2022-08-01 RX ADMIN — Medication 1 SPRAY: at 09:29

## 2022-08-01 RX ADMIN — METOPROLOL TARTRATE 25 MG: 25 TABLET, FILM COATED ORAL at 09:30

## 2022-08-01 RX ADMIN — CEFAZOLIN SODIUM 2000 MG: 2 SOLUTION INTRAVENOUS at 04:32

## 2022-08-01 RX ADMIN — ACETAMINOPHEN 975 MG: 325 TABLET ORAL at 05:39

## 2022-08-01 RX ADMIN — HEPARIN SODIUM 5000 UNITS: 5000 INJECTION INTRAVENOUS; SUBCUTANEOUS at 05:39

## 2022-08-01 RX ADMIN — PREGABALIN 100 MG: 100 CAPSULE ORAL at 09:30

## 2022-08-01 NOTE — DISCHARGE SUMMARY
Patient seen and evaluated for anticipated discharge  She is doing well and tolerating her diet with minimal pain that is controlled with Tylenol  She states she feels ready for discharge today and has been cleared both from a surgical standpoint as well as from a medical standpoint after adequate heart rate control for her postoperative atrial fibrillation by Cardiology Service  In prior to discharge, her FABIEN drain was removed by cutting the suture at the skin, leaving suction and removing the drain without issue  A dry gauze dressing was placed over the prior drain site  Patient to continue using abdominal binder for comfort and support      Leslieor MAIKEL Canela  8/1/2022 09:01 AM

## 2022-08-01 NOTE — PLAN OF CARE
Problem: MOBILITY - ADULT  Goal: Maintain or return to baseline ADL function  Description: INTERVENTIONS:  -  Assess patient's ability to carry out ADLs; assess patient's baseline for ADL function and identify physical deficits which impact ability to perform ADLs (bathing, care of mouth/teeth, toileting, grooming, dressing, etc )  - Assess/evaluate cause of self-care deficits   - Assess range of motion  - Assess patient's mobility; develop plan if impaired  - Assess patient's need for assistive devices and provide as appropriate  - Encourage maximum independence but intervene and supervise when necessary  - Involve family in performance of ADLs  - Assess for home care needs following discharge   - Consider OT consult to assist with ADL evaluation and planning for discharge  - Provide patient education as appropriate  Outcome: Adequate for Discharge  Goal: Maintains/Returns to pre admission functional level  Description: INTERVENTIONS:  - Perform BMAT or MOVE assessment daily    - Set and communicate daily mobility goal to care team and patient/family/caregiver  - Collaborate with rehabilitation services on mobility goals if consulted  - Perform Range of Motion  times a day  - Reposition patient every  hours    - Dangle patient  times a day  - Stand patient  times a day  - Ambulate patient  times a day  - Out of bed to chair  times a day   - Out of bed for meals  times a day  - Out of bed for toileting  - Record patient progress and toleration of activity level   Outcome: Adequate for Discharge     Problem: Potential for Falls  Goal: Patient will remain free of falls  Description: INTERVENTIONS:  - Educate patient/family on patient safety including physical limitations  - Instruct patient to call for assistance with activity   - Consult OT/PT to assist with strengthening/mobility   - Keep Call bell within reach  - Keep bed low and locked with side rails adjusted as appropriate  - Keep care items and personal belongings within reach  - Initiate and maintain comfort rounds  - Make Fall Risk Sign visible to staff  - Offer Toileting every  Hours, in advance of need  - Initiate/Maintain alarm  - Obtain necessary fall risk management equipment:   - Apply yellow socks and bracelet for high fall risk patients  - Consider moving patient to room near nurses station  Outcome: Adequate for Discharge     Problem: CARDIOVASCULAR - ADULT  Goal: Maintains optimal cardiac output and hemodynamic stability  Description: INTERVENTIONS:  - Monitor I/O, vital signs and rhythm  - Monitor for S/S and trends of decreased cardiac output  - Administer and titrate ordered vasoactive medications to optimize hemodynamic stability  - Assess quality of pulses, skin color and temperature  - Assess for signs of decreased coronary artery perfusion  - Instruct patient to report change in severity of symptoms  Outcome: Adequate for Discharge  Goal: Absence of cardiac dysrhythmias or at baseline rhythm  Description: INTERVENTIONS:  - Continuous cardiac monitoring, vital signs, obtain 12 lead EKG if ordered  - Administer antiarrhythmic and heart rate control medications as ordered  - Monitor electrolytes and administer replacement therapy as ordered  Outcome: Adequate for Discharge

## 2022-08-01 NOTE — DISCHARGE INSTRUCTIONS
Acute Care Surgery Discharge Instructions    Please follow-up as instructed  If you do not already have a follow-up appointment, please call the office when you leave to schedule an appointment to be seen in 2-3 weeks for post-operative re-evaluation  Activity:  - No lifting greater than 20 pounds or strenuous physical activity or exercise for 6 weeks  - Walking and normal light activities are encouraged  - Normal daily activities including climbing steps are okay  - No driving until no longer using pain medications  Diet:    - You may resume your normal diet  Wound Care:  - May shower daily  No tub baths or swimming until cleared by your surgeon   - Wash incision gently with soap and water and pat dry  - Continue using abdominal binder for comfort and support when up and out of bed  - Do not apply any creams or ointments unless instructed to do so by your surgeon   - Maile Melgar may apply a dry gauze dressing over her midline incision and or the site of your previous drain  Recommend changing the dressing daily and or as needed for soilage  Medications:    - You may resume all of your regular medications after discharge unless otherwise instructed  Please refer to your discharge medication list for further details  - Please take the pain medications as directed  - You may become constipated, especially if taking pain medications  You may take any over the counter stool softeners or laxatives as needed  Examples: Milk of Magnesia, Colace, Senna  Additional Instructions:  - If you have any questions or concerns after discharge please call the office   - Call office or return to ER if fever greater than 101, chills, persistent nausea/vomiting, worsening/uncontrollable pain, and/or increasing redness or purulent/foul smelling drainage from incision(s)

## 2022-08-01 NOTE — PROGRESS NOTES
Cardiology Progress Note - Neymar Grey 65 y o  female MRN: [id number]    Unit/Bed#: [facility] 512-01 Encounter: [id number]        Subjective:    No significant events overnight  Feels well  Currently in NSR  Review of Systems   Constitutional: Positive for malaise/fatigue  Cardiovascular: Negative for chest pain  Respiratory: Negative for shortness of breath  Objective:   Vitals: Blood pressure 169/72, pulse 76, temperature 98 1 °F (36 7 °C), temperature source Oral, resp  rate 18, height 4' 11" (1 499 m), weight 74 4 kg (164 lb), SpO2 98 %  , Body mass index is 33 12 kg/m² ,   Orthostatic Blood Pressures    Flowsheet Row Most Recent Value   Blood Pressure 169/72 filed at 08/01/2022 0706   Patient Position - Orthostatic VS Lying filed at 08/01/2022 7370         Systolic (21MCF), CVJ:325 , Min:81 , TGD:312     Diastolic (83BCD), NKU:03, Min:40, Max:72      Intake/Output Summary (Last 24 hours) at 8/1/2022 4723  Last data filed at 8/1/2022 0706  Gross per 24 hour   Intake 658 ml   Output 1220 ml   Net -562 ml     Weight (last 2 days)     None            Telemetry Review: No significant arrhythmias seen on telemetry review  NSR  PAF      Physical Exam  Vitals and nursing note reviewed  Constitutional:       Appearance: Normal appearance  HENT:      Head: Normocephalic  Nose: Nose normal       Mouth/Throat:      Mouth: Mucous membranes are moist    Eyes:      General: No scleral icterus  Conjunctiva/sclera: Conjunctivae normal    Cardiovascular:      Rate and Rhythm: Normal rate and regular rhythm  Heart sounds: No murmur heard  No gallop  Pulmonary:      Effort: Pulmonary effort is normal  No respiratory distress  Breath sounds: Normal breath sounds  No wheezing or rales  Abdominal:      General: Abdomen is flat  Bowel sounds are normal  There is no distension  Palpations: Abdomen is soft  Tenderness: There is no abdominal tenderness  There is no guarding  Musculoskeletal:      Cervical back: Normal range of motion and neck supple  Right lower leg: No edema  Left lower leg: No edema  Skin:     General: Skin is warm and dry  Neurological:      General: No focal deficit present  Mental Status: She is alert and oriented to person, place, and time  Psychiatric:         Mood and Affect: Mood normal          Behavior: Behavior normal            Laboratory Results:        CBC with diff:   Results from last 7 days   Lab Units 08/01/22  0635 07/31/22  1205 07/31/22  0547 07/30/22  1252 07/30/22  0434 07/29/22  0525 07/28/22  1407 07/28/22  0921 07/28/22  0447   WBC Thousand/uL  --   --  7 83  --  7 68 9 52  --   --  10 36*   HEMOGLOBIN g/dL 8 3* 8 1* 7 7* 8 3* 6 8* 7 6* 8 1*   < > 7 7*   HEMATOCRIT % 26 5* 26 6* 24 5* 26 6* 21 9* 25 2* 25 8*   < > 24 3*   MCV fL  --   --  100*  --  101* 104*  --   --  100*   PLATELETS Thousands/uL  --   --  200  --  164 208  --   --  205   MCH pg  --   --  31 4  --  31 3 31 3  --   --  31 6   MCHC g/dL  --   --  31 4  --  31 1* 30 2*  --   --  31 7   RDW %  --   --  15 7*  --  14 7 14 4  --   --  13 8   MPV fL  --   --  9 3  --  9 3 9 3  --   --  9 1   NRBC AUTO /100 WBCs  --   --  1  --  0 0  --   --  0    < > = values in this interval not displayed           CMP:  Results from last 7 days   Lab Units 07/31/22  0547 07/30/22  0434 07/29/22  0525 07/28/22  2036 07/28/22  0447   POTASSIUM mmol/L 3 6 3 9 4 0 3 8 4 1   CHLORIDE mmol/L 116* 115* 111* 111* 109*   CO2 mmol/L 27 27 26 27 26   BUN mg/dL 7 8 12 13 15   CREATININE mg/dL 0 38* 0 46* 0 57* 0 64 0 60   CALCIUM mg/dL 7 8* 7 9* 8 4 8 1* 7 9*   AST U/L 25  --   --   --   --    ALT U/L 12  --   --   --   --    ALK PHOS U/L 42*  --   --   --   --    EGFR ml/min/1 73sq m 101 95 89 85 87         BMP:  Results from last 7 days   Lab Units 07/31/22  0547 07/30/22  0434 07/29/22  0525 07/28/22  2036 07/28/22  0447   POTASSIUM mmol/L 3 6 3 9 4 0 3 8 4 1   CHLORIDE mmol/L 116* 115* 111* 111* 109*   CO2 mmol/L 27 27 26 27 26   BUN mg/dL 7 8 12 13 15   CREATININE mg/dL 0 38* 0 46* 0 57* 0 64 0 60   CALCIUM mg/dL 7 8* 7 9* 8 4 8 1* 7 9*       BNP: No results for input(s): BNP in the last 72 hours  Magnesium:   Results from last 7 days   Lab Units 07/29/22  0525 07/28/22  2036   MAGNESIUM mg/dL 2 2 1 7       Coags:   Results from last 7 days   Lab Units 07/29/22  0525 07/28/22  0921   INR  1 13 1 26*       TSH: No results found for: TSH    Hemoglobin A1C       Lipid Profile:   Results from last 7 days   Lab Units 07/29/22  0525   TRIGLYCERIDES mg/dL 216*   HDL mg/dL 30*       Cardiac testing:   No results found for this or any previous visit  No results found for this or any previous visit  No results found for this or any previous visit  No results found for this or any previous visit        Meds/Allergies   current meds:   Current Facility-Administered Medications   Medication Dose Route Frequency    acetaminophen (TYLENOL) tablet 975 mg  975 mg Oral Q8H [address]    bimatoprost (LUMIGAN) 0 01 % ophthalmic solution 1 drop  1 drop Both Eyes HS    brimonidine tartrate 0 2 % ophthalmic solution 1 drop  1 drop Both Eyes BID    ceFAZolin (ANCEF) IVPB (premix in dextrose) 2,000 mg 50 mL  2,000 mg Intravenous Q8H    heparin (porcine) subcutaneous injection 5,000 Units  5,000 Units Subcutaneous Q8H [address]    lidocaine (LIDODERM) 5 % patch 1 patch  1 patch Topical Daily    metoprolol (LOPRESSOR) injection 5 mg  5 mg Intravenous Once    metoprolol tartrate (LOPRESSOR) partial tablet 12 5 mg  12 5 mg Oral Once    metoprolol tartrate (LOPRESSOR) tablet 25 mg  25 mg Oral Q12H STEPAN    montelukast (SINGULAIR) tablet 10 mg  10 mg Oral HS    ondansetron (ZOFRAN) injection 4 mg  4 mg Intravenous Q4H PRN    oxyCODONE (ROXICODONE) IR tablet 2 5 mg  2 5 mg Oral Q4H PRN    oxyCODONE (ROXICODONE) IR tablet 5 mg  5 mg Oral Q4H PRN    phenol (CHLORASEPTIC) 1 4 % mucosal liquid 1 spray  1 spray Mouth/Throat Q2H PRN    pregabalin (LYRICA) capsule 100 mg  100 mg Oral BID    scopolamine (TRANSDERM-SCOP) 1 mg/3 days TD 72 hr patch 1 patch  1 patch Transdermal Q72H     Medications Prior to Admission   Medication    acetaminophen (TYLENOL) 325 mg tablet    bimatoprost (LUMIGAN) 0 01 % ophthalmic drops    Brimonidine Tartrate 0 025 % SOLN    lidocaine (LIDODERM) 5 %    pregabalin (LYRICA) 100 mg capsule    simvastatin (ZOCOR) 20 mg tablet    cholecalciferol (VITAMIN D3) 1,000 units tablet    montelukast (SINGULAIR) 10 mg tablet    naproxen (NAPROSYN) 375 mg tablet    ondansetron (ZOFRAN-ODT) 4 mg disintegrating tablet          Assessment:  Principal Problem:    Incisional hernia, without obstruction or gangrene  Active Problems:    Intestinal adhesions    Plan:    Postop Atrial Fibrillation: Continue with metoprolol as ordered  Outpatient zio event monitor is ordered  She is stable for DC home today from CV standpoinrt  Counseling / Coordination of Care  Total floor / unit time spent today 25 minutes  Greater than 50% of total time was spent with the patient and / or family counseling and / or coordination of care  A description of the counseling / coordination of care

## 2022-08-01 NOTE — PROGRESS NOTES
Progress Note - General Surgery   Georgia Palmer 65 y o  female MRN: [id number]  Unit/Bed#: [facility] 512-01 Encounter: [id number]    Assessment:  Georgia Palmer is a 65 y o  female who presents with recurrent ventral hernia s/p repair w/VINNY, mesh explantation, and enterotomy repair x1 on 7/27      Hgb now  8 3 after 1 unit PRBC due to Hgb 6 8 in Am 7/30  Plan:  -f/u H&H  -follow-up on cardiology recommendations  -pain and nausea control p r n   -PTOT following  -DVT prophylaxis   -dispo planning; home with home health     Subjective/Objective     Subjective:  No acute events overnight  Patient seen and examined at bedside  Had 1 bowel movement yesterday  Making good urine, drain with dark serosanguineous output; not recorded  Ambulating at baseline  Objective:     Blood pressure 123/56, pulse 62, temperature 97 6 °F (36 4 °C), temperature source Oral, resp  rate 18, height 4' 11" (1 499 m), weight 74 4 kg (164 lb), SpO2 98 %  ,Body mass index is 33 12 kg/m²  Intake/Output Summary (Last 24 hours) at 8/1/2022 1335  Last data filed at 8/1/2022 0551  Gross per 24 hour   Intake 478 ml   Output 1520 ml   Net -1042 ml       Invasive Devices  Report    Peripheral Intravenous Line  Duration           Peripheral IV 07/31/22 Proximal;Right;Ventral (anterior) Forearm 1 day          Drain  Duration           Closed/Suction Drain Right Abdomen 10 Fr  4 days                Physical Exam:  General: No acute distress, alert and oriented  CV: Well perfused, regular rate and rhythm  Lungs: Normal work of breathing, no increased respiratory effort  Abdomen: Soft, non-tender, non-distended  Incision clean, dry and intact  FABIEN in place as above   Extremities: No edema, clubbing or cyanosis  Skin: Warm, dry      Lab, Imaging and other studies:I have personally reviewed pertinent lab results      VTE Pharmacologic Prophylaxis: Sequential compression device (Venodyne)   VTE Mechanical Prophylaxis: sequential compression device

## 2022-08-01 NOTE — RESTORATIVE TECHNICIAN NOTE
Restorative Technician Note      Patient Name: Magi Serna     Note Type: Mobility  Patient Position Upon Consult: Supine  Activity Performed: Ambulated  Assistive Device: Cane  Patient Position at End of Consult: Supine;  All needs within reach

## 2022-08-02 ENCOUNTER — HOME CARE VISIT (OUTPATIENT)
Dept: HOME HEALTH SERVICES | Facility: HOME HEALTHCARE | Age: 77
End: 2022-08-02
Payer: MEDICARE

## 2022-08-02 VITALS
OXYGEN SATURATION: 96 % | SYSTOLIC BLOOD PRESSURE: 116 MMHG | HEART RATE: 74 BPM | RESPIRATION RATE: 16 BRPM | DIASTOLIC BLOOD PRESSURE: 50 MMHG | TEMPERATURE: 96.1 F

## 2022-08-02 PROCEDURE — 400013 VN SOC

## 2022-08-02 PROCEDURE — 10330081 VN NO-PAY CLAIM PROCEDURE

## 2022-08-02 PROCEDURE — G0299 HHS/HOSPICE OF RN EA 15 MIN: HCPCS

## 2022-08-02 NOTE — CASE COMMUNICATION
Back office-Please fax to PCP Hedy Avalos DO  Fax: [phone]     Cardiology-Reports chest pressure with exertion intermittant ever since surgery  Pressure lasts minutes  Associated with shortness of breath  Patient reports Doctors in hospital were aware of chest pressure with exertion  Low diastolic /63 left arm 116/50 right arm  PCP- Patient report 8 bouts of diarrhea 8/1/22, had mineral oil laxative in hospital  Took  two doses of Kopectate  No Diarrhea today

## 2022-08-02 NOTE — CASE COMMUNICATION
[facility] has admitted your patient to [address] [place] [address] service with the following disciplines:      SN  This report is informational only, no responses is needed  Primary focus of home health care: GI  Patient stated goals of care: Improve mobility wounds healed  Anticipated visit pattern: 2w1 3w1 2w1 and next visit date: 8/4/22 Wound assess Cardiac assess  See medication list - meds in home differ from AVS: Missing metoprolol tartrate   Patient is to get metorprolol today from pharmacy  Patient reports no need for percocet or lidocane  Significant clinical findings: Reports chest pressure with exertion intermittant ever since surgery  Pressure lasts minutes  Associated with shortness of breath  Patient reports Doctors in hospital were aware of chest pressure with exertion  Low diastolic /83 left arm 116/50 right arm  Patient report 8 bouts of diarrhea 8/1 /22, had mineral oil laxative in hospital  Took two doses of Kopectate  No Diarrhea today  Potential barriers to goal achievement: lives alone     Thank you for allowing us to participate in the care of your patient        National Melanie Martino RN

## 2022-08-04 ENCOUNTER — HOME CARE VISIT (OUTPATIENT)
Dept: HOME HEALTH SERVICES | Facility: HOME HEALTHCARE | Age: 77
End: 2022-08-04
Payer: MEDICARE

## 2022-08-04 VITALS
OXYGEN SATURATION: 97 % | RESPIRATION RATE: 18 BRPM | SYSTOLIC BLOOD PRESSURE: 118 MMHG | HEART RATE: 60 BPM | TEMPERATURE: 97.8 F | DIASTOLIC BLOOD PRESSURE: 78 MMHG

## 2022-08-04 PROCEDURE — G0180 MD CERTIFICATION HHA PATIENT: HCPCS | Performed by: SURGERY

## 2022-08-04 PROCEDURE — G0299 HHS/HOSPICE OF RN EA 15 MIN: HCPCS

## 2022-08-08 ENCOUNTER — HOME CARE VISIT (OUTPATIENT)
Dept: HOME HEALTH SERVICES | Facility: HOME HEALTHCARE | Age: 77
End: 2022-08-08
Payer: MEDICARE

## 2022-08-08 VITALS
DIASTOLIC BLOOD PRESSURE: 68 MMHG | SYSTOLIC BLOOD PRESSURE: 122 MMHG | RESPIRATION RATE: 18 BRPM | HEART RATE: 64 BPM | TEMPERATURE: 97.5 F | OXYGEN SATURATION: 96 %

## 2022-08-08 PROCEDURE — G0299 HHS/HOSPICE OF RN EA 15 MIN: HCPCS

## 2022-08-10 ENCOUNTER — HOME CARE VISIT (OUTPATIENT)
Dept: HOME HEALTH SERVICES | Facility: HOME HEALTHCARE | Age: 77
End: 2022-08-10
Payer: MEDICARE

## 2022-08-10 VITALS
TEMPERATURE: 97.7 F | SYSTOLIC BLOOD PRESSURE: 110 MMHG | OXYGEN SATURATION: 98 % | RESPIRATION RATE: 18 BRPM | DIASTOLIC BLOOD PRESSURE: 62 MMHG | HEART RATE: 61 BPM

## 2022-08-10 PROCEDURE — G0299 HHS/HOSPICE OF RN EA 15 MIN: HCPCS

## 2022-08-12 ENCOUNTER — HOME CARE VISIT (OUTPATIENT)
Dept: HOME HEALTH SERVICES | Facility: HOME HEALTHCARE | Age: 77
End: 2022-08-12
Payer: MEDICARE

## 2022-08-12 VITALS
TEMPERATURE: 97 F | RESPIRATION RATE: 18 BRPM | SYSTOLIC BLOOD PRESSURE: 102 MMHG | DIASTOLIC BLOOD PRESSURE: 60 MMHG | HEART RATE: 64 BPM | OXYGEN SATURATION: 98 %

## 2022-08-12 PROCEDURE — G0299 HHS/HOSPICE OF RN EA 15 MIN: HCPCS

## 2022-08-15 ENCOUNTER — OFFICE VISIT (OUTPATIENT)
Dept: SURGERY | Facility: CLINIC | Age: 77
End: 2022-08-15

## 2022-08-15 DIAGNOSIS — Z48.89 POSTOPERATIVE VISIT: Primary | ICD-10-CM

## 2022-08-15 PROCEDURE — 99024 POSTOP FOLLOW-UP VISIT: CPT | Performed by: SURGERY

## 2022-08-15 NOTE — PROGRESS NOTES
Assessment/Plan:  Patient is status post repair of incisional hernia with lysis of adhesions and explantation of mesh  She returns for follow-up visit  She feels well  She is advance her diet and activities nicely  Her incision is clean and healing well  Her abdomen is soft  Diet and activity instructions were provided  All questions answered  There are no diagnoses linked to this encounter  Pathology: None sent      Postoperative restrictions reviewed  All questions answered  ______________________________________________________  HPI: Presents for post operative exam  Hx incisional hernia repair with explantation of mesh, VINNY 7/27/2022  Doing well, staples to be removed  ROS:  General ROS: negative for - chills, fatigue, fever or night sweats, weight loss  Respiratory ROS: no cough, shortness of breath, or wheezing  Cardiovascular ROS: no chest pain or dyspnea on exertion  Genito-Urinary ROS: no dysuria, trouble voiding, or hematuria  Musculoskeletal ROS: negative for - gait disturbance, joint pain or muscle pain  Neurological ROS: no TIA or stroke symptoms  GI ROS: see HPI  Skin ROS: no new rashes or lesions   Lymphatic ROS: no new adenopathy noted by pt     GYN ROS: see HPI, no new GYN history or bleeding noted  Psy ROS: no new mental or behavioral disturbances         Patient Active Problem List   Diagnosis    Spinal stenosis    Mixed hyperlipidemia    COVID-19    Other chest pain    Intestinal adhesions    Incisional hernia, without obstruction or gangrene       Allergies:  Hydromorphone      Current Outpatient Medications:     acetaminophen (TYLENOL) 325 mg tablet, Take 2 tablets (650 mg total) by mouth every 4 (four) hours as needed for mild pain, Disp: , Rfl: 0    bimatoprost (LUMIGAN) 0 01 % ophthalmic drops, 1 drop daily at bedtime, Disp: , Rfl:     Brimonidine Tartrate 0 025 % SOLN, Apply to eye 2 (two) times a day, Disp: , Rfl:     lidocaine (LIDODERM) 5 %, Apply 1 patch topically daily Remove & Discard patch within 12 hours or as directed by MD (Patient not taking: Reported on 8/2/2022), Disp: 6 patch, Rfl: 0    metoprolol tartrate (LOPRESSOR) 25 mg tablet, Take 1 tablet (25 mg total) by mouth every 12 (twelve) hours, Disp: 60 tablet, Rfl: 0    montelukast (SINGULAIR) 10 mg tablet, Take 10 mg by mouth daily at bedtime  , Disp: , Rfl:     oxyCODONE-acetaminophen (PERCOCET) 5-325 mg per tablet, Take 1 tablet by mouth every 4 (four) hours as needed for moderate pain for up to 20 doses Max Daily Amount: 6 tablets (Patient not taking: Reported on 8/2/2022), Disp: 20 tablet, Rfl: 0    pregabalin (LYRICA) 100 mg capsule, Take 100 mg by mouth 2 (two) times a day  , Disp: , Rfl:     simvastatin (ZOCOR) 20 mg tablet, Take 20 mg by mouth daily at bedtime, Disp: , Rfl:     Past Medical History:   Diagnosis Date    Chronic kidney disease     Horse shoe kidney    Colitis     Hyperlipidemia        Past Surgical History:   Procedure Laterality Date    ABDOMINAL ADHESION SURGERY N/A 11/27/2021    Procedure: LYSIS ADHESIONS;  Surgeon: Panfilo Villanueva MD;  [facility]: [facility];  Service: General    BACK SURGERY      2 rods placed in back    COLON SURGERY      COLONOSCOPY N/A 5/4/2018    Procedure: COLONOSCOPY;  Surgeon: Anshu Morales MD;  Location: [facility]; Service: Colorectal    JOINT REPLACEMENT Bilateral     LAPAROTOMY N/A 11/27/2021    Procedure: LAPAROTOMY EXPLORATORY; EXPLANTATION OF MESH;  Surgeon: Panfilo Villanueva MD;  [facility]: [facility];  Service: General    WI REPAIR INCISIONAL HERNIA,REDUCIBLE N/A 7/27/2022    Procedure: REPAIR HERNIA INCISIONAL, LYSIS OF ADHESIONS, EXPLANTATION OF MESH, REPAIR OF ENTEROTOMY;  Surgeon: Aaron Sanford MD;  Location: [facility];  Service: General       Family History   Problem Relation Age of Onset    No Known Problems Mother     No Known Problems Father         reports that she quit smoking about 52 years ago   She has never used smokeless tobacco  She reports current alcohol use  She reports that she does not use drugs  PHYSICAL EXAM    There were no vitals taken for this visit      General: normal, cooperative, no distress  Abdominal: soft, nondistended or nontender  Incision: clean, dry, and intact and healing well      Aaron Sanford MD    Date: 8/15/2022 Time: 2:47 PM

## 2022-08-16 NOTE — PROGRESS NOTES
Cardiology Follow Up    Sulma Calix  1945  [id number]  [facility]  [facility]  [address]  [phone]  [phone]    1  Atrial fibrillation, unspecified type (HCC)  POCT ECG    CBC and differential    Basic metabolic panel    Magnesium   2  Dyslipidemia     3  Anemia, unspecified type     4  Aortic valve stenosis, etiology of cardiac valve disease unspecified         Interval History:   Ms Sulma Calix was admitted to [facility] on 7/27 - 8/01/22 for elective hernia repair  On 7/27/22 Ms Robert Ovalles underwent elective hernia repair,   Lysis of adhesions, explantation of mesh, repair of enterotomy by Dr Nickie Hill  Postoperatively she developed atrial fibrillation with RVR captured on telemetry  She was treated with 1 dose of IV Lopressor and converted to normal sinus rhythm  Cardiology was consulted for rhythm management  Mayra Pardo experienced acute postoperative blood loss anemia with a hemoglobin of 7 6 hematocrit 25 2  She was hypotensive and was transfused with PRBC and IV resuscitation  She was not placed on anticoagulation due to brief AFib postoperatively and anemia  2 week Zio patch orderd at discharge  7/29/22 TTE:   Left ventricle cavity size normal,  Wall thickness mildly increased mild concentric hypertrophy  LVEF 65% grade 2 pseudo normal relaxation  Aortic valve mild to moderate stenosis valve mean gradient 13 mmHg  Tricuspid valve mild regurgitation  She was discharged home  Ms Sulma Calix presents to our office for a recent hospitalization follow up visit  Mayra Pardo admits to fatigue  She is starting to eat and drink better  She denies dyspnea with minimal exertion chest pain palpitations lightheadedness or dizziness  She is moving her bowels without difficulty     Mayra Pardo denies fever or chills     Medical History   Dyslipidemia 7/29/22 , , HDL 30, LDL 55   Spinal stenosis   Coumadin in the past she cannot remember reason   Patient Active Problem List   Diagnosis    Spinal stenosis    Mixed hyperlipidemia    COVID-19    Other chest pain    Intestinal adhesions    Incisional hernia, without obstruction or gangrene    Postoperative visit     Past Medical History:   Diagnosis Date    Chronic kidney disease     Horse shoe kidney    Colitis     Hyperlipidemia      Social History     Socioeconomic History    Marital status:      Spouse name: Not on file    Number of children: Not on file    Years of education: Not on file    Highest education level: Not on file   Occupational History    Not on file   Tobacco Use    Smoking status: Former Smoker     Quit date: 1970     Years since quittin 6    Smokeless tobacco: Never Used   Vaping Use    Vaping Use: Never used   Substance and Sexual Activity    Alcohol use: Yes     Comment: once in awhile    Drug use: No    Sexual activity: Not on file   Other Topics Concern    Not on file   Social History Narrative    Not on file     Social Determinants of Health     Financial Resource Strain: Not on file   Food Insecurity: No Food Insecurity    Worried About [address] in the Last Year: Never true    [address] in the Last Year: Never true   Transportation Needs: No Transportation Needs    Lack of Transportation (Medical): No    Lack of Transportation (Non-Medical):  No   Physical Activity: Not on file   Stress: Not on file   Social Connections: Not on file   Intimate Partner Violence: Not on file   Housing Stability: Low Risk     Unable to Pay for Housing in the Last Year: No    Number of Places Lived in the Last Year: 1    Unstable Housing in the Last Year: No      Family History   Problem Relation Age of Onset    No Known Problems Mother     No Known Problems Father      Past Surgical History:   Procedure Laterality Date    ABDOMINAL ADHESION SURGERY N/A 2021 Procedure: LYSIS ADHESIONS;  Surgeon: Yannick Kelley MD;  [facility];  Service: General    BACK SURGERY      2 rods placed in back    COLON SURGERY      COLONOSCOPY N/A 5/4/2018    Procedure: COLONOSCOPY;  Surgeon: Mercedez Gagnon MD;  [facility];   Service: Colorectal    JOINT REPLACEMENT Bilateral     LAPAROTOMY N/A 11/27/2021    Procedure: LAPAROTOMY EXPLORATORY; EXPLANTATION OF MESH;  Surgeon: Yannick Kelley MD;  [facility];  Service: General    [place] REPAIR INCISIONAL HERNIA,REDUCIBLE N/A 7/27/2022    Procedure: REPAIR HERNIA INCISIONAL, LYSIS OF ADHESIONS, EXPLANTATION OF MESH, REPAIR OF ENTEROTOMY;  Surgeon: Sia Bishop MD;  [facility];  Service: General       Current Outpatient Medications:     acetaminophen (TYLENOL) 325 mg tablet, Take 2 tablets (650 mg total) by mouth every 4 (four) hours as needed for mild pain, Disp: , Rfl: 0    bimatoprost (LUMIGAN) 0 01 % ophthalmic drops, 1 drop daily at bedtime, Disp: , Rfl:     Brimonidine Tartrate 0 025 % SOLN, Apply to eye 2 (two) times a day, Disp: , Rfl:     lidocaine (LIDODERM) 5 %, Apply 1 patch topically daily Remove & Discard patch within 12 hours or as directed by MD (Patient not taking: Reported on 8/2/2022), Disp: 6 patch, Rfl: 0    metoprolol tartrate (LOPRESSOR) 25 mg tablet, Take 1 tablet (25 mg total) by mouth every 12 (twelve) hours, Disp: 60 tablet, Rfl: 0    montelukast (SINGULAIR) 10 mg tablet, Take 10 mg by mouth daily at bedtime  , Disp: , Rfl:     oxyCODONE-acetaminophen (PERCOCET) 5-325 mg per tablet, Take 1 tablet by mouth every 4 (four) hours as needed for moderate pain for up to 20 doses Max Daily Amount: 6 tablets (Patient not taking: Reported on 8/2/2022), Disp: 20 tablet, Rfl: 0    pregabalin (LYRICA) 100 mg capsule, Take 100 mg by mouth 2 (two) times a day  , Disp: , Rfl:     simvastatin (ZOCOR) 20 mg tablet, Take 20 mg by mouth daily at bedtime, Disp: , Rfl:   Allergies Allergen Reactions    Hydromorphone Other (See Comments)     "it stopped my heart"  hallucinations         Labs:  No results displayed because visit has over 200 results  Appointment on 07/05/2022   Component Date Value    WBC 07/05/2022 6 02     RBC 07/05/2022 4 20     Hemoglobin 07/05/2022 13 1     Hematocrit 07/05/2022 40 4     MCV 07/05/2022 96     MCH 07/05/2022 31 2     MCHC 07/05/2022 32 4     RDW 07/05/2022 14 1     Platelets 09/67/3940 278     MPV 07/05/2022 9 2     Sodium 07/05/2022 139     Potassium 07/05/2022 4 5     Chloride 07/05/2022 105     CO2 07/05/2022 30     ANION GAP 07/05/2022 4     BUN 07/05/2022 11     Creatinine 07/05/2022 0 55 (A)    Glucose 07/05/2022 91     Calcium 07/05/2022 9 6     AST 07/05/2022 28     ALT 07/05/2022 29     Alkaline Phosphatase 07/05/2022 63     Total Protein 07/05/2022 8 0     Albumin 07/05/2022 3 8     Total Bilirubin 07/05/2022 0 26     eGFR 07/05/2022 90    Appointment on 07/05/2022   Component Date Value    Ventricular Rate 07/05/2022 70     Atrial Rate 07/05/2022 70     MA Interval 07/05/2022 178     QRSD Interval 07/05/2022 82     QT Interval 07/05/2022 424     QTC Interval 07/05/2022 457     P Axis 07/05/2022 20     QRS Axis 07/05/2022 -6     T Wave Axis 07/05/2022 -1      Imaging: US bedside procedure    Result Date: 7/27/2022  Narrative: 9 [id number]  0 [id number]  1 [id number]    Echo complete w/ contrast if indicated    Result Date: 7/29/2022  Narrative: Ardeth Needs  Left Ventricle: Left ventricular cavity size is normal  Wall thickness is mildly increased  There is mild concentric hypertrophy  The left ventricular ejection fraction is 65% by visual estimation  Systolic function is normal  Although no diagnostic regional wall motion abnormality was identified, this possibility cannot be completely excluded on the basis of this study   Diastolic function is moderately abnormal, consistent with grade II (pseudonormal) relaxation  Left atrial filling pressure is elevated    Aortic Valve: There is mild to moderate stenosis  The aortic valve peak velocity is 2 35 m/s  The aortic valve mean gradient is 13 mmHg  The DVI is 0 49    Tricuspid Valve: There is mild regurgitation  The right ventricular systolic pressure is moderately to severely elevated  The estimated right ventricular systolic pressure is 90 13 mmHg  No prior studies for comparison  Review of Systems:  Review of Systems   Musculoskeletal: Positive for arthralgias, gait problem and myalgias  All other systems reviewed and are negative  Physical Exam:  Physical Exam  Constitutional:       Appearance: She is obese  Cardiovascular:      Rate and Rhythm: Normal rate  Rhythm irregular  Pulses: Normal pulses  Heart sounds: Normal heart sounds  Pulmonary:      Effort: Pulmonary effort is normal       Breath sounds: Normal breath sounds  Abdominal:      General: Bowel sounds are normal       Palpations: Abdomen is soft  Musculoskeletal:         General: Normal range of motion  Cervical back: Normal range of motion and neck supple  Right lower leg: No edema  Left lower leg: No edema  Skin:     General: Skin is warm and dry  Capillary Refill: Capillary refill takes less than 2 seconds  Neurological:      General: No focal deficit present  Mental Status: She is alert and oriented to person, place, and time  Psychiatric:         Mood and Affect: Mood normal          Discussion/Summary:  1  Atrial fibrillation CHADS2VASc=3 ( age, female) EKG in the office today shows Atrial fibrillation ventricular response 88 BPM   I have discussed stroke risk and AC options  Dominic Beck has opted to take Coumadin, goal INR 2 0-3 0  This will be managed by [facility]  I will hold off on starting until cleared by Dr Aminta Beck  continues with significant post operative anemia    HR controlled on Metoprolol tartrate 25mg Q12 hours     2  Dyslipidemia 7/29/22 , , HDL 30, LDL 46  Continue on simvastatin 20 mg daily, heart healthy diet  3  Anemia post operative 8/01/22 Hgb 8 3 Hct 26 5 CBC in one week     4   Mild to Mod AS continue to [facility]

## 2022-08-17 ENCOUNTER — OFFICE VISIT (OUTPATIENT)
Dept: CARDIOLOGY CLINIC | Facility: CLINIC | Age: 77
End: 2022-08-17
Payer: MEDICARE

## 2022-08-17 VITALS
SYSTOLIC BLOOD PRESSURE: 118 MMHG | HEART RATE: 88 BPM | HEIGHT: 59 IN | DIASTOLIC BLOOD PRESSURE: 66 MMHG | BODY MASS INDEX: 33.38 KG/M2 | OXYGEN SATURATION: 98 % | WEIGHT: 165.6 LBS

## 2022-08-17 DIAGNOSIS — D64.9 ANEMIA, UNSPECIFIED TYPE: ICD-10-CM

## 2022-08-17 DIAGNOSIS — I48.91 ATRIAL FIBRILLATION, UNSPECIFIED TYPE (HCC): Primary | ICD-10-CM

## 2022-08-17 DIAGNOSIS — I35.0 AORTIC VALVE STENOSIS, ETIOLOGY OF CARDIAC VALVE DISEASE UNSPECIFIED: ICD-10-CM

## 2022-08-17 DIAGNOSIS — E78.5 DYSLIPIDEMIA: ICD-10-CM

## 2022-08-17 PROCEDURE — 93000 ELECTROCARDIOGRAM COMPLETE: CPT | Performed by: NURSE PRACTITIONER

## 2022-08-17 PROCEDURE — 99215 OFFICE O/P EST HI 40 MIN: CPT | Performed by: NURSE PRACTITIONER

## 2022-08-17 NOTE — PATIENT INSTRUCTIONS
Lab studies one week Consent: The patient's consent was obtained including but not limited to risks of crusting, scabbing, blistering, scarring, darker or lighter pigmentary change, recurrence, incomplete removal and infection. Render Post-Care Instructions In Note?: no Detail Level: Detailed Medical Necessity Information: It is in your best interest to select a reason for this procedure from the list below. All of these items fulfill various CMS LCD requirements except the new and changing color options. Number Of Freeze-Thaw Cycles: 2 freeze-thaw cycles Medical Necessity Clause: This procedure was medically necessary because the lesions that were treated were: causing pain Include Z78.9 (Other Specified Conditions Influencing Health Status) As An Associated Diagnosis?: Yes Post-Care Instructions: I reviewed with the patient in detail post-care instructions. Patient is to wear sunprotection, and avoid picking at any of the treated lesions. Pt may apply Vaseline to crusted or scabbing areas. Universal Safety Interventions

## 2022-08-18 DIAGNOSIS — I48.91 ATRIAL FIBRILLATION, UNSPECIFIED TYPE (HCC): Primary | ICD-10-CM

## 2022-08-18 RX ORDER — WARFARIN SODIUM 2.5 MG/1
TABLET ORAL
Qty: 60 TABLET | Refills: 11 | Status: SHIPPED | OUTPATIENT
Start: 2022-08-18 | End: 2022-08-24 | Stop reason: SDUPTHER

## 2022-08-22 ENCOUNTER — HOME CARE VISIT (OUTPATIENT)
Dept: HOME HEALTH SERVICES | Facility: HOME HEALTHCARE | Age: 77
End: 2022-08-22
Payer: MEDICARE

## 2022-08-22 ENCOUNTER — CLINICAL SUPPORT (OUTPATIENT)
Dept: CARDIOLOGY CLINIC | Facility: CLINIC | Age: 77
End: 2022-08-22
Payer: MEDICARE

## 2022-08-22 ENCOUNTER — APPOINTMENT (OUTPATIENT)
Dept: LAB | Facility: HOSPITAL | Age: 77
End: 2022-08-22
Payer: MEDICARE

## 2022-08-22 VITALS
OXYGEN SATURATION: 98 % | HEART RATE: 68 BPM | TEMPERATURE: 97.5 F | RESPIRATION RATE: 14 BRPM | DIASTOLIC BLOOD PRESSURE: 62 MMHG | SYSTOLIC BLOOD PRESSURE: 122 MMHG

## 2022-08-22 DIAGNOSIS — I48.0 PAF (PAROXYSMAL ATRIAL FIBRILLATION) (HCC): ICD-10-CM

## 2022-08-22 DIAGNOSIS — I48.91 ATRIAL FIBRILLATION, UNSPECIFIED TYPE (HCC): ICD-10-CM

## 2022-08-22 LAB
ANION GAP SERPL CALCULATED.3IONS-SCNC: 2 MMOL/L (ref 4–13)
BASOPHILS # BLD AUTO: 0.04 THOUSANDS/ΜL (ref 0–0.1)
BASOPHILS NFR BLD AUTO: 1 % (ref 0–1)
BUN SERPL-MCNC: 13 MG/DL (ref 5–25)
CALCIUM SERPL-MCNC: 8.8 MG/DL (ref 8.3–10.1)
CHLORIDE SERPL-SCNC: 108 MMOL/L (ref 96–108)
CO2 SERPL-SCNC: 29 MMOL/L (ref 21–32)
CREAT SERPL-MCNC: 0.5 MG/DL (ref 0.6–1.3)
EOSINOPHIL # BLD AUTO: 0.39 THOUSAND/ΜL (ref 0–0.61)
EOSINOPHIL NFR BLD AUTO: 7 % (ref 0–6)
ERYTHROCYTE [DISTWIDTH] IN BLOOD BY AUTOMATED COUNT: 14.6 % (ref 11.6–15.1)
GFR SERPL CREATININE-BSD FRML MDRD: 92 ML/MIN/1.73SQ M
GLUCOSE SERPL-MCNC: 101 MG/DL (ref 65–140)
HCT VFR BLD AUTO: 36.6 % (ref 34.8–46.1)
HGB BLD-MCNC: 11.1 G/DL (ref 11.5–15.4)
IMM GRANULOCYTES # BLD AUTO: 0.02 THOUSAND/UL (ref 0–0.2)
IMM GRANULOCYTES NFR BLD AUTO: 0 % (ref 0–2)
INR PPP: 1.26 (ref 0.84–1.19)
INR PPP: 1.26 (ref 0.84–1.19)
LYMPHOCYTES # BLD AUTO: 1.45 THOUSANDS/ΜL (ref 0.6–4.47)
LYMPHOCYTES NFR BLD AUTO: 25 % (ref 14–44)
MAGNESIUM SERPL-MCNC: 2.6 MG/DL (ref 1.6–2.6)
MCH RBC QN AUTO: 30.8 PG (ref 26.8–34.3)
MCHC RBC AUTO-ENTMCNC: 30.3 G/DL (ref 31.4–37.4)
MCV RBC AUTO: 102 FL (ref 82–98)
MONOCYTES # BLD AUTO: 0.64 THOUSAND/ΜL (ref 0.17–1.22)
MONOCYTES NFR BLD AUTO: 11 % (ref 4–12)
NEUTROPHILS # BLD AUTO: 3.26 THOUSANDS/ΜL (ref 1.85–7.62)
NEUTS SEG NFR BLD AUTO: 56 % (ref 43–75)
NRBC BLD AUTO-RTO: 0 /100 WBCS
PLATELET # BLD AUTO: 394 THOUSANDS/UL (ref 149–390)
PMV BLD AUTO: 8.7 FL (ref 8.9–12.7)
POTASSIUM SERPL-SCNC: 5 MMOL/L (ref 3.5–5.3)
PROTHROMBIN TIME: 16 SECONDS (ref 11.6–14.5)
RBC # BLD AUTO: 3.6 MILLION/UL (ref 3.81–5.12)
SODIUM SERPL-SCNC: 139 MMOL/L (ref 135–147)
WBC # BLD AUTO: 5.8 THOUSAND/UL (ref 4.31–10.16)

## 2022-08-22 PROCEDURE — 85025 COMPLETE CBC W/AUTO DIFF WBC: CPT

## 2022-08-22 PROCEDURE — 93248 EXT ECG>7D<15D REV&INTERPJ: CPT | Performed by: INTERNAL MEDICINE

## 2022-08-22 PROCEDURE — G0300 HHS/HOSPICE OF LPN EA 15 MIN: HCPCS

## 2022-08-22 PROCEDURE — 36415 COLL VENOUS BLD VENIPUNCTURE: CPT

## 2022-08-22 PROCEDURE — 85610 PROTHROMBIN TIME: CPT

## 2022-08-22 PROCEDURE — 80048 BASIC METABOLIC PNL TOTAL CA: CPT

## 2022-08-22 PROCEDURE — 83735 ASSAY OF MAGNESIUM: CPT

## 2022-08-24 ENCOUNTER — ANTICOAG VISIT (OUTPATIENT)
Dept: CARDIOLOGY CLINIC | Facility: CLINIC | Age: 77
End: 2022-08-24

## 2022-08-24 ENCOUNTER — TELEPHONE (OUTPATIENT)
Dept: CARDIOLOGY CLINIC | Facility: CLINIC | Age: 77
End: 2022-08-24

## 2022-08-24 ENCOUNTER — HOME CARE VISIT (OUTPATIENT)
Dept: HOME HEALTH SERVICES | Facility: HOME HEALTHCARE | Age: 77
End: 2022-08-24
Payer: MEDICARE

## 2022-08-24 DIAGNOSIS — I48.91 ATRIAL FIBRILLATION, UNSPECIFIED TYPE (HCC): ICD-10-CM

## 2022-08-24 DIAGNOSIS — I48.0 PAF (PAROXYSMAL ATRIAL FIBRILLATION) (HCC): Primary | ICD-10-CM

## 2022-08-24 RX ORDER — WARFARIN SODIUM 2.5 MG/1
TABLET ORAL
Qty: 60 TABLET | Refills: 11 | Status: SHIPPED | OUTPATIENT
Start: 2022-08-24

## 2022-08-24 NOTE — TELEPHONE ENCOUNTER
Called pt, advised of Holter results  Pt stated she has been taking anticoagulation since Friday  Please advise

## 2022-08-24 NOTE — TELEPHONE ENCOUNTER
----- Message from Naveed Gomez MD sent at 8/23/2022  4:53 PM EDT -----  Please let patient know to continue current medications  In and out of afib on the monitor, should start anticoagulation as Carol Moya has planned when cleared by the surgeon

## 2022-08-29 ENCOUNTER — APPOINTMENT (OUTPATIENT)
Dept: LAB | Facility: CLINIC | Age: 77
End: 2022-08-29
Payer: MEDICARE

## 2022-08-29 ENCOUNTER — ANTICOAG VISIT (OUTPATIENT)
Dept: CARDIOLOGY CLINIC | Facility: CLINIC | Age: 77
End: 2022-08-29

## 2022-08-29 LAB
INR PPP: 1.45 (ref 0.84–1.19)
PROTHROMBIN TIME: 17.9 SECONDS (ref 11.6–14.5)

## 2022-08-29 PROCEDURE — 36415 COLL VENOUS BLD VENIPUNCTURE: CPT

## 2022-08-29 PROCEDURE — 85610 PROTHROMBIN TIME: CPT

## 2022-08-30 ENCOUNTER — HOME CARE VISIT (OUTPATIENT)
Dept: HOME HEALTH SERVICES | Facility: HOME HEALTHCARE | Age: 77
End: 2022-08-30
Payer: MEDICARE

## 2022-08-30 VITALS
TEMPERATURE: 98.6 F | RESPIRATION RATE: 18 BRPM | DIASTOLIC BLOOD PRESSURE: 68 MMHG | HEART RATE: 69 BPM | SYSTOLIC BLOOD PRESSURE: 104 MMHG | OXYGEN SATURATION: 98 %

## 2022-08-30 PROCEDURE — 10330064 SUTURE REMOVAL TRAY, PLAS (50/CS)

## 2022-08-30 PROCEDURE — G0299 HHS/HOSPICE OF RN EA 15 MIN: HCPCS

## 2022-09-06 ENCOUNTER — APPOINTMENT (OUTPATIENT)
Dept: LAB | Facility: CLINIC | Age: 77
End: 2022-09-06
Payer: MEDICARE

## 2022-09-06 ENCOUNTER — ANTICOAG VISIT (OUTPATIENT)
Dept: CARDIOLOGY CLINIC | Facility: CLINIC | Age: 77
End: 2022-09-06

## 2022-09-12 ENCOUNTER — ANTICOAG VISIT (OUTPATIENT)
Dept: CARDIOLOGY CLINIC | Facility: CLINIC | Age: 77
End: 2022-09-12

## 2022-09-12 ENCOUNTER — APPOINTMENT (OUTPATIENT)
Dept: LAB | Facility: CLINIC | Age: 77
End: 2022-09-12
Payer: MEDICARE

## 2022-09-19 ENCOUNTER — APPOINTMENT (OUTPATIENT)
Dept: LAB | Facility: CLINIC | Age: 77
End: 2022-09-19
Payer: MEDICARE

## 2022-09-19 ENCOUNTER — ANTICOAG VISIT (OUTPATIENT)
Dept: CARDIOLOGY CLINIC | Facility: CLINIC | Age: 77
End: 2022-09-19

## 2022-09-19 NOTE — PROGRESS NOTES
Cardiology Follow Up    Nahum Holden  1945  [id number]  [address] [facility] [place] [phone] [phone]    1  Hyperlipidemia, unspecified hyperlipidemia type     2  PAF (paroxysmal atrial fibrillation) (HCC)  metoprolol succinate (TOPROL-XL) 50 mg 24 hr tablet    POCT ECG       Interval History:   Ms Nahum Holden was admitted to [facility] on 7/27 - 8/01/22 for elective hernia repair  On 7/27/22 Ms Eros Pillai underwent elective hernia repair,   Lysis of adhesions, explantation of mesh, repair of enterotomy by Dr Julius Cheng  Postoperatively she developed atrial fibrillation with RVR captured on telemetry  She was treated with 1 dose of IV Lopressor and converted to normal sinus rhythm  Cardiology was consulted for rhythm management  Macario Mederos experienced acute postoperative blood loss anemia with a hemoglobin of 7 6 hematocrit 25 2  She was hypotensive and was transfused with PRBC and IV resuscitation  She was not placed on anticoagulation due to brief AFib postoperatively and anemia  2 week Zio patch orderd at discharge  7/29/22 TTE:   Left ventricle cavity size normal,  Wall thickness mildly increased mild concentric hypertrophy  LVEF 65% grade 2 pseudo normal relaxation  Aortic valve mild to moderate stenosis valve mean gradient 13 mmHg  Tricuspid valve mild regurgitation  She was discharged home      On 8/17/22 Ms Nahum Holden was seen in our office for a recent hospitalization follow up visit  Macario Mederos admits to fatigue  She is starting to eat and drink better  She denies dyspnea with minimal exertion chest pain palpitations lightheadedness or dizziness  She is moving her bowels without difficulty  Macario Mederos denies fever or chills  EKG int he office showed   Fibrillation ventricular response 88 beats per minute      Discussion in regards to anticoagulation with options during this office visit  She opted for Coumadin  This will be managed by MANDEEP SIDHU     22 INR 1 52     Ms Luis Laughlin presents to our office for a follow up visit  Mayda Dee  admits to fatigue since discharge  She does not have energy to complete her chores and is falling asleep easily mid morning  She has no "Pep"     Medical History   Dyslipidemia 22 , , HDL 30, LDL 46   Spinal stenosis   Coumadin in the past she cannot remember reason     Patient Active Problem List   Diagnosis    Spinal stenosis    Mixed hyperlipidemia    COVID-19    Other chest pain    Intestinal adhesions    Incisional hernia, without obstruction or gangrene    Postoperative visit     Past Medical History:   Diagnosis Date    Chronic kidney disease     Horse shoe kidney    Colitis     Hyperlipidemia      Social History     Socioeconomic History    Marital status:      Spouse name: Not on file    Number of children: Not on file    Years of education: Not on file    Highest education level: Not on file   Occupational History    Not on file   Tobacco Use    Smoking status: Former Smoker     Quit date:      Years since quittin 7    Smokeless tobacco: Never Used   Vaping Use    Vaping Use: Never used   Substance and Sexual Activity    Alcohol use: Yes     Comment: once in [employer] Drug use: No    Sexual activity: Not on file   Other Topics Concern    Not on file   Social History Narrative    Not on file     Social Determinants of Health     Financial Resource Strain: Not on file   Food Insecurity: No Food Insecurity    Worried About [address] in the Last Year: Never true    [address] in the Last Year: Never true   Transportation Needs: No Transportation Needs    Lack of Transportation (Medical): No    Lack of Transportation (Non-Medical):  No   Physical Activity: Not on file   Stress: Not on file   Social Connections: Not on file   Intimate Partner Violence: Not on file   Housing Stability: Low Risk     Unable to Pay for Housing in the Last Year: No    Number of Places Lived in the Last Year: 1    Unstable Housing in the Last Year: No      Family History   Problem Relation Age of Onset    No Known Problems Mother     No Known Problems Father      Past Surgical History:   Procedure Laterality Date    ABDOMINAL ADHESION SURGERY N/A 11/27/2021    Procedure: LYSIS ADHESIONS;  Surgeon: Shayy Watts MD;  [facility];  Service: General    BACK SURGERY      2 rods placed in back    COLON SURGERY      COLONOSCOPY N/A 5/4/2018    Procedure: COLONOSCOPY;  Surgeon: Roxane Hensley MD;  [facility];   Service: Colorectal    JOINT REPLACEMENT Bilateral     LAPAROTOMY N/A 11/27/2021    Procedure: LAPAROTOMY EXPLORATORY; EXPLANTATION OF MESH;  Surgeon: Shayy Watts MD;  [facility];  Service: General    [address] [place] [address] N/A 7/27/2022    Procedure: REPAIR HERNIA INCISIONAL, LYSIS OF ADHESIONS, EXPLANTATION OF MESH, REPAIR OF ENTEROTOMY;  Surgeon: Bibiana Odonnell MD;  [facility];  Service: General       Current Outpatient Medications:     acetaminophen (TYLENOL) 325 mg tablet, Take 2 tablets (650 mg total) by mouth every 4 (four) hours as needed for mild pain, Disp: , Rfl: 0    bimatoprost (LUMIGAN) 0 01 % ophthalmic drops, 1 drop daily at bedtime, Disp: , Rfl:     Brimonidine Tartrate 0 025 % SOLN, Apply to eye 2 (two) times a day, Disp: , Rfl:     metoprolol tartrate (LOPRESSOR) 25 mg tablet, Take 1 tablet (25 mg total) by mouth every 12 (twelve) hours, Disp: 60 tablet, Rfl: 11    montelukast (SINGULAIR) 10 mg tablet, Take 10 mg by mouth daily at bedtime  , Disp: , Rfl:     pregabalin (LYRICA) 100 mg capsule, Take 100 mg by mouth 2 (two) times a day  , Disp: , Rfl:     simvastatin (ZOCOR) 20 mg tablet, Take 20 mg by mouth daily at bedtime, Disp: , Rfl:     warfarin (Coumadin) 2 5 mg tablet, 8 24 22 Coumadin 5 mg  Wednesday  Coumadin 2 5 mg all other days  PT INR 8 30 22 , Disp: 60 tablet, Rfl: 11  Allergies   Allergen Reactions    Hydromorphone Other (See Comments)     "it stopped my heart"  hallucinations         Labs:   Ancillary Orders on 09/16/2022   Component Date Value    Protime 09/19/2022 18 6 (A)    INR 09/19/2022 1 52 (A)   Ancillary Orders on 09/09/2022   Component Date Value    Protime 09/12/2022 22 8 (A)    INR 09/12/2022 1 99 (A)   Ancillary Orders on 09/02/2022   Component Date Value    Protime 09/06/2022 17 2 (A)    INR 09/06/2022 1 38 (A)   Anticoag visit on 08/24/2022   Component Date Value    INR 08/22/2022 1 26 (A)   Appointment on 08/22/2022   Component Date Value    WBC 08/22/2022 5 80     RBC 08/22/2022 3 60 (A)    Hemoglobin 08/22/2022 11 1 (A)    Hematocrit 08/22/2022 36 6     MCV 08/22/2022 102 (A)    MCH 08/22/2022 30 8     MCHC 08/22/2022 30 3 (A)    RDW 08/22/2022 14 6     MPV 08/22/2022 8 7 (A)    Platelets 07/91/3838 394 (A)    nRBC 08/22/2022 0     Neutrophils Relative 08/22/2022 56     Immat GRANS % 08/22/2022 0     Lymphocytes Relative 08/22/2022 25     Monocytes Relative 08/22/2022 11     Eosinophils Relative 08/22/2022 7 (A)    Basophils Relative 08/22/2022 1     Neutrophils Absolute 08/22/2022 3 26     Immature Grans Absolute 08/22/2022 0 02     Lymphocytes Absolute 08/22/2022 1 45     Monocytes Absolute 08/22/2022 0 64     Eosinophils Absolute 08/22/2022 0 39     Basophils Absolute 08/22/2022 0 04     Sodium 08/22/2022 139     Potassium 08/22/2022 5 0     Chloride 08/22/2022 108     CO2 08/22/2022 29     ANION GAP 08/22/2022 2 (A)    BUN 08/22/2022 13     Creatinine 08/22/2022 0 50 (A)    Glucose 08/22/2022 101     Calcium 08/22/2022 8 8     eGFR 08/22/2022 92     Magnesium 08/22/2022 2 6     Protime 08/22/2022 16 0 (A)    INR 08/22/2022 1 26 (A)   Orders Only on 08/18/2022   Component Date Value    Protime 08/29/2022 17 9 (A)  INR 08/29/2022 1 45 (A)   No results displayed because visit has over 200 results  Imaging: No results found  Review of Systems:  Review of Systems   Constitutional: Positive for fatigue  All other systems reviewed and are negative  Physical Exam:  Physical Exam  Vitals reviewed  Constitutional:       Appearance: She is obese  Cardiovascular:      Rate and Rhythm: Normal rate and regular rhythm  Pulses: Normal pulses  Heart sounds: Normal heart sounds  Pulmonary:      Effort: Pulmonary effort is normal       Breath sounds: Normal breath sounds  Abdominal:      General: Bowel sounds are normal       Palpations: Abdomen is soft  Musculoskeletal:         General: Normal range of motion  Cervical back: Normal range of motion and neck supple  Right lower leg: No edema  Left lower leg: No edema  Skin:     General: Skin is warm and dry  Capillary Refill: Capillary refill takes less than 2 seconds  Neurological:      General: No focal deficit present  Mental Status: She is alert and oriented to person, place, and time  Psychiatric:         Mood and Affect: Mood normal          Behavior: Behavior normal          Discussion/Summary:  1  Paroxysmal atrial fibrillation CHADS2 VASc= 3 ( age, Female) Continue on Coumadin goal INR 2 0-3 0  EKG confirms NSR 63 BPM   Sharon Jacobo is complaining of severe fatigue and the inability to get her chores and falling asleep easily  This is most likely due to Metoprolol  Sharon Jacobo does not want to stop Metoprolol and change to Diltiazem  Stop Metoprolol tartrate and place on Metoprolol succinate 50mg daily at bedtime  Evaluate her symptoms in one month      2  Dyslipidemia 7/29/22 , , HDL 30, LDL 46 - continue on Simvastatin 20mg daily, Heart healthy diet

## 2022-09-20 ENCOUNTER — OFFICE VISIT (OUTPATIENT)
Dept: CARDIOLOGY CLINIC | Facility: CLINIC | Age: 77
End: 2022-09-20
Payer: MEDICARE

## 2022-09-20 VITALS
HEIGHT: 59 IN | HEART RATE: 65 BPM | BODY MASS INDEX: 33.43 KG/M2 | SYSTOLIC BLOOD PRESSURE: 128 MMHG | WEIGHT: 165.8 LBS | DIASTOLIC BLOOD PRESSURE: 60 MMHG | OXYGEN SATURATION: 98 %

## 2022-09-20 DIAGNOSIS — E78.5 HYPERLIPIDEMIA, UNSPECIFIED HYPERLIPIDEMIA TYPE: Primary | ICD-10-CM

## 2022-09-20 DIAGNOSIS — I48.0 PAF (PAROXYSMAL ATRIAL FIBRILLATION) (HCC): ICD-10-CM

## 2022-09-20 PROCEDURE — 93000 ELECTROCARDIOGRAM COMPLETE: CPT | Performed by: INTERNAL MEDICINE

## 2022-09-20 PROCEDURE — 99215 OFFICE O/P EST HI 40 MIN: CPT | Performed by: INTERNAL MEDICINE

## 2022-09-20 RX ORDER — METOPROLOL SUCCINATE 50 MG/1
50 TABLET, EXTENDED RELEASE ORAL
Qty: 30 TABLET | Refills: 3 | Status: SHIPPED | OUTPATIENT
Start: 2022-09-20

## 2022-09-26 ENCOUNTER — ANTICOAG VISIT (OUTPATIENT)
Dept: CARDIOLOGY CLINIC | Facility: CLINIC | Age: 77
End: 2022-09-26

## 2022-09-26 ENCOUNTER — APPOINTMENT (OUTPATIENT)
Dept: LAB | Facility: CLINIC | Age: 77
End: 2022-09-26
Payer: MEDICARE

## 2022-10-03 ENCOUNTER — APPOINTMENT (OUTPATIENT)
Dept: LAB | Facility: CLINIC | Age: 77
End: 2022-10-03
Payer: MEDICARE

## 2022-10-03 ENCOUNTER — ANTICOAG VISIT (OUTPATIENT)
Dept: CARDIOLOGY CLINIC | Facility: CLINIC | Age: 77
End: 2022-10-03

## 2022-10-10 ENCOUNTER — APPOINTMENT (OUTPATIENT)
Dept: LAB | Facility: CLINIC | Age: 77
End: 2022-10-10
Payer: MEDICARE

## 2022-10-10 ENCOUNTER — ANTICOAG VISIT (OUTPATIENT)
Dept: CARDIOLOGY CLINIC | Facility: CLINIC | Age: 77
End: 2022-10-10

## 2022-10-18 NOTE — PROGRESS NOTES
Cardiology Follow Up    Meredeth Burkitt  1945  [id number]  [address] [facility] [place] [id number] [phone]    1  PAF (paroxysmal atrial fibrillation) ([address] )     2  Mixed hyperlipidemia         Interval History:  Ms Meredeth Burkitt was admitted to [facility] on 7/27 - 8/01/22 for elective hernia repair  Laureen Randall 7/27/22 Ms Kylah Carroll underwent elective hernia repair,   Lysis of adhesions, explantation of mesh, repair of enterotomy by Dr Alexa Lock    Postoperatively she developed atrial fibrillation with RVR captured on telemetry   She was treated with 1 dose of IV Lopressor and converted to normal sinus rhythm   Cardiology was consulted for rhythm management     Lucero experienced acute postoperative blood loss anemia with a hemoglobin of 7 6 hematocrit 25  2     She was hypotensive and was transfused with PRBC and IV resuscitation    She was not placed on anticoagulation due to brief AFib postoperatively and anemia   2 week Zio patch orderd at discharge   7/29/22 TTE:   Left ventricle cavity size normal,  Wall thickness mildly increased mild concentric hypertrophy   LVEF 65% grade 2 pseudo normal relaxation   Aortic valve mild to moderate stenosis valve mean gradient 13 mmHg   Tricuspid valve mild regurgitation    She was discharged home      On 8/17/22 Ms Meredeth Burkitt was seen in our office for a recent hospitalization follow up visit  Claudeen Atkinson admits to fatigue    She is starting to eat and drink better  Denice Bryan denies dyspnea with minimal exertion chest pain palpitations lightheadedness or dizziness  Denice Shells is moving her bowels without difficulty    Lucero denies fever or chills  EKG int he office showed   Fibrillation ventricular response 88 beats per minute  Discussion in regards to anticoagulation with options during this office visit  She opted for Coumadin    This will be managed by MANDEEP SIDHU      9/19/22 INR 1 52      On 22 Ms Leopoldo Lozano was seen in our office for a follow up visit  Lucero's chief complaint was fatigue since discharge  She does not have energy to complete her chores and is falling asleep easily mid morning  She has no "Pep"  Metoprolol tartrate was changed to succinate at bedtime  Thought if this change did not improve her symptoms stop Metoprolol and change to Diltiazem  Ms Leopoldo Lozano presents to our office for a follow up visit  She is not longer tired during the day  She is sleeping well at night              Medical History   Paroxysmal atrial fibrillation [id number]= 3 ( age, Female)   Dyslipidemia 22 , , HDL 30, LDL 46   Spinal stenosis   Coumadin in the past she cannot remember reason    COVID -19 + 2020       Patient Active Problem List   Diagnosis   • Spinal stenosis   • Mixed hyperlipidemia   • COVID-19   • Other chest pain   • Intestinal adhesions   • Incisional hernia, without obstruction or gangrene   • Postoperative visit          Patient Active Problem List   Diagnosis   • Spinal stenosis   • Mixed hyperlipidemia   • COVID-19   • Other chest pain   • Intestinal adhesions   • Incisional hernia, without obstruction or gangrene   • Postoperative visit     Past Medical History:   Diagnosis Date   • Chronic kidney disease     Horse shoe kidney   • Colitis    • Hyperlipidemia      Social History     Socioeconomic History   • Marital status:       Spouse name: Not on file   • Number of children: Not on file   • Years of education: Not on file   • Highest education level: Not on file   Occupational History   • Not on file   Tobacco Use   • Smoking status: Former Smoker     Quit date: 1970     Years since quittin 8   • Smokeless tobacco: Never Used   Vaping Use   • Vaping Use: Never used   Substance and Sexual Activity   • Alcohol use: Yes     Comment: once in awhile   • Drug use: No   • Sexual activity: Not on file   Other Topics Concern   • Not on file   Social History Narrative   • Not on file     Social Determinants of Health     Financial Resource Strain: Not on file   Food Insecurity: No Food Insecurity   • Worried About Running Out of Food in the Last Year: Never true   • Ran Out of Food in the Last Year: Never true   Transportation Needs: No Transportation Needs   • Lack of Transportation (Medical): No   • Lack of Transportation (Non-Medical): No   Physical Activity: Not on file   Stress: Not on file   Social Connections: Not on file   Intimate Partner Violence: Not on file   Housing Stability: Low Risk    • Unable to Pay for Housing in the Last Year: No   • Number of Places Lived in the Last Year: 1   • Unstable Housing in the Last Year: No      Family History   Problem Relation Age of Onset   • No Known Problems Mother    • No Known Problems Father      Past Surgical History:   Procedure Laterality Date   • ABDOMINAL ADHESION SURGERY N/A 11/27/2021    Procedure: LYSIS ADHESIONS;  Surgeon: Jey Crawford MD;  [facility];  Service: General   • BACK SURGERY      2 rods placed in back   • COLON SURGERY     • COLONOSCOPY N/A 5/4/2018    Procedure: COLONOSCOPY;  Surgeon: Nuria Sidhu MD;  [facility];   Service: Colorectal   • JOINT REPLACEMENT Bilateral    • LAPAROTOMY N/A 11/27/2021    Procedure: LAPAROTOMY EXPLORATORY; EXPLANTATION OF MESH;  Surgeon: Jey Crawford MD;  [facility];  Service: General   • MS REPAIR INCISIONAL HERNIA,REDUCIBLE N/A 7/27/2022    Procedure: REPAIR HERNIA INCISIONAL, LYSIS OF ADHESIONS, EXPLANTATION OF MESH, REPAIR OF ENTEROTOMY;  Surgeon: Pankaj Obregon MD;  [facility];  Service: General       Current Outpatient Medications:   •  acetaminophen (TYLENOL) 325 mg tablet, Take 2 tablets (650 mg total) by mouth every 4 (four) hours as needed for mild pain, Disp: , Rfl: 0  •  bimatoprost (LUMIGAN) 0 01 % ophthalmic drops, 1 drop daily at bedtime, Disp: , Rfl:   •  Brimonidine Tartrate 0 025 % SOLN, Apply to eye 2 (two) times a day, Disp: , Rfl:   •  metoprolol succinate (TOPROL-XL) 50 mg 24 hr tablet, Take 1 tablet (50 mg total) by mouth daily at bedtime, Disp: 30 tablet, Rfl: 3  •  montelukast (SINGULAIR) 10 mg tablet, Take 10 mg by mouth daily at bedtime  , Disp: , Rfl:   •  pregabalin (LYRICA) 100 mg capsule, Take 100 mg by mouth 2 (two) times a day  , Disp: , Rfl:   •  simvastatin (ZOCOR) 20 mg tablet, Take 20 mg by mouth daily at bedtime, Disp: , Rfl:   •  warfarin (Coumadin) 2 5 mg tablet, 8 24 22  Coumadin 5 mg  Wednesday  Coumadin 2 5 mg all other days  PT INR 8 30 22 , Disp: 60 tablet, Rfl: 11  Allergies   Allergen Reactions   • Hydromorphone Other (See Comments)     "it stopped my heart"  hallucinations         Labs:   Ancillary Orders on 10/07/2022   Component Date Value   • Protime 10/10/2022 23 9 (A)   • INR 10/10/2022 2 11 (A)   Ancillary Orders on 09/30/2022   Component Date Value   • Protime 10/03/2022 21 9 (A)   • INR 10/03/2022 1 89 (A)   Ancillary Orders on 09/23/2022   Component Date Value   • Protime 09/26/2022 19 5 (A)   • INR 09/26/2022 1 62 (A)   Ancillary Orders on 09/16/2022   Component Date Value   • Protime 09/19/2022 18 6 (A)   • INR 09/19/2022 1 52 (A)   Ancillary Orders on 09/09/2022   Component Date Value   • Protime 09/12/2022 22 8 (A)   • INR 09/12/2022 1 99 (A)   Ancillary Orders on 09/02/2022   Component Date Value   • Protime 09/06/2022 17 2 (A)   • INR 09/06/2022 1 38 (A)   Anticoag visit on 08/24/2022   Component Date Value   • INR 08/22/2022 1 26 (A)   Appointment on 08/22/2022   Component Date Value   • WBC 08/22/2022 5 80    • RBC 08/22/2022 3 60 (A)   • Hemoglobin 08/22/2022 11 1 (A)   • Hematocrit 08/22/2022 36 6    • MCV 08/22/2022 102 (A)   • MCH 08/22/2022 30 8    • MCHC 08/22/2022 30 3 (A)   • RDW 08/22/2022 14 6    • MPV 08/22/2022 8 7 (A)   • Platelets 80/63/2476 394 (A)   • nRBC 08/22/2022 0    • Neutrophils Relative 08/22/2022 56    • Immat GRANS % 08/22/2022 0    • Lymphocytes Relative 08/22/2022 25    • Monocytes Relative 08/22/2022 11    • Eosinophils Relative 08/22/2022 7 (A)   • Basophils Relative 08/22/2022 1    • Neutrophils Absolute 08/22/2022 3 26    • Immature Grans Absolute 08/22/2022 0 02    • Lymphocytes Absolute 08/22/2022 1 45    • Monocytes Absolute 08/22/2022 0 64    • Eosinophils Absolute 08/22/2022 0 39    • Basophils Absolute 08/22/2022 0 04    • Sodium 08/22/2022 139    • Potassium 08/22/2022 5 0    • Chloride 08/22/2022 108    • CO2 08/22/2022 29    • ANION GAP 08/22/2022 2 (A)   • BUN 08/22/2022 13    • Creatinine 08/22/2022 0 50 (A)   • Glucose 08/22/2022 101    • Calcium 08/22/2022 8 8    • eGFR 08/22/2022 92    • Magnesium 08/22/2022 2 6    • Protime 08/22/2022 16 0 (A)   • INR 08/22/2022 1 26 (A)     Imaging: No results found  Review of Systems:  Review of Systems   Eyes: Positive for visual disturbance  Musculoskeletal: Positive for arthralgias and myalgias  All other systems reviewed and are negative  Physical Exam:  Physical Exam  Vitals reviewed  Constitutional:       Appearance: She is obese  Cardiovascular:      Rate and Rhythm: Normal rate and regular rhythm  Pulses: Normal pulses  Heart sounds: Normal heart sounds  Pulmonary:      Effort: Pulmonary effort is normal       Breath sounds: Normal breath sounds  Abdominal:      General: Bowel sounds are normal       Palpations: Abdomen is soft  Musculoskeletal:         General: Normal range of motion  Skin:     General: Skin is warm and dry  Capillary Refill: Capillary refill takes less than 2 seconds  Neurological:      General: No focal deficit present  Mental Status: She is alert and oriented to person, place, and time  Psychiatric:         Mood and Affect: Mood normal          Behavior: Behavior normal      RUE sitting 130/60     Discussion/Summary:  1   Paroxysmal atrial fibrillation [id number]= 3 ( age, Female) continues on Warfarin goal INR 2 0-3 0 followed by [facility], 10/10/22 INR 2 11  On coumadin 5mg daily  Continue on Metoprolol succinate 50mg daily at bedtime      2  Dyslipidemia 7/29/22 , , HDL 30, LDL 46 - continue on Zocor 20mg daily, DASH diet

## 2022-10-19 ENCOUNTER — OFFICE VISIT (OUTPATIENT)
Dept: CARDIOLOGY CLINIC | Facility: CLINIC | Age: 77
End: 2022-10-19
Payer: MEDICARE

## 2022-10-19 VITALS
DIASTOLIC BLOOD PRESSURE: 58 MMHG | SYSTOLIC BLOOD PRESSURE: 130 MMHG | WEIGHT: 168.6 LBS | HEART RATE: 70 BPM | OXYGEN SATURATION: 98 % | BODY MASS INDEX: 33.99 KG/M2 | HEIGHT: 59 IN

## 2022-10-19 DIAGNOSIS — E78.2 MIXED HYPERLIPIDEMIA: ICD-10-CM

## 2022-10-19 DIAGNOSIS — I48.0 PAF (PAROXYSMAL ATRIAL FIBRILLATION) (HCC): Primary | ICD-10-CM

## 2022-10-19 PROCEDURE — 99214 OFFICE O/P EST MOD 30 MIN: CPT | Performed by: INTERNAL MEDICINE

## 2022-10-24 ENCOUNTER — ANTICOAG VISIT (OUTPATIENT)
Dept: CARDIOLOGY CLINIC | Facility: CLINIC | Age: 77
End: 2022-10-24

## 2022-10-24 ENCOUNTER — APPOINTMENT (OUTPATIENT)
Dept: LAB | Facility: CLINIC | Age: 77
End: 2022-10-24
Payer: MEDICARE

## 2022-11-07 ENCOUNTER — ANTICOAG VISIT (OUTPATIENT)
Dept: CARDIOLOGY CLINIC | Facility: CLINIC | Age: 77
End: 2022-11-07

## 2022-11-07 ENCOUNTER — APPOINTMENT (OUTPATIENT)
Dept: LAB | Facility: CLINIC | Age: 77
End: 2022-11-07

## 2022-11-28 ENCOUNTER — APPOINTMENT (OUTPATIENT)
Dept: LAB | Facility: CLINIC | Age: 77
End: 2022-11-28

## 2022-11-28 ENCOUNTER — ANTICOAG VISIT (OUTPATIENT)
Dept: CARDIOLOGY CLINIC | Facility: CLINIC | Age: 77
End: 2022-11-28

## 2022-12-19 ENCOUNTER — APPOINTMENT (OUTPATIENT)
Dept: LAB | Facility: CLINIC | Age: 77
End: 2022-12-19

## 2022-12-19 ENCOUNTER — ANTICOAG VISIT (OUTPATIENT)
Dept: CARDIOLOGY CLINIC | Facility: CLINIC | Age: 77
End: 2022-12-19

## 2023-01-16 ENCOUNTER — ANTICOAG VISIT (OUTPATIENT)
Dept: CARDIOLOGY CLINIC | Facility: CLINIC | Age: 78
End: 2023-01-16

## 2023-01-16 ENCOUNTER — APPOINTMENT (OUTPATIENT)
Dept: LAB | Facility: CLINIC | Age: 78
End: 2023-01-16

## 2023-01-19 DIAGNOSIS — I48.0 PAF (PAROXYSMAL ATRIAL FIBRILLATION) (HCC): ICD-10-CM

## 2023-01-19 RX ORDER — METOPROLOL SUCCINATE 50 MG/1
50 TABLET, EXTENDED RELEASE ORAL
Qty: 30 TABLET | Refills: 3 | Status: SHIPPED | OUTPATIENT
Start: 2023-01-19

## 2023-02-06 ENCOUNTER — APPOINTMENT (OUTPATIENT)
Dept: LAB | Facility: CLINIC | Age: 78
End: 2023-02-06

## 2023-02-06 ENCOUNTER — ANTICOAG VISIT (OUTPATIENT)
Dept: CARDIOLOGY CLINIC | Facility: CLINIC | Age: 78
End: 2023-02-06

## 2023-02-20 ENCOUNTER — APPOINTMENT (OUTPATIENT)
Dept: LAB | Facility: CLINIC | Age: 78
End: 2023-02-20

## 2023-02-20 ENCOUNTER — ANTICOAG VISIT (OUTPATIENT)
Dept: CARDIOLOGY CLINIC | Facility: CLINIC | Age: 78
End: 2023-02-20

## 2023-02-27 ENCOUNTER — ANTICOAG VISIT (OUTPATIENT)
Dept: CARDIOLOGY CLINIC | Facility: CLINIC | Age: 78
End: 2023-02-27

## 2023-02-27 ENCOUNTER — APPOINTMENT (OUTPATIENT)
Dept: LAB | Facility: CLINIC | Age: 78
End: 2023-02-27

## 2023-03-02 ENCOUNTER — OFFICE VISIT (OUTPATIENT)
Dept: CARDIOLOGY CLINIC | Facility: CLINIC | Age: 78
End: 2023-03-02

## 2023-03-02 VITALS
DIASTOLIC BLOOD PRESSURE: 72 MMHG | SYSTOLIC BLOOD PRESSURE: 134 MMHG | HEIGHT: 59 IN | BODY MASS INDEX: 34.88 KG/M2 | HEART RATE: 64 BPM | WEIGHT: 173 LBS

## 2023-03-02 DIAGNOSIS — I35.0 NON-RHEUMATIC AORTIC STENOSIS: ICD-10-CM

## 2023-03-02 DIAGNOSIS — I48.0 PAF (PAROXYSMAL ATRIAL FIBRILLATION) (HCC): Primary | ICD-10-CM

## 2023-03-02 NOTE — PROGRESS NOTES
Cardiology Follow Up    Donald Hannon  1945  [id number]  [address] [facility] [place]  [address] [address] [phone]    1  PAF (paroxysmal atrial fibrillation) (HCC)  POCT ECG      2  Non-rheumatic aortic stenosis  Echo complete w/ contrast if indicated          Discussion/Summary:    PAF - identified post op after hernia surgery  Mostly maintaining sinus rhythm with metoprolol  It makes her tired, she takes it at night  In SR today  One episode of palpitations recently  On warfarin for a/c  DOAC was cost prohibitive  If more symptoms, could repeat monitor  AS - mild-moderate on last echo from last year  Repeat echo in 6 months prior to next visit  History of Present Illness:   77year old female  In July 2022, I met her in the hospital  She had postoperative afib after hernia repair, VINNY  She was controlled with metoprolol  She was anemic post op, so anticoagulation wasn't initially started  Subsequently she saw the NP and had a zio patch which showed ongoing PAF, about 9% burden  Is on coumadin because of cost     Had not felt palpitations until last week when she felt some when laying in bed  When she woke up was back to normal     Usually does the driveway, stairs, and denies any symptoms or limitations  Echo last year also showed mild-moderate AS            Medical Problems     Problem List     Spinal stenosis    Mixed hyperlipidemia    COVID-19    Other chest pain    Intestinal adhesions    Incisional hernia, without obstruction or gangrene    Postoperative visit    Non-rheumatic aortic stenosis    PAF (paroxysmal atrial fibrillation) (HCC)        Past Medical History:   Diagnosis Date   • Chronic kidney disease     Horse shoe kidney   • Colitis    • Hyperlipidemia      Social History     Tobacco Use   • Smoking status: Former     Types: Cigarettes     Quit date: 1970     Years since quitting: 53 2   • Smokeless tobacco: Never   Vaping Use   • Vaping Use: Never used   Substance Use Topics   • Alcohol use: Yes     Comment: once in awhile   • Drug use: No      Family History   Problem Relation Age of Onset   • No Known Problems Mother    • No Known Problems Father      Past Surgical History:   Procedure Laterality Date   • ABDOMINAL ADHESION SURGERY N/A 11/27/2021    Procedure: LYSIS ADHESIONS;  Surgeon: Valeriy Ivan MD;  [facility];  Service: General   • BACK SURGERY      2 rods placed in back   • COLON SURGERY     • COLONOSCOPY N/A 5/4/2018    Procedure: COLONOSCOPY;  Surgeon: Rohit Rapp MD;  [facility]; Service: Colorectal   • JOINT REPLACEMENT Bilateral    • LAPAROTOMY N/A 11/27/2021    Procedure: LAPAROTOMY EXPLORATORY; EXPLANTATION OF MESH;  Surgeon: Valeriy Ivan MD;  [facility];  Service: General   • SD REPAIR INCISIONAL HERNIA,REDUCIBLE N/A 7/27/2022    Procedure: REPAIR HERNIA INCISIONAL, LYSIS OF ADHESIONS, EXPLANTATION OF MESH, REPAIR OF ENTEROTOMY;  Surgeon: Brett Le MD;  [facility];  Service: General       Current Outpatient Medications:   •  acetaminophen (TYLENOL) 325 mg tablet, Take 2 tablets (650 mg total) by mouth every 4 (four) hours as needed for mild pain, Disp: , Rfl: 0  •  metoprolol succinate (TOPROL-XL) 50 mg 24 hr tablet, TAKE 1 TABLET (50 MG TOTAL) BY MOUTH DAILY AT BEDTIME, Disp: 30 tablet, Rfl: 3  •  montelukast (SINGULAIR) 10 mg tablet, Take 10 mg by mouth daily at bedtime  , Disp: , Rfl:   •  pregabalin (LYRICA) 100 mg capsule, Take 100 mg by mouth 2 (two) times a day  , Disp: , Rfl:   •  simvastatin (ZOCOR) 20 mg tablet, Take 20 mg by mouth daily at bedtime, Disp: , Rfl:   •  warfarin (Coumadin) 2 5 mg tablet, 8 24 22  Coumadin 5 mg  Wednesday  Coumadin 2 5 mg all other days   PT INR 8 30 22 , Disp: 60 tablet, Rfl: 11  Allergies   Allergen Reactions   • Hydromorphone Other (See Comments)     "it stopped my heart"  hallucinations         Vitals:    03/02/23 1612   BP: 134/72   BP Location: Right arm   Patient Position: Sitting   Cuff Size: Large   Pulse: 64   Weight: 78 5 kg (173 lb)   Height: 4' 11" (1 499 m)     Vitals:    03/02/23 1612   Weight: 78 5 kg (173 lb)      Height: 4' 11" (149 9 cm)   Body mass index is 34 94 kg/m²  Physical Exam:   GENERAL: Alert, well appearing, and in no distress  HEENT: PERRL, EOMI, no scleral icterus, no conjunctival pallor  NECK: Supple, No elevated JVP, no thyromegaly, no carotid bruits  HEART: Regular rate and rhythm, normal S1/S2, no S3/S4, no murmur or rub  LUNGS:  Clear to auscultation bilaterally  ABDOMEN:  Soft, non-tender, positive bowel sounds, no rebound or guarding  EXTREMITIES:  No edema  VASCULAR:  Normal pedal pulses   NEURO: No focal deficits,  SKIN: Normal without suspicious lesions on exposed skin      ROS:  Positive for arthritis, difficulty walking, abdominal pain, diarrhea  Except as noted in HPI, is otherwise reviewed in detail and a 12 point review of systems is negative  Labs:  Lab Results   Component Value Date    SODIUM 139 08/22/2022    K 5 0 08/22/2022     08/22/2022    CREATININE 0 50 (L) 08/22/2022    BUN 13 08/22/2022    CO2 29 08/22/2022    ALT 12 07/31/2022    AST 25 07/31/2022    INR 2 54 (H) 02/27/2023    GLUF 87 04/19/2019    WBC 5 80 08/22/2022    HGB 11 1 (L) 08/22/2022    HCT 36 6 08/22/2022     (H) 08/22/2022       No results found for: CHOL  Lab Results   Component Value Date    LDLCALC 46 07/29/2022    LDLCALC 76 04/19/2019     Lab Results   Component Value Date    HDL 30 (L) 07/29/2022    HDL 36 (L) 04/19/2019     Lab Results   Component Value Date    TRIG 216 (H) 07/29/2022    TRIG 232 (H) 04/19/2019       Testing:  o 8/2022  Patient had a min HR of 52 bpm, max HR of 197 bpm, and avg HR of 67 bpm   Predominant underlying rhythm was Sinus Rhythm   4 Ventricular Tachycardia runs  occurred, the run with the fastest interval lasting 8 beats with a max rate of 197  bpm, the longest lasting 16 beats with an avg rate of 102 bpm  162  Supraventricular Tachycardia runs occurred, the run with the fastest interval lasting  5 beats with a max rate of 148 bpm, the longest lasting 13 4 secs with an avg rate  of 97 bpm  Atrial Fibrillation occurred (9% burden), ranging from  bpm (avg  of 80 bpm), the longest lasting 11 hours 42 mins with an avg rate of 77 bpm   Isolated SVEs were occasional (1 2%, 79192), SVE Couplets were rare (<1 0%,  2199), and SVE Triplets were rare (<1 0%, 346)  Isolated VEs were rare (<1 0%), VE  Couplets were rare (<1 0%), and no VE Triplets were present      Agree with above  Afib is seen, known from office  To start on a/c    Echo 7/2022  Left Ventricle: Left ventricular cavity size is normal  Wall thickness is mildly increased  There is mild concentric hypertrophy  The left ventricular ejection fraction is 65% by visual estimation  Systolic function is normal  Although no diagnostic regional wall motion abnormality was identified, this possibility cannot be completely excluded on the basis of this study  Diastolic function is moderately abnormal, consistent with grade II (pseudonormal) relaxation  Left atrial filling pressure is elevated  •  Aortic Valve: There is mild to moderate stenosis  The aortic valve peak velocity is 2 35 m/s  The aortic valve mean gradient is 13 mmHg  The DVI is 0 49  •  Tricuspid Valve: There is mild regurgitation  The right ventricular systolic pressure is moderately to severely elevated  The estimated right ventricular systolic pressure is 28 43 mmHg  EKG:  Sinus rhythm, 64 beats per minute  Low voltage  Poor R wave progression

## 2023-03-13 ENCOUNTER — APPOINTMENT (OUTPATIENT)
Dept: LAB | Facility: CLINIC | Age: 78
End: 2023-03-13

## 2023-03-13 ENCOUNTER — ANTICOAG VISIT (OUTPATIENT)
Dept: CARDIOLOGY CLINIC | Facility: CLINIC | Age: 78
End: 2023-03-13

## 2023-03-28 ENCOUNTER — ANTICOAG VISIT (OUTPATIENT)
Dept: CARDIOLOGY CLINIC | Facility: CLINIC | Age: 78
End: 2023-03-28

## 2023-03-28 ENCOUNTER — APPOINTMENT (OUTPATIENT)
Dept: LAB | Facility: CLINIC | Age: 78
End: 2023-03-28

## 2023-05-01 ENCOUNTER — ANTICOAG VISIT (OUTPATIENT)
Dept: CARDIOLOGY CLINIC | Facility: CLINIC | Age: 78
End: 2023-05-01

## 2023-05-01 ENCOUNTER — APPOINTMENT (OUTPATIENT)
Dept: LAB | Facility: CLINIC | Age: 78
End: 2023-05-01

## 2023-05-15 ENCOUNTER — TELEPHONE (OUTPATIENT)
Dept: CARDIOLOGY CLINIC | Facility: CLINIC | Age: 78
End: 2023-05-15

## 2023-05-15 DIAGNOSIS — I48.0 PAF (PAROXYSMAL ATRIAL FIBRILLATION) (HCC): ICD-10-CM

## 2023-05-15 RX ORDER — METOPROLOL SUCCINATE 50 MG/1
50 TABLET, EXTENDED RELEASE ORAL
Qty: 30 TABLET | Refills: 11 | Status: ON HOLD | OUTPATIENT
Start: 2023-05-15

## 2023-05-16 DIAGNOSIS — I48.0 PAF (PAROXYSMAL ATRIAL FIBRILLATION) (HCC): ICD-10-CM

## 2023-05-16 RX ORDER — WARFARIN SODIUM 2.5 MG/1
TABLET ORAL
Qty: 210 TABLET | Refills: 3 | Status: ON HOLD | OUTPATIENT
Start: 2023-05-16

## 2023-05-27 ENCOUNTER — HOSPITAL ENCOUNTER (INPATIENT)
Facility: HOSPITAL | Age: 78
LOS: 4 days | DRG: 481 | End: 2023-05-31
Attending: SURGERY | Admitting: SURGERY
Payer: MEDICARE

## 2023-05-27 ENCOUNTER — APPOINTMENT (INPATIENT)
Dept: RADIOLOGY | Facility: HOSPITAL | Age: 78
DRG: 481 | End: 2023-05-27
Payer: MEDICARE

## 2023-05-27 ENCOUNTER — APPOINTMENT (EMERGENCY)
Dept: RADIOLOGY | Facility: HOSPITAL | Age: 78
DRG: 481 | End: 2023-05-27
Payer: MEDICARE

## 2023-05-27 DIAGNOSIS — M97.8XXA: ICD-10-CM
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
## 2023-05-27 DIAGNOSIS — M25.551 RIGHT HIP PAIN: ICD-10-CM

## 2023-05-27 DIAGNOSIS — Z96.649: ICD-10-CM

## 2023-05-27 PROBLEM — M97.01XA PERIPROSTHETIC FRACTURE AROUND INTERNAL PROSTHETIC RIGHT HIP JOINT (HCC): Status: ACTIVE | Noted: 2023-05-27

## 2023-05-27 LAB
ABO GROUP BLD: NORMAL
ALBUMIN SERPL BCP-MCNC: 3.3 G/DL (ref 3.5–5)
ALP SERPL-CCNC: 59 U/L (ref 46–116)
ALT SERPL W P-5'-P-CCNC: 30 U/L (ref 12–78)
ANION GAP SERPL CALCULATED.3IONS-SCNC: 4 MMOL/L (ref 4–13)
APTT PPP: 33 SECONDS (ref 23–37)
AST SERPL W P-5'-P-CCNC: 24 U/L (ref 5–45)
BASE EXCESS BLDA CALC-SCNC: -1 MMOL/L (ref -2–3)
BASOPHILS # BLD AUTO: 0.07 THOUSANDS/ÂΜL (ref 0–0.1)
BASOPHILS NFR BLD AUTO: 1 % (ref 0–1)
BILIRUB SERPL-MCNC: 0.31 MG/DL (ref 0.2–1)
BLD GP AB SCN SERPL QL: NEGATIVE
BUN SERPL-MCNC: 15 MG/DL (ref 5–25)
CA-I BLD-SCNC: 1.25 MMOL/L (ref 1.12–1.32)
CALCIUM ALBUM COR SERPL-MCNC: 9.1 MG/DL (ref 8.3–10.1)
CALCIUM SERPL-MCNC: 8.5 MG/DL (ref 8.3–10.1)
CHLORIDE SERPL-SCNC: 109 MMOL/L (ref 96–108)
CO2 SERPL-SCNC: 24 MMOL/L (ref 21–32)
CREAT SERPL-MCNC: 0.57 MG/DL (ref 0.6–1.3)
EOSINOPHIL # BLD AUTO: 0.27 THOUSAND/ÂΜL (ref 0–0.61)
EOSINOPHIL NFR BLD AUTO: 4 % (ref 0–6)
ERYTHROCYTE [DISTWIDTH] IN BLOOD BY AUTOMATED COUNT: 14.4 % (ref 11.6–15.1)
GFR SERPL CREATININE-BSD FRML MDRD: 88 ML/MIN/1.73SQ M
GLUCOSE SERPL-MCNC: 139 MG/DL (ref 65–140)
GLUCOSE SERPL-MCNC: 140 MG/DL (ref 65–140)
HCO3 BLDA-SCNC: 24.8 MMOL/L (ref 24–30)
HCT VFR BLD AUTO: 39 % (ref 34.8–46.1)
HCT VFR BLD CALC: 37 % (ref 34.8–46.1)
HGB BLD-MCNC: 12.4 G/DL (ref 11.5–15.4)
HGB BLDA-MCNC: 12.6 G/DL (ref 11.5–15.4)
IMM GRANULOCYTES # BLD AUTO: 0.03 THOUSAND/UL (ref 0–0.2)
IMM GRANULOCYTES NFR BLD AUTO: 0 % (ref 0–2)
INR PPP: 2.33 (ref 0.84–1.19)
LYMPHOCYTES # BLD AUTO: 2.41 THOUSANDS/ÂΜL (ref 0.6–4.47)
LYMPHOCYTES NFR BLD AUTO: 34 % (ref 14–44)
MCH RBC QN AUTO: 30.9 PG (ref 26.8–34.3)
MCHC RBC AUTO-ENTMCNC: 31.8 G/DL (ref 31.4–37.4)
MCV RBC AUTO: 97 FL (ref 82–98)
MONOCYTES # BLD AUTO: 0.85 THOUSAND/ÂΜL (ref 0.17–1.22)
MONOCYTES NFR BLD AUTO: 12 % (ref 4–12)
NEUTROPHILS # BLD AUTO: 3.39 THOUSANDS/ÂΜL (ref 1.85–7.62)
NEUTS SEG NFR BLD AUTO: 49 % (ref 43–75)
NRBC BLD AUTO-RTO: 0 /100 WBCS
PCO2 BLD: 26 MMOL/L (ref 21–32)
PCO2 BLD: 46.1 MM HG (ref 42–50)
PH BLD: 7.34 [PH] (ref 7.3–7.4)
PLATELET # BLD AUTO: 290 THOUSANDS/UL (ref 149–390)
PMV BLD AUTO: 8.7 FL (ref 8.9–12.7)
PO2 BLD: 57 MM HG (ref 35–45)
POTASSIUM BLD-SCNC: 4.3 MMOL/L (ref 3.5–5.3)
POTASSIUM SERPL-SCNC: 4.2 MMOL/L (ref 3.5–5.3)
PROT SERPL-MCNC: 7 G/DL (ref 6.4–8.4)
PROTHROMBIN TIME: 25.8 SECONDS (ref 11.6–14.5)
RBC # BLD AUTO: 4.01 MILLION/UL (ref 3.81–5.12)
RH BLD: POSITIVE
SAO2 % BLD FROM PO2: 87 % (ref 60–85)
SODIUM BLD-SCNC: 140 MMOL/L (ref 136–145)
SODIUM SERPL-SCNC: 137 MMOL/L (ref 135–147)
SPECIMEN EXPIRATION DATE: NORMAL
SPECIMEN SOURCE: ABNORMAL
WBC # BLD AUTO: 7.02 THOUSAND/UL (ref 4.31–10.16)

## 2023-05-27 PROCEDURE — 73502 X-RAY EXAM HIP UNI 2-3 VIEWS: CPT

## 2023-05-27 PROCEDURE — 86901 BLOOD TYPING SEROLOGIC RH(D): CPT

## 2023-05-27 PROCEDURE — 73552 X-RAY EXAM OF FEMUR 2/>: CPT

## 2023-05-27 PROCEDURE — 73700 CT LOWER EXTREMITY W/O DYE: CPT

## 2023-05-27 PROCEDURE — 96374 THER/PROPH/DIAG INJ IV PUSH: CPT

## 2023-05-27 PROCEDURE — 72125 CT NECK SPINE W/O DYE: CPT

## 2023-05-27 PROCEDURE — 99285 EMERGENCY DEPT VISIT HI MDM: CPT

## 2023-05-27 PROCEDURE — 84132 ASSAY OF SERUM POTASSIUM: CPT

## 2023-05-27 PROCEDURE — 85025 COMPLETE CBC W/AUTO DIFF WBC: CPT | Performed by: SURGERY

## 2023-05-27 PROCEDURE — 93308 TTE F-UP OR LMTD: CPT | Performed by: SURGERY

## 2023-05-27 PROCEDURE — 82803 BLOOD GASES ANY COMBINATION: CPT

## 2023-05-27 PROCEDURE — 71260 CT THORAX DX C+: CPT

## 2023-05-27 PROCEDURE — 76705 ECHO EXAM OF ABDOMEN: CPT | Performed by: SURGERY

## 2023-05-27 PROCEDURE — 82330 ASSAY OF CALCIUM: CPT

## 2023-05-27 PROCEDURE — 85610 PROTHROMBIN TIME: CPT

## 2023-05-27 PROCEDURE — 74177 CT ABD & PELVIS W/CONTRAST: CPT

## 2023-05-27 PROCEDURE — 85014 HEMATOCRIT: CPT

## 2023-05-27 PROCEDURE — 70450 CT HEAD/BRAIN W/O DYE: CPT

## 2023-05-27 PROCEDURE — 93005 ELECTROCARDIOGRAM TRACING: CPT

## 2023-05-27 PROCEDURE — 82947 ASSAY GLUCOSE BLOOD QUANT: CPT

## 2023-05-27 PROCEDURE — 36415 COLL VENOUS BLD VENIPUNCTURE: CPT

## 2023-05-27 PROCEDURE — 84295 ASSAY OF SERUM SODIUM: CPT

## 2023-05-27 PROCEDURE — 99223 1ST HOSP IP/OBS HIGH 75: CPT | Performed by: SURGERY

## 2023-05-27 PROCEDURE — 86850 RBC ANTIBODY SCREEN: CPT

## 2023-05-27 PROCEDURE — 85730 THROMBOPLASTIN TIME PARTIAL: CPT

## 2023-05-27 PROCEDURE — 80053 COMPREHEN METABOLIC PANEL: CPT | Performed by: SURGERY

## 2023-05-27 PROCEDURE — 86900 BLOOD TYPING SEROLOGIC ABO: CPT

## 2023-05-27 RX ORDER — ENOXAPARIN SODIUM 100 MG/ML
30 INJECTION SUBCUTANEOUS EVERY 12 HOURS SCHEDULED
Status: DISCONTINUED | OUTPATIENT
Start: 2023-05-27 | End: 2023-05-28

## 2023-05-27 RX ORDER — PRAVASTATIN SODIUM 40 MG
40 TABLET ORAL
Status: DISCONTINUED | OUTPATIENT
Start: 2023-05-27 | End: 2023-05-31 | Stop reason: HOSPADM

## 2023-05-27 RX ORDER — MONTELUKAST SODIUM 10 MG/1
10 TABLET ORAL
Status: DISCONTINUED | OUTPATIENT
Start: 2023-05-27 | End: 2023-05-31 | Stop reason: HOSPADM

## 2023-05-27 RX ORDER — LIDOCAINE 50 MG/G
1 PATCH TOPICAL ONCE
Status: COMPLETED | OUTPATIENT
Start: 2023-05-27 | End: 2023-05-28

## 2023-05-27 RX ORDER — ENOXAPARIN SODIUM 100 MG/ML
30 INJECTION SUBCUTANEOUS EVERY 12 HOURS
Status: DISCONTINUED | OUTPATIENT
Start: 2023-05-27 | End: 2023-05-27

## 2023-05-27 RX ORDER — ACETAMINOPHEN 325 MG/1
975 TABLET ORAL EVERY 6 HOURS SCHEDULED
Status: DISCONTINUED | OUTPATIENT
Start: 2023-05-27 | End: 2023-05-31 | Stop reason: HOSPADM

## 2023-05-27 RX ORDER — PREGABALIN 100 MG/1
100 CAPSULE ORAL 2 TIMES DAILY
Status: DISCONTINUED | OUTPATIENT
Start: 2023-05-27 | End: 2023-05-31 | Stop reason: HOSPADM

## 2023-05-27 RX ORDER — METOPROLOL SUCCINATE 50 MG/1
50 TABLET, EXTENDED RELEASE ORAL
Status: DISCONTINUED | OUTPATIENT
Start: 2023-05-27 | End: 2023-05-31 | Stop reason: HOSPADM

## 2023-05-27 RX ORDER — SODIUM CHLORIDE, SODIUM LACTATE, POTASSIUM CHLORIDE, CALCIUM CHLORIDE 600; 310; 30; 20 MG/100ML; MG/100ML; MG/100ML; MG/100ML
75 INJECTION, SOLUTION INTRAVENOUS CONTINUOUS
Status: DISCONTINUED | OUTPATIENT
Start: 2023-05-28 | End: 2023-05-31

## 2023-05-27 RX ORDER — OXYCODONE HYDROCHLORIDE 5 MG/1
5 TABLET ORAL EVERY 4 HOURS PRN
Status: DISCONTINUED | OUTPATIENT
Start: 2023-05-27 | End: 2023-05-31 | Stop reason: HOSPADM

## 2023-05-27 RX ORDER — ONDANSETRON 2 MG/ML
4 INJECTION INTRAMUSCULAR; INTRAVENOUS ONCE
Status: COMPLETED | OUTPATIENT
Start: 2023-05-27 | End: 2023-05-27

## 2023-05-27 RX ADMIN — OXYCODONE HYDROCHLORIDE 5 MG: 5 TABLET ORAL at 16:28

## 2023-05-27 RX ADMIN — ENOXAPARIN SODIUM 30 MG: 30 INJECTION SUBCUTANEOUS at 21:19

## 2023-05-27 RX ADMIN — LIDOCAINE 1 PATCH: 50 PATCH CUTANEOUS at 12:22

## 2023-05-27 RX ADMIN — PREGABALIN 100 MG: 100 CAPSULE ORAL at 12:22

## 2023-05-27 RX ADMIN — Medication 2.5 MG: at 12:20

## 2023-05-27 RX ADMIN — PHYTONADIONE 10 MG: 10 INJECTION, EMULSION INTRAMUSCULAR; INTRAVENOUS; SUBCUTANEOUS at 21:19

## 2023-05-27 RX ADMIN — IOHEXOL 100 ML: 350 INJECTION, SOLUTION INTRAVENOUS at 09:10

## 2023-05-27 RX ADMIN — MONTELUKAST 10 MG: 10 TABLET, FILM COATED ORAL at 21:19

## 2023-05-27 RX ADMIN — PRAVASTATIN SODIUM 40 MG: 40 TABLET ORAL at 17:53

## 2023-05-27 RX ADMIN — OXYCODONE HYDROCHLORIDE 5 MG: 5 TABLET ORAL at 22:09

## 2023-05-27 RX ADMIN — ONDANSETRON 4 MG: 2 INJECTION INTRAMUSCULAR; INTRAVENOUS at 09:22

## 2023-05-27 RX ADMIN — PREGABALIN 100 MG: 100 CAPSULE ORAL at 17:53

## 2023-05-27 RX ADMIN — ACETAMINOPHEN 975 MG: 325 TABLET ORAL at 12:21

## 2023-05-27 RX ADMIN — ACETAMINOPHEN 975 MG: 325 TABLET ORAL at 17:53

## 2023-05-27 NOTE — TRAUMA DOCUMENTATION
Trauma resident TT about the pt being unable to ambulate without extreme pain in her right hip  Pt is  non weight bearing and required a x's 2 assist to move in bed

## 2023-05-27 NOTE — PROGRESS NOTES
Laura Kulkarni in the bathroom at home; had a hip replacement; recently moved from [facility] and is planning to return to attend the graduation of her good friend's grandson; listened with empathyr

## 2023-05-27 NOTE — CONSULTS
Orthopedics   Sierra Eaton 77 y o  female MRN: [id number]  Unit/Bed#: X ray      Chief Complaint:   right hip pain    HPI:   77 y o  female ambulates with cane status post fall complaining of right hip pain and inability to bear weight  Positive Headstrike, negative LOC, on Warfarin for Blood thinners (INR 2 33)  Pain is sharp in character, Located in the right hip/groin, acute in onset, constant in duration, severe in intensity  Exacerbating factors include weight bearing and movement, remitting factors include rest  nonradiating, no numbness, no tingling, no open wounds noted  No other complaints at this time  PMH significant for R revision AUGUST and left AUGUST done by surgeon in [address] [place] [address] 12 years ago, Afib on warfarin, CKD  Occupation retired  Review Of Systems:   · Skin: Normal  · Neuro: See HPI  · Musculoskeletal: See HPI  · 14 point review of systems negative except as stated above     Past Medical History:   Past Medical History:   Diagnosis Date   • Chronic kidney disease     Horse shoe kidney   • Colitis    • Hyperlipidemia        Past Surgical History:   Past Surgical History:   Procedure Laterality Date   • ABDOMINAL ADHESION SURGERY N/A 11/27/2021    Procedure: LYSIS ADHESIONS;  Surgeon: Kalpesh Polo MD;  [facility];  Service: General   • BACK SURGERY      2 rods placed in back   • COLON SURGERY     • COLONOSCOPY N/A 5/4/2018    Procedure: COLONOSCOPY;  Surgeon: Carole Herrera MD;  Location: [facility];   Service: Colorectal   • JOINT REPLACEMENT Bilateral    • LAPAROTOMY N/A 11/27/2021    Procedure: LAPAROTOMY EXPLORATORY; EXPLANTATION OF MESH;  Surgeon: Kalpesh Polo MD;  [facility];  Service: General   • WY REPAIR FIRST ABDOMINAL WALL HERNIA N/A 7/27/2022    Procedure: REPAIR HERNIA INCISIONAL, LYSIS OF ADHESIONS, EXPLANTATION OF MESH, REPAIR OF ENTEROTOMY;  Surgeon: Nelson Howard MD;  [facility];  Service: General       Family History:  Family history "reviewed and non-contributory  Family History   Problem Relation Age of Onset   • No Known Problems Mother    • No Known Problems Father        Social History:  Social History     Socioeconomic History   • Marital status:      Spouse name: None   • Number of children: None   • Years of education: None   • Highest education level: None   Occupational History   • None   Tobacco Use   • Smoking status: Former     Types: Cigarettes     Quit date: 1970     Years since quittin 4   • Smokeless tobacco: Never   Vaping Use   • Vaping Use: Never used   Substance and Sexual Activity   • Alcohol use: Yes     Comment: once in awhile   • Drug use: No   • Sexual activity: None   Other Topics Concern   • None   Social History Narrative   • None     Social Determinants of Health     Financial Resource Strain: Not on file   Food Insecurity: No Food Insecurity (2022)    Hunger Vital Sign    • Worried About Running Out of Food in the Last Year: Never true    • Ran Out of Food in the Last Year: Never true   Transportation Needs: No Transportation Needs (2022)    PRAPARE - Transportation    • Lack of Transportation (Medical): No    • Lack of Transportation (Non-Medical): No   Physical Activity: Not on file   Stress: Not on file   Social Connections: Not on file   Intimate Partner Violence: Not on file   Housing Stability: Low Risk  (2022)    Housing Stability Vital Sign    • Unable to Pay for Housing in the Last Year: No    • Number of Places Lived in the Last Year: 1    • Unstable Housing in the Last Year: No       Allergies:    Allergies   Allergen Reactions   • Hydromorphone Other (See Comments)     \"it stopped my heart\"  hallucinations             Labs:  0   Lab Value Date/Time    CRP <3 0 10/11/2018 1435    ESR 18 10/11/2018 1435    HCT 39 0 2023 0854    HCT 37 2023 0854    HCT 40 4 2023 0059    HCT 36 6 2022 1006    HGB 12 4 2023 0854    HGB 12 6 2023 0854    HGB 12 9 " "04/11/2023 0059    HGB 11 1 (L) 08/22/2022 1006    INR 2 33 (H) 05/27/2023 0926    WBC 7 02 05/27/2023 0854    WBC 7 10 04/11/2023 0059    WBC 5 80 08/22/2022 1006       Meds:    Current Facility-Administered Medications:   •  acetaminophen (TYLENOL) tablet 975 mg, 975 mg, Oral, Q6H [facility], Brunilda Zuñiga MD, 975 mg at 05/27/23 1221  •  lidocaine (LIDODERM) 5 % patch 1 patch, 1 patch, Topical, Once, Brunilda Zuñiga MD, 1 patch at 05/27/23 1222  •  metoprolol succinate (TOPROL-XL) 24 hr tablet 50 mg, 50 mg, Oral, HS, Brunilda Zuñiga MD  •  montelukast (SINGULAIR) tablet 10 mg, 10 mg, Oral, HS, Brunilda Zuñiga MD  •  oxyCODONE (ROXICODONE) IR tablet 5 mg, 5 mg, Oral, Q4H PRN, Brunilda Zuñiga MD  •  oxyCODONE (ROXICODONE) split tablet 2 5 mg, 2 5 mg, Oral, Q4H PRN, Brunilda Zuñiga MD, 2 5 mg at 05/27/23 1220  •  pravastatin (PRAVACHOL) tablet 40 mg, 40 mg, Oral, Daily With Rehana Alfaro MD  •  pregabalin (LYRICA) capsule 100 mg, 100 mg, Oral, BID, Brunilda Zuñiga MD, 100 mg at 05/27/23 1222    Current Outpatient Medications:   •  acetaminophen (TYLENOL) 325 mg tablet, Take 2 tablets (650 mg total) by mouth every 4 (four) hours as needed for mild pain, Disp: , Rfl: 0  •  metoprolol succinate (TOPROL-XL) 50 mg 24 hr tablet, Take 1 tablet (50 mg total) by mouth daily at bedtime, Disp: 30 tablet, Rfl: 11  •  montelukast (SINGULAIR) 10 mg tablet, Take 10 mg by mouth daily at bedtime  , Disp: , Rfl:   •  pregabalin (LYRICA) 100 mg capsule, Take 100 mg by mouth 2 (two) times a day  , Disp: , Rfl:   •  simvastatin (ZOCOR) 20 mg tablet, Take 20 mg by mouth daily at bedtime, Disp: , Rfl:   •  warfarin (Coumadin) 2 5 mg tablet, TAKE 2 TO 2 1/2 TABLETS DAILY BY MOUTH OR AS ORDERED BY PHYSICIAN, Disp: 210 tablet, Rfl: 3    Blood Culture:   No results found for: \"BLOODCX\"    Wound Culture:   No results found for: \"WOUNDCULT\"    Ins and Outs:  No intake/output data " recorded  Physical Exam:   /63 (BP Location: Right arm)   Pulse 59   Temp (!) 97 2 °F (36 2 °C) (Tympanic)   Resp 16   Wt 73 8 kg (162 lb 11 2 oz)   SpO2 97%   BMI 32 86 kg/m²   Gen: No acute distress, resting comfortably in bed  HEENT: Eyes clear, moist mucus membranes, hearing intact  Respiratory: No audible wheezing or stridor  Cardiovascular: Well Perfused peripherally, 2+ distal pulse  Abdomen: nondistended, no peritoneal signs  Musculoskeletal: right lower extremity  · Skin intact, Limb lengths equal, RLE Externally rotated  · Tender to palpation over hip  · ROM not assessed 2/2 known fracture  · Sensation intact L3-S1  · Intact ankle dorsi/plantar flexion, EHL/FHL  · 2+ DP/ PT pulse  · Musculature is soft and compressible, no pain with passive stretch    Radiology:   I personally reviewed the films  X-rays AP/Lateral views of right hip shows a minimally displaced siri-prosthetic hip fracture with intact AUGUST components  Assessment:  77 y  o female status post fall with right siri-prosthetic hip fracture  She will benefit from operative fixation  Plan:   · Non weight bearing right lower extremity  · Analgesics for pain  · Warfarin reversal per trauma  · NPO at midnight  · Medicine consult for all medical management and preoperative risk stratification  · To OR for ORIF tomorrow  · Body mass index is 32 86 kg/m²  mildly obese  Recommend behavior modifications, nutrition and physical activity    · Dispo: Ortho will follow   Case was reviewed and discussed with senior resident and attending  Jane Dumont MD  Orthopedic surgery resident   · 05/27/23  ·     Jane Dumont MD

## 2023-05-27 NOTE — PROCEDURES
POC FAST US    Date/Time: 5/27/2023 9:07 AM    Performed by: Carlos Enrique Corbin MD  Authorized by: Carlos Enrique Corbin MD    Patient location:  ED  Procedure details:     Exam Type:  Diagnostic    Indications: blunt abdominal trauma      Assess for:  Pericardial effusion and intra-abdominal fluid    Technique: FAST      Views obtained:  Heart - Pericardial sac, RUQ - Davis's Pouch, Suprapubic - Pouch of Jesse and LUQ - Splenorenal space    Image quality: diagnostic      Image availability:  Images available in PACS  FAST Findings:     RUQ (Hepatorenal) free fluid: absent      LUQ (Splenorenal) free fluid: absent      Suprapubic free fluid: absent      Cardiac wall motion: identified      Pericardial effusion: absent    Interpretation:     Impressions: negative

## 2023-05-27 NOTE — H&P
H&P - Trauma   Bijan Velez 77 y o  female MRN: [id number]  Unit/Bed#: TR 02 Encounter: [id number]    Trauma Alert: Level B   Model of Arrival: Ambulance    Trauma Team: Attending Titus Seip and Residents Rupinder Boyle  Consultants:     Orthopedics: routine consult; Epic consult order placed; Assessment/Plan   Active Problems / Assessment:   - Fall  - Right hip pain  - Chronic warfarin use  - Ambulatory dysfunction     Plan:   - Admit to trauma for ambulatory dysfunction  - Await formal right hip radiograph read  - Consult ortho to eval for hip fracture  - Consider holding warfarin and start enoxaparin pending ortho plan  - Pain regimen  - PT/OT and CM consult for disposition planning    History of Present Illness     Chief Complaint: right hip pain after fall  Mechanism:Fall     HPI:    Bijan Velez is a 77 y o  female who presents after a fall  She was in her bathroom on her knees cleaning  She went to stand up and felt a crack in her right hip  She then slipped and fell on the ground hitting her head on the wall  She was unable to get up, so she called for help  She takes warfarin daily  On arrival to the ED, she is reporting right hip pain and no other concerns  Review of Systems   All other systems reviewed and are negative  12-point, complete review of systems was reviewed and negative except as stated above  Historical Information     Past Medical History:   Diagnosis Date   • Chronic kidney disease     Horse shoe kidney   • Colitis    • Hyperlipidemia      Past Surgical History:   Procedure Laterality Date   • ABDOMINAL ADHESION SURGERY N/A 11/27/2021    Procedure: LYSIS ADHESIONS;  Surgeon: García Li MD;  [facility];  Service: General   • BACK SURGERY      2 rods placed in back   • COLON SURGERY     • COLONOSCOPY N/A 5/4/2018    Procedure: COLONOSCOPY;  Surgeon: Alan Enriquez MD;  [facility];   Service: Colorectal   • JOINT REPLACEMENT Bilateral    • LAPAROTOMY N/A 2021    Procedure: LAPAROTOMY EXPLORATORY; EXPLANTATION OF MESH;  Surgeon: Lina Awad MD;  [facility];  Service: General   • WY REPAIR FIRST ABDOMINAL WALL HERNIA N/A 2022    Procedure: REPAIR HERNIA INCISIONAL, LYSIS OF ADHESIONS, EXPLANTATION OF MESH, REPAIR OF ENTEROTOMY;  Surgeon: Danielle Aragon MD;  [facility];  Service: General        Social History     Tobacco Use   • Smoking status: Former     Types: Cigarettes     Quit date: 1970     Years since quittin 4   • Smokeless tobacco: Never   Vaping Use   • Vaping Use: Never used   Substance Use Topics   • Alcohol use: Yes     Comment: once in awhile   • Drug use: No     Immunization History   Administered Date(s) Administered   • COVID-19 PFIZER VACCINE 0 3 ML IM 2021, 2021     Last Tetanus: unknown  Family History: Non-contributory    1  Before the illness or injury that brought you to the Emergency, did you need someone to help you on a regular basis? 0=No   2  Since the illness or injury that brought you to the Emergency, have you needed more help than usual to take care of yourself? 1=Yes   3  Have you been hospitalized for one or more nights during the past 6 months (excluding a stay in the Emergency Department)? 0=No   4  In general, do you see well? 0=Yes   5  In general, do you have serious problems with your memory? 0=No   6  Do you take more than three different medications everyday? 1=Yes   TOTAL   2     Did you order a geriatric consult if the score was 2 or greater?: yes     Meds/Allergies   PTA meds:   Prior to Admission Medications   Prescriptions Last Dose Informant Patient Reported?  Taking?   acetaminophen (TYLENOL) 325 mg tablet  Self No No   Sig: Take 2 tablets (650 mg total) by mouth every 4 (four) hours as needed for mild pain   metoprolol succinate (TOPROL-XL) 50 mg 24 hr tablet   No No   Sig: Take 1 tablet (50 mg total) by mouth daily at bedtime   montelukast (SINGULAIR) 10 mg "tablet  Self Yes No   Sig: Take 10 mg by mouth daily at bedtime     pregabalin (LYRICA) 100 mg capsule  Self Yes No   Sig: Take 100 mg by mouth 2 (two) times a day     simvastatin (ZOCOR) 20 mg tablet  Self Yes No   Sig: Take 20 mg by mouth daily at bedtime   warfarin (Coumadin) 2 5 mg tablet   No No   Sig: TAKE 2 TO 2 1/2 TABLETS DAILY BY MOUTH OR AS ORDERED BY PHYSICIAN      Facility-Administered Medications: None        Allergies   Allergen Reactions   • Hydromorphone Other (See Comments)     \"it stopped my heart\"  hallucinations         Objective   Initial Vitals:   Temperature: (!) 97 2 °F (36 2 °C) (05/27/23 0850)  Pulse: 61 (05/27/23 0847)  Respirations: 18 (05/27/23 0847)  Blood Pressure: 131/75 (05/27/23 0847)    Primary Survey:   Airway:        Status: patent;        Pre-hospital Interventions: none        Hospital Interventions: none  Breathing:        Pre-hospital Interventions: none       Effort: normal       Right breath sounds: normal       Left breath sounds: normal  Circulation:        Rhythm: regular       Rate: regular   Right Pulses Left Pulses    R radial: 2+    R pedal: 2+     L radial: 2+    L pedal: 2+       Disability:        GCS: Eye: 4; Verbal: 5 Motor: 6 Total: 15       Right Pupil: 3 mm;  round;  reactive         Left Pupil:  3 mm;  round;  reactive      R Motor Strength L Motor Strength    R : 5/5  R dorsiflex: 5/5  R plantarflex: 5/5 L : 5/5  L dorsiflex: 5/5  L plantarflex: 5/5        Sensory:  No sensory deficit  Exposure:       Completed: Yes      Secondary Survey:  Physical Exam  Constitutional:       Appearance: Normal appearance  HENT:      Head: Normocephalic and atraumatic  Right Ear: External ear normal       Left Ear: External ear normal       Nose: Nose normal       Mouth/Throat:      Mouth: Mucous membranes are moist       Pharynx: Oropharynx is clear  Eyes:      Extraocular Movements: Extraocular movements intact        Pupils: Pupils are equal, round, and " reactive to light  Cardiovascular:      Rate and Rhythm: Normal rate and regular rhythm  Pulmonary:      Effort: Pulmonary effort is normal  No respiratory distress  Breath sounds: Normal breath sounds  No stridor  No wheezing, rhonchi or rales  Abdominal:      General: Abdomen is flat  There is no distension  Palpations: Abdomen is soft  Tenderness: There is no abdominal tenderness  Musculoskeletal:      Cervical back: Normal range of motion and neck supple  No tenderness  Right lower leg: No edema  Left lower leg: No edema  Comments: No pain with passive ROM of RUE, LUE, and LLE  Pain with flexion of right hip  No pain at the femur, knee, leg, ankle, or foot  Right leg favors external rotation at the hip  Skin:     General: Skin is warm and dry  Neurological:      General: No focal deficit present  Mental Status: She is alert and oriented to person, place, and time     Psychiatric:         Mood and Affect: Mood normal          Behavior: Behavior normal          Invasive Devices     Peripheral Intravenous Line  Duration           Peripheral IV 05/27/23 Left;Proximal;Ventral (anterior) Forearm <1 day              Lab Results:   Results: I have personally reviewed all pertinent laboratory/tests results, BMP/CMP:   Lab Results   Component Value Date    ALKPHOS 59 05/27/2023    ALT 30 05/27/2023    AST 24 05/27/2023    BUN 15 05/27/2023    CALCIUM 8 5 05/27/2023     (H) 05/27/2023    CO2 24 05/27/2023    CO2 26 05/27/2023    CREATININE 0 57 (L) 05/27/2023    EGFR 88 05/27/2023    GLUCOSE 140 05/27/2023    K 4 2 05/27/2023    SODIUM 137 05/27/2023   , CBC:   Lab Results   Component Value Date    HCT 39 0 05/27/2023    HCT 37 05/27/2023    HGB 12 4 05/27/2023    HGB 12 6 05/27/2023    MCH 30 9 05/27/2023    MCHC 31 8 05/27/2023    MCV 97 05/27/2023    MPV 8 7 (L) 05/27/2023    NRBC 0 05/27/2023     05/27/2023    RBC 4 01 05/27/2023    RDW 14 4 05/27/2023    WBC 7 02 05/27/2023    and Coagulation:   Lab Results   Component Value Date    INR 2 33 (H) 05/27/2023       Imaging Results: I have personally reviewed pertinent reports  Chest Xray(s): negative for acute findings   FAST exam(s): pending   CT Scan(s): negative for acute findings   Additional Xray(s): pending     Other Studies: none    Code Status: Prior  Advance Directive and Living Will:      Power of :    POLST:    I have spent 40 minutes with Patient  today in which greater than 50% of this time was spent in counseling/coordination of care regarding Diagnostic results, Prognosis, Risks and benefits of tx options, Instructions for management, Patient and family education, Importance of tx compliance, Counseling / Coordination of care, Documenting in the medical record, Reviewing / ordering tests, medicine, procedures   and Obtaining or reviewing history

## 2023-05-27 NOTE — ASSESSMENT & PLAN NOTE
- Patient reports attempted to bear weight on her right leg and feeling a crack  - Unable to bear weight and severe pain with right hip flexion  - ortho consulted

## 2023-05-27 NOTE — ASSESSMENT & PLAN NOTE
- secondary to presumed right femur fracture and inability to bear weight  - 5/27 CT head, c spine, and CAP without obvious injury  - PT/OT and CM consult for dispo planning

## 2023-05-27 NOTE — PLAN OF CARE
Problem: Potential for Falls  Goal: Patient will remain free of falls  Description: INTERVENTIONS:  - Educate patient/family on patient safety including physical limitations  - Instruct patient to call for assistance with activity   - Consult OT/PT to assist with strengthening/mobility   - Keep Call bell within reach  - Keep bed low and locked with side rails adjusted as appropriate  - Keep care items and personal belongings within reach  - Initiate and maintain comfort rounds  - Make Fall Risk Sign visible to staff  - Offer Toileting every  Hours, in advance of need  - Initiate/Maintain alarm  - Obtain necessary fall risk management equipment:   - Apply yellow socks and bracelet for high fall risk patients  - Consider moving patient to room near nurses station  Outcome: Progressing     Problem: MOBILITY - ADULT  Goal: Maintain or return to baseline ADL function  Description: INTERVENTIONS:  -  Assess patient's ability to carry out ADLs; assess patient's baseline for ADL function and identify physical deficits which impact ability to perform ADLs (bathing, care of mouth/teeth, toileting, grooming, dressing, etc )  - Assess/evaluate cause of self-care deficits   - Assess range of motion  - Assess patient's mobility; develop plan if impaired  - Assess patient's need for assistive devices and provide as appropriate  - Encourage maximum independence but intervene and supervise when necessary  - Involve family in performance of ADLs  - Assess for home care needs following discharge   - Consider OT consult to assist with ADL evaluation and planning for discharge  - Provide patient education as appropriate  Outcome: Progressing  Goal: Maintains/Returns to pre admission functional level  Description: INTERVENTIONS:  - Perform BMAT or MOVE assessment daily    - Set and communicate daily mobility goal to care team and patient/family/caregiver     - Collaborate with rehabilitation services on mobility goals if consulted  - Perform Range of Motion  times a day  - Reposition patient every  hours    - Dangle patient  times a day  - Stand patient  times a day  - Ambulate patient  times a day  - Out of bed to chair  times a day   - Out of bed for meals  times a day  - Out of bed for toileting  - Record patient progress and toleration of activity level   Outcome: Progressing     Problem: PAIN - ADULT  Goal: Verbalizes/displays adequate comfort level or baseline comfort level  Description: Interventions:  - Encourage patient to monitor pain and request assistance  - Assess pain using appropriate pain scale  - Administer analgesics based on type and severity of pain and evaluate response  - Implement non-pharmacological measures as appropriate and evaluate response  - Consider cultural and social influences on pain and pain management  - Notify physician/advanced practitioner if interventions unsuccessful or patient reports new pain  Outcome: Progressing     Problem: INFECTION - ADULT  Goal: Absence or prevention of progression during hospitalization  Description: INTERVENTIONS:  - Assess and monitor for signs and symptoms of infection  - Monitor lab/diagnostic results  - Monitor all insertion sites, i e  indwelling lines, tubes, and drains  - Monitor endotracheal if appropriate and nasal secretions for changes in amount and color  - [place] appropriate cooling/warming therapies per order  - Administer medications as ordered  - Instruct and encourage patient and family to use good hand hygiene technique  - Identify and instruct in appropriate isolation precautions for identified infection/condition  Outcome: Progressing     Problem: SAFETY ADULT  Goal: Patient will remain free of falls  Description: INTERVENTIONS:  - Educate patient/family on patient safety including physical limitations  - Instruct patient to call for assistance with activity   - Consult OT/PT to assist with strengthening/mobility   - Keep Call bell within reach  - Keep bed low and locked with side rails adjusted as appropriate  - Keep care items and personal belongings within reach  - Initiate and maintain comfort rounds  - Make Fall Risk Sign visible to staff  - Offer Toileting every  Hours, in advance of need  - Initiate/Maintain alarm  - Obtain necessary fall risk management equipment:   - Apply yellow socks and bracelet for high fall risk patients  - Consider moving patient to room near nurses station  Outcome: Progressing  Goal: Maintain or return to baseline ADL function  Description: INTERVENTIONS:  -  Assess patient's ability to carry out ADLs; assess patient's baseline for ADL function and identify physical deficits which impact ability to perform ADLs (bathing, care of mouth/teeth, toileting, grooming, dressing, etc )  - Assess/evaluate cause of self-care deficits   - Assess range of motion  - Assess patient's mobility; develop plan if impaired  - Assess patient's need for assistive devices and provide as appropriate  - Encourage maximum independence but intervene and supervise when necessary  - Involve family in performance of ADLs  - Assess for home care needs following discharge   - Consider OT consult to assist with ADL evaluation and planning for discharge  - Provide patient education as appropriate  Outcome: Progressing  Goal: Maintains/Returns to pre admission functional level  Description: INTERVENTIONS:  - Perform BMAT or MOVE assessment daily    - Set and communicate daily mobility goal to care team and patient/family/caregiver  - Collaborate with rehabilitation services on mobility goals if consulted  - Perform Range of Motion  times a day  - Reposition patient every  hours    - Dangle patient  times a day  - Stand patient  times a day  - Ambulate patient  times a day  - Out of bed to chair  times a day   - Out of bed for meals times a day  - Out of bed for toileting  - Record patient progress and toleration of activity level   Outcome: Progressing Problem: DISCHARGE PLANNING  Goal: Discharge to home or other facility with appropriate resources  Description: INTERVENTIONS:  - Identify barriers to discharge w/patient and caregiver  - Arrange for needed discharge resources and transportation as appropriate  - Identify discharge learning needs (meds, wound care, etc )  - Arrange for interpretive services to assist at discharge as needed  - Refer to Case Management Department for coordinating discharge planning if the patient needs post-hospital services based on physician/advanced practitioner order or complex needs related to functional status, cognitive ability, or social support system  Outcome: Progressing     Problem: Knowledge Deficit  Goal: Patient/family/caregiver demonstrates understanding of disease process, treatment plan, medications, and discharge instructions  Description: Complete learning assessment and assess knowledge base  Interventions:  - Provide teaching at level of understanding  - Provide teaching via preferred learning methods  Outcome: Progressing     Problem: Nutrition/Hydration-ADULT  Goal: Nutrient/Hydration intake appropriate for improving, restoring or maintaining nutritional needs  Description: Monitor and assess patient's nutrition/hydration status for malnutrition  Collaborate with interdisciplinary team and initiate plan and interventions as ordered  Monitor patient's weight and dietary intake as ordered or per policy  Utilize nutrition screening tool and intervene as necessary  Determine patient's food preferences and provide high-protein, high-caloric foods as appropriate       INTERVENTIONS:  - Monitor oral intake, urinary output, labs, and treatment plans  - Assess nutrition and hydration status and recommend course of action  - Evaluate amount of meals eaten  - Assist patient with eating if necessary   - Allow adequate time for meals  - Recommend/ encourage appropriate diets, oral nutritional supplements, and vitamin/mineral supplements  - Order, calculate, and assess calorie counts as needed  - Recommend, monitor, and adjust tube feedings and TPN/PPN based on assessed needs  - Assess need for intravenous fluids  - Provide specific nutrition/hydration education as appropriate  - Include patient/family/caregiver in decisions related to nutrition  Outcome: Progressing

## 2023-05-28 ENCOUNTER — APPOINTMENT (INPATIENT)
Dept: RADIOLOGY | Facility: HOSPITAL | Age: 78
DRG: 481 | End: 2023-05-28
Payer: MEDICARE

## 2023-05-28 ENCOUNTER — ANESTHESIA (INPATIENT)
Dept: PERIOP | Facility: HOSPITAL | Age: 78
End: 2023-05-28

## 2023-05-28 ENCOUNTER — ANESTHESIA EVENT (INPATIENT)
Dept: PERIOP | Facility: HOSPITAL | Age: 78
End: 2023-05-28

## 2023-05-28 PROBLEM — M97.01XA PERIPROSTHETIC FRACTURE AROUND INTERNAL PROSTHETIC RIGHT HIP JOINT (HCC): Status: ACTIVE | Noted: 2023-05-28

## 2023-05-28 PROBLEM — M25.551 RIGHT HIP PAIN: Status: RESOLVED | Noted: 2023-05-27 | Resolved: 2023-05-28

## 2023-05-28 PROBLEM — E78.00 PURE HYPERCHOLESTEROLEMIA: Status: ACTIVE | Noted: 2017-07-18

## 2023-05-28 PROBLEM — M48.061 SPINAL STENOSIS OF LUMBAR REGION: Status: ACTIVE | Noted: 2017-06-30

## 2023-05-28 LAB
ANION GAP SERPL CALCULATED.3IONS-SCNC: 1 MMOL/L (ref 4–13)
BASOPHILS # BLD AUTO: 0.04 THOUSANDS/ÂΜL (ref 0–0.1)
BASOPHILS NFR BLD AUTO: 1 % (ref 0–1)
BUN SERPL-MCNC: 12 MG/DL (ref 5–25)
CALCIUM SERPL-MCNC: 8.3 MG/DL (ref 8.3–10.1)
CHLORIDE SERPL-SCNC: 108 MMOL/L (ref 96–108)
CO2 SERPL-SCNC: 27 MMOL/L (ref 21–32)
CREAT SERPL-MCNC: 0.6 MG/DL (ref 0.6–1.3)
EOSINOPHIL # BLD AUTO: 0.18 THOUSAND/ÂΜL (ref 0–0.61)
EOSINOPHIL NFR BLD AUTO: 3 % (ref 0–6)
ERYTHROCYTE [DISTWIDTH] IN BLOOD BY AUTOMATED COUNT: 14.5 % (ref 11.6–15.1)
GFR SERPL CREATININE-BSD FRML MDRD: 86 ML/MIN/1.73SQ M
GLUCOSE SERPL-MCNC: 87 MG/DL (ref 65–140)
HCT VFR BLD AUTO: 34.6 % (ref 34.8–46.1)
HGB BLD-MCNC: 11 G/DL (ref 11.5–15.4)
IMM GRANULOCYTES # BLD AUTO: 0.02 THOUSAND/UL (ref 0–0.2)
IMM GRANULOCYTES NFR BLD AUTO: 0 % (ref 0–2)
INR PPP: 1.49 (ref 0.84–1.19)
LYMPHOCYTES # BLD AUTO: 1.98 THOUSANDS/ÂΜL (ref 0.6–4.47)
LYMPHOCYTES NFR BLD AUTO: 27 % (ref 14–44)
MCH RBC QN AUTO: 30.6 PG (ref 26.8–34.3)
MCHC RBC AUTO-ENTMCNC: 31.8 G/DL (ref 31.4–37.4)
MCV RBC AUTO: 96 FL (ref 82–98)
MONOCYTES # BLD AUTO: 0.87 THOUSAND/ÂΜL (ref 0.17–1.22)
MONOCYTES NFR BLD AUTO: 12 % (ref 4–12)
NEUTROPHILS # BLD AUTO: 4.13 THOUSANDS/ÂΜL (ref 1.85–7.62)
NEUTS SEG NFR BLD AUTO: 57 % (ref 43–75)
NRBC BLD AUTO-RTO: 0 /100 WBCS
PLATELET # BLD AUTO: 247 THOUSANDS/UL (ref 149–390)
PMV BLD AUTO: 8.9 FL (ref 8.9–12.7)
POTASSIUM SERPL-SCNC: 4 MMOL/L (ref 3.5–5.3)
PROTHROMBIN TIME: 18.3 SECONDS (ref 11.6–14.5)
QRS AXIS: 40 DEGREES
QRSD INTERVAL: 64 MS
QT INTERVAL: 450 MS
QTC INTERVAL: 426 MS
RBC # BLD AUTO: 3.6 MILLION/UL (ref 3.81–5.12)
SODIUM SERPL-SCNC: 136 MMOL/L (ref 135–147)
T WAVE AXIS: -8 DEGREES
VENTRICULAR RATE: 54 BPM
WBC # BLD AUTO: 7.22 THOUSAND/UL (ref 4.31–10.16)

## 2023-05-28 PROCEDURE — 73552 X-RAY EXAM OF FEMUR 2/>: CPT

## 2023-05-28 PROCEDURE — 99232 SBSQ HOSP IP/OBS MODERATE 35: CPT | Performed by: SURGERY

## 2023-05-28 PROCEDURE — 85025 COMPLETE CBC W/AUTO DIFF WBC: CPT

## 2023-05-28 PROCEDURE — 99223 1ST HOSP IP/OBS HIGH 75: CPT | Performed by: ORTHOPAEDIC SURGERY

## 2023-05-28 PROCEDURE — 93010 ELECTROCARDIOGRAM REPORT: CPT | Performed by: INTERNAL MEDICINE

## 2023-05-28 PROCEDURE — NC001 PR NO CHARGE: Performed by: ORTHOPAEDIC SURGERY

## 2023-05-28 PROCEDURE — 0QS804Z REPOSITION RIGHT FEMORAL SHAFT WITH INTERNAL FIXATION DEVICE, OPEN APPROACH: ICD-10-PCS | Performed by: EMERGENCY MEDICINE

## 2023-05-28 PROCEDURE — 27507 TREATMENT OF THIGH FRACTURE: CPT | Performed by: ORTHOPAEDIC SURGERY

## 2023-05-28 PROCEDURE — 80048 BASIC METABOLIC PNL TOTAL CA: CPT

## 2023-05-28 PROCEDURE — C1713 ANCHOR/SCREW BN/BN,TIS/BN: HCPCS | Performed by: ORTHOPAEDIC SURGERY

## 2023-05-28 PROCEDURE — 85610 PROTHROMBIN TIME: CPT

## 2023-05-28 DEVICE — 1.7MM COCR CABLE WITH TI CRIMP 750MM-STERILE: Type: IMPLANTABLE DEVICE | Status: FUNCTIONAL

## 2023-05-28 RX ORDER — VANCOMYCIN HYDROCHLORIDE 1 G/20ML
INJECTION, POWDER, LYOPHILIZED, FOR SOLUTION INTRAVENOUS AS NEEDED
Status: DISCONTINUED | OUTPATIENT
Start: 2023-05-28 | End: 2023-05-28 | Stop reason: HOSPADM

## 2023-05-28 RX ORDER — CEFAZOLIN SODIUM 2 G/50ML
2000 SOLUTION INTRAVENOUS EVERY 8 HOURS
Status: COMPLETED | OUTPATIENT
Start: 2023-05-28 | End: 2023-05-29

## 2023-05-28 RX ORDER — SODIUM CHLORIDE, SODIUM LACTATE, POTASSIUM CHLORIDE, CALCIUM CHLORIDE 600; 310; 30; 20 MG/100ML; MG/100ML; MG/100ML; MG/100ML
INJECTION, SOLUTION INTRAVENOUS CONTINUOUS PRN
Status: DISCONTINUED | OUTPATIENT
Start: 2023-05-28 | End: 2023-05-28

## 2023-05-28 RX ORDER — FENTANYL CITRATE/PF 50 MCG/ML
50 SYRINGE (ML) INJECTION
Status: COMPLETED | OUTPATIENT
Start: 2023-05-28 | End: 2023-05-28

## 2023-05-28 RX ORDER — TRANEXAMIC ACID 100 MG/ML
INJECTION, SOLUTION INTRAVENOUS AS NEEDED
Status: DISCONTINUED | OUTPATIENT
Start: 2023-05-28 | End: 2023-05-28

## 2023-05-28 RX ORDER — ONDANSETRON 2 MG/ML
4 INJECTION INTRAMUSCULAR; INTRAVENOUS ONCE AS NEEDED
Status: DISCONTINUED | OUTPATIENT
Start: 2023-05-28 | End: 2023-05-28 | Stop reason: HOSPADM

## 2023-05-28 RX ORDER — GLYCOPYRROLATE 0.2 MG/ML
INJECTION INTRAMUSCULAR; INTRAVENOUS AS NEEDED
Status: DISCONTINUED | OUTPATIENT
Start: 2023-05-28 | End: 2023-05-28

## 2023-05-28 RX ORDER — SENNOSIDES 8.6 MG
1 TABLET ORAL
Status: DISCONTINUED | OUTPATIENT
Start: 2023-05-28 | End: 2023-05-31 | Stop reason: HOSPADM

## 2023-05-28 RX ORDER — OXYCODONE HYDROCHLORIDE 5 MG/1
5 TABLET ORAL ONCE
Status: COMPLETED | OUTPATIENT
Start: 2023-05-28 | End: 2023-05-28

## 2023-05-28 RX ORDER — DEXAMETHASONE SODIUM PHOSPHATE 10 MG/ML
INJECTION, SOLUTION INTRAMUSCULAR; INTRAVENOUS AS NEEDED
Status: DISCONTINUED | OUTPATIENT
Start: 2023-05-28 | End: 2023-05-28

## 2023-05-28 RX ORDER — LIDOCAINE HYDROCHLORIDE 10 MG/ML
INJECTION, SOLUTION EPIDURAL; INFILTRATION; INTRACAUDAL; PERINEURAL AS NEEDED
Status: DISCONTINUED | OUTPATIENT
Start: 2023-05-28 | End: 2023-05-28

## 2023-05-28 RX ORDER — PROPOFOL 10 MG/ML
INJECTION, EMULSION INTRAVENOUS AS NEEDED
Status: DISCONTINUED | OUTPATIENT
Start: 2023-05-28 | End: 2023-05-28

## 2023-05-28 RX ORDER — ENOXAPARIN SODIUM 100 MG/ML
30 INJECTION SUBCUTANEOUS EVERY 12 HOURS SCHEDULED
Status: DISCONTINUED | OUTPATIENT
Start: 2023-05-29 | End: 2023-05-31 | Stop reason: HOSPADM

## 2023-05-28 RX ORDER — ROCURONIUM BROMIDE 10 MG/ML
INJECTION, SOLUTION INTRAVENOUS AS NEEDED
Status: DISCONTINUED | OUTPATIENT
Start: 2023-05-28 | End: 2023-05-28

## 2023-05-28 RX ORDER — ONDANSETRON 2 MG/ML
INJECTION INTRAMUSCULAR; INTRAVENOUS AS NEEDED
Status: DISCONTINUED | OUTPATIENT
Start: 2023-05-28 | End: 2023-05-28

## 2023-05-28 RX ORDER — FENTANYL CITRATE 50 UG/ML
INJECTION, SOLUTION INTRAMUSCULAR; INTRAVENOUS AS NEEDED
Status: DISCONTINUED | OUTPATIENT
Start: 2023-05-28 | End: 2023-05-28

## 2023-05-28 RX ORDER — NEOSTIGMINE METHYLSULFATE 1 MG/ML
INJECTION INTRAVENOUS AS NEEDED
Status: DISCONTINUED | OUTPATIENT
Start: 2023-05-28 | End: 2023-05-28

## 2023-05-28 RX ADMIN — PREGABALIN 100 MG: 100 CAPSULE ORAL at 08:20

## 2023-05-28 RX ADMIN — ONDANSETRON 4 MG: 2 INJECTION INTRAMUSCULAR; INTRAVENOUS at 17:54

## 2023-05-28 RX ADMIN — OXYCODONE HYDROCHLORIDE 5 MG: 5 TABLET ORAL at 20:59

## 2023-05-28 RX ADMIN — ROCURONIUM BROMIDE 50 MG: 10 INJECTION, SOLUTION INTRAVENOUS at 17:11

## 2023-05-28 RX ADMIN — SENNOSIDES 8.6 MG: 8.6 TABLET, FILM COATED ORAL at 23:05

## 2023-05-28 RX ADMIN — ACETAMINOPHEN 975 MG: 325 TABLET ORAL at 05:00

## 2023-05-28 RX ADMIN — LIDOCAINE HYDROCHLORIDE 50 MG: 10 INJECTION, SOLUTION EPIDURAL; INFILTRATION; INTRACAUDAL; PERINEURAL at 17:11

## 2023-05-28 RX ADMIN — DEXAMETHASONE SODIUM PHOSPHATE 10 MG: 10 INJECTION, SOLUTION INTRAMUSCULAR; INTRAVENOUS at 17:54

## 2023-05-28 RX ADMIN — FENTANYL CITRATE 50 MCG: 50 INJECTION INTRAMUSCULAR; INTRAVENOUS at 19:07

## 2023-05-28 RX ADMIN — ACETAMINOPHEN 975 MG: 325 TABLET ORAL at 23:05

## 2023-05-28 RX ADMIN — TRANEXAMIC ACID 1 G: 100 INJECTION INTRAVENOUS at 17:28

## 2023-05-28 RX ADMIN — CEFAZOLIN SODIUM 2000 MG: 2 SOLUTION INTRAVENOUS at 23:07

## 2023-05-28 RX ADMIN — NEOSTIGMINE METHYLSULFATE 4 MG: 1 INJECTION INTRAVENOUS at 18:25

## 2023-05-28 RX ADMIN — PREGABALIN 100 MG: 100 CAPSULE ORAL at 20:59

## 2023-05-28 RX ADMIN — SODIUM CHLORIDE, SODIUM LACTATE, POTASSIUM CHLORIDE, AND CALCIUM CHLORIDE 75 ML/HR: .6; .31; .03; .02 INJECTION, SOLUTION INTRAVENOUS at 19:08

## 2023-05-28 RX ADMIN — MONTELUKAST 10 MG: 10 TABLET, FILM COATED ORAL at 23:05

## 2023-05-28 RX ADMIN — SODIUM CHLORIDE, SODIUM LACTATE, POTASSIUM CHLORIDE, AND CALCIUM CHLORIDE: .6; .31; .03; .02 INJECTION, SOLUTION INTRAVENOUS at 17:04

## 2023-05-28 RX ADMIN — FENTANYL CITRATE 50 MCG: 50 INJECTION INTRAMUSCULAR; INTRAVENOUS at 18:49

## 2023-05-28 RX ADMIN — FENTANYL CITRATE 50 MCG: 50 INJECTION, SOLUTION INTRAMUSCULAR; INTRAVENOUS at 17:11

## 2023-05-28 RX ADMIN — GLYCOPYRROLATE 0.6 MG: 0.2 INJECTION, SOLUTION INTRAMUSCULAR; INTRAVENOUS at 18:25

## 2023-05-28 RX ADMIN — ACETAMINOPHEN 975 MG: 325 TABLET ORAL at 00:15

## 2023-05-28 RX ADMIN — OXYCODONE HYDROCHLORIDE 5 MG: 5 TABLET ORAL at 03:08

## 2023-05-28 RX ADMIN — PROPOFOL 100 MG: 10 INJECTION, EMULSION INTRAVENOUS at 17:11

## 2023-05-28 RX ADMIN — SODIUM CHLORIDE, SODIUM LACTATE, POTASSIUM CHLORIDE, AND CALCIUM CHLORIDE 75 ML/HR: .6; .31; .03; .02 INJECTION, SOLUTION INTRAVENOUS at 00:15

## 2023-05-28 RX ADMIN — OXYCODONE HYDROCHLORIDE 5 MG: 5 TABLET ORAL at 11:43

## 2023-05-28 RX ADMIN — OXYCODONE HYDROCHLORIDE 5 MG: 5 TABLET ORAL at 08:26

## 2023-05-28 RX ADMIN — FENTANYL CITRATE 50 MCG: 50 INJECTION, SOLUTION INTRAMUSCULAR; INTRAVENOUS at 17:37

## 2023-05-28 RX ADMIN — METOPROLOL SUCCINATE 50 MG: 50 TABLET, EXTENDED RELEASE ORAL at 23:05

## 2023-05-28 RX ADMIN — SODIUM CHLORIDE, SODIUM LACTATE, POTASSIUM CHLORIDE, AND CALCIUM CHLORIDE 75 ML/HR: .6; .31; .03; .02 INJECTION, SOLUTION INTRAVENOUS at 11:49

## 2023-05-28 NOTE — PROGRESS NOTES
[facility]  Progress Note  Name: Julius Baker  MRN: [id number]  Unit/Bed#: OR POOL I Date of Admission: 5/27/2023   Date of Service: 5/28/2023 I Hospital Day: 1    Assessment/Plan   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 [facility])  Assessment & Plan  - ortho consult   - s/p OR 5/28    Fall  Assessment & Plan  - secondary to presumed right femur fracture and inability to bear weight  - 5/27 CT head, c spine, and CAP without obvious injury  - PT/OT and CM consult for dispo planning    PAF (paroxysmal atrial fibrillation) ([facility] [address] )  Assessment & Plan  - Hold home warfarin for now pending ortho eval  - Continue 25 mg metoprolol XL daily    Mixed hyperlipidemia  Assessment & Plan  - substitute 40 mg pravastatin for 20 mg rosuvastatin daily as not on formulary    * Right hip pain-resolved as of 5/28/2023  Assessment & Plan  - Patient reports attempted to bear weight on her right leg and feeling a crack  - Unable to bear weight and severe pain with right hip flexion  - ortho consulted           Bowel Regimen: senna  VTE Prophylaxis:Enoxaparin (Lovenox)     Disposition:pt/ot     Subjective   Chief Complaint: R Leg pain    Subjective: Seen Postoperatively  With appropriate pain  No chest pain or SOB  No numbness or tingling       Objective   Vitals:   Temp:  [98 2 °F (36 8 °C)-98 7 °F (37 1 °C)] 98 3 °F (36 8 °C)  HR:  [60-71] 63  Resp:  [16-18] 16  BP: ()/(37-64) 112/52    I/O       05/26 0701  05/27 0700 05/27 0701  05/28 0700 05/28 0701  05/29 0700    I V  (mL/kg)   956 3 (13)    Total Intake(mL/kg)   956 3 (13)    Urine (mL/kg/hr)  825 600 (0 7)    Total Output  825 600    Net  -825 +356 3                  Physical Exam:   GENERAL APPEARANCE: NAD  NEURO: alert and oriented x3, motorsensory intact   HEENT: MMM, NC AT  CV: well perfused  LUNGS: no respiratory distress, no cyanosis  GI: soft, nontender, nondistended  : deferred  MSK: no gross deformity, dressing cdi  SKIN: warm, dry      Invasive Devices     Peripheral Intravenous Line  Duration           Peripheral IV 05/27/23 Left;Proximal;Ventral (anterior) Forearm 1 day          Drain  Duration           External Urinary Catheter <1 day          Airway  Duration           ETT  Cuffed 7 mm <1 day                      Lab Results: Results: I have personally reviewed all pertinent laboratory/tests results  Imaging: I have personally reviewed pertinent reports       Other Studies:

## 2023-05-28 NOTE — OCCUPATIONAL THERAPY NOTE
Occupational Therapy         Patient Name: Flakita Stratton  VMNLE'E Date: 5/28/2023 05/28/23 0700   OT Last Visit   OT Visit Date 05/28/23   Note Type   Note type Cancelled Session   Cancel Reasons Patient to operating room   Additional Comments Pt pending OR - will defer and address OT needs post op     [employer]

## 2023-05-28 NOTE — ANESTHESIA POSTPROCEDURE EVALUATION
Post-Op Assessment Note    CV Status:  Stable  Pain Score: 3    Pain management: adequate     Mental Status:  Alert and awake   Hydration Status:  Euvolemic   PONV Controlled:  Controlled   Airway Patency:  Patent      Post Op Vitals Reviewed: Yes      Staff: CRNA         No notable events documented      BP   150/87   Temp   97 6   Pulse  121   Resp   12   SpO2   96

## 2023-05-28 NOTE — PLAN OF CARE
Problem: PAIN - ADULT  Goal: Verbalizes/displays adequate comfort level or baseline comfort level  Description: Interventions:  - Encourage patient to monitor pain and request assistance  - Assess pain using appropriate pain scale  - Administer analgesics based on type and severity of pain and evaluate response  - Implement non-pharmacological measures as appropriate and evaluate response  - Consider cultural and social influences on pain and pain management  - Notify physician/advanced practitioner if interventions unsuccessful or patient reports new pain  Outcome: Progressing     Problem: INFECTION - ADULT  Goal: Absence or prevention of progression during hospitalization  Description: INTERVENTIONS:  - Assess and monitor for signs and symptoms of infection  - Monitor lab/diagnostic results  - Monitor all insertion sites, i e  indwelling lines, tubes, and drains  - Monitor endotracheal if appropriate and nasal secretions for changes in amount and color  - [place] appropriate cooling/warming therapies per order  - Administer medications as ordered  - Instruct and encourage patient and family to use good hand hygiene technique  - Identify and instruct in appropriate isolation precautions for identified infection/condition  Outcome: Progressing

## 2023-05-28 NOTE — OP NOTE
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, initial encounter [L85  8XXA, Z96 649]    Procedure(s):  Right - OPEN REDUCTION W/ INTERNAL FIXATION (ORIF) FEMUR- ORIF right paola-prosthetic hip fracture    Specimens:  * No specimens in log *    Estimated Blood Loss:   Minimal    Drains:  External Urinary Catheter (Active)   Collection Container Canister and suction tubing (For Female) 23 0015   Output (mL) 600 mL 23 1301   Number of days: 0       Anesthesia Type:   General     Operative Indications: In brief this is a 77-year-old female who ambulates with a cane who had a mechanical fall complaining of her right thigh pain and inability to bear weight, she sustained a [place] B right femoral fracture, she has history of right total hip arthroplasty revision done multiple years ago  Operative Findings:  Right femur periprosthetic fracture status post ORIF with cables x3    Complications:   None    Procedure and Technique:  The patient was identified in the preop holding area and taken to the operating room in her hospital stretcher  She then underwent general anesthesia as per the anesthesiology team   She was then transferred to the operating table and placed in the lateral decub position with the right hip up  Axillary roll was placed  Bony prominences were well-padded  The right lower extremity was prepped and draped in usual sterile orthopedic fashion    A timeout was performed to confirm the patient by name, date of birth, correct extremity, correct laterality and correct procedure, antibiotics were addressed, implants were available in the room; all parties were in agreement to proceed  Using her prior lateral incision in the thigh, dissection was taken down to the level of the IT band, this was split  The vastus lateralis muscle was then elevated from the intermuscular septum revealing femoral shaft fracture  Then utilizing cable passers, 3 cerclage cables from [employer] were placed along the femoral shaft periprosthetic fracture, they were tensioned  Fluoroscopy images were taken revealing appropriate position of the cerclage cables  The wound was thoroughly irrigated with, 1 g of vancomycin powder was applied to the wound  The vastus lateralis muscle was loosely repaired back to the intermuscular septum with 1-0 Vicryl, the IT band was closed with 1-0 strata fix, the skin was closed with 2-0 Vicryl in the subcu layer and then reinforced with staples  Mepilex dressing was applied  The patient was then transferred to her hospital stretcher and anesthesia was reversed, she was extubated successfully and transferred to PACU in stable condition  Dr Dionte Melendez was present for the entire procedure  Postoperatively patient will require 2 doses of Ancef for surgical prophylaxis, pain control as needed, she will require DVT prophylaxis which can be her home warfarin, she will require PT OT evaluations, she will be allowed to be weightbearing as tolerated to the right lower extremity with the use of a walker    Follow-up in 2 weeks with Dr Dionte Melendez in the office for new x-rays and staple removal     Patient Disposition:  extubated and stable            SIGNATURE: Antonia Hernandez MD  DATE: May 28, 2023  TIME: 6:41 PM

## 2023-05-28 NOTE — PROGRESS NOTES
Progress Note - Orthopedics   Rima Hernandez 77 y o  female MRN: [id number]  Unit/Bed#: [facility] 623-01      Subjective:    77 y  o female with right siri-prosthetic hip fx  No acute events, no new complaints  Patient doing well  Pain well controlled  Denies fevers, chills, CP, SOB, N/V, numbness or tingling  Patient reports no issues with urination or bowel movements  Patient states she is ready for surgery      Labs:  0   Lab Value Date/Time    CRP <3 0 10/11/2018 1435    ESR 18 10/11/2018 1435    HCT 34 6 (L) 05/28/2023 0445    HCT 39 0 05/27/2023 0854    HCT 37 05/27/2023 0854    HCT 40 4 04/11/2023 0059    HGB 11 0 (L) 05/28/2023 0445    HGB 12 4 05/27/2023 0854    HGB 12 6 05/27/2023 0854    HGB 12 9 04/11/2023 0059    INR 1 49 (H) 05/28/2023 0445    WBC 7 22 05/28/2023 0445    WBC 7 02 05/27/2023 0854    WBC 7 10 04/11/2023 0059       Meds:    Current Facility-Administered Medications:   •  acetaminophen (TYLENOL) tablet 975 mg, 975 mg, Oral, Q6H [address], Ramakrishna Robert MD, 975 mg at 05/28/23 0500  •  enoxaparin (LOVENOX) subcutaneous injection 30 mg, 30 mg, Subcutaneous, Q12H [address], Ramakrishna Robert MD, 30 mg at 05/27/23 2119  •  lactated ringers infusion, 75 mL/hr, Intravenous, Continuous, Renny Alarcon MD, Last Rate: 75 mL/hr at 05/28/23 0015, 75 mL/hr at 05/28/23 0015  •  metoprolol succinate (TOPROL-XL) 24 hr tablet 50 mg, 50 mg, Oral, HS, Ramakrishna Robert MD  •  montelukast (SINGULAIR) tablet 10 mg, 10 mg, Oral, HS, Ramakrishna Robert MD, 10 mg at 05/27/23 2119  •  oxyCODONE (ROXICODONE) IR tablet 5 mg, 5 mg, Oral, Q4H PRN, Ramakrishna Robert MD, 5 mg at 05/28/23 0308  •  oxyCODONE (ROXICODONE) split tablet 2 5 mg, 2 5 mg, Oral, Q4H PRN, Ramakrishna Robert MD, 2 5 mg at 05/27/23 1220  •  pravastatin (PRAVACHOL) tablet 40 mg, 40 mg, Oral, Daily With Pablo Andres MD, 40 mg at 05/27/23 1753  •  pregabalin (LYRICA) capsule 100 mg, 100 mg, Oral, BID, Ramakrishna Robert "MD, 100 mg at 05/27/23 6418    Blood Culture:   No results found for: \"BLOODCX\"    Wound Culture:   No results found for: \"WOUNDCULT\"    Ins and Outs:  I/O last 24 hours: In: -   Out: 825 [Urine:825]          Physical:  Vitals:    05/28/23 0301   BP: (!) 98/42   Pulse: 60   Resp: 18   Temp:    SpO2: 95%     Musculoskeletal: right Lower Extremity     · Skin warm, dry   No erythema or ecchymosis  · TTP around hip  · Sensation intact to saphenous, sural, tibial, superficial peroneal nerve, and deep peroneal  · Motor intact to +FHL/EHL, +ankle dorsi/plantar flexion  · 2+ DP pulse, symmetric bilaterally  · Digits warm and well perfused  · Capillary refill < 2 seconds    Assessment:    77 y  o female with right siri-prosthetic hip fx   Plan for OR today     Plan:  · NWB RLE  · To OR today  · Will monitor for ABLA and administer IVF/prbc as indicated for Greater than 2 gram drop or Hgb < 7  · PT/OT  · Pain control  · DVT ppx: hold for OR  · Dispo: Ortho will follow    Azeem Herrmann MD      "

## 2023-05-28 NOTE — ANESTHESIA PREPROCEDURE EVALUATION
Procedure:  OPEN REDUCTION W/ INTERNAL FIXATION (ORIF) FEMUR- ORIF right siri-prosthetic hip fracture (Right: Leg Upper)    ECHO (07/29/22): Interpretation Summary  •  Left Ventricle: Left ventricular cavity size is normal  Wall thickness is mildly increased  There is mild concentric hypertrophy  The left ventricular ejection fraction is 65% by visual estimation  Systolic function is normal  Although no diagnostic regional wall motion abnormality was identified, this possibility cannot be completely excluded on the basis of this study  Diastolic function is moderately abnormal, consistent with grade II (pseudonormal) relaxation  Left atrial filling pressure is elevated  •  Aortic Valve: There is mild to moderate stenosis  The aortic valve peak velocity is 2 35 m/s  The aortic valve mean gradient is 13 mmHg  The DVI is 0 49  AV valve area 1 37 cm2         LVOT diameter 1 9 cm      •  Tricuspid Valve: There is mild regurgitation  The right ventricular systolic pressure is moderately to severely elevated  The estimated right ventricular systolic pressure is 61 44 mmHg       Relevant Problems   ANESTHESIA (within normal limits)      CARDIO   (+) Mixed hyperlipidemia   (+) Non-rheumatic aortic stenosis   (+) Other chest pain   (+) PAF (paroxysmal atrial fibrillation) (HCC)   (+) Pure hypercholesterolemia      ENDO (within normal limits)      GI/HEPATIC (within normal limits)      /RENAL (within normal limits)      GYN (within normal limits)      HEMATOLOGY (within normal limits)      MUSCULOSKELETAL (within normal limits)      NEURO/PSYCH (within normal limits)      PULMONARY (within normal limits)      Other   (+) Fall   (+) Right hip pain   (+) Spinal stenosis of lumbar region      Lab Results   Component Value Date    HCT 34 6 (L) 05/28/2023    HGB 11 0 (L) 05/28/2023    MCV 96 05/28/2023     05/28/2023    WBC 7 22 05/28/2023     Lab Results   Component Value Date    AGAP 1 (L) 05/28/2023    ALKPHOS 59 05/27/2023    ALT 30 05/27/2023    AST 24 05/27/2023    BUN 12 05/28/2023    CALCIUM 8 3 05/28/2023     05/28/2023    CO2 27 05/28/2023    CREATININE 0 60 05/28/2023    EGFR 86 05/28/2023    GLUC 87 05/28/2023    GLUF 87 04/19/2019    K 4 0 05/28/2023    SODIUM 136 05/28/2023    TBILI 0 31 05/27/2023    TP 7 0 05/27/2023     Lab Results   Component Value Date    PTT 33 05/27/2023     Lab Results   Component Value Date    INR 1 49 (H) 05/28/2023    INR 2 33 (H) 05/27/2023    INR 2 47 (H) 05/01/2023    PROTIME 18 3 (H) 05/28/2023    PROTIME 25 8 (H) 05/27/2023    PROTIME 27 0 (H) 05/01/2023         Physical Exam    Airway    Mallampati score: II  TM Distance: >3 FB  Neck ROM: full     Dental   No notable dental hx     Cardiovascular  Rhythm: regular, Rate: normal, Cardiovascular exam normal    Pulmonary  Pulmonary exam normal Breath sounds clear to auscultation,     Other Findings        Anesthesia Plan  ASA Score- 3     Anesthesia Type- general with ASA Monitors  Additional Monitors:   Airway Plan: ETT  Plan Factors-Exercise tolerance (METS): >4 METS  Chart reviewed  EKG reviewed  Imaging results reviewed  Existing labs reviewed  Patient summary reviewed  Patient is not a current smoker  Patient did not smoke on day of surgery  Obstructive sleep apnea risk education given perioperatively  Induction- intravenous  Postoperative Plan- Plan for postoperative opioid use  Informed Consent- Anesthetic plan and risks discussed with patient  I personally reviewed this patient with the CRNA  Discussed and agreed on the Anesthesia Plan with the CRNA  Shima Manzo

## 2023-05-28 NOTE — PHYSICAL THERAPY NOTE
Physical Therapy Cancellation Note    PT orders received and chart reviewed  Per ortho  Pt pending OR for ORIF of R periprosthetic hip fx  Will defer PT eval at this time and continue to follow and evaluate as appropriate      Celestina Gunderson, PT, DPT

## 2023-05-28 NOTE — DISCHARGE INSTR - AVS FIRST PAGE
Discharge Instructions - Orthopedics  Krystyna Xiong 77 y o  female MRN: [id number]  Unit/Bed#: PACU 13    Weight Bearing Status:                                           Weightbearing as tolerated in the right lower extremity with the use of assistive devices such as a walker    DVT prophylaxis  You may resume your warfarin    Pain:  Continue analgesics as directed    Dressing Instructions:   Please keep clean, dry and intact until follow up     Appt Instructions: If you do not have your appointment, please call the clinic at [phone] to follow-up with Dr Rose Marie Negrete in 2 weeks  Otherwise followup as scheduled     Contact the office sooner if you experience any increased numbness/tingling in the extremities

## 2023-05-29 PROCEDURE — 97163 PT EVAL HIGH COMPLEX 45 MIN: CPT

## 2023-05-29 PROCEDURE — 97167 OT EVAL HIGH COMPLEX 60 MIN: CPT

## 2023-05-29 PROCEDURE — NC001 PR NO CHARGE: Performed by: ORTHOPAEDIC SURGERY

## 2023-05-29 PROCEDURE — 99232 SBSQ HOSP IP/OBS MODERATE 35: CPT | Performed by: SURGERY

## 2023-05-29 RX ADMIN — ACETAMINOPHEN 975 MG: 325 TABLET ORAL at 11:19

## 2023-05-29 RX ADMIN — METOPROLOL SUCCINATE 50 MG: 50 TABLET, EXTENDED RELEASE ORAL at 21:52

## 2023-05-29 RX ADMIN — ACETAMINOPHEN 975 MG: 325 TABLET ORAL at 05:46

## 2023-05-29 RX ADMIN — SODIUM CHLORIDE, SODIUM LACTATE, POTASSIUM CHLORIDE, AND CALCIUM CHLORIDE 75 ML/HR: .6; .31; .03; .02 INJECTION, SOLUTION INTRAVENOUS at 09:59

## 2023-05-29 RX ADMIN — ACETAMINOPHEN 975 MG: 325 TABLET ORAL at 17:31

## 2023-05-29 RX ADMIN — PREGABALIN 100 MG: 100 CAPSULE ORAL at 08:02

## 2023-05-29 RX ADMIN — SENNOSIDES 8.6 MG: 8.6 TABLET, FILM COATED ORAL at 21:52

## 2023-05-29 RX ADMIN — OXYCODONE HYDROCHLORIDE 5 MG: 5 TABLET ORAL at 09:06

## 2023-05-29 RX ADMIN — ENOXAPARIN SODIUM 30 MG: 30 INJECTION SUBCUTANEOUS at 05:46

## 2023-05-29 RX ADMIN — PRAVASTATIN SODIUM 40 MG: 40 TABLET ORAL at 16:00

## 2023-05-29 RX ADMIN — PREGABALIN 100 MG: 100 CAPSULE ORAL at 17:31

## 2023-05-29 RX ADMIN — ENOXAPARIN SODIUM 30 MG: 30 INJECTION SUBCUTANEOUS at 21:52

## 2023-05-29 RX ADMIN — MONTELUKAST 10 MG: 10 TABLET, FILM COATED ORAL at 21:52

## 2023-05-29 RX ADMIN — OXYCODONE HYDROCHLORIDE 5 MG: 5 TABLET ORAL at 01:01

## 2023-05-29 RX ADMIN — Medication 2.5 MG: at 16:00

## 2023-05-29 RX ADMIN — CEFAZOLIN SODIUM 2000 MG: 2 SOLUTION INTRAVENOUS at 05:46

## 2023-05-29 NOTE — WOUND OSTOMY CARE
Progress Note - Wound   Kaden Crockett 77 y o  female MRN: [id number]  Unit/Bed#: [facility] 623-01 Encounter: [id number]        Assessment:   Patient seen for wound care consult, concerns for stage I to sacrum  Sacral/buttocks intact, with pink, blanching skin, no drainage present  B/L heels intact     Orders listed below and wound care will sign off, call or tiger text with questions  Bedside nurse updated of findings and orders  Flowsheets below with pictures and measurements  Skin care plans:  1-Hydraguard to bilateral sacrum, buttock and heels BID and PRN  2-Elevate heels to offload pressure  3-Ehob cushion in chair when out of bed  4-Moisturize skin daily with skin nourishing cream   5-Turn/reposition q2h or when medically stable for pressure re-distribution on skin                    Amy PARKN, RN, [employer]

## 2023-05-29 NOTE — PROGRESS NOTES
Progress Note - Orthopedics   Idris Peer 77 y o  female MRN: [id number]  [facility]      Subjective:    77 y  o female POD 1 ORIF R siri-prosthetic femur fx  No acute events, no new complaints  Patient doing well  Denies fevers, chills, CP, SOB, N/V, numbness or tingling  Patient reports no issues with urination or bowel movements  Patient states she is doing well      Labs:  0   Lab Value Date/Time    CRP <3 0 10/11/2018 1435    ESR 18 10/11/2018 1435    HCT 34 6 (L) 05/28/2023 0445    HCT 39 0 05/27/2023 0854    HCT 37 05/27/2023 0854    HCT 40 4 04/11/2023 0059    HGB 11 0 (L) 05/28/2023 0445    HGB 12 4 05/27/2023 0854    HGB 12 6 05/27/2023 0854    HGB 12 9 04/11/2023 0059    INR 1 49 (H) 05/28/2023 0445    WBC 7 22 05/28/2023 0445    WBC 7 02 05/27/2023 0854    WBC 7 10 04/11/2023 0059       Meds:    Current Facility-Administered Medications:   •  acetaminophen (TYLENOL) tablet 975 mg, 975 mg, Oral, Q6H [address], Winnie Alexandre MD, 975 mg at 05/29/23 0546  •  enoxaparin (LOVENOX) subcutaneous injection 30 mg, 30 mg, Subcutaneous, Q12H [address], Winnie Alexandre MD, 30 mg at 05/29/23 0546  •  lactated ringers bolus 1,000 mL, 1,000 mL, Intravenous, Once PRN **AND** lactated ringers bolus 1,000 mL, 1,000 mL, Intravenous, Once PRN, Winnie Alexandre MD  •  lactated ringers infusion, 75 mL/hr, Intravenous, Continuous, Winnie Alexandre MD, Last Rate: 75 mL/hr at 05/28/23 1908, 75 mL/hr at 05/28/23 1908  •  metoprolol succinate (TOPROL-XL) 24 hr tablet 50 mg, 50 mg, Oral, HS, Haven Crow D Henry Meneses, MD, 50 mg at 05/28/23 2305  •  montelukast (SINGULAIR) tablet 10 mg, 10 mg, Oral, HS, Winnie Alexandre MD, 10 mg at 05/28/23 2305  •  oxyCODONE (ROXICODONE) IR tablet 5 mg, 5 mg, Oral, Q4H PRN, Winnie Alexandre MD, 5 mg at 05/29/23 0101  •  oxyCODONE (ROXICODONE) split tablet 2 5 mg, 2 5 mg, Oral, Q4H PRN, Winnie Alexandre MD, 2 5 mg at 05/27/23 1220  •  pravastatin (PRAVACHOL) tablet 40 mg, 40 mg, Oral, Daily With "Gerardo Rasheed MD, 40 mg at 05/27/23 1753  •  pregabalin (LYRICA) capsule 100 mg, 100 mg, Oral, BID, Jose L Riley MD, 100 mg at 05/28/23 2059  •  senna (SENOKOT) tablet 8 6 mg, 1 tablet, Oral, HS, Sylvie Paris MD, 8 6 mg at 05/28/23 2305  •  sodium chloride 0 9 % bolus 1,000 mL, 1,000 mL, Intravenous, Once PRN **AND** sodium chloride 0 9 % bolus 1,000 mL, 1,000 mL, Intravenous, Once PRN, Jose L Riley MD    Blood Culture:   No results found for: \"BLOODCX\"    Wound Culture:   No results found for: \"WOUNDCULT\"    Ins and Outs:  I/O last 24 hours: In: 1906 3 [I V :1906 3]  Out: 2275 [Urine:2175; Blood:100]          Physical:  Vitals:    05/29/23 0315   BP: 122/60   Pulse: 90   Resp: 16   Temp: 97 9 °F (36 6 °C)   SpO2: 94%     Musculoskeletal: right Lower Extremity  · Skin warm, dry   No erythema or ecchymosis  · Dressing c/d/i  · TTP siri-incisionally  · Sensation intact to saphenous, sural, tibial, superficial peroneal nerve, and deep peroneal  · Motor intact to +FHL/EHL, +ankle dorsi/plantar flexion  · 2+ DP pulse, symmetric bilaterally  · Digits warm and well perfused  · Capillary refill < 2 seconds    Assessment:    77 y  o female POD 1 ORIF R siri-prosthetic femur fx   Patient doing well       Plan:  · WBAT RLE  · Will monitor for ABLA and administer IVF/prbc as indicated for Greater than 2 gram drop or Hgb < 7  · PT/OT  · Pain control  · DVT ppx LVX, okay to resume warfarin: trauma managing  · Dispo: Ortho will follow    Mabel Reyna MD      "

## 2023-05-29 NOTE — PLAN OF CARE
Problem: Potential for Falls  Goal: Patient will remain free of falls  Description: INTERVENTIONS:  - Educate patient/family on patient safety including physical limitations  - Instruct patient to call for assistance with activity   - Consult OT/PT to assist with strengthening/mobility   - Keep Call bell within reach  - Keep bed low and locked with side rails adjusted as appropriate  - Keep care items and personal belongings within reach  - Initiate and maintain comfort rounds  - Make Fall Risk Sign visible to staff  - Offer Toileting   - Obtain necessary fall risk management equipment  - Apply yellow socks and bracelet for high fall risk patients  - Consider moving patient to room near nurses station  Outcome: Progressing     Problem: MOBILITY - ADULT  Goal: Maintain or return to baseline ADL function  Description: INTERVENTIONS:  -  Assess patient's ability to carry out ADLs; assess patient's baseline for ADL function and identify physical deficits which impact ability to perform ADLs (bathing, care of mouth/teeth, toileting, grooming, dressing, etc )  - Assess/evaluate cause of self-care deficits   - Assess range of motion  - Assess patient's mobility; develop plan if impaired  - Assess patient's need for assistive devices and provide as appropriate  - Encourage maximum independence but intervene and supervise when necessary  - Involve family in performance of ADLs  - Assess for home care needs following discharge   - Consider OT consult to assist with ADL evaluation and planning for discharge  - Provide patient education as appropriate  Outcome: Progressing  Goal: Maintains/Returns to pre admission functional level  Description: INTERVENTIONS:  - Perform BMAT or MOVE assessment daily    - Set and communicate daily mobility goal to care team and patient/family/caregiver     - Collaborate with rehabilitation services on mobility goals if consulted  - Reposition patient   - Out of bed for toileting  - Record patient progress and toleration of activity level   Outcome: Progressing     Problem: PAIN - ADULT  Goal: Verbalizes/displays adequate comfort level or baseline comfort level  Description: Interventions:  - Encourage patient to monitor pain and request assistance  - Assess pain using appropriate pain scale  - Administer analgesics based on type and severity of pain and evaluate response  - Implement non-pharmacological measures as appropriate and evaluate response  - Consider cultural and social influences on pain and pain management  - Notify physician/advanced practitioner if interventions unsuccessful or patient reports new pain  Outcome: Progressing     Problem: INFECTION - ADULT  Goal: Absence or prevention of progression during hospitalization  Description: INTERVENTIONS:  - Assess and monitor for signs and symptoms of infection  - Monitor lab/diagnostic results  - Monitor all insertion sites, i e  indwelling lines, tubes, and drains  - Monitor endotracheal if appropriate and nasal secretions for changes in amount and color  - [place] appropriate cooling/warming therapies per order  - Administer medications as ordered  - Instruct and encourage patient and family to use good hand hygiene technique  - Identify and instruct in appropriate isolation precautions for identified infection/condition  Outcome: Progressing     Problem: SAFETY ADULT  Goal: Patient will remain free of falls  Description: INTERVENTIONS:  - Educate patient/family on patient safety including physical limitations  - Instruct patient to call for assistance with activity   - Consult OT/PT to assist with strengthening/mobility   - Keep Call bell within reach  - Keep bed low and locked with side rails adjusted as appropriate  - Keep care items and personal belongings within reach  - Initiate and maintain comfort rounds  - Make Fall Risk Sign visible to staff  - Offer Toileting   - Obtain necessary fall risk management equipment  - Apply yellow socks and bracelet for high fall risk patients  - Consider moving patient to room near nurses station  Outcome: Progressing  Goal: Maintain or return to baseline ADL function  Description: INTERVENTIONS:  -  Assess patient's ability to carry out ADLs; assess patient's baseline for ADL function and identify physical deficits which impact ability to perform ADLs (bathing, care of mouth/teeth, toileting, grooming, dressing, etc )  - Assess/evaluate cause of self-care deficits   - Assess range of motion  - Assess patient's mobility; develop plan if impaired  - Assess patient's need for assistive devices and provide as appropriate  - Encourage maximum independence but intervene and supervise when necessary  - Involve family in performance of ADLs  - Assess for home care needs following discharge   - Consider OT consult to assist with ADL evaluation and planning for discharge  - Provide patient education as appropriate  Outcome: Progressing  Goal: Maintains/Returns to pre admission functional level  Description: INTERVENTIONS:  - Perform BMAT or MOVE assessment daily    - Set and communicate daily mobility goal to care team and patient/family/caregiver     - Collaborate with rehabilitation services on mobility goals if consulted  - Reposition patient  - Out of bed for toileting  - Record patient progress and toleration of activity level   Outcome: Progressing     Problem: DISCHARGE PLANNING  Goal: Discharge to home or other facility with appropriate resources  Description: INTERVENTIONS:  - Identify barriers to discharge w/patient and caregiver  - Arrange for needed discharge resources and transportation as appropriate  - Identify discharge learning needs (meds, wound care, etc )  - Arrange for interpretive services to assist at discharge as needed  - Refer to Case Management Department for coordinating discharge planning if the patient needs post-hospital services based on physician/advanced practitioner order or complex needs related to functional status, cognitive ability, or social support system  Outcome: Progressing     Problem: Knowledge Deficit  Goal: Patient/family/caregiver demonstrates understanding of disease process, treatment plan, medications, and discharge instructions  Description: Complete learning assessment and assess knowledge base  Interventions:  - Provide teaching at level of understanding  - Provide teaching via preferred learning methods  Outcome: Progressing     Problem: Nutrition/Hydration-ADULT  Goal: Nutrient/Hydration intake appropriate for improving, restoring or maintaining nutritional needs  Description: Monitor and assess patient's nutrition/hydration status for malnutrition  Collaborate with interdisciplinary team and initiate plan and interventions as ordered  Monitor patient's weight and dietary intake as ordered or per policy  Utilize nutrition screening tool and intervene as necessary  Determine patient's food preferences and provide high-protein, high-caloric foods as appropriate       INTERVENTIONS:  - Monitor oral intake, urinary output, labs, and treatment plans  - Assess nutrition and hydration status and recommend course of action  - Evaluate amount of meals eaten  - Assist patient with eating if necessary   - Allow adequate time for meals  - Recommend/ encourage appropriate diets, oral nutritional supplements, and vitamin/mineral supplements  - Order, calculate, and assess calorie counts as needed  - Recommend, monitor, and adjust tube feedings and TPN/PPN based on assessed needs  - Assess need for intravenous fluids  - Provide specific nutrition/hydration education as appropriate  - Include patient/family/caregiver in decisions related to nutrition  Outcome: Progressing

## 2023-05-29 NOTE — PLAN OF CARE
"  Problem: OCCUPATIONAL THERAPY ADULT  Goal: Performs self-care activities at highest level of function for planned discharge setting  See evaluation for individualized goals  Description: Treatment Interventions: ADL retraining, Functional transfer training, Endurance training, UE strengthening/ROM, Patient/family training, Compensatory technique education, Continued evaluation, Energy conservation, Activityengagement          See flowsheet documentation for full assessment, interventions and recommendations  Note: Limitation: Decreased ADL status, Decreased UE strength, Decreased Safe judgement during ADL, Decreased endurance, Decreased self-care trans, Decreased high-level ADLs  Prognosis: Fair  Assessment: Pt is a 77 y o  female seen for OT evaluation s/p admit to B on 5/27/2023 w/ Right hip pain  Pt presents to ED following standing from floor when feeling a \"pop\" in R hip followed by pain and collapse to floor in bathroom  Pt found to have [place]-prosthetic fracture of femur following total hip arthroplasty and is now s/p Right ORIF femur and is WBAT to RLE  Comorbidities affecting pt's functional performance at time of assessment include: Chronic kidney disease, Colitis, and Hyperlipidemia  Personal factors affecting pt at time of IE include:steps to enter environment, limited home support, difficulty performing ADLS, difficulty performing IADLS , limited insight into deficits, flat affect, decreased initiation and engagement  and health management   Prior to admission, pt was I with all ADLS and IADLS  Upon evaluation: Pt presents supine and is agreeable to OTIE, all vitals WNL  Pt requires overall mod - max A x2, 2* the following deficits impacting occupational performance: weakness, decreased strength, decreased balance, decreased tolerance, impaired problem solving, impulsivity, decreased safety awareness, increased pain, orthopedic restrictions and decreased coping skills   Pt resting in chair at " end of session with all needs in reach, alarm on, all lines in place and SCD's on  Pt to benefit from continued skilled OT tx while in the hospital to address deficits as defined above and maximize level of functional independence w ADL's and functional mobility  Occupational Performance areas to address include: grooming, bathing/shower, toilet hygiene, dressing, health maintenance, functional mobility, community mobility and clothing management  The patient's raw score on the AM-PAC Daily Activity inpatient short form is 16  , standardized score is 35 96  , less than 39 4  Patients at this level are likely to benefit from discharge to post-acute rehabilitation services  Please refer to the recommendation of the Occupational Therapist for safe discharge planning       OT Discharge Recommendation: Post acute rehabilitation services

## 2023-05-29 NOTE — CASE MANAGEMENT
Case Management Discharge Planning Note    Patient name Rubi Lies  Location [address] 623/PPHP 202-40 MRN [id number]  : 1945 Date 2023       Current Admission Date: 2023  Current Admission Diagnosis:Fall   Patient Active Problem List    Diagnosis Date Noted   • 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 ([facility] [address] ) 2023   • Fall 2023   • Non-rheumatic aortic stenosis 2023   • PAF (paroxysmal atrial fibrillation) ([facility] [address] ) 2023   • Postoperative visit 08/15/2022   • Incisional hernia, without obstruction or gangrene 2022   • Intestinal adhesions 2021   • Mixed hyperlipidemia 2020   • COVID-19 2020   • Other chest pain 2020   • Pure hypercholesterolemia 2017   • Spinal stenosis of lumbar region 2017      LOS (days): 2  Geometric Mean LOS (GMLOS) (days):   Days to GMLOS:     OBJECTIVE:  Risk of Unplanned Readmission Score: 12 77         Current admission status: Inpatient   Preferred Pharmacy:   [address] [facility] - [id number] [address]  Phone: [phone] Fax: [phone]    Primary Care Provider: Delia Lazaro DO    Primary Insurance: MEDICARE  Secondary Insurance: [address]  DETAILS:    CM informed pt of STR rec, she will think about it and give CM a final answer tomorrow

## 2023-05-29 NOTE — PROGRESS NOTES
"[address]  Progress Note  Name: Nancy Li  MRN: [id number]  Unit/Bed#: [facility] 623-01 I Date of Admission: 5/27/2023   Date of Service: 5/29/2023 I Hospital Day: 2    Assessment/Plan   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 [facility])  Assessment & Plan  - ortho consult   - s/p OR 5/28  POD #1  PT/OT  Pain control    Fall  Assessment & Plan  - secondary to presumed right femur fracture and inability to bear weight  - 5/27 CT head, c spine, and CAP without obvious injury  - PT/OT and CM consult for dispo planning    PAF (paroxysmal atrial fibrillation) ([facility] [address] )  Assessment & Plan  - Hold home warfarin for now pending ortho eval  - Continue 25 mg metoprolol XL daily    Mixed hyperlipidemia  Assessment & Plan  - substitute 40 mg pravastatin for 20 mg rosuvastatin daily as not on formulary             Bowel Regimen: [place]  VTE Prophylaxis:Enoxaparin (Lovenox)     Disposition: rehab    Subjective   Chief Complaint: hip soreness    Subjective: \" Good Morning\"     Objective   Vitals:   Temp:  [97 5 °F (36 4 °C)-98 8 °F (37 1 °C)] 98 8 °F (37 1 °C)  HR:  [] 74  Resp:  [15-20] 15  BP: ()/(52-88) 115/55    I/O       05/27 0701  05/28 0700 05/28 0701  05/29 0700 05/29 0701  05/30 0700    I V  (mL/kg)  1906 3 (25 8) 1113 8 (15 1)    IV Piggyback   50    Total Intake(mL/kg)  1906 3 (25 8) 1163 8 (15 8)    Urine (mL/kg/hr) 825 1350 (0 8)     Blood  100     Total Output 825 1450     Net -825 +456 3 +1163 8                  Physical Exam:   GENERAL APPEARANCE: comfortable  NEURO: intact, GCS - 15  HEENT: EOm's intact  CV: RRR< no coomplaints of chest pain  LUNGS: CTA bilaterally, no shortness of breath  GI: tolerating a diet  : voiding  MSK: moving all extremities  SKIN: warma nd dry    Invasive Devices     Peripheral Intravenous Line  Duration           Peripheral IV 05/27/23 Left;Proximal;Ventral (anterior) Forearm 2 days    Peripheral IV 05/29/23 " Left;Ventral (anterior) Forearm <1 day          Drain  Duration           External Urinary Catheter 1 day                      Lab Results: check labs in am  Imaging: none  Other Studies: none

## 2023-05-29 NOTE — PHYSICAL THERAPY NOTE
Physical Therapy Evaluation    Patient's Name: Christine Cha    Admitting Diagnosis  Right hip pain [M25 551]  Fall, initial encounter [W19  XXXA]  Paola-prosthetic fracture of femur following total hip arthroplasty, initial encounter [V67  Blase Harm  Other injury of unspecified body region, initial encounter [T14  8XXA]    Problem List  Patient Active Problem List   Diagnosis    Spinal stenosis of lumbar region    Mixed hyperlipidemia    COVID-19    Other chest pain    Intestinal adhesions    Incisional hernia, without obstruction or gangrene    Postoperative visit    Non-rheumatic aortic stenosis    PAF (paroxysmal atrial fibrillation) ([address] )    Fall    Pure hypercholesterolemia    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 [facility])       Past Medical History  Past Medical History:   Diagnosis Date    Chronic kidney disease     Horse shoe kidney    Colitis     Hyperlipidemia        Past Surgical History  Past Surgical History:   Procedure Laterality Date    ABDOMINAL ADHESION SURGERY N/A 11/27/2021    Procedure: LYSIS ADHESIONS;  Surgeon: Lamar Doyle MD;  [facility];  Service: General    BACK SURGERY      2 rods placed in back    COLON SURGERY      COLONOSCOPY N/A 5/4/2018    Procedure: COLONOSCOPY;  Surgeon: Ira Parker MD;  Location: [facility];   Service: Colorectal    JOINT REPLACEMENT Bilateral     LAPAROTOMY N/A 11/27/2021    Procedure: LAPAROTOMY EXPLORATORY; EXPLANTATION OF MESH;  Surgeon: Lamar Doyle MD;  Location[facility];  Service: General    AR REPAIR FIRST ABDOMINAL WALL HERNIA N/A 7/27/2022    Procedure: REPAIR HERNIA INCISIONAL, LYSIS OF ADHESIONS, EXPLANTATION OF MESH, REPAIR OF ENTEROTOMY;  Surgeon: Felicia Dodge MD;  Location[facility];  Service: General        05/29/23 0933   PT Last Visit   PT Visit Date 05/29/23   Note Type   Note type Evaluation   Pain Assessment   Pain Assessment Tool 0-10   Pain Score 4   Pain Location/Orientation Orientation: Right;Location: Hip   Hospital Pain Intervention(s) Cold applied;Relaxation technique  (RN pre-medicated for pain)   Restrictions/Precautions   Weight Bearing Precautions Per Order Yes   RLE Weight Bearing Per Order WBAT   Other Precautions Chair Alarm; Bed Alarm;Multiple lines; Fall Risk;Pain   Home Living   Type of [address] One level  (2 BLAS + 2 steps down to sunken living room)   Bathroom Shower/Tub Walk-in shower   Bathroom Toilet Standard   Bathroom Equipment Grab bars in shower;Commode; Shower chair;Grab bars around toilet   Home Equipment Walker;Cane;Wheelchair-manual   Prior Function   Level of [place] Independent with ADLs; Independent with functional mobility; Independent with IADLS  (reports being I w/o AD, uses RW prn (mostly at night when getting up to go to the bathroom))   Lives With Alone   IADLs Independent with driving; Independent with meal prep; Independent with medication management  (gardens)   Falls in the last 6 months 1 to 4  (2)   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)   General   Family/Caregiver Present No   Cognition   Arousal/Participation Alert   Orientation Level Oriented X4   Comments anxious   Subjective   Subjective Pt agreeable to mobilize  RLE Assessment   RLE Assessment   (grossly 2/5)   LLE Assessment   LLE Assessment WFL   Coordination   Movements are Fluid and Coordinated 0   Coordination and Movement Description slow, guarded, antalgic   Bed Mobility   Supine to Sit 3  Moderate assistance   Additional items Assist x 2;HOB elevated; Bedrails; Increased time required;Verbal cues;LE management   Sit to Supine Unable to assess   Additional Comments Pt greeted in supine  Transfers   Sit to Stand 2  Maximal assistance   Additional items Assist x 2; Increased time required;Verbal cues  (elevated bed)   Stand to Sit 2  Maximal assistance   Additional items Assist x 2; Increased time required;Verbal cues; Bed elevated   Stand pivot 2  Maximal assistance   Additional items Assist x 2; Increased time required;Verbal cues   Additional Comments 3 sit<>stands performed from elevated bed  1st attempt w/ RW; pt able to take 2 small steps towards chair but then panicked, began screaming that she was falling, unable to redirect pt w/ cues so assisted back onto bed  Follow up sit<>stands w/ B HHA  Successful stand pivot to chair on 3rd attempt  Ambulation/Elevation   Gait pattern Excessively slow; Short stride; Antalgic; Improper Weight shift; Poor UE support;Decreased foot clearance   Gait Assistance 2  Maximal assist   Additional items Assist x 2;Verbal cues; Tactile cues   Assistive Device Rolling walker   Distance 2 small steps toward chair   Stair Management Assistance Not tested   Balance   Static Sitting Fair   Dynamic Sitting Fair -   Static Standing Poor   Dynamic Standing Poor -   Ambulatory Poor -  (RW)   Endurance Deficit   Endurance Deficit Yes   Endurance Deficit Description pain, fatigue   Activity Tolerance   Activity Tolerance Patient limited by pain; Patient limited by fatigue  (anxiety)   Medical Staff Made Aware JENNIFER Christine   Nurse Made Aware yes - cleared + updated   Assessment   Prognosis Fair   Problem List Decreased strength;Decreased range of motion;Decreased endurance; Impaired balance;Decreased mobility; Decreased safety awareness;Pain   Assessment Pt is 77 y o  female seen for a high complexity PT evaluation s/p admit to [facility] on 5/27/2023  Pt presenting s/p fall w/ R siri-prosthetic femur fx, now s/p ORIF 5/28/23 w/ orders for WBAT RLE  Please see above for other active problem list / PMH  PT now consulted to assess functional mobility and needs for safe d/c planning  Prior to admission, pt was I + active mostly w/o AD, admits to using RW prn (mostly when getting up at night to go to the bathroom), lives alone in a [facility] w/ 2 BLAS       Currently pt requires ModAx2 for bed skills; [address] for functional transfers; [address] for very limited ambulation w/ RW  Pt presents functioning below baseline and w/ overall mobility deficits 2* to: pain; decreased RLE strength; decreased RLE ROM; decreased endurance; impaired balance; gait deviations; anxiety; fatigue; impaired safety and judgement; bed/chair alarms; multiple lines  These impairments place pt at risk for falls  Pt will continue to benefit from skilled PT interventions to address stated impairments; to maximize functional potential; for ongoing pt/ family training; and DME needs  PT is currently recommending rehab  Pt agreeable to plan and goals as stated on evaluation  Barriers to Discharge Inaccessible home environment;Decreased caregiver support   Goals   Patient Goals less pain, stay in bed   STG Expiration Date 06/12/23   Short Term Goal #1 In 14 days pt will complete: 1) Bed mobility skills with MinAx1 to facilitate safe return to previous living environment  2) Functional transfers with MinAx1 to facilitate safe return to previous living environment  3) Ambulation with ' w CGA without LOB for safe ambulation in home/community environment  4) Improve balance scores by 1 grade to decrease fall risk  5) Improve LE strength grades by 1 to increase independence w/ all functional mobility, transfers and gait  6) PT for ongoing pt and family education; DME needs and D/C planning to promote highest level of function in least restrictive environment  7) Stair training up/ down at least 2 steps with most appropriate technique and CGA for safe access to previous living environment and to increase community access  PT Treatment Day 0   Plan   Treatment/Interventions Functional transfer training;LE strengthening/ROM; Elevations; Therapeutic exercise; Endurance training;Patient/family training;Equipment eval/education; Bed mobility;Gait training; Compensatory technique education;Spoke to nursing;OT  (balance training)   PT Frequency 3-5x/wk   Recommendation   PT Discharge Recommendation Post acute rehabilitation services   AM-PAC Basic Mobility Inpatient   Turning in Flat Bed Without Bedrails 2   Lying on Back to Sitting on Edge of Flat Bed Without Bedrails 2   Moving Bed to Chair 1   Standing Up From Chair Using Arms 1   Walk in Room 1   Climb 3-5 Stairs With Railing 1   Basic Mobility Inpatient Raw Score 8   Highest Level Of Mobility   JH-HLM Goal 3: Sit at edge of bed   JH-HLM Achieved 4: Move to chair/commode   End of Consult   Patient Position at End of Consult Bedside chair;Bed/Chair alarm activated; All needs within reach  (on waffle cushion, BLE elevated, CP to R hip, all lines in tact)     Teofilo Montoya, PT, DPT

## 2023-05-29 NOTE — PLAN OF CARE
Problem: PHYSICAL THERAPY ADULT  Goal: Performs mobility at highest level of function for planned discharge setting  See evaluation for individualized goals  Description: Treatment/Interventions: Functional transfer training, LE strengthening/ROM, Elevations, Therapeutic exercise, Endurance training, Patient/family training, Equipment eval/education, Bed mobility, Gait training, Compensatory technique education, Spoke to nursing, OT (balance training)          See flowsheet documentation for full assessment, interventions and recommendations  Note: Prognosis: Fair  Problem List: Decreased strength, Decreased range of motion, Decreased endurance, Impaired balance, Decreased mobility, Decreased safety awareness, Pain  Assessment: Pt is 77 y o  female seen for a high complexity PT evaluation s/p admit to [facility] on 5/27/2023  Pt presenting s/p fall w/ R siri-prosthetic femur fx, now s/p ORIF 5/28/23 w/ orders for WBAT RLE  Please see above for other active problem list / PMH  PT now consulted to assess functional mobility and needs for safe d/c planning  Prior to admission, pt was I + active mostly w/o AD, admits to using RW prn (mostly when getting up at night to go to the bathroom), lives alone in a [facility] w/ 2 BLAS  Currently pt requires ModAx2 for bed skills; MaxAx2 for functional transfers; [facility] for very limited ambulation w/ RW  Pt presents functioning below baseline and w/ overall mobility deficits 2* to: pain; decreased RLE strength; decreased RLE ROM; decreased endurance; impaired balance; gait deviations; anxiety; fatigue; impaired safety and judgement; bed/chair alarms; multiple lines  These impairments place pt at risk for falls  Pt will continue to benefit from skilled PT interventions to address stated impairments; to maximize functional potential; for ongoing pt/ family training; and DME needs  PT is currently recommending rehab   Pt agreeable to plan and goals as stated on evaluation  Barriers to Discharge: Inaccessible home environment, Decreased caregiver support     PT Discharge Recommendation: Post acute rehabilitation services    See flowsheet documentation for full assessment

## 2023-05-29 NOTE — OCCUPATIONAL THERAPY NOTE
Occupational Therapy Evaluation     Patient Name: Moni Briceño  QCKLH'[id number] Date: 5/29/2023  Problem List  Active Problems:    Mixed hyperlipidemia    PAF (paroxysmal atrial fibrillation) ([address] )    Fall    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 [facility])    Past Medical History  Past Medical History:   Diagnosis Date    Chronic kidney disease     Horse shoe kidney    Colitis     Hyperlipidemia      Past Surgical History  Past Surgical History:   Procedure Laterality Date    ABDOMINAL ADHESION SURGERY N/A 11/27/2021    Procedure: LYSIS ADHESIONS;  Surgeon: Mao Kim MD;  [facility];  Service: General    BACK SURGERY      2 rods placed in back    COLON SURGERY      COLONOSCOPY N/A 5/4/2018    Procedure: COLONOSCOPY;  Surgeon: Mahsa Medrano MD;  Location[facility]; Service: Colorectal    JOINT REPLACEMENT Bilateral     LAPAROTOMY N/A 11/27/2021    Procedure: LAPAROTOMY EXPLORATORY; EXPLANTATION OF MESH;  Surgeon: Mao Kim MD;  [facility];  Service: General    TN REPAIR FIRST ABDOMINAL WALL HERNIA N/A 7/27/2022    Procedure: REPAIR HERNIA INCISIONAL, LYSIS OF ADHESIONS, EXPLANTATION OF MESH, REPAIR OF ENTEROTOMY;  Surgeon: Anson Mendiola MD;  [facility];  Service: General             05/29/23 0934   OT Last Visit   OT Visit Date 05/29/23   Note Type   Note type Evaluation   Pain Assessment   Pain Score 4   Pain Location/Orientation Orientation: Right;Location: Leg   Restrictions/Precautions   Weight Bearing Precautions Per Order Yes   RLE Weight Bearing Per Order WBAT   Other Precautions Chair Alarm;Multiple lines; Fall Risk;Pain;WBS   Home Living   Type of [address] [place] [address] Av One level;Performs ADLs on one level;Stairs to enter with rails   Bathroom Shower/Tub Walk-in shower   Bathroom Toilet Standard   Bathroom Equipment Grab bars in shower; Shower chair   Bathroom Accessibility Accessible   Home Equipment Walker;Cane;Wheelchair-manual "  Prior Function   Level of [place] Independent with ADLs; Independent with functional mobility; Independent with IADLS   Lives With Alone   Receives Help From Family;Friend(s); Neighbor   IADLs Independent with driving; Independent with meal prep; Independent with medication management   Falls in the last 6 months 1 to 4  (2)   Vocational Retired   Lifestyle   Autonomy I with ADLS and IADLS +    Reciprocal Relationships supportive local sister in law and supportive neighbors   Service to Others retired   Intrinsic Gratification Enjoys gardening   Subjective   Subjective \"You dont understand the pain\"   ADL   Where Assessed Edge of bed   Grooming Assistance 5  Supervision/Setup   UB Bathing Assistance 4  Minimal Assistance   LB [address] 2  Maximal Assistance   [address]  [address] 2  Maximal [address]  2  Maximal Assistance   Bed Mobility   Supine to Sit 3  Moderate assistance   Additional items Assist x 2; Increased time required;LE management   Additional Comments OOB at end of session   Transfers   Sit to Stand 2  Maximal assistance   Additional items Assist x 2; Increased time required   Stand to Sit 2  Maximal assistance   Additional items Assist x 2; Increased time required   Stand pivot 2  Maximal assistance   Additional items Assist x 2; Increased time required   Additional Comments Attempted with RW - limited by pain and anxious like behaviors, require HHA x2 to drop arm chair   Functional Mobility   Functional Mobility 2  Maximal assistance   Additional Comments Ax2, few steps forward   Additional items Rolling walker   Balance   Static Sitting Fair   Dynamic Sitting Fair -   Static Standing Poor   Dynamic Standing Poor -   Ambulatory Poor -   Activity Tolerance   Activity Tolerance Patient limited by pain   Medical Staff Made Aware DPT, NSG Aware   Nurse Made Aware yes   RUE Assessment   RUE Assessment WFL   LUE " "Assessment   LUE Assessment WFL   Psychosocial   Psychosocial (WDL) X   Patient Behaviors/Mood Anxious   Cognition   Overall Cognitive Status WFL   Arousal/Participation Alert; Responsive; Cooperative   Attention Attends with cues to redirect   Orientation Level Oriented X4   Memory Decreased recall of precautions   Following Commands Follows one step commands with increased time or repetition   Comments Overall pleasant and cooperative, grows more anxious throguhout session and reports a fear of falling   Assessment   Limitation Decreased ADL status; Decreased UE strength;Decreased Safe judgement during ADL;Decreased endurance;Decreased self-care trans;Decreased high-level ADLs   Prognosis Fair   Assessment Pt is a 77 y o  female seen for OT evaluation s/p admit to [facility] on 5/27/2023 w/ Right hip pain  Pt presents to ED following standing from floor when feeling a \"pop\" in R hip followed by pain and collapse to floor in bathroom  Pt found to have Paola-prosthetic fracture of femur following total hip arthroplasty and is now s/p Right ORIF femur and is WBAT to RLE  Comorbidities affecting pt's functional performance at time of assessment include: Chronic kidney disease, Colitis, and Hyperlipidemia  Personal factors affecting pt at time of IE include:steps to enter environment, limited home support, difficulty performing ADLS, difficulty performing IADLS , limited insight into deficits, flat affect, decreased initiation and engagement  and health management   Prior to admission, pt was I with all ADLS and IADLS  Upon evaluation: Pt presents supine and is agreeable to OTIE, all vitals WNL  Pt requires overall mod - max A x2, 2* the following deficits impacting occupational performance: weakness, decreased strength, decreased balance, decreased tolerance, impaired problem solving, impulsivity, decreased safety awareness, increased pain, orthopedic restrictions and decreased coping skills   Pt resting in chair at end of " session with all needs in reach, alarm on, all lines in place and SCD's on  Pt to benefit from continued skilled OT tx while in the hospital to address deficits as defined above and maximize level of functional independence w ADL's and functional mobility  Occupational Performance areas to address include: grooming, bathing/shower, toilet hygiene, dressing, health maintenance, functional mobility, community mobility and clothing management  The patient's raw score on the AM-PAC Daily Activity inpatient short form is 16  , standardized score is 35 96  , less than 39 4  Patients at this level are likely to benefit from discharge to post-acute rehabilitation services  Please refer to the recommendation of the Occupational Therapist for safe discharge planning  Goals   Patient Goals To have less pain   LTG Time Frame 10-14   Long Term Goal #1 see below   Plan   Treatment Interventions ADL retraining;Functional transfer training; Endurance training;UE strengthening/ROM; Patient/family training; Compensatory technique education;Continued evaluation; Energy conservation; Activityengagement   Goal Expiration Date 06/12/23   OT Frequency 3-5x/wk   Recommendation   OT Discharge Recommendation Post acute rehabilitation services   AM[facility] Daily Activity Inpatient   Lower Body Dressing 2   Bathing 2   Toileting 2   Upper Body Dressing 3   Grooming 3   Eating 4   Daily Activity Raw Score 16   Daily Activity Standardized Score (Calc for Raw Score >=11) 35 96   AM-[facility] Applied Cognition Inpatient   Following a Speech/Presentation 4   Understanding Ordinary Conversation 4   Taking Medications 4   Remembering Where Things Are Placed or Put Away 4   Remembering List of 4-5 Errands 3   Taking Care of Complicated Tasks 3   Applied Cognition Raw Score 22   Applied Cognition Standardized Score 47 83     OT goals to be addressed in the next 14 days:    1) Pt will increase activity tolerance to G for 30 min txment sessions  2) Pt will complete UB/LB dressing/self care w/ mod I using adaptive device and DME as needed  3) Pt will complete bathing w/ Mod I w/ use of AE and DME as needed  4) Pt will complete toileting w/ mod I w/ G hygiene/thoroughness using DME as needed  5) Pt will improve functional transfers to Mod I on/off all surfaces using DME as needed w/ G balance/safety   6) Pt will improve functional mobility during ADL/IADL/leisure tasks to Mod I using DME as needed w/ G balance/safety   7) Pt will participate in simulated IADL management task with DME as needed to increase independence to  w/ G safety and endurance  8) Pt will demonstrate G carryover of pt/caregiver education and training as appropriate  9) Pt will demonstrate 100% carryover of energy conservation techniques t/o functional I/ADL/leisure tasks w/o cues s/p skilled education  10) Pt will independently identify and utilize 2-3 coping strategies to increase positive affect and promote overall well-being    11) Pt will engage in ongoing cognitive assessment w/ G participation to assist w/ safe d/c planning/recommendations

## 2023-05-29 NOTE — CASE MANAGEMENT
Case Management Assessment    Patient name Joaquin Reyes  Location [address] [place] [address] 623/PPHP [id number] MRN [id number]  : 1945 Date 2023       Current Admission Date: 2023  Current Admission Diagnosis:Fall   Patient Active Problem List    Diagnosis Date Noted   • 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 ([facility] [address] ) 2023   • Fall 2023   • Non-rheumatic aortic stenosis 2023   • PAF (paroxysmal atrial fibrillation) ([facility] [address] ) 2023   • Postoperative visit 08/15/2022   • Incisional hernia, without obstruction or gangrene 2022   • Intestinal adhesions 2021   • Mixed hyperlipidemia 2020   • COVID-19 2020   • Other chest pain 2020   • Pure hypercholesterolemia 2017   • Spinal stenosis of lumbar region 2017      LOS (days): 2  Geometric Mean LOS (GMLOS) (days):   Days to GMLOS:     OBJECTIVE:    Risk of Unplanned Readmission Score: 12 88         Current admission status: Inpatient       [facility] [place] [facility] [address] [address]  Phone: [phone] Fax: [phone]    Primary Care Provider: Karissa Mckeon DO    Primary Insurance: MEDICARE  Secondary Insurance: Glorya Nilda SHIELD    ASSESSMENT:  [address] [place] [address], [address] [facility] Representative - Sister In-Law   Primary Phone: [phone] (Home)                 Patient Information  Admitted from[de-identified] Home  Mental Status: Alert  During Assessment patient was accompanied by: Not accompanied during assessment  Assessment information provided by[de-identified] Patient  Primary Caregiver: Self  Support Systems: Family members  Home entry access options   Select all that apply : Stairs  Number of steps to enter home : 2  Type of Current Residence: [place]  Homeless/housing insecurity resource given?: N/A  Living Arrangements: Lives Alone  Activities of Daily Living Prior to Admission  Functional Status: Independent  Completes ADLs independently?: Yes  Ambulates independently?: Yes  Does patient use assisted devices?: Yes  Assisted Devices (DME) used: Sol Charter, Bedside Commode, Shower Chair, Straight Cane  Does patient currently own DME?: Yes  What DME does the patient currently own?: Sol Charter, Bedside Commode, Straight Cane, Shower Chair, Wheelchair (2 walkers, 3 canes)  Does patient have a history of Outpatient Therapy (PT/OT)?: No  Does the patient have a history of Short-Term Rehab?: No  Does patient have a history of HHC?: Yes  Patient Information Continued  Income Source: Pension/halfway  Does patient receive dialysis treatments?: No  Does patient have a history of substance abuse?: No  Does patient have a history of Mental Health Diagnosis?: No  Means of Transportation  Means of Transport to [facility][de-identified] Drives Self  Was application for public transport provided?: N/A

## 2023-05-30 LAB
ANION GAP SERPL CALCULATED.3IONS-SCNC: 0 MMOL/L (ref 4–13)
BASOPHILS # BLD AUTO: 0.03 THOUSANDS/ÂΜL (ref 0–0.1)
BASOPHILS NFR BLD AUTO: 0 % (ref 0–1)
BUN SERPL-MCNC: 14 MG/DL (ref 5–25)
CALCIUM SERPL-MCNC: 8.6 MG/DL (ref 8.3–10.1)
CHLORIDE SERPL-SCNC: 109 MMOL/L (ref 96–108)
CO2 SERPL-SCNC: 30 MMOL/L (ref 21–32)
CREAT SERPL-MCNC: 0.58 MG/DL (ref 0.6–1.3)
EOSINOPHIL # BLD AUTO: 0.02 THOUSAND/ÂΜL (ref 0–0.61)
EOSINOPHIL NFR BLD AUTO: 0 % (ref 0–6)
ERYTHROCYTE [DISTWIDTH] IN BLOOD BY AUTOMATED COUNT: 14.6 % (ref 11.6–15.1)
GFR SERPL CREATININE-BSD FRML MDRD: 87 ML/MIN/1.73SQ M
GLUCOSE SERPL-MCNC: 101 MG/DL (ref 65–140)
HCT VFR BLD AUTO: 28.5 % (ref 34.8–46.1)
HGB BLD-MCNC: 9 G/DL (ref 11.5–15.4)
IMM GRANULOCYTES # BLD AUTO: 0.04 THOUSAND/UL (ref 0–0.2)
IMM GRANULOCYTES NFR BLD AUTO: 0 % (ref 0–2)
LYMPHOCYTES # BLD AUTO: 1.99 THOUSANDS/ÂΜL (ref 0.6–4.47)
LYMPHOCYTES NFR BLD AUTO: 18 % (ref 14–44)
MCH RBC QN AUTO: 30.5 PG (ref 26.8–34.3)
MCHC RBC AUTO-ENTMCNC: 31.6 G/DL (ref 31.4–37.4)
MCV RBC AUTO: 97 FL (ref 82–98)
MONOCYTES # BLD AUTO: 1.5 THOUSAND/ÂΜL (ref 0.17–1.22)
MONOCYTES NFR BLD AUTO: 13 % (ref 4–12)
NEUTROPHILS # BLD AUTO: 7.68 THOUSANDS/ÂΜL (ref 1.85–7.62)
NEUTS SEG NFR BLD AUTO: 69 % (ref 43–75)
NRBC BLD AUTO-RTO: 0 /100 WBCS
PLATELET # BLD AUTO: 219 THOUSANDS/UL (ref 149–390)
PMV BLD AUTO: 9.2 FL (ref 8.9–12.7)
POTASSIUM SERPL-SCNC: 4.2 MMOL/L (ref 3.5–5.3)
RBC # BLD AUTO: 2.95 MILLION/UL (ref 3.81–5.12)
SODIUM SERPL-SCNC: 139 MMOL/L (ref 135–147)
WBC # BLD AUTO: 11.26 THOUSAND/UL (ref 4.31–10.16)

## 2023-05-30 PROCEDURE — 99232 SBSQ HOSP IP/OBS MODERATE 35: CPT | Performed by: SURGERY

## 2023-05-30 PROCEDURE — NC001 PR NO CHARGE: Performed by: ORTHOPAEDIC SURGERY

## 2023-05-30 PROCEDURE — 99223 1ST HOSP IP/OBS HIGH 75: CPT | Performed by: INTERNAL MEDICINE

## 2023-05-30 PROCEDURE — 97530 THERAPEUTIC ACTIVITIES: CPT

## 2023-05-30 PROCEDURE — 85025 COMPLETE CBC W/AUTO DIFF WBC: CPT | Performed by: NURSE PRACTITIONER

## 2023-05-30 PROCEDURE — 80048 BASIC METABOLIC PNL TOTAL CA: CPT | Performed by: NURSE PRACTITIONER

## 2023-05-30 PROCEDURE — 97116 GAIT TRAINING THERAPY: CPT

## 2023-05-30 PROCEDURE — 97110 THERAPEUTIC EXERCISES: CPT

## 2023-05-30 RX ORDER — TAFLUPROST OPTHALMIC 0 MG/.3ML
1 SOLUTION/ DROPS OPHTHALMIC DAILY
COMMUNITY

## 2023-05-30 RX ADMIN — MONTELUKAST 10 MG: 10 TABLET, FILM COATED ORAL at 22:09

## 2023-05-30 RX ADMIN — SODIUM CHLORIDE, SODIUM LACTATE, POTASSIUM CHLORIDE, AND CALCIUM CHLORIDE 75 ML/HR: .6; .31; .03; .02 INJECTION, SOLUTION INTRAVENOUS at 22:12

## 2023-05-30 RX ADMIN — ENOXAPARIN SODIUM 30 MG: 30 INJECTION SUBCUTANEOUS at 22:09

## 2023-05-30 RX ADMIN — ACETAMINOPHEN 975 MG: 325 TABLET ORAL at 18:09

## 2023-05-30 RX ADMIN — OXYCODONE HYDROCHLORIDE 5 MG: 5 TABLET ORAL at 13:52

## 2023-05-30 RX ADMIN — OXYCODONE HYDROCHLORIDE 5 MG: 5 TABLET ORAL at 09:31

## 2023-05-30 RX ADMIN — PREGABALIN 100 MG: 100 CAPSULE ORAL at 09:21

## 2023-05-30 RX ADMIN — ACETAMINOPHEN 975 MG: 325 TABLET ORAL at 05:27

## 2023-05-30 RX ADMIN — PRAVASTATIN SODIUM 40 MG: 40 TABLET ORAL at 16:00

## 2023-05-30 RX ADMIN — ACETAMINOPHEN 975 MG: 325 TABLET ORAL at 00:07

## 2023-05-30 RX ADMIN — SODIUM CHLORIDE, SODIUM LACTATE, POTASSIUM CHLORIDE, AND CALCIUM CHLORIDE 75 ML/HR: .6; .31; .03; .02 INJECTION, SOLUTION INTRAVENOUS at 12:15

## 2023-05-30 RX ADMIN — SENNOSIDES 8.6 MG: 8.6 TABLET, FILM COATED ORAL at 22:09

## 2023-05-30 RX ADMIN — ACETAMINOPHEN 975 MG: 325 TABLET ORAL at 12:09

## 2023-05-30 RX ADMIN — ENOXAPARIN SODIUM 30 MG: 30 INJECTION SUBCUTANEOUS at 09:21

## 2023-05-30 RX ADMIN — SODIUM CHLORIDE, SODIUM LACTATE, POTASSIUM CHLORIDE, AND CALCIUM CHLORIDE 75 ML/HR: .6; .31; .03; .02 INJECTION, SOLUTION INTRAVENOUS at 00:07

## 2023-05-30 RX ADMIN — PREGABALIN 100 MG: 100 CAPSULE ORAL at 18:10

## 2023-05-30 NOTE — PROGRESS NOTES
[address] [facility]  Progress Note  Name: Hilda Urbina  MRN: [id number]  Unit/Bed#: [facility] 623-01 I Date of Admission: 5/27/2023   Date of Service: 5/30/2023 I Hospital Day: 3    Assessment/Plan   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 [facility])  Assessment & Plan  - ortho consult   - s/p OR 5/28  POD #2  PT/OT  Pain control    Fall  Assessment & Plan  - secondary to presumed right femur fracture and inability to bear weight  - 5/27 CT head, c spine, and CAP without obvious injury  - PT/OT and CM consult for dispo planning    PAF (paroxysmal atrial fibrillation) ([facility] [address] )  Assessment & Plan  - Hold home warfarin for now pending ortho eval  - Continue 25 mg metoprolol XL daily    Mixed hyperlipidemia  Assessment & Plan  - substitute 40 mg pravastatin for 20 mg rosuvastatin daily as not on formulary    * Right hip pain-resolved as of 5/28/2023  Assessment & Plan  - Patient reports attempted to bear weight on her right leg and feeling a crack  - Unable to bear weight and severe pain with right hip flexion  - ortho consulted           Bowel Regimen: senokot  VTE Prophylaxis:Enoxaparin (Lovenox)     Disposition: rehab    Subjective   Chief Complaint: hip pain    Subjective: 77year old female was seen and examined at bedside  She is doing well and having minimal pain  Objective   Vitals:   Temp:  [98 7 °F (37 1 °C)-98 9 °F (37 2 °C)] 98 9 °F (37 2 °C)  HR:  [63-75] 63  Resp:  [16-20] 20  BP: ()/(39-56) 124/39    I/O       05/28 0701  05/29 0700 05/29 0701  05/30 0700    P  O   90    I V  (mL/kg) 1906 3 (25 8) 2173 8 (29 5)    IV Piggyback  50    Total Intake(mL/kg) 1906 3 (25 8) 2313 8 (31 4)    Urine (mL/kg/hr) 1350 (0 8) 800 (0 5)    Blood 100     Total Output 1450 800    Net +456 3 +1513 8                 Physical Exam:   GENERAL APPEARANCE: comfortable  NEURO: intact, GCS 15  HEENT: EOm's intact  CV: RRR< no coomplaints of chest pain  LUNGS: CTA bilaterally, no shortness of breath  GI: soft non tender  : voiding  MSK: moving all extremities  SKIN: warma and dry    Invasive Devices     Peripheral Intravenous Line  Duration           Peripheral IV 05/29/23 Left;Ventral (anterior) Forearm <1 day          Drain  Duration           External Urinary Catheter 2 days                      Lab Results: Results: I have personally reviewed all pertinent laboratory/tests results  Imaging: I have personally reviewed pertinent reports       Other Studies: none

## 2023-05-30 NOTE — PLAN OF CARE
Problem: PHYSICAL THERAPY ADULT  Goal: Performs mobility at highest level of function for planned discharge setting  See evaluation for individualized goals  Description: Treatment/Interventions: Functional transfer training, LE strengthening/ROM, Elevations, Therapeutic exercise, Endurance training, Patient/family training, Equipment eval/education, Bed mobility, Gait training, Compensatory technique education, Spoke to nursing, OT (balance training)          See flowsheet documentation for full assessment, interventions and recommendations  Outcome: Progressing  Note: Prognosis: Fair  Problem List: Decreased strength, Decreased range of motion, Decreased endurance, Impaired balance, Decreased mobility, Decreased safety awareness, Pain  Assessment: Pt remains limited overall by pain & RLE weakness as result of acute injury, but was able to make progress today, performing tasks with less assist overall compared to yesterday  She safely managed RW under her own power, and was able to successfully weight shift lateraly & unload BLEs during appropriate swing phases of gait to advance BLEs without need for assist to do so despite pt concerns regarding the same  Extensive education given in regards to improtance of mobiilty, participating in her rehab recovery & attempting to correct gait to reduce risk for falls s/p RLE surgery  She will benefit from rehab to address functional deficits & progress to PLOF  Barriers to Discharge: Inaccessible home environment, Decreased caregiver support     PT Discharge Recommendation: Post acute rehabilitation services    See flowsheet documentation for full assessment

## 2023-05-30 NOTE — CONSULTS
Consultation - Geriatric Medicine   Rolene Mary 77 y o  female MRN: [id number]  Unit/Bed#: [facility] 623-01 Encounter: [id number]      Assessment/Plan     Ambulatory dysfunction with fall  -pt reportedly attempting to kneel onto ground when felt right hip/thigh pain and collapse to ground from seated height or less   -(-) head strike (-) loss of consciousness  -In setting of chronic systemic anticoagulation with coumadin   -injuries as outlined below  -Requires use of cane for ambulation at baseline  -hx prior falls none in past six months   -high risk future falls due to age, hx fall, deconditioning/debility and unfamiliar environment   -encourage good body mechanics and assist with all transfers  -keep personal items and call bell close to prevent reaching  -maintain environment free of fall hazards  -encourage appropriate footwear and adequate lighting at all times when out of bed  -Consider home fall risk assessment and continuation of life alert system  on return home   -PT and OT following    Right periprosthetic hip fracture  -noted on right femur x-ray obtained on admission  -S/p ORIF with Ortho on 5/28  -continue acute multimodal pain control per geriatric pain protocol  -(+) ABLA noted  -Neurovascular checks per protocol  -PT/OT following     Acute pain due to trauma  -Current pain control per Geriatric pain protocol:  Tylenol 975mg Q8H scheduled  Roxicodone 2 5mg Q4H PRN moderate pain  Roxicodone 5mg Q4H PRN severe pain  -consider adjuncts such as lidocaine patch topically  -home lyrica continued on admission   -encourage addition of non-pharmacologic pain treatment including ice and frequent repositioning  -recommend aggressive  bowel regimen to prevent and treat constipation due to increased risk with acute pain and opiate pain medications,    Acute blood loss anemia  -evidenced by >2g drop in Hb from 12 4 to 9 0  -continue to monitor for acute bleeding  -tx with IVF and PRBC as indicated    Coagulopathy due to coumadin    -INR 2 33 on initial presentation reversed and held on admit now 1 49  -Coumadin remains on hold currently being anticoagulated with Lovenox  -resumption of Coumadin when cleared to safely do so     Afib   -Home regimen includes rate control metoprolol and anticoagulation with Coumadin  -Continue close outpatient follow-up with Cardiology    Lumbar spinal stenosis  -Chronic radiculopathy symptoms managed with Lyrica as outpatient  -Symptoms currently at baseline, monitor closely   -continue o/p f/u with Ortho, follows at [facility]  -PT/OT following     Cognitive screening   -Alert and oriented, denies memory or cognitive concerns  -Reportedly independent with ADLs and IADLs at baseline  -No prior cognitive testing on record for review  -no recent TSH or B12, could consider checking with routine labs  -[facility] obtained initial presentation personally reviewed, reveals at least mild to moderate chronic microangiopathic changes  -Encourage use of sensory assist devices such corrective lenses and hearing aids at all appropriate times reduce risk since impairment contributing to isolation, confusion, encephalopathy and more precipitous cognitive decline  -Encourage patient when physically, socially, cognitively active and engaged to maintain cognitive acuity    Impaired Vision  -recommend use of corrective lenses at all appropriate times  -encourage adequate lighting and encourage use of assistance with ambulation  -keep personal belongings close to person to avoid reaching  -encourage appropriate footwear at all times  -Consider large font for printed materials provided to patient    Deconditioning/debility/frailty   -Clinical frailty scale stage IV, vulnerable  -Multifactorial due to age, ambulatory dysfunction, lumbar spinal stenosis, A-fib and multiple additional chronic medical comorbidities now with fall and acute traumatic injury superimposed  -Continue optimization of chronic conditions and address acute metabolic derangements as arise  -Encourage well-balanced nutrition, consider nutritional supplements between meals if oral intake is poor  -Monitor for and treat any anxiety/mood/depression symptoms as may impact patient response to therapies as well as overall sense of wellbeing and quality of life  -Continue psychosocial supports     Delirium precautions  -Patient is high risk of delirium due to age, fall, traumatic injuries, acute pain, hospitalization   -Continue delirium precautions  -maintain normal sleep/wake cycle  -minimize overnight interruptions, group overnight vitals/labs/nursing checks as possible  -dim lights, close blinds and turn off tv to minimize stimulation and encourage sleep environment in evenings  -ensure that pain is well controlled  -monitor for fecal and urinary retention which may precipitate delirium  -encourage early mobilization and ambulation with assistance  -provide frequent reorientation and redirection as indicated and appropriate  -Minimize use of medications which may precipitate or worsen delirium such as tramadol, benzodiazepine, anticholinergics, and benadryl whenever possible  -encourage hydration and nutrition   -redirect unwanted behaviors as first line,    Home medication review   Personally confirmed with [id number] [employer] [phone]:    Tafluprost 0 0015% solution one drop each eye HS  Metoprolol succinate 50 Mg HS  Simvastatin 20 Mg daily  Lyrica 100 Mg twice daily (PDMP checked - last filled 3/20)   Coumadin - dose by INR  Singulair 10mg daily    Care coordination: rounded with Zach Damon (RN)    History of Present Illness   Physician Requesting Consult: Moustapha Gonzalez DO  Reason for Consult / Principal Problem: Fall   Hx and PE limited by: N/A  Additional history obtained from: Chart review and patient evaluation    HPI: Veronica Fraser is a 77y o  year old female with paroxysmal atrial fibrillation, aortic stenosis, hyperlipidemia, and lumbar spinal stenosis who is admitted to the trauma service with amatory function and fall found to right periprosthetic femur fracture for which she underwent ORIF on 5/28, she is being seen in consultation by geriatrics for high risk developing delirium during hospitalization  Amina Villagomez is seen and examined at bedside where she is lying resting, she reports that her hip pain is well controlled at rest but is severe with movement  She explains that the injury occurred at home on 5/27/23 when getting up from using the restroom and kneeling down to clean around the commode she felt severe pain in her right hip/leg and inability to get up or bear weight  She pressed her medical alert button for assistance and emergency services were dispatched  She reports continued severe pain in her right hip since the injury and states that it was too painful yesterday to bear weight when attempting to work with therapy  Prior to admission Jelly Guerra was residing home alone and reports being independent with ADLs and IADLs at baseline, she denies memory or cognitive concerns  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  She uses cane for ambulation at baseline but uses walker if getting up in middle of night and needing to move quickly, she has history of falls no additional reported in past six months  She is mildly hard of hearing, does not currently us hearing aid, wears glasses, no issues with dentition  Inpatient consult to Gerontology  Consult performed by: Caryn Canales DO  Consult ordered by: Jossy Porter MD        Review of Systems   Constitutional: Negative  Negative for chills and fever  HENT: Positive for hearing loss (mild but does not use hearing aid)  Negative for congestion  Eyes: Positive for visual disturbance (wears glasses)  Respiratory: Negative  Negative for shortness of breath  Cardiovascular: Negative  Gastrointestinal: Positive for constipation  Genitourinary: Positive for frequency  Negative for dysuria  Musculoskeletal: Positive for back pain and gait problem  Right hip pain     Skin: Negative  Neurological: Negative for dizziness, weakness, light-headedness, numbness and headaches  Hematological: Negative  Psychiatric/Behavioral: Negative  Negative for sleep disturbance  All other systems reviewed and are negative  Historical Information   Past Medical History:   Diagnosis Date   • Chronic kidney disease     Horse shoe kidney   • Colitis    • Hyperlipidemia      Past Surgical History:   Procedure Laterality Date   • ABDOMINAL ADHESION SURGERY N/A 2021    Procedure: LYSIS ADHESIONS;  Surgeon: Consuello Oppenheim, MD;  Location: [facility];  Service: General   • BACK SURGERY      2 rods placed in back   • COLON SURGERY     • COLONOSCOPY N/A 2018    Procedure: COLONOSCOPY;  Surgeon: James Jiménez MD;  Location: [facility];   Service: Colorectal   • JOINT REPLACEMENT Bilateral    • LAPAROTOMY N/A 2021    Procedure: LAPAROTOMY EXPLORATORY; EXPLANTATION OF MESH;  Surgeon: Consuello Oppenheim, MD;  Location[facility];  Service: General   • NE REPAIR FIRST ABDOMINAL WALL HERNIA N/A 2022    Procedure: REPAIR HERNIA INCISIONAL, LYSIS OF ADHESIONS, EXPLANTATION OF MESH, REPAIR OF ENTEROTOMY;  Surgeon: Tashia Myrick MD;  Location[facility];  Service: General     Social History   Social History     Substance and Sexual Activity   Alcohol Use Yes    Comment: once in awhile     Social History     Substance and Sexual Activity   Drug Use No     Social History     Tobacco Use   Smoking Status Former   • Types: Cigarettes   • Quit date:    • Years since quittin 4   Smokeless Tobacco Never     Family History:   Family History   Problem Relation Age of Onset   • No Known Problems Mother    • No Known Problems Father      Meds/Allergies   all current active meds have been reviewed    Allergies   Allergen Reactions   • Hydromorphone Other (See Comments) "\"it stopped my heart\"  hallucinations       Objective     Intake/Output Summary (Last 24 hours) at 5/30/2023 0947  Last data filed at 5/30/2023 0901  Gross per 24 hour   Intake 2263 75 ml   Output 1800 ml   Net 463 75 ml     Invasive Devices     Peripheral Intravenous Line  Duration           Peripheral IV 05/29/23 Left;Ventral (anterior) Forearm <1 day          Drain  Duration           External Urinary Catheter 2 days              Physical Exam  Vitals and nursing note reviewed  Constitutional:       General: She is not in acute distress  Appearance: Normal appearance  She is not toxic-appearing  HENT:      Head: Normocephalic and atraumatic  Nose: Nose normal       Mouth/Throat:      Mouth: Mucous membranes are moist       Comments: Dentition intact  Eyes:      General: No scleral icterus  Right eye: No discharge  Left eye: No discharge  Conjunctiva/sclera: Conjunctivae normal       Comments: Wearing glasses   Neck:      Comments: Phonation normal   Cardiovascular:      Rate and Rhythm: Normal rate  Pulses: Normal pulses  Pulmonary:      Effort: Pulmonary effort is normal  No respiratory distress  Breath sounds: No wheezing  Abdominal:      General: Bowel sounds are normal  There is no distension  Palpations: Abdomen is soft  Tenderness: There is no abdominal tenderness  Musculoskeletal:      Cervical back: Neck supple  Right lower leg: No edema  Left lower leg: No edema  Comments: Muscle bulk and tone consistent with activity level   Skin:     General: Skin is warm and dry  Neurological:      Mental Status: She is alert  Comments: Awake and alert, oriented, answers questions appropriately    Psychiatric:         Mood and Affect: Mood normal          Behavior: Behavior normal        Lab Results:     I have personally reviewed pertinent lab results      I have personally reviewed the pertinent imaging study reports in " PACS     Therapies:   PT: following   OT: following     VTE Prophylaxis: Enoxaparin (Lovenox)    Code Status: Level 1 - Full Code  Advance Directive and Living Will:      Power of :    POLST:      Family and Social Support:   Living Arrangements: Lives Alone  Support Systems: Family members  Type of Current Residence: [facility]    Goals of Care: Pain control

## 2023-05-30 NOTE — PHYSICAL THERAPY NOTE
Physical Therapy Progress Note     05/30/23 1049   Note Type   Note Type Treatment   Pain Assessment   Pain Assessment Tool FLACC   Pain Rating: FLACC (Rest) - Face 1   Pain Rating: FLACC (Rest) - Legs 0   Pain Rating: FLACC (Rest) - Activity 0   Pain Rating: FLACC (Rest) - Cry 1   Pain Rating: FLACC (Rest) - Consolability 0   Score: FLACC (Rest) 2   Pain Rating: FLACC (Activity) - Face 2   Pain Rating: FLACC (Activity) - Legs 1   Pain Rating: FLACC (Activity) - Activity 1   Pain Rating: FLACC (Activity) - Cry 2   Pain Rating: FLACC (Activity) - Consolability 1   Score: FLACC (Activity) 7   Restrictions/Precautions   RLE Weight Bearing Per Order WBAT   Other Precautions Cognitive; Chair Alarm;Pain; Fall Risk;Multiple lines   Subjective   Subjective pt encountered supine in bed, pleasant and motivated to participate in PT  Reports improving pain overall, but still has high pain levels with activity  She became tearful at one point towards end of sesssion due to the same, but recovered quickly with emotional support  pt remains somewhat anxious, but responded well to encouragement  Bed Mobility   Supine to Sit 3  Moderate assistance   Additional items Assist x 2   Transfers   Sit to Stand 3  Moderate assistance   Additional items Assist x 2   Stand to Sit 3  Moderate assistance   Additional items Assist x 2   Stand pivot 3  Moderate assistance   Additional items Assist x 2   Ambulation/Elevation   Gait pattern Step to;Short stride; Foward flexed; Inconsistent christen; Shuffling;Decreased R stance;Decreased foot clearance; Antalgic; Improper Weight shift;Narrow AMBIKA   Gait Assistance 3  Moderate assist   Additional items Assist x 2   Assistive Device Rolling walker   Distance 3' bed to chair, then 4' forwards with seated rest in between   Balance   Static Sitting Fair   Static Standing Poor   Ambulatory Poor   Activity Tolerance   Activity Tolerance Patient tolerated treatment well;Patient limited by fatigue;Patient "limited by pain   Nurse [address] , RN   Assessment   Prognosis Fair   Problem List Decreased strength;Decreased range of motion;Decreased endurance; Impaired balance;Decreased mobility; Decreased safety awareness;Pain   Assessment Pt remains limited overall by pain & RLE weakness as result of acute injury, but was able to make progress today, performing tasks with less assist overall compared to yesterday  She safely managed RW under her own power, and was able to successfully weight shift lateraly & unload BLEs during appropriate swing phases of gait to advance BLEs without need for assist to do so despite pt concerns regarding the same  Extensive education given in regards to improtance of mobiilty, participating in her rehab recovery & attempting to correct gait to reduce risk for falls s/p RLE surgery  She will benefit from rehab to address functional deficits & progress to PLOF  Goals   Patient Goals to have less pain   STG Expiration Date 06/12/23   PT Treatment Day 1   Plan   Treatment/Interventions Functional transfer training;LE strengthening/ROM; Therapeutic exercise; Endurance training;Patient/family training;Equipment eval/education; Bed mobility;Gait training   Progress Progressing toward goals   PT Frequency 3-5x/wk   Recommendation   PT Discharge Recommendation Post acute rehabilitation services   Equipment Recommended [facility] Recommended Wheeled walker   Change/add to INTTRA?  Yes, Change Size   Walker Size [place] (Ht <5'1\")   AM-PAC Basic Mobility Inpatient   Turning in Flat Bed Without Bedrails 2   Lying on Back to Sitting on Edge of Flat Bed Without Bedrails 2   Moving Bed to Chair 1   Standing Up From Chair Using Arms 1   Walk in Room 1   Climb 3-5 Stairs With Railing 1   Basic Mobility Inpatient Raw Score 8   Turning Head Towards Sound 4   Follow Simple Instructions 4   Low Function Basic Mobility Raw Score  16   Low Function Basic Mobility Standardized Score  " 25 72   Highest Level Of Mobility   -HLM Goal 3: Sit at edge of bed   JH-HLM Achieved 5: Stand (1 or more minutes)     Jaspal Jones PTA    An [facility] Basic Mobility Raw Score less than 17 suggests pt would benefit from post acute rehab  Please also refer to the recommendation of the Physical Therapist for safe discharge planning

## 2023-05-30 NOTE — CASE MANAGEMENT
Case Management Discharge Planning Note    Patient name Moni Briceño  [facility] [address] [id number]  : 1945 Date 2023       Current Admission Date: 2023  Current Admission Diagnosis:Fall   Patient Active Problem List    Diagnosis Date Noted   • 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 ([facility] [address] ) 2023   • Fall 2023   • Non-rheumatic aortic stenosis 2023   • PAF (paroxysmal atrial fibrillation) ([facility] [address] ) 2023   • Postoperative visit 08/15/2022   • Incisional hernia, without obstruction or gangrene 2022   • Intestinal adhesions 2021   • Mixed hyperlipidemia 2020   • COVID-19 2020   • Other chest pain 2020   • Pure hypercholesterolemia 2017   • Spinal stenosis of lumbar region 2017      LOS (days): 3  Geometric Mean LOS (GMLOS) (days): 4 40  Days to GMLOS:1 2     OBJECTIVE:  Risk of Unplanned Readmission Score: 12 83         Current admission status: Inpatient   Preferred Pharmacy:   [address] [place] [address] - [id number] [place] [address]  Phone: [phone] Fax: [phone]    Primary Care Provider: Jennifer Paulson DO    Primary Insurance: MEDICARE  Secondary Insurance: [facility] DETAILS:    Discharge planning discussed with[de-identified] Patient  Freedom of Choice: Yes  Comments - Freedom of Choice: Discussed FOC  CM contacted family/caregiver?: No- see comments (Declined)  Were Treatment Team discharge recommendations reviewed with patient/caregiver?: Yes  Did patient/caregiver verbalize understanding of patient care needs?: Yes  Were patient/caregiver advised of the risks associated with not following Treatment Team discharge recommendations?: Yes    Other Referral/Resources/Interventions Provided:  Interventions: Short Term Rehab  Referral Comments: Patient requesting referral to [facility] and [facility], if both unable to accept, agreeable to blanket referral to SNF in [facility], entered in [facility]

## 2023-05-30 NOTE — PROGRESS NOTES
Progress Note - Orthopedics   Storm Galvezer 77 y o  female MRN: [id number]  [facility]      Subjective:    77 y  o female POD 2 ORIF right periprosthetic femur fracture  No acute events  Was up with therapy yesterday  Still has right hip pain with ROM  Denies fevers, chills, CP, SOB, N/V, numbness or tingling      Labs:  0   Lab Value Date/Time    CRP <3 0 10/11/2018 1435    ESR 18 10/11/2018 1435    HCT 28 5 (L) 05/30/2023 0444    HCT 34 6 (L) 05/28/2023 0445    HCT 39 0 05/27/2023 0854    HCT 37 05/27/2023 0854    HGB 9 0 (L) 05/30/2023 0444    HGB 11 0 (L) 05/28/2023 0445    HGB 12 4 05/27/2023 0854    HGB 12 6 05/27/2023 0854    INR 1 49 (H) 05/28/2023 0445    WBC 11 26 (H) 05/30/2023 0444    WBC 7 22 05/28/2023 0445    WBC 7 02 05/27/2023 0854       Meds:    Current Facility-Administered Medications:   •  acetaminophen (TYLENOL) tablet 975 mg, 975 mg, Oral, Q6H [address], Willie Diez MD, 975 mg at 05/30/23 1181  •  enoxaparin (LOVENOX) subcutaneous injection 30 mg, 30 mg, Subcutaneous, Q12H [address], Willie Diez MD, 30 mg at 05/29/23 2152  •  lactated ringers infusion, 75 mL/hr, Intravenous, Continuous, Willie Diez MD, Last Rate: 75 mL/hr at 05/30/23 0007, 75 mL/hr at 05/30/23 0007  •  metoprolol succinate (TOPROL-XL) 24 hr tablet 50 mg, 50 mg, Oral, HS, Willie Diez MD, 50 mg at 05/29/23 2152  •  montelukast (SINGULAIR) tablet 10 mg, 10 mg, Oral, HS, Willie Diez MD, 10 mg at 05/29/23 2152  •  oxyCODONE (ROXICODONE) IR tablet 5 mg, 5 mg, Oral, Q4H PRN, Willie Diez MD, 5 mg at 05/29/23 5830  •  oxyCODONE (ROXICODONE) split tablet 2 5 mg, 2 5 mg, Oral, Q4H PRN, Willie Diez MD, 2 5 mg at 05/29/23 1600  •  pravastatin (PRAVACHOL) tablet 40 mg, 40 mg, Oral, Daily With Maik Patton MD, 40 mg at 05/29/23 1600  •  pregabalin (LYRICA) capsule 100 mg, 100 mg, Oral, BID, Willie Diez MD, 100 mg at 05/29/23 1731  •  senna (SENOKOT) tablet 8 6 mg, 1 tablet, Oral, "HS, Satish Acevedo MD, 8 6 mg at 05/29/23 2152    Blood Culture:   No results found for: \"BLOODCX\"    Wound Culture:   No results found for: \"WOUNDCULT\"    Ins and Outs:  I/O last 24 hours: In: 2363 8 [P O :90; I V :2223 8; IV Piggyback:50]  Out: 7492 [Urine:1850]          Physical:  Vitals:    05/30/23 0439   BP: (!) 108/48   Pulse: 68   Resp:    Temp: 98 8 °F (37 1 °C)   SpO2: 97%     Musculoskeletal: right Lower Extremity  · Skin warm, dry   No erythema or ecchymosis  · Dressing c/d/i to right posterolateral hip  · TTP siri-incisionally, tissues of thigh soft depressible no ecchymosis or induration  · Sensation intact to saphenous, sural, tibial, superficial peroneal nerve, and deep peroneal  · Motor intact to +FHL/EHL, +ankle dorsi/plantar flexion  · 2+ DP pulse  · Digits warm and well perfused    Assessment:    77 y  o female POD 2 ORIF R siri-prosthetic femur fx   Patient doing well       Plan:  · WBAT RLE  · Will monitor for ABLA and administer IVF/prbc as indicated for Greater than 2 gram drop or Hgb < 7  · PT/OT  · Pain control  · DVT ppx LVX, okay to resume warfarin: trauma managing  · Dispo: Ortho will follow    Karlene Tariq MD      "

## 2023-05-30 NOTE — OCCUPATIONAL THERAPY NOTE
Occupational Therapy Treatment Note:      05/30/23 1050   OT Last Visit   OT Visit Date 05/30/23   Pain Assessment   Pain Assessment Tool 0-10   Pain Score 6   Pain Location/Orientation   (pain in r le c mobility)   Restrictions/Precautions   RLE Weight Bearing Per Order WBAT   ADL   Where Assessed Chair   Eating Assistance 5  Supervision/Setup   Grooming Assistance 5  Supervision/Setup   UB Bathing Assistance 5  Supervision/Setup   UB Dressing Assistance 5  Supervision/Setup   LB Dressing Assistance 2  Maximal Assistance   LB Dressing Comments pt reports today that she uses lhae pta  will assess next session  Toileting Assistance  2  Maximal Assistance   Transfers   Sit to Stand 3  Moderate assistance   Additional items Assist x 2   Stand to Sit 3  Moderate assistance   Additional items Assist x 2   Stand pivot 3  Moderate assistance   Additional items Assist x 2   Functional Mobility   Functional Mobility 3  Moderate assistance  (mod a x 1 and min of another)   Cognition   Arousal/Participation Alert   Attention Within functional limits   Memory Decreased recall of precautions   Following Commands Follows one step commands without difficulty   Comments pt required mod vc's for hand placement   Activity Tolerance   Activity Tolerance Patient tolerated treatment well   Assessment   Assessment pt participated in am ot session and was seen focusing on bed mobility, activity tolerence, sitting tolerence eob and stand pivot transfer using rw  pt was motivated and cooperative  pt is fearful of falling  reports weats shoes in house when walking secondary to neuropathy  pt with improved ability to take steps using rw c min to mod asst x 2   at times from taller surface pt was able to stand with min to mod asst x 1  pt requires mod vc's for hand placement and technique  pt was sba for grooming oral care and to saad deodorant while sitting in chair   pt repors using lhae and tools from home inorder to dress lb as since back sx has not been able to reach feet  plan to assess lhae next session  pt with marked improvmeent in ability to take steps using rw c a x 2   Plan   Treatment Interventions ADL retraining;Functional transfer training; Endurance training;Patient/family training   Goal Expiration Date 06/12/23   OT Treatment Day 1   OT Frequency 3-5x/wk   Recommendation   OT Discharge Recommendation Post acute rehabilitation services   AM-PAC Daily Activity Inpatient   Lower Body Dressing 2   Bathing 2   Toileting 2   Upper Body Dressing 3   Grooming 3   Eating 4   Daily Activity Raw Score 16   Daily Activity Standardized Score (Calc for Raw Score >=11) 35 96   AM-PAC Applied Cognition Inpatient   Following a Speech/Presentation 4   Understanding Ordinary Conversation 4   Taking Medications 3   Remembering Where Things Are Placed or Put Away 4   Remembering List of 4-5 Errands 3   Taking Care of Complicated Tasks 3   Applied Cognition Raw Score 21   Applied Cognition Standardized Score 44 3     April A Storm

## 2023-05-30 NOTE — ARC ADMISSION
Referral received for consideration of patient for inpatient acute rehab  Will review patient's case with [facility] physician and update CM as able

## 2023-05-30 NOTE — PLAN OF CARE
Problem: PAIN - ADULT  Goal: Verbalizes/displays adequate comfort level or baseline comfort level  Description: Interventions:  - Encourage patient to monitor pain and request assistance  - Assess pain using appropriate pain scale  - Administer analgesics based on type and severity of pain and evaluate response  - Implement non-pharmacological measures as appropriate and evaluate response  - Consider cultural and social influences on pain and pain management  - Notify physician/advanced practitioner if interventions unsuccessful or patient reports new pain  Outcome: Progressing     Problem: INFECTION - ADULT  Goal: Absence or prevention of progression during hospitalization  Description: INTERVENTIONS:  - Assess and monitor for signs and symptoms of infection  - Monitor lab/diagnostic results  - Monitor all insertion sites, i e  indwelling lines, tubes, and drains  - Monitor endotracheal if appropriate and nasal secretions for changes in amount and color  - [place] appropriate cooling/warming therapies per order  - Administer medications as ordered  - Instruct and encourage patient and family to use good hand hygiene technique  - Identify and instruct in appropriate isolation precautions for identified infection/condition  Outcome: Progressing

## 2023-05-31 ENCOUNTER — HOSPITAL ENCOUNTER (INPATIENT)
Facility: HOSPITAL | Age: 78
LOS: 16 days | Discharge: HOME HEALTH | DRG: 561 | End: 2023-06-16
Payer: MEDICARE

## 2023-05-31 VITALS
HEART RATE: 80 BPM | TEMPERATURE: 98.7 F | SYSTOLIC BLOOD PRESSURE: 129 MMHG | DIASTOLIC BLOOD PRESSURE: 53 MMHG | WEIGHT: 162.7 LBS | RESPIRATION RATE: 16 BRPM | BODY MASS INDEX: 32.86 KG/M2 | OXYGEN SATURATION: 95 %

## 2023-05-31 DIAGNOSIS — K43.2 INCISIONAL HERNIA, WITHOUT OBSTRUCTION OR GANGRENE: ICD-10-CM

## 2023-05-31 DIAGNOSIS — Z78.9 DEFICIT IN ACTIVITIES OF DAILY LIVING (ADL): ICD-10-CM

## 2023-05-31 DIAGNOSIS — M97.01XA PERIPROSTHETIC FRACTURE AROUND INTERNAL PROSTHETIC RIGHT HIP JOINT (HCC): ICD-10-CM

## 2023-05-31 DIAGNOSIS — R26.2 AMBULATORY DYSFUNCTION: ICD-10-CM

## 2023-05-31 DIAGNOSIS — D64.9 ANEMIA: Primary | ICD-10-CM

## 2023-05-31 DIAGNOSIS — M48.061 SPINAL STENOSIS OF LUMBAR REGION WITHOUT NEUROGENIC CLAUDICATION: ICD-10-CM

## 2023-05-31 DIAGNOSIS — R52 PAIN: ICD-10-CM

## 2023-05-31 DIAGNOSIS — I48.0 PAF (PAROXYSMAL ATRIAL FIBRILLATION) (HCC): ICD-10-CM

## 2023-05-31 PROBLEM — Z91.89 AT RISK FOR VENOUS THROMBOEMBOLISM (VTE): Status: ACTIVE | Noted: 2023-05-31

## 2023-05-31 PROBLEM — K59.00 CONSTIPATION: Status: ACTIVE | Noted: 2023-05-31

## 2023-05-31 PROBLEM — R93.89 ABNORMAL FINDING ON RADIOLOGY EXAM: Status: ACTIVE | Noted: 2023-05-31

## 2023-05-31 PROBLEM — U07.1 COVID-19: Status: RESOLVED | Noted: 2020-06-07 | Resolved: 2023-05-31

## 2023-05-31 LAB
ANION GAP SERPL CALCULATED.3IONS-SCNC: 4 MMOL/L (ref 4–13)
BASOPHILS # BLD AUTO: 0.04 THOUSANDS/ÂΜL (ref 0–0.1)
BASOPHILS NFR BLD AUTO: 1 % (ref 0–1)
BUN SERPL-MCNC: 14 MG/DL (ref 5–25)
CALCIUM SERPL-MCNC: 8.6 MG/DL (ref 8.3–10.1)
CHLORIDE SERPL-SCNC: 108 MMOL/L (ref 96–108)
CO2 SERPL-SCNC: 27 MMOL/L (ref 21–32)
CREAT SERPL-MCNC: 0.42 MG/DL (ref 0.6–1.3)
EOSINOPHIL # BLD AUTO: 0.21 THOUSAND/ÂΜL (ref 0–0.61)
EOSINOPHIL NFR BLD AUTO: 2 % (ref 0–6)
ERYTHROCYTE [DISTWIDTH] IN BLOOD BY AUTOMATED COUNT: 14.7 % (ref 11.6–15.1)
GFR SERPL CREATININE-BSD FRML MDRD: 97 ML/MIN/1.73SQ M
GLUCOSE SERPL-MCNC: 94 MG/DL (ref 65–140)
HCT VFR BLD AUTO: 30.9 % (ref 34.8–46.1)
HGB BLD-MCNC: 9.8 G/DL (ref 11.5–15.4)
IMM GRANULOCYTES # BLD AUTO: 0.04 THOUSAND/UL (ref 0–0.2)
IMM GRANULOCYTES NFR BLD AUTO: 1 % (ref 0–2)
INR PPP: 1.07 (ref 0.84–1.19)
LYMPHOCYTES # BLD AUTO: 1.71 THOUSANDS/ÂΜL (ref 0.6–4.47)
LYMPHOCYTES NFR BLD AUTO: 20 % (ref 14–44)
MCH RBC QN AUTO: 30.9 PG (ref 26.8–34.3)
MCHC RBC AUTO-ENTMCNC: 31.7 G/DL (ref 31.4–37.4)
MCV RBC AUTO: 98 FL (ref 82–98)
MONOCYTES # BLD AUTO: 1.19 THOUSAND/ÂΜL (ref 0.17–1.22)
MONOCYTES NFR BLD AUTO: 14 % (ref 4–12)
NEUTROPHILS # BLD AUTO: 5.51 THOUSANDS/ÂΜL (ref 1.85–7.62)
NEUTS SEG NFR BLD AUTO: 62 % (ref 43–75)
NRBC BLD AUTO-RTO: 0 /100 WBCS
PLATELET # BLD AUTO: 218 THOUSANDS/UL (ref 149–390)
PMV BLD AUTO: 9.2 FL (ref 8.9–12.7)
POTASSIUM SERPL-SCNC: 4.1 MMOL/L (ref 3.5–5.3)
PROTHROMBIN TIME: 14.1 SECONDS (ref 11.6–14.5)
RBC # BLD AUTO: 3.17 MILLION/UL (ref 3.81–5.12)
SARS-COV-2 RNA RESP QL NAA+PROBE: NEGATIVE
SODIUM SERPL-SCNC: 139 MMOL/L (ref 135–147)
WBC # BLD AUTO: 8.7 THOUSAND/UL (ref 4.31–10.16)

## 2023-05-31 PROCEDURE — NC001 PR NO CHARGE: Performed by: PHYSICIAN ASSISTANT

## 2023-05-31 PROCEDURE — NC001 PR NO CHARGE: Performed by: ORTHOPAEDIC SURGERY

## 2023-05-31 PROCEDURE — 85610 PROTHROMBIN TIME: CPT | Performed by: NURSE PRACTITIONER

## 2023-05-31 PROCEDURE — 99238 HOSP IP/OBS DSCHRG MGMT 30/<: CPT | Performed by: EMERGENCY MEDICINE

## 2023-05-31 PROCEDURE — 85025 COMPLETE CBC W/AUTO DIFF WBC: CPT | Performed by: NURSE PRACTITIONER

## 2023-05-31 PROCEDURE — NC001 PR NO CHARGE

## 2023-05-31 PROCEDURE — 87635 SARS-COV-2 COVID-19 AMP PRB: CPT | Performed by: PHYSICIAN ASSISTANT

## 2023-05-31 PROCEDURE — 99223 1ST HOSP IP/OBS HIGH 75: CPT

## 2023-05-31 PROCEDURE — 99222 1ST HOSP IP/OBS MODERATE 55: CPT | Performed by: INTERNAL MEDICINE

## 2023-05-31 PROCEDURE — 80048 BASIC METABOLIC PNL TOTAL CA: CPT | Performed by: NURSE PRACTITIONER

## 2023-05-31 RX ORDER — PREGABALIN 100 MG/1
100 CAPSULE ORAL 2 TIMES DAILY
Status: DISCONTINUED | OUTPATIENT
Start: 2023-05-31 | End: 2023-06-16 | Stop reason: HOSPADM

## 2023-05-31 RX ORDER — ENOXAPARIN SODIUM 100 MG/ML
30 INJECTION SUBCUTANEOUS EVERY 12 HOURS SCHEDULED
Status: DISCONTINUED | OUTPATIENT
Start: 2023-05-31 | End: 2023-06-09

## 2023-05-31 RX ORDER — WARFARIN SODIUM 5 MG/1
5 TABLET ORAL
Status: DISCONTINUED | OUTPATIENT
Start: 2023-05-31 | End: 2023-06-04

## 2023-05-31 RX ORDER — ENOXAPARIN SODIUM 100 MG/ML
30 INJECTION SUBCUTANEOUS EVERY 12 HOURS SCHEDULED
Refills: 0
Start: 2023-05-31 | End: 2023-06-16

## 2023-05-31 RX ORDER — POLYETHYLENE GLYCOL 3350 17 G/17G
17 POWDER, FOR SOLUTION ORAL DAILY PRN
Status: DISCONTINUED | OUTPATIENT
Start: 2023-05-31 | End: 2023-05-31 | Stop reason: HOSPADM

## 2023-05-31 RX ORDER — POLYETHYLENE GLYCOL 3350 17 G/17G
17 POWDER, FOR SOLUTION ORAL DAILY PRN
Status: DISCONTINUED | OUTPATIENT
Start: 2023-05-31 | End: 2023-05-31

## 2023-05-31 RX ORDER — DOCUSATE SODIUM 100 MG/1
100 CAPSULE, LIQUID FILLED ORAL 2 TIMES DAILY
Status: DISCONTINUED | OUTPATIENT
Start: 2023-05-31 | End: 2023-06-16 | Stop reason: HOSPADM

## 2023-05-31 RX ORDER — OXYCODONE HYDROCHLORIDE 5 MG/1
2.5 TABLET ORAL EVERY 4 HOURS PRN
Refills: 0
Start: 2023-05-31 | End: 2023-06-16

## 2023-05-31 RX ORDER — ACETAMINOPHEN 325 MG/1
975 TABLET ORAL EVERY 6 HOURS SCHEDULED
Status: DISCONTINUED | OUTPATIENT
Start: 2023-05-31 | End: 2023-06-11

## 2023-05-31 RX ORDER — METOPROLOL SUCCINATE 50 MG/1
50 TABLET, EXTENDED RELEASE ORAL
Status: DISCONTINUED | OUTPATIENT
Start: 2023-05-31 | End: 2023-06-16 | Stop reason: HOSPADM

## 2023-05-31 RX ORDER — SENNOSIDES 8.6 MG
1 TABLET ORAL
Status: DISCONTINUED | OUTPATIENT
Start: 2023-05-31 | End: 2023-05-31

## 2023-05-31 RX ORDER — BISACODYL 10 MG
10 SUPPOSITORY, RECTAL RECTAL DAILY PRN
Status: DISCONTINUED | OUTPATIENT
Start: 2023-05-31 | End: 2023-06-16 | Stop reason: HOSPADM

## 2023-05-31 RX ORDER — LIDOCAINE HYDROCHLORIDE 20 MG/ML
JELLY TOPICAL EVERY 4 HOURS PRN
Status: DISCONTINUED | OUTPATIENT
Start: 2023-05-31 | End: 2023-06-16 | Stop reason: HOSPADM

## 2023-05-31 RX ORDER — OXYCODONE HYDROCHLORIDE 5 MG/1
5 TABLET ORAL EVERY 4 HOURS PRN
Refills: 0
Start: 2023-05-31 | End: 2023-06-16

## 2023-05-31 RX ORDER — BISACODYL 10 MG
10 SUPPOSITORY, RECTAL RECTAL DAILY PRN
Qty: 12 SUPPOSITORY | Refills: 0
Start: 2023-05-31 | End: 2023-06-16

## 2023-05-31 RX ORDER — SENNOSIDES 8.6 MG
8.6 TABLET ORAL
Refills: 0
Start: 2023-05-31 | End: 2023-06-16

## 2023-05-31 RX ORDER — DOCUSATE SODIUM 100 MG/1
100 CAPSULE, LIQUID FILLED ORAL 2 TIMES DAILY
Status: DISCONTINUED | OUTPATIENT
Start: 2023-05-31 | End: 2023-05-31 | Stop reason: HOSPADM

## 2023-05-31 RX ORDER — LATANOPROST 50 UG/ML
1 SOLUTION/ DROPS OPHTHALMIC
Status: DISCONTINUED | OUTPATIENT
Start: 2023-05-31 | End: 2023-06-16 | Stop reason: HOSPADM

## 2023-05-31 RX ORDER — POLYETHYLENE GLYCOL 3350 17 G/17G
17 POWDER, FOR SOLUTION ORAL DAILY PRN
Refills: 0
Start: 2023-05-31

## 2023-05-31 RX ORDER — ONDANSETRON 2 MG/ML
4 INJECTION INTRAMUSCULAR; INTRAVENOUS EVERY 4 HOURS PRN
Status: DISCONTINUED | OUTPATIENT
Start: 2023-05-31 | End: 2023-05-31 | Stop reason: HOSPADM

## 2023-05-31 RX ORDER — MINERAL OIL 100 G/100G
1 OIL RECTAL ONCE AS NEEDED
Status: COMPLETED | OUTPATIENT
Start: 2023-05-31 | End: 2023-05-31

## 2023-05-31 RX ORDER — BISACODYL 10 MG
10 SUPPOSITORY, RECTAL RECTAL ONCE
Status: DISCONTINUED | OUTPATIENT
Start: 2023-05-31 | End: 2023-05-31 | Stop reason: HOSPADM

## 2023-05-31 RX ORDER — PRAVASTATIN SODIUM 40 MG
40 TABLET ORAL
Status: DISCONTINUED | OUTPATIENT
Start: 2023-05-31 | End: 2023-06-16 | Stop reason: HOSPADM

## 2023-05-31 RX ORDER — DOCUSATE SODIUM 100 MG/1
100 CAPSULE, LIQUID FILLED ORAL 2 TIMES DAILY
Refills: 0
Start: 2023-05-31 | End: 2023-06-16

## 2023-05-31 RX ORDER — OXYCODONE HYDROCHLORIDE 5 MG/1
5 TABLET ORAL EVERY 4 HOURS PRN
Status: DISCONTINUED | OUTPATIENT
Start: 2023-05-31 | End: 2023-06-16 | Stop reason: HOSPADM

## 2023-05-31 RX ORDER — POLYETHYLENE GLYCOL 3350 17 G/17G
17 POWDER, FOR SOLUTION ORAL DAILY PRN
Status: DISCONTINUED | OUTPATIENT
Start: 2023-05-31 | End: 2023-06-16 | Stop reason: HOSPADM

## 2023-05-31 RX ORDER — MONTELUKAST SODIUM 10 MG/1
10 TABLET ORAL
Status: DISCONTINUED | OUTPATIENT
Start: 2023-05-31 | End: 2023-06-16 | Stop reason: HOSPADM

## 2023-05-31 RX ADMIN — POLYETHYLENE GLYCOL 3350 17 G: 17 POWDER, FOR SOLUTION ORAL at 17:06

## 2023-05-31 RX ADMIN — ENOXAPARIN SODIUM 30 MG: 30 INJECTION SUBCUTANEOUS at 22:02

## 2023-05-31 RX ADMIN — PREGABALIN 100 MG: 100 CAPSULE ORAL at 17:06

## 2023-05-31 RX ADMIN — ENOXAPARIN SODIUM 30 MG: 30 INJECTION SUBCUTANEOUS at 08:12

## 2023-05-31 RX ADMIN — ACETAMINOPHEN 975 MG: 325 TABLET, FILM COATED ORAL at 17:06

## 2023-05-31 RX ADMIN — ACETAMINOPHEN 975 MG: 325 TABLET ORAL at 06:10

## 2023-05-31 RX ADMIN — PRAVASTATIN SODIUM 40 MG: 40 TABLET ORAL at 17:06

## 2023-05-31 RX ADMIN — WARFARIN SODIUM 5 MG: 5 TABLET ORAL at 17:49

## 2023-05-31 RX ADMIN — ACETAMINOPHEN 975 MG: 325 TABLET ORAL at 01:30

## 2023-05-31 RX ADMIN — POLYETHYLENE GLYCOL 3350 17 G: 17 POWDER, FOR SOLUTION ORAL at 10:08

## 2023-05-31 RX ADMIN — Medication 2.5 MG: at 13:11

## 2023-05-31 RX ADMIN — MINERAL OIL 1 ENEMA: 118 ENEMA RECTAL at 17:57

## 2023-05-31 RX ADMIN — PREGABALIN 100 MG: 100 CAPSULE ORAL at 08:13

## 2023-05-31 RX ADMIN — METOPROLOL SUCCINATE 50 MG: 50 TABLET, EXTENDED RELEASE ORAL at 22:02

## 2023-05-31 RX ADMIN — LATANOPROST 1 DROP: 50 SOLUTION OPHTHALMIC at 22:03

## 2023-05-31 RX ADMIN — DOCUSATE SODIUM 100 MG: 100 CAPSULE, LIQUID FILLED ORAL at 10:08

## 2023-05-31 RX ADMIN — MONTELUKAST SODIUM 10 MG: 10 TABLET, COATED ORAL at 22:02

## 2023-05-31 RX ADMIN — DOCUSATE SODIUM 100 MG: 100 CAPSULE, LIQUID FILLED ORAL at 17:06

## 2023-05-31 RX ADMIN — ONDANSETRON 4 MG: 2 INJECTION INTRAMUSCULAR; INTRAVENOUS at 10:13

## 2023-05-31 NOTE — PROGRESS NOTES
[facility]  Progress Note  Name: Merry Morgan  MRN: [id number]  Unit/Bed#: [facility] 623-01 I Date of Admission: 5/27/2023   Date of Service: 5/31/2023 I Hospital Day: 4    Assessment/Plan   Fall  Assessment & Plan  - Status post fall with the below noted injuries  - Fall precautions  - Geriatric Medicine consultation for evaluation, medication review and recommendations   - PT and OT evaluation and treatment as indicated  - Case Management consultation for disposition planning  * 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 ([address] )  Assessment & Plan  - Periprosthetic hip fracture on the right, present on admission   - Appreciate Orthopedic surgery evaluation, recommendations and interventions as noted  - Status post ORIF of right periprosthetic hip fracture on 5/20/2023   - May be weightbearing as tolerated on the right lower extremity   - Monitor right lower extremity neurovascular exam   - Continue multimodal analgesic regimen   - Continue DVT prophylaxis until INR therapeutic on warfarin   - PT and OT evaluation and treatment as indicated  - Outpatient follow up with Orthopedic surgery for re-evaluation  PAF (paroxysmal atrial fibrillation) ([address] )  Assessment & Plan  - Patient with chronic history of paroxysmal atrial fibrillation   - Continue current medication regimen and resume home medication therapy and discharge as appropriate  - May resume home anticoagulation with Coumadin   - Outpatient follow-up routine  Mixed hyperlipidemia  Assessment & Plan  - Patient with chronic history of hyperlipidemia   - Continue current medication regimen and resume home medication therapy and discharge as appropriate  - Outpatient follow-up routine  Bowel Regimen: On Colace, MiraLAX and senna    VTE Prophylaxis:Sequential compression device (Venodyne)  and Enoxaparin (Lovenox)     Disposition: Continue current level of care pending "placement at a postacute care facility for rehab  Case management following for disposition planning  Subjective   Chief Complaint: \"I am a little nauseous  \"    Subjective: Patient is overall doing okay  She does still have pain in her right thigh, but it is manageable with her current medication regimen  This morning, she feels a little bit nauseous and bloated/constipated, but is tolerating oral intake  She is passing some flatus and has not had any vomiting  She has no other new complaints at this time  Objective   Vitals:   Temp:  [97 6 °F (36 4 °C)-99 3 °F (37 4 °C)] 98 7 °F (37 1 °C)  HR:  [65-80] 80  Resp:  [16-17] 16  BP: ()/(37-63) 129/53    I/O       05/29 0701  05/30 0700 05/30 0701  05/31 0700 05/31 0701  06/01 0700    P  O  90 120     I V  (mL/kg) 2173 8 (29 5) 2253 8 (30 5)     IV Piggyback 50      Total Intake(mL/kg) 2313 8 (31 4) 2373 8 (32 2)     Urine (mL/kg/hr) 1100 (0 6) 2975 (1 7) 1000 (2 1)    Stool   0    Blood       Total Output 1100 2975 1000    Net +1213 8 -601 3 -1000           Unmeasured Urine Occurrence   1 x    Unmeasured Stool Occurrence   0 x           Physical Exam:   GENERAL APPEARANCE: Patient in no acute distress  HEENT: NCAT; EOMs intact; Mucous membranes moist  CV: Regular rate and rhythm; no murmur/gallops/rubs appreciated  CHEST / LUNGS: Clear to auscultation; no wheezes/rales/rhonci  ABD: NABS; soft; non-distended; non-tender  : Voiding spontaneously  EXT: +2 pulses bilaterally upper & lower extremities  Mild to moderate tenderness over the right thigh with intact postoperative dressing and mild to moderate swelling with intact neurovascular exam distally  NEURO: GCS 15; no focal neurologic deficits; neurovascularly intact  SKIN: Warm, dry and well perfused; no rash; no jaundice      Invasive Devices     Peripheral Intravenous Line  Duration           Peripheral IV 05/29/23 Left;Ventral (anterior) Forearm 2 days          Drain  Duration           " External Urinary Catheter 3 days                      Lab Results:   Results: I have personally reviewed all pertinent laboratory/tests results, BMP/CMP:   Lab Results   Component Value Date    BUN 14 05/31/2023    CALCIUM 8 6 05/31/2023     05/31/2023    CO2 27 05/31/2023    CREATININE 0 42 (L) 05/31/2023    EGFR 97 05/31/2023    K 4 1 05/31/2023    SODIUM 139 05/31/2023    and CBC:   Lab Results   Component Value Date    HCT 30 9 (L) 05/31/2023    HGB 9 8 (L) 05/31/2023    MCH 30 9 05/31/2023    MCHC 31 7 05/31/2023    MCV 98 05/31/2023    MPV 9 2 05/31/2023    NRBC 0 05/31/2023     05/31/2023    RBC 3 17 (L) 05/31/2023    RDW 14 7 05/31/2023    WBC 8 70 05/31/2023     Imaging: I have personally reviewed pertinent reports       Other Studies: N/A        Ana Capellan PA-C  5/31/2023  11:29 AM

## 2023-05-31 NOTE — PLAN OF CARE
Problem: MOBILITY - ADULT  Goal: Maintains/Returns to pre admission functional level  Description: INTERVENTIONS:  - Perform BMAT or MOVE assessment daily    - Set and communicate daily mobility goal to care team and patient/family/caregiver     - Collaborate with rehabilitation services on mobility goals if consulted    - Out of bed for toileting  - Record patient progress and toleration of activity level   Outcome: Progressing     Problem: SAFETY ADULT  Goal: Patient will remain free of falls  Description: INTERVENTIONS:  - Educate patient/family on patient safety including physical limitations  - Instruct patient to call for assistance with activity   - Consult OT/PT to assist with strengthening/mobility   - Keep Call bell within reach  - Keep bed low and locked with side rails adjusted as appropriate  - Keep care items and personal belongings within reach  - Initiate and maintain comfort rounds    - Apply yellow socks and bracelet for high fall risk patients  - Consider moving patient to room near nurses station  Outcome: Progressing     Problem: SAFETY ADULT  Goal: Maintain or return to baseline ADL function  Description: INTERVENTIONS:  -  Assess patient's ability to carry out ADLs; assess patient's baseline for ADL function and identify physical deficits which impact ability to perform ADLs (bathing, care of mouth/teeth, toileting, grooming, dressing, etc )  - Assess/evaluate cause of self-care deficits   - Assess range of motion  - Assess patient's mobility; develop plan if impaired  - Assess patient's need for assistive devices and provide as appropriate  - Encourage maximum independence but intervene and supervise when necessary  - Involve family in performance of ADLs  - Assess for home care needs following discharge   - Consider OT consult to assist with ADL evaluation and planning for discharge  - Provide patient education as appropriate  Outcome: Progressing Quality 47: Advance Care Plan: Advance Care Planning discussed and documented; advance care plan or surrogate decision maker documented in the medical record. Quality 431: Preventive Care And Screening: Unhealthy Alcohol Use - Screening: Patient screened for unhealthy alcohol use using a single question and scores less than 2 times per year Quality 110: Preventive Care And Screening: Influenza Immunization: Influenza Immunization previously received during influenza season Quality 130: Documentation Of Current Medications In The Medical Record: Current Medications Documented Quality 111:Pneumonia Vaccination Status For Older Adults: Pneumococcal Vaccination Previously Received Detail Level: Detailed Quality 226: Preventive Care And Screening: Tobacco Use: Screening And Cessation Intervention: Patient screened for tobacco use and is an ex/non-smoker

## 2023-05-31 NOTE — PLAN OF CARE
Problem: PAIN - ADULT  Goal: Verbalizes/displays adequate comfort level or baseline comfort level  Description: Interventions:  - Encourage patient to monitor pain and request assistance  - Assess pain using appropriate pain scale  - Administer analgesics based on type and severity of pain and evaluate response  - Implement non-pharmacological measures as appropriate and evaluate response  - Consider cultural and social influences on pain and pain management  - Notify physician/advanced practitioner if interventions unsuccessful or patient reports new pain  Outcome: Progressing     Problem: INFECTION - ADULT  Goal: Absence or prevention of progression during hospitalization  Description: INTERVENTIONS:  - Assess and monitor for signs and symptoms of infection  - Monitor lab/diagnostic results  - Monitor all insertion sites, i e  indwelling lines, tubes, and drains  - Monitor endotracheal if appropriate and nasal secretions for changes in amount and color  - [place] appropriate cooling/warming therapies per order  - Administer medications as ordered  - Instruct and encourage patient and family to use good hand hygiene technique  - Identify and instruct in appropriate isolation precautions for identified infection/condition  Outcome: Progressing

## 2023-05-31 NOTE — ASSESSMENT & PLAN NOTE
- Periprosthetic hip fracture on the right, present on admission   - Appreciate Orthopedic surgery evaluation, recommendations and interventions as noted  - Status post ORIF of right periprosthetic hip fracture on 5/20/2023   - May be weightbearing as tolerated on the right lower extremity   - Monitor right lower extremity neurovascular exam   - Continue multimodal analgesic regimen   - Continue DVT prophylaxis until INR therapeutic on warfarin   - PT and OT evaluation and treatment as indicated  - Outpatient follow up with Orthopedic surgery for re-evaluation

## 2023-05-31 NOTE — INCIDENTAL FINDINGS
The following findings require follow up:  Radiographic finding     Findings and Follow up required:       1) Scattered less than 3 mm nodular densities in the posterior right upper lobe of the lung as well as a calcified granuloma in the right lower lobe of the lung posteriorly were noted incidentally on your trauma imaging  A malignancy (cancer) cannot be completely excluded based on trauma imaging alone  Recommend short-term outpatient follow-up with primary care provider to review the finding and for further surveillance as indicated  2)  A subcentimeter cystic hypodensity posterior right lobe of the liver was incidentally identified on your trauma imaging and is unchanged compared to prior imaging  A malignancy (cancer) cannot be completely excluded based on trauma imaging alone  Recommend short-term outpatient follow-up with primary care provider to review the finding and for further surveillance as indicated  3) A left adrenal gland 1 4 x 0 9 cm nodule was incidentally identified under trauma imaging and is unchanged compared to prior imaging  A malignancy (cancer) cannot be completely excluded based on trauma imaging alone  Recommend short-term outpatient follow-up with primary care provider to review the finding and for further surveillance as indicated  4) Subcentimeter hypodensities in your kidneys were incidentally identified under trauma imaging and are suggestive of benign cysts  A malignancy (cancer) cannot be completely excluded based on trauma imaging alone  Recommend short-term outpatient follow-up with primary care provider to review the finding and for further surveillance as indicated  5) A new small umbilical hernia containing nonobstructed bowel loops and fat was incidentally identified under trauma imaging as well as a previously seen right para-midline ventral hernia defect containing adherent bowel loops in a nonobstructive pattern    No further work-up or intervention necessary for these findings at this time  Recommend short-term outpatient follow-up with primary care provider to review the finding and for further surveillance as indicated  Follow up should be done within 2 week(s)    The above noted incidental findings were discussed with the patient  The patient demonstrated understanding of the findings and the follow-up recommendations      Please notify the following clinician to assist with the follow up:   Dr Virginia Faust

## 2023-05-31 NOTE — ARC ADMISSION
Reviewed patient's case with [facility] physician - patient is approved for [facility] pending medical stability, functional progress and bed availability  CM has been updated  Will continue to follow at this time  Notified by CM that patient is medically stable for discharge  She will admit to [address] [place] [address] [id number], as discussed, with pickup at 1:30pm  Report can be called to   CM has been updated

## 2023-05-31 NOTE — PROGRESS NOTES
PHYSICAL MEDICINE AND REHABILITATION   PREADMISSION ASSESSMENT     Projected [facility] Diagnoses:  Impairment of mobility, safety and Activities of Daily Living (ADLs) due to Orthopedic Disorders:  08 9  Other Orthopedic    Etiologic Dx: Right Periprosthetic Hip Fracture  Date of Onset: 5/27/2023   Date of surgery: 5/28/2023 ORIF Right Periprosthetic Hip Fracture    PATIENT INFORMATION  Name: Flakita Stratton Phone #: [phone] (home)   Address: [address] [place] [address]  YOB: 1945 Age: 77 y o  SS#   Marital Status:   Ethnicity:   Employment Status: retired  Extended Emergency Contact Information  Primary Emergency Contact: [address] Phone: [phone]  Relation: Sister In-Law  Advance Directive: Level 1 Full Code - unknown advanced directive    INSURANCE/COVERAGE:     Primary Payor: MEDICARE / Plan: MEDICARE A AND B / Product Type: Medicare A & B Fee for Service /   Secondary Payer: [employer]   Payer Contact:  Payer Contact:   Contact Phone:  Contact Phone:     Authorization #:   Coverage Dates:  LCD:   MEDICARE #: [id number]  Medicare Days: 60/30/60  Medical Record #: [id number]    REFERRAL SOURCE:   Referring provider: Beatriz Hernandez DO  Referring facility: [address] [place] [address]  Room: [address] [place] [address] 051-03  PCP: Maury Smith DO PCP phone number: [phone]    MEDICAL INFORMATION  HPI: Patient is a 77year old female that presented to [facility] on 5/27/2023 as a level B trauma s/p fall at home  Patient was in the bathroom on her knees cleaning, went to stand up and felt crack in her right hip  She then slipped and fell onto the floor, hitting her head on the wall  Patient does take Coumadin daily for history of Afib, and has a history of B/L AUGUST s/p right AUGUST revision  CT of head and cervical spine was negative  Initial right hip/pelvis x-ray was negative   However, repeat x-ray showed bone fragment posterior to femoral stem suspicious for fracture  Orthopedics was consulted, with recommendations for NWB to RLE and plans for surgical intervention  CT of RLE showed acute minimally displaced periprosthetic fracture of right proximal femoral diaphysis adjacent to femoral stem of right AUGUST  On 5/28, patient went to the OR with Orthopedics for ORIF right periprosthetic hip fracture; EBL minimal  Postoperatively, patent was cleared for weight bearing as tolerated to RLE and was initiated on Lovenox SQ for DVT prophylaxis  Coumadin was restarted on 5/31, as Hgb remained stable, even with noted ABLA  Patient is overall hemodynamically stable and medically cleared for discharge to [facility]  PT/OT therapies were consulted, as well as patient's case reviewed with [facility] physician, and they are recommending patient for inpatient Acute Rehab  She has demonstrated that she can tolerate and participate in 3 hours of therapy per day  Received both Pfizer vaccines; COVID test has been requested  Past Medical History:   Past Surgical History: Allergies:     Past Medical History:   Diagnosis Date   • Chronic kidney disease     Horse shoe kidney   • Colitis    • Hyperlipidemia     Past Surgical History:   Procedure Laterality Date   • ABDOMINAL ADHESION SURGERY N/A 11/27/2021    Procedure: LYSIS ADHESIONS;  Surgeon: Yvan Kruger MD;  [facility];  Service: General   • BACK SURGERY      2 rods placed in back   • COLON SURGERY     • COLONOSCOPY N/A 5/4/2018    Procedure: COLONOSCOPY;  Surgeon: Davidson Witt MD;  Location: [facility];   Service: Colorectal   • JOINT REPLACEMENT Bilateral    • LAPAROTOMY N/A 11/27/2021    Procedure: LAPAROTOMY EXPLORATORY; EXPLANTATION OF MESH;  Surgeon: Yvan Kruger MD;  [facility];  Service: General   • ORIF FEMUR FRACTURE Right 5/28/2023    Procedure: OPEN REDUCTION W/ INTERNAL FIXATION (ORIF) FEMUR- ORIF right siri-prosthetic hip fracture; "Surgeon: Rashad Felipe MD;  [facility];  Service: Orthopedics   • MA REPAIR FIRST ABDOMINAL WALL HERNIA N/A 7/27/2022    Procedure: REPAIR HERNIA INCISIONAL, LYSIS OF ADHESIONS, EXPLANTATION OF MESH, REPAIR OF ENTEROTOMY;  Surgeon: Zuly Culver MD;  [facility];  Service: General     Allergies   Allergen Reactions   • Hydromorphone Other (See Comments)     \"it stopped my heart\"  hallucinations           Medical/functional conditions requiring inpatient rehabilitation: right periprosthetic hip fracture s/p fall, s/p ORIF RLE, WBAT to RLE, acute pain, ABLA, impaired mobility and self care    Risk for medical/clinical complications: risk for falls, risk for skin breakdown, risk for uncontrolled pain, risk for DVT/PE, risk for infection    Comorbidities/Surgeries in the last 100 days: B/L AUGUST s/p right AUGUST revision, Afib on Coumadin, CKD, HLD, s/p ORIF right periprosthetic hip fracture on 5/28/2023    CURRENT VITAL SIGNS:   Temp:  [97 6 °F (36 4 °C)-99 3 °F (37 4 °C)] 98 7 °F (37 1 °C)  HR:  [65-80] 80  Resp:  [16-17] 16  BP: ()/(37-63) 129/53   Intake/Output Summary (Last 24 hours) at 5/31/2023 1244  Last data filed at 5/31/2023 1212  Gross per 24 hour   Intake 1548 75 ml   Output 2975 ml   Net -1426 25 ml        LABORATORY RESULTS:      Lab Results   Component Value Date    HCT 30 9 (L) 05/31/2023    HGB 9 8 (L) 05/31/2023    WBC 8 70 05/31/2023     Lab Results   Component Value Date    BUN 14 05/31/2023     05/31/2023    CREATININE 0 42 (L) 05/31/2023    GLUCOSE 140 05/27/2023    K 4 1 05/31/2023     Lab Results   Component Value Date    INR 1 49 (H) 05/28/2023    PROTIME 18 3 (H) 05/28/2023        DIAGNOSTIC STUDIES:  XR hip/pelv 2-3 vws right if performed    Result Date: 5/30/2023  Impression: No acute cardiopulmonary disease within limitations of supine imaging  No acute right hip periprosthetic fracture   Workstation performed: [id number]     XR femur 2 vw right    Result " Date: 5/29/2023  Impression: Fluoroscopic guidance provided for procedure guidance  Please refer to the separate procedure notes for additional details  Workstation performed: [id number]     XR hip/pelv 2-3 vws right if performed    Result Date: 5/27/2023  Impression: Status post right hip replacement  Bone fragment posterior to the femoral stem suspicious for fracture  See the follow-up CT report of the same day for further details  Workstation performed: [id number]     CT lower extremity wo contrast right    Result Date: 5/27/2023  Impression: Acute, minimally displaced periprosthetic fracture of the right proximal femoral diaphysis adjacent to the femoral stem of the right total hip arthroplasty (series 4 images , and series 500 images 64-78 ) Workstation performed: [id number]     XR trauma multiple    Result Date: 5/27/2023  Impression: No acute cardiopulmonary disease within limitations of supine imaging  No acute right hip periprosthetic fracture  Workstation performed: [id number]     XR hip/pelv 2-3 vws right if performed    Result Date: 5/27/2023  Impression: No acute cardiopulmonary disease within limitations of supine imaging  No acute right hip periprosthetic fracture  Workstation performed: [id number]     XR femur 2 vw right    Result Date: 5/27/2023  Impression: No acute cardiopulmonary disease within limitations of supine imaging  No acute right hip periprosthetic fracture  Workstation performed: [id number]     TRAUMA - CT chest abdomen pelvis w contrast    Addendum Date: 5/27/2023    ADDENDUM: Scattered nodules in the right lung possibly inflammatory  Based on current Fleischner Society 2017 Guidelines on incidental pulmonary nodule, no routine follow-up is needed if the patient is low risk  If the patient is high risk, optional follow-up chest CT at 12 months can be considered  Result Date: 5/27/2023  Impression: No CT findings of trauma in the chest, abdomen or pelvis   Other findings as described  I personally discussed this study with Bimal Manzano on 2023 9:29 AM  Workstation performed: [id number]     TRAUMA - CT head wo contrast    Result Date: 2023  Impression: No acute intracranial abnormality  I personally discussed this study with Bimal Manzano on 2023 9:29 AM  Workstation performed: [id number]     TRAUMA - CT spine cervical wo contrast    Result Date: 2023  Impression: No cervical spine fracture or traumatic malalignment  I personally discussed this study with Bimal Manzano on 2023 9:29 AM  Workstation performed: [id number]       PRECAUTIONS/SPECIAL NEEDS:  Tobacco:   Social History     Tobacco Use   Smoking Status Former   • Types: Cigarettes   • Quit date:    • Years since quittin 4   Smokeless Tobacco Never   , Alcohol:    Social History     Substance and Sexual Activity   Alcohol Use Yes    Comment: once in awhile   , Weight Bearing Precautions:  weight bearing as tolerated, Anticoagulation:  warfarin and Lovenox SQ, Edema Management, Safety Concerns, Pain Management, Visually Impaired, Language Preference: English and Fall Precautions      MEDICATIONS:     Current Facility-Administered Medications:   •  acetaminophen (TYLENOL) tablet 975 mg, 975 mg, Oral, Q6H [facility], Pattie Judge MD, 975 mg at 23 5633  •  bisacodyl (DULCOLAX) rectal suppository 10 mg, 10 mg, Rectal, Once, Chantel Belcher PA-C  •  docusate sodium (COLACE) capsule 100 mg, 100 mg, Oral, BID, Frederick Waters PA-C, 100 mg at 23 1008  •  enoxaparin (LOVENOX) subcutaneous injection 30 mg, 30 mg, Subcutaneous, Q12H [facility], Pattie Judge MD, 30 mg at 23 7252  •  metoprolol succinate (TOPROL-XL) 24 hr tablet 50 mg, 50 mg, Oral, HS, Tino Meneses MD, 50 mg at 23 215  •  montelukast (SINGULAIR) tablet 10 mg, 10 mg, Oral, HS, Akil Meneses MD, 10 mg at 23  •  ondansetron (ZOFRAN) injection 4 mg, 4 mg, Intravenous, Q4H PRN, Chantel Belcher PA-C, 4 mg at 05/31/23 1013  •  oxyCODONE (ROXICODONE) IR tablet 5 mg, 5 mg, Oral, Q4H PRN, Gonzalo Pruett MD, 5 mg at 05/30/23 1352  •  oxyCODONE (ROXICODONE) split tablet 2 5 mg, 2 5 mg, Oral, Q4H PRN, Gonzalo Pruett MD, 2 5 mg at 05/29/23 1600  •  polyethylene glycol (MIRALAX) packet 17 g, 17 g, Oral, Daily PRN, Hayes Leblanc PA-C, 17 g at 05/31/23 1008  •  pravastatin (PRAVACHOL) tablet 40 mg, 40 mg, Oral, Daily With Hemant Sewell MD, 40 mg at 05/30/23 1600  •  pregabalin (LYRICA) capsule 100 mg, 100 mg, Oral, BID, Gonzalo Pruett MD, 100 mg at 05/31/23 0813  •  senna (SENOKOT) tablet 8 6 mg, 1 tablet, Oral, HS, Elen Oquendo MD, 8 6 mg at 05/30/23 2209    SKIN INTEGRITY:   erythema - sacrum, Incision: healing well, no significant drainage, no dehiscence, no significant erythema, right hip incision with silver dressing    PRIOR LEVEL OF FUNCTION:  She lives in a(n) single family home  lAba Chavez is  and lives alone  Self Care: Independent, Indoor Mobility: Modified Independent, Stairs (in/outdoor): Modified Independent and Cognition: Independent  Prior to patient's admission, patient was fully Independent with ADLs and IADLs, including driving  She was Independent with mobility, only using a RW as needed, mostly at HS  FALLS IN THE LAST 6 MONTHS: 1 to 4    HOME ENVIRONMENT:  The living area: can live on one level  There are 2 steps to enter the home with 2 steps inside the home down to sunken living room  The patient will not have 24 hour supervision/physical assistance available upon discharge  Patient has a supportive, local sister-in-law that is able to provide limited assistance, as she works  Patient also has supportive neighbors  PREVIOUS DME:  Equipment in home (previous DME):  Shower Chair, Grab Bars, Rolling Walker, Manual Wheelchair and Single Point Cane    FUNCTIONAL STATUS:  Physical Therapy Occupational Therapy Speech Therapy   5/20/2023, per PTA    Subjective Subjective pt encountered supine in bed, pleasant and motivated to participate in PT  Reports improving pain overall, but still has high pain levels with activity  She became tearful at one point towards end of sesssion due to the same, but recovered quickly with emotional support  pt remains somewhat anxious, but responded well to encouragement  Bed Mobility   Supine to Sit 3  Moderate assistance   Additional items Assist x 2   Transfers   Sit to Stand 3  Moderate assistance   Additional items Assist x 2   Stand to Sit 3  Moderate assistance   Additional items Assist x 2   Stand pivot 3  Moderate assistance   Additional items Assist x 2   Ambulation/Elevation   Gait pattern Step to;Short stride; Foward flexed; Inconsistent christen; Shuffling;Decreased R stance;Decreased foot clearance; Antalgic; Improper Weight shift;Narrow AMBIKA   Gait Assistance 3  Moderate assist   Additional items Assist x 2   Assistive Device Rolling walker   Distance 3' bed to chair, then 4' forwards with seated rest in between   Balance   Static Sitting Fair   Static Standing Poor   Ambulatory Poor   Activity Tolerance   Activity Tolerance Patient tolerated treatment well;Patient limited by fatigue;Patient limited by pain   Nurse [address], RN   Assessment   Prognosis Fair   Problem List Decreased strength;Decreased range of motion;Decreased endurance; Impaired balance;Decreased mobility; Decreased safety awareness;Pain   Assessment Pt remains limited overall by pain & RLE weakness as result of acute injury, but was able to make progress today, performing tasks with less assist overall compared to yesterday  She safely managed RW under her own power, and was able to successfully weight shift lateraly & unload BLEs during appropriate swing phases of gait to advance BLEs without need for assist to do so despite pt concerns regarding the same    Extensive education given in regards to improtance of mobiilty, participating in her rehab recovery & attempting to correct gait to reduce risk for falls s/p RLE surgery  She will benefit from rehab to address functional deficits & progress to PLOF       5/29/2023, per OT    ADL   Where Assessed Edge of bed   Grooming Assistance 5  Supervision/Setup   UB Bathing Assistance 4  Minimal Assistance   LB Bathing Assistance 2  Maximal [address] 4  Minimal [address] 2  Maximal [address]  2  Maximal Assistance   Bed Mobility   Supine to Sit 3  Moderate assistance   Additional items Assist x 2; Increased time required;LE management   Additional Comments OOB at end of session   Transfers   Sit to Stand 2  Maximal assistance   Additional items Assist x 2; Increased time required   Stand to Sit 2  Maximal assistance   Additional items Assist x 2; Increased time required   Stand pivot 2  Maximal assistance   Additional items Assist x 2; Increased time required   Additional Comments Attempted with RW - limited by pain and anxious like behaviors, require HHA x2 to drop arm chair   Functional Mobility   Functional Mobility 2  Maximal assistance   Additional Comments Ax2, few steps forward   Additional items Rolling walker   Balance   Static Sitting Fair   Dynamic Sitting Fair -   Static Standing Poor   Dynamic Standing Poor -   Ambulatory Poor -   Activity Tolerance   Activity Tolerance Patient limited by pain   Medical Staff Made Aware DPT, NSG Aware   Nurse Made Aware yes   RUE Assessment   RUE Assessment WFL   LUE Assessment   LUE Assessment WFL   Psychosocial   Psychosocial (WDL) X   Patient Behaviors/Mood Anxious   Cognition   Overall Cognitive Status WFL   Arousal/Participation Alert; Responsive; Cooperative   Attention Attends with cues to redirect   Orientation Level Oriented X4   Memory Decreased recall of precautions   Following Commands Follows one step commands with increased time or repetition   Comments Overall pleasant and cooperative, "grows more anxious throguhout session and reports a fear of falling   Assessment   Limitation Decreased ADL status; Decreased UE strength;Decreased Safe judgement during ADL;Decreased endurance;Decreased self-care trans;Decreased high-level ADLs   Prognosis Fair   Assessment Pt is a 77 y o  female seen for OT evaluation s/p admit to [facility] on 5/27/2023 w/ Right hip pain  Pt presents to ED following standing from floor when feeling a \"pop\" in R hip followed by pain and collapse to floor in bathroom  Pt found to have [place]-prosthetic fracture of femur following total hip arthroplasty and is now s/p Right ORIF femur and is WBAT to RLE  Comorbidities affecting pt's functional performance at time of assessment include: Chronic kidney disease, Colitis, and Hyperlipidemia  Personal factors affecting pt at time of IE include:steps to enter environment, limited home support, difficulty performing ADLS, difficulty performing IADLS , limited insight into deficits, flat affect, decreased initiation and engagement  and health management   Prior to admission, pt was I with all ADLS and IADLS  Upon evaluation: Pt presents supine and is agreeable to OTIE, all vitals WNL  Pt requires overall mod - max A x2, 2* the following deficits impacting occupational performance: weakness, decreased strength, decreased balance, decreased tolerance, impaired problem solving, impulsivity, decreased safety awareness, increased pain, orthopedic restrictions and decreased coping skills  Pt resting in chair at end of session with all needs in reach, alarm on, all lines in place and SCD's on  Pt to benefit from continued skilled OT tx while in the hospital to address deficits as defined above and maximize level of functional independence w ADL's and functional mobility  Occupational Performance areas to address include: grooming, bathing/shower, toilet hygiene, dressing, health maintenance, functional mobility, community mobility and clothing management   " The patient's raw score on the AM-PAC Daily Activity inpatient short form is 16  , standardized score is 35 96  , less than 39 4  Patients at this level are likely to benefit from discharge to post-acute rehabilitation services  Please refer to the recommendation of the Occupational Therapist for safe discharge planning  N/A     CARE SCORES:  Self Care:  Eatin: Not applicable  Oral hygiene: 04: Supervision or touching  assistance  Toilet hygiene: 02: Substantial/maximal assistance  Shower/bathing self: 02: Substantial/maximal assistance  Upper body dressin: Supervision or touching  assistance  Lower body dressin: Substantial/maximal assistance  Putting on/taking off footwear: 09: Not applicable  Transfers:  Roll left and right: 09: Not applicable  Sit to lyin: Not applicable  Lying to sitting on side of bed: 02: Substantial/maximal assistance  Sit to stand: 02: Substantial/maximal assistance  Chair/bed to chair transfer: 02: Substantial/maximal assistance  Toilet transfer: 09: Not applicable  Mobility:  Walk 10 ft: 88: Not attempted due to medical conditions or safety concerns  Walk 50 ft with two turns: 10: Not attempted due to environmental limitations  Walk 150ft: 88: Not attempted due to medical conditions or safety concerns    CURRENT GAP IN FUNCTION  Prior to Admission: Functional Status: Patient was independent with mobility/ambulation, transfers, ADL's, IADL's  Expected functional outcomes: It is expected that with skilled acute rehabilitation services the patient will progress to Independent for self care and Independent for mobility     Estimated length of stay: 10 to 14 days    Anticipated Post-Discharge Disposition/Treatment  Disposition: Return to previous home/apartment    Outpatient Services: Physical Therapy (PT) and Occupational Therapy (OT)    BARRIERS TO DISCHARGE  Lovenox, Weakness, Pain, Balance Difficulty, Fatigue, Home Accessibility, Caregiver Accessibility, Financial Resources, Equipment Needs, Resource Availability and Lives Alone    INTERVENTIONS FOR DISCHARGE  Adaptive equipment, Patient/Family/Caregiver Education, [employer], Financial Assistance, Arrange DME needs, Medication Changes as per MD recommendations, Therapy exercises, Center of balance support  and Energy conservation education     REQUIRED THERAPY:  Patient will require PT and OT 90 minutes each per day, five days per week to achieve rehab goals  REQUIRED FUNCTIONAL AND MEDICAL MANAGEMENT FOR INPATIENT REHABILITATION:  Skin:  There are no pressure sores currently, sacral erythema, right hip incision with silver dressing, Pain Management: Overall pain is moderately controlled, Deep Vein Thrombosis (DVT) Prophylaxis:  Coumadin with INR goal 2-3 and Lovenox SQ, further IM management of additional medical conditions while on ARC, she needs PT/OT intervention, patient/family education and training, possible Neuropsych and Orthopedics consults with any other needed consults prn, nursing medication review and management of bowel/bladder function  RECOMMENDED LEVEL OF CARE:   Patient is a 77year old female that presented to [place] [id number] on 5/27/2023 as a level B trauma s/p fall at home  Patient was in the bathroom on her knees cleaning, went to stand up and felt crack in her right hip  She then slipped and fell onto the floor, hitting her head on the wall  Patient does take Coumadin daily for history of Afib, and has a history of B/L AUGUST s/p right AUGUST revision  CT of head and cervical spine was negative  Initial right hip/pelvis x-ray was negative  However, repeat x-ray showed bone fragment posterior to femoral stem suspicious for fracture  Orthopedics was consulted, with recommendations for NWB to RLE and plans for surgical intervention  CT of RLE showed acute minimally displaced periprosthetic fracture of right proximal femoral diaphysis adjacent to femoral stem of right AUGUST  On 5/28, patient went to the OR with Orthopedics for ORIF right periprosthetic hip fracture; EBL minimal  Postoperatively, patent was cleared for weight bearing as tolerated to RLE and was initiated on Lovenox SQ for DVT prophylaxis  Coumadin was restarted on 5/31, as Hgb remained stable, even with noted ABLA  Prior to patient's admission, patient was fully Independent with ADLs and IADLs, including driving  She was Independent with mobility, only using a RW as needed, mostly at HS  Currently, patient is Mod assist of 2 with use of RW for gait and transfers, and Min assist for UB ADLs, Max assist for LB ADLs  Close medical management and PM&R management is recommended at this time while patient is on the [facility]  Inpatient acute rehab is recommended for patient to maximize overall strength and mobility upon discharge to home with support of friends and family

## 2023-05-31 NOTE — CONSULTS
Internal Medicine Consultation Note    Patient: Navya Barber  Age/sex: 77 y o  female  Medical Record #: [id number]      Assessment/Plan:    Right periprosthetic femur fracture  · S/p ORIF   · WBAT    Hx PAF  · Restart Coumadin  · At home she takes Coumadin 6 25 mg (2 1/2 tabs of 2 5mg tabs) on  and 5mg the rest of the days of the week  She did tell Dr Gautam Hernandez that she adjusts her Coumadin on her own  · [facility] does her INRs and she has 2 5mg tabs  · INR AM  · Continue DVT prophylaxis until INR is 2 0 or greater    HLD  · Continue Pravachol as a sub for home home Simvastatin    Peripheral neuropathy  · Continue Lyrica    Aortic stenosis  · ECHO 2022:  LVEF 65%/gr 2 DD, mild-mod AS, mild TR/MR, RV systolic pressure is mod-severe elevated with systolic pressure 59 mmHg  · euvolemic      Discharge date:  Team       Subjective/HPI:   Navya Barber is a 77year old patient with a history of PAF, neuropathy, HLD, aortic stenosis, abdominal surgery for adhesions/MESH explantation, bowel surgery, back surgery (states has 2 rods in back) and bilateral AUGUST who was admitted after a mechanical fall  As a result of the fall, she sustained a periprosthetic femur fracture and underwent an ORIF 23  She had some mild acute blood loss anemia but did not require transfusion  Her Coumadin had been held but she is now cleared to restart it  Patient is now in [facility] for inpatient acute rehabilitation and we are ask to assist with medical management  Currently there are no complaints of CP, SOB, dizziness, N/V/D      ROS:   A 10 point ROS was performed; negative except as noted above       Social History:    Substance Use History:   Social History     Substance and Sexual Activity   Alcohol Use Yes    Comment: once in awhile     Social History     Tobacco Use   Smoking Status Former   • Types: Cigarettes   • Quit date:    • Years since quittin 4   Smokeless Tobacco Never     Social History Substance and Sexual Activity   Drug Use No       Family History:    Family History   Problem Relation Age of Onset   • No Known Problems Mother    • No Known Problems Father          Review of Scheduled Meds:  Current Facility-Administered Medications   Medication Dose Route Frequency Provider Last Rate   • acetaminophen  975 mg Oral Q6H [address] Carlyle Loza MD     • bisacodyl  10 mg Rectal Daily PRN Carlyle Loza MD     • docusate sodium  100 mg Oral BID Carlyle Loza MD     • enoxaparin  30 mg Subcutaneous Q12H [address] Carlyle Loza MD     • lidocaine   Topical Q4H PRN Carlyle Loza MD     • metoprolol succinate  50 mg Oral HS Carlyle Loza MD     • montelukast  10 mg Oral HS Carlyle Loza MD     • oxyCODONE  5 mg Oral Q4H PRN Carlyle Loza MD     • oxyCODONE  2 5 mg Oral Q4H PRN Carlyle Loza MD     • polyethylene glycol  17 g Oral Daily PRN Carlyle Loza MD     • pravastatin  40 mg Oral Daily With Romaine Frazier MD     • pregabalin  100 mg Oral BID Carlyle Loza MD     • senna  1 tablet Oral HS Carlyle Loza MD     • warfarin  5 mg Oral Daily (warfarin) RAYMON Benson         Labs:     Results from last 7 days   Lab Units 05/31/23 0538 05/30/23 0444   HEMATOCRIT % 30 9* 28 5*   HEMOGLOBIN g/dL 9 8* 9 0*   PLATELETS Thousands/uL 218 219   WBC Thousand/uL 8 70 11 26*     Results from last 7 days   Lab Units 05/31/23 0538 05/30/23 0444 05/28/23 0445 05/27/23  0854   BUN mg/dL 14 14   < > 15   CALCIUM mg/dL 8 6 8 6   < > 8 5   CHLORIDE mmol/L 108 109*   < > 109*   CO2 mmol/L 27 30   < > 24   CO2, I-STAT mmol/L  --   --   --  26   CREATININE mg/dL 0 42* 0 58*   < > 0 57*   GLUCOSE, ISTAT mg/dl  --   --   --  140   POTASSIUM mmol/L 4 1 4 2   < > 4 2   SODIUM mmol/L 139 139   < > 137    < > = values in this interval not displayed           Results from last 7 days   Lab Units 05/28/23 0445 05/27/23  5042 "  INR  1 49* 2 33*              No results found for: \"BLOODCX\", \"SPUTUMCULTUR\", \"URINECX\", \"WOUNDCULT\"    Input and Output Summary (last 24 hours):     No intake or output data in the 24 hours ending 23 1635    Imaging:     No orders to display       *Labs /Radiology studies reviewed  *Medications reviewed and reconciled as needed  *Please refer to order section for additional ordered labs studies  *Case discussed with primary attending during morning huddle case rounds    Vitals:   Temp (24hrs), Av 1 °F (36 7 °C), Min:97 6 °F (36 4 °C), Max:98 7 °F (37 1 °C)    Temp:  [97 6 °F (36 4 °C)-98 7 °F (37 1 °C)] 98 5 °F (36 9 °C)  HR:  [65-89] 89  Resp:  [16-18] 18  BP: ()/(37-63) 148/63  SpO2:  [92 %-97 %] 96 %  Body mass index is 34 95 kg/m²  Physical Exam:   General Appearance: no distress, conversive  HEENT: PERRLA, conjuctiva normal; oropharynx clear; mucous membranes moist   Neck:  Supple, normal ROM  Lungs: CTA, normal respiratory effort, no retractions, expiratory effort normal  CV: regular rate and rhythm; no rubs/gallops, +murmur  PMI normal   ABD: soft; ND/NT; +BS  EXT: no edema  Skin: normal turgor, normal texture, no rashes  Psych: affect normal, mood normal  Neuro: AAO          Invasive Devices     Peripheral Intravenous Line  Duration           Peripheral IV 23 Left;Ventral (anterior) Forearm 2 days          Drain  Duration           External Urinary Catheter 3 days                 VTE Pharmacologic Prophylaxis: Coumadin and Lovenox  Code Status: Level 3 - DNAR and DNI  Current Length of Stay: 0 day(s)    Total floor / unit time spent today 1 hour with more than 50% spent counseling/coordinating care  Counseling includes discussion with patient re: progress  and discussion with patient of his/her current medical state/information  Coordination of patient's care was performed in conjunction with primary service   Time invested included review of patient's labs, vitals, and " management of their comorbidities with continued monitoring  In addition, this patient was discussed with medical team including physician and advanced extenders  The care of the patient was extensively discussed and appropriate treatment plan was formulated unique for this patient by supervising physician unless stated otherwise in their attestation statement  ** Please Note: voice to text software may have been used in the creation of this document   Audio transcription errors may occur**

## 2023-05-31 NOTE — ASSESSMENT & PLAN NOTE
- Patient with chronic history of hyperlipidemia   - Continue current medication regimen and resume home medication therapy and discharge as appropriate  - Outpatient follow-up routine

## 2023-05-31 NOTE — CASE MANAGEMENT
Case Management Discharge Planning Note    Patient name Sandie Adkins  Location [address] [place] [address] [facility] [id number]  : 1945 Date 2023       Current Admission Date: 2023  Current Admission Diagnosis:Fall   Patient Active Problem List    Diagnosis Date Noted   • 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 ([facility] ) 2023   • Fall 2023   • Non-rheumatic aortic stenosis 2023   • PAF (paroxysmal atrial fibrillation) ([facility] ) 2023   • Postoperative visit 08/15/2022   • Incisional hernia, without obstruction or gangrene 2022   • Intestinal adhesions 2021   • Mixed hyperlipidemia 2020   • COVID-19 2020   • Other chest pain 2020   • Pure hypercholesterolemia 2017   • Spinal stenosis of lumbar region 2017      LOS (days): 4  Geometric Mean LOS (GMLOS) (days): 4 40  Days to GMLOS:0 4     OBJECTIVE:  Risk of Unplanned Readmission Score: 11 6         Current admission status: Inpatient   Preferred Pharmacy:   [address], [place] - [address] [address]  Phone: [phone] Fax: [phone]    Primary Care Provider: Jean-Paul Rodriguez DO    Primary Insurance: MEDICARE  Secondary Insurance: [address]  DETAILS: Patient accepted at [facility], room 962, patient aware and in agreement    This CM called luke Berg regarding same

## 2023-05-31 NOTE — ASSESSMENT & PLAN NOTE
- Patient with chronic history of paroxysmal atrial fibrillation   - Continue current medication regimen and resume home medication therapy and discharge as appropriate  - May resume home anticoagulation with Coumadin   - Outpatient follow-up routine

## 2023-05-31 NOTE — DISCHARGE SUMMARY
[address] [facility]  Discharge- Ankit Steve 1945, 77 y o  female MRN: [id number]  Unit/Bed#: [facility] 623-01 Encounter: [id number]  Primary Care Provider: Patty Caruso DO   Date and time admitted to hospital: 5/27/2023  8:39 AM    Fall  Assessment & Plan  - Status post fall with the below noted injuries  - Fall precautions  - Geriatric Medicine consultation for evaluation, medication review and recommendations   - PT and OT evaluation and treatment as indicated  - Case Management consultation for disposition planning  * 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 ([facility] )  Assessment & Plan  - Periprosthetic hip fracture on the right, present on admission   - Appreciate Orthopedic surgery evaluation, recommendations and interventions as noted  - Status post ORIF of right periprosthetic hip fracture on 5/20/2023   - May be weightbearing as tolerated on the right lower extremity   - Monitor right lower extremity neurovascular exam   - Continue multimodal analgesic regimen   - Continue DVT prophylaxis until INR therapeutic on warfarin   - PT and OT evaluation and treatment as indicated  - Outpatient follow up with Orthopedic surgery for re-evaluation  PAF (paroxysmal atrial fibrillation) ([facility] )  Assessment & Plan  - Patient with chronic history of paroxysmal atrial fibrillation   - Continue current medication regimen and resume home medication therapy and discharge as appropriate  - May resume home anticoagulation with Coumadin   - Outpatient follow-up routine  Mixed hyperlipidemia  Assessment & Plan  - Patient with chronic history of hyperlipidemia   - Continue current medication regimen and resume home medication therapy and discharge as appropriate  - Outpatient follow-up routine  Constipation  Assessment & Plan  - Constipation, likely drug-induced    - Continue multimodal bowel regimen with additions made on 5/31/2023   - Continue diet as tolerated  Discharge Summary - Trauma Service   Felisa Castro 77 y o  female MRN: [id number]  Unit/Bed#: [facility] 623-01 Encounter: [id number]    Admission Date: 5/27/2023     Discharge Date: 5/31/2023    Admitting Diagnosis: Right hip pain [M25 551]  Fall, initial encounter [W19  XXXA]  Paola-prosthetic fracture of femur following total hip arthroplasty, initial encounter [P72  Rizzo Loud  Other injury of unspecified body region, initial encounter [T14  8XXA]    Discharge Diagnosis: See above  Attending and Service: Dr Pravin Beck, Acute Care Surgical Services  Consulting Physician(s):     1  Orthopedic surgery  2   Geriatric medicine  Imaging and Procedures Performed:   Orders Placed This Encounter   Procedures   • Fast Ultrasound       XR hip/pelv 2-3 vws right if performed    Result Date: 5/30/2023  Impression: No acute cardiopulmonary disease within limitations of supine imaging  No acute right hip periprosthetic fracture  Workstation performed: [id number]     XR femur 2 vw right    Result Date: 5/29/2023  Impression: Fluoroscopic guidance provided for procedure guidance  Please refer to the separate procedure notes for additional details  Workstation performed: [id number]     XR hip/pelv 2-3 vws right if performed    Result Date: 5/27/2023  Impression: Status post right hip replacement  Bone fragment posterior to the femoral stem suspicious for fracture  See the follow-up CT report of the same day for further details   Workstation performed: [id number]     CT lower extremity wo contrast right    Result Date: 5/27/2023  Impression: Acute, minimally displaced periprosthetic fracture of the right proximal femoral diaphysis adjacent to the femoral stem of the right total hip arthroplasty (series 4 images , and series 500 images 64-78 ) Workstation performed: [id number]     XR trauma multiple    Result Date: 5/27/2023  Impression: No acute cardiopulmonary disease within limitations of supine imaging  No acute right hip periprosthetic fracture  Workstation performed: [id number]     XR hip/pelv 2-3 vws right if performed    Result Date: 5/27/2023  Impression: No acute cardiopulmonary disease within limitations of supine imaging  No acute right hip periprosthetic fracture  Workstation performed: [id number]     XR femur 2 vw right    Result Date: 5/27/2023  Impression: No acute cardiopulmonary disease within limitations of supine imaging  No acute right hip periprosthetic fracture  Workstation performed: [id number]     TRAUMA - CT chest abdomen pelvis w contrast    Addendum Date: 5/27/2023    ADDENDUM: Scattered nodules in the right lung possibly inflammatory  Based on current Fleischner Society 2017 Guidelines on incidental pulmonary nodule, no routine follow-up is needed if the patient is low risk  If the patient is high risk, optional follow-up chest CT at 12 months can be considered  Result Date: 5/27/2023  Impression: No CT findings of trauma in the chest, abdomen or pelvis  Other findings as described  I personally discussed this study with José Antonio Meng on 5/27/2023 9:29 AM  Workstation performed: [id number]     TRAUMA - CT head wo contrast    Result Date: 5/27/2023  Impression: No acute intracranial abnormality  I personally discussed this study with José Antonio Meng on 5/27/2023 9:29 AM  Workstation performed: [id number]     TRAUMA - CT spine cervical wo contrast    Result Date: 5/27/2023  Impression: No cervical spine fracture or traumatic malalignment  I personally discussed this study with José Antonio Meng on 5/27/2023 9:29 AM  Workstation performed: [id number]     ORIF of right hip periprosthetic fracture on 5/20/2023  Hospital Course: Marques Cleary is a 67-year-old female who arrived as a level B trauma alert via ambulance following a fall    The patient was noted to be in her bathroom on her knees cleaning when she went to stand up and felt a crack in her hip causing her to slip and fall hitting her head on the wall  She was subsequently unable to get up and complained only of right hip pain on presentation  During her initial trauma evaluation, her primary survey was unremarkable  On secondary survey, she was afebrile with normal vital signs; she had pain in her right hip with flexion range of motion in her right lower extremity was noted to external rotation; remainder exam is unremarkable  Her initial work-up included labs and imaging studies  She was admitted to the trauma service status post fall with right periprosthetic hip fracture, ambulatory dysfunction, acute pain secondary to trauma, and coagulopathy secondary to warfarin use  Geriatric medicine was consulted to assist with medication review and management throughout her hospital encounter  Orthopedic surgery was consulted to assist with her fracture management and recommended operative intervention  Patient agreed to proceed with surgery and underwent ORIF of right hip periprosthetic fracture on 5/20/2023  Postoperatively, her diet was advanced as tolerated  Once tolerating an oral diet, her IV fluids were discontinued and she was transitioned to an oral multimodal analgesic regimen  PT and OT evaluated her and recommended rehab  Case management assisted with disposition planning, the patient is deemed stable for discharge on 5/31/2023  For further details of her hospital encounter, please see her complete medical records  On discharge, the patient is instructed to follow-up with the patient's primary care provider to review the events of the patient's recent hospitalization  The patient is instructed to follow-up in the Trauma Clinic as needed  The patient is instructed to follow-up with orthopedic surgery in 2 weeks for postoperative reevaluation  The patient should follow the provided discharge instructions      Condition at Discharge: stable     Discharge instructions/Information to patient and family:   See after visit summary for information provided to patient and family  Provisions for Follow-Up Care:  See after visit summary for information related to follow-up care and any pertinent home health orders  Disposition: See After Visit Summary for discharge disposition information  Planned Readmission: No    Discharge Statement   I spent 25 minutes discharging the patient  This time was spent on the day of discharge  I had direct contact with the patient on the day of discharge  Additional documentation is required if more than 30 minutes were spent on discharge  Discharge Medications:  See after visit summary for reconciled discharge medications provided to patient and family        Sonya Russ PA-C  5/31/2023  12:26 PM

## 2023-05-31 NOTE — ASSESSMENT & PLAN NOTE
- Constipation, likely drug-induced  - Continue multimodal bowel regimen with additions made on 5/31/2023   - Continue diet as tolerated

## 2023-05-31 NOTE — ASSESSMENT & PLAN NOTE
"Reviewed all incidentals and follow-up plan with patient on 6/14/23 and she will follow-up with PCP and OP providers     Per discharging provider to [facility]"Findings and Follow up required:                              1) Scattered less than 3 mm nodular densities in the posterior right upper lobe of the lung as well as a calcified granuloma in the right lower lobe of the lung posteriorly were noted incidentally on your trauma imaging  A malignancy (cancer) cannot be completely excluded based on trauma imaging alone  Recommend short-term outpatient follow-up with primary care provider to review the finding and for further surveillance as indicated                               2)  A subcentimeter cystic hypodensity posterior right lobe of the liver was incidentally identified on your trauma imaging and is unchanged compared to prior imaging  A malignancy (cancer) cannot be completely excluded based on trauma imaging alone  Recommend short-term outpatient follow-up with primary care provider to review the finding and for further surveillance as indicated                               3) A left adrenal gland 1 4 x 0 9 cm nodule was incidentally identified under trauma imaging and is unchanged compared to prior imaging  A malignancy (cancer) cannot be completely excluded based on trauma imaging alone  Recommend short-term outpatient follow-up with primary care provider to review the finding and for further surveillance as indicated                               4) Subcentimeter hypodensities in your kidneys were incidentally identified under trauma imaging and are suggestive of benign cysts  A malignancy (cancer) cannot be completely excluded based on trauma imaging alone    Recommend short-term outpatient follow-up with primary care provider to review the finding and for further surveillance as indicated                               5) A  was incidentally identified under trauma imaging as well as a prevnew small " "umbilical hernia containing nonobstructed bowel loops and fatiously seen right para-midline ventral hernia defect containing adherent bowel loops in a nonobstructive pattern  No further work-up or intervention necessary for these findings at this time  Recommend short-term outpatient follow-up with primary care provider to review the finding and for further surveillance as indicated                   Follow up should be done within 2 week(s)     The above noted incidental findings were discussed with the patient    The patient demonstrated understanding of the findings and the follow-up recommendations      Please notify the following clinician to assist with the follow up:              Dr Alva Gibbs"  "

## 2023-06-01 PROBLEM — E66.9 OBESITY: Status: ACTIVE | Noted: 2023-06-01

## 2023-06-01 PROBLEM — G62.9 PERIPHERAL NEUROPATHY: Status: ACTIVE | Noted: 2023-06-01

## 2023-06-01 PROBLEM — Z76.89 ENCOUNTER FOR SKIN CARE: Status: ACTIVE | Noted: 2023-06-01

## 2023-06-01 LAB
INR PPP: 1.15 (ref 0.84–1.19)
PROTHROMBIN TIME: 15 SECONDS (ref 11.6–14.5)

## 2023-06-01 PROCEDURE — 97535 SELF CARE MNGMENT TRAINING: CPT

## 2023-06-01 PROCEDURE — 97167 OT EVAL HIGH COMPLEX 60 MIN: CPT

## 2023-06-01 PROCEDURE — 99232 SBSQ HOSP IP/OBS MODERATE 35: CPT | Performed by: INTERNAL MEDICINE

## 2023-06-01 PROCEDURE — 97530 THERAPEUTIC ACTIVITIES: CPT

## 2023-06-01 PROCEDURE — 97161 PT EVAL LOW COMPLEX 20 MIN: CPT

## 2023-06-01 PROCEDURE — 85610 PROTHROMBIN TIME: CPT | Performed by: NURSE PRACTITIONER

## 2023-06-01 PROCEDURE — 97110 THERAPEUTIC EXERCISES: CPT

## 2023-06-01 PROCEDURE — 99232 SBSQ HOSP IP/OBS MODERATE 35: CPT

## 2023-06-01 RX ORDER — POLYETHYLENE GLYCOL 3350 17 G/17G
17 POWDER, FOR SOLUTION ORAL DAILY
Status: DISCONTINUED | OUTPATIENT
Start: 2023-06-01 | End: 2023-06-16 | Stop reason: HOSPADM

## 2023-06-01 RX ADMIN — ENOXAPARIN SODIUM 30 MG: 30 INJECTION SUBCUTANEOUS at 21:55

## 2023-06-01 RX ADMIN — ACETAMINOPHEN 975 MG: 325 TABLET, FILM COATED ORAL at 00:21

## 2023-06-01 RX ADMIN — DOCUSATE SODIUM 100 MG: 100 CAPSULE, LIQUID FILLED ORAL at 09:40

## 2023-06-01 RX ADMIN — WARFARIN SODIUM 5 MG: 5 TABLET ORAL at 17:27

## 2023-06-01 RX ADMIN — POLYETHYLENE GLYCOL 3350 17 G: 17 POWDER, FOR SOLUTION ORAL at 16:30

## 2023-06-01 RX ADMIN — ACETAMINOPHEN 975 MG: 325 TABLET, FILM COATED ORAL at 05:08

## 2023-06-01 RX ADMIN — MONTELUKAST SODIUM 10 MG: 10 TABLET, COATED ORAL at 21:55

## 2023-06-01 RX ADMIN — METOPROLOL SUCCINATE 50 MG: 50 TABLET, EXTENDED RELEASE ORAL at 21:55

## 2023-06-01 RX ADMIN — Medication 2.5 MG: at 02:35

## 2023-06-01 RX ADMIN — ACETAMINOPHEN 975 MG: 325 TABLET, FILM COATED ORAL at 17:26

## 2023-06-01 RX ADMIN — ACETAMINOPHEN 975 MG: 325 TABLET, FILM COATED ORAL at 12:30

## 2023-06-01 RX ADMIN — Medication 2.5 MG: at 12:31

## 2023-06-01 RX ADMIN — PREGABALIN 100 MG: 100 CAPSULE ORAL at 09:40

## 2023-06-01 RX ADMIN — DOCUSATE SODIUM 100 MG: 100 CAPSULE, LIQUID FILLED ORAL at 17:27

## 2023-06-01 RX ADMIN — PREGABALIN 100 MG: 100 CAPSULE ORAL at 17:27

## 2023-06-01 RX ADMIN — ENOXAPARIN SODIUM 30 MG: 30 INJECTION SUBCUTANEOUS at 09:40

## 2023-06-01 RX ADMIN — PRAVASTATIN SODIUM 40 MG: 40 TABLET ORAL at 16:30

## 2023-06-01 RX ADMIN — Medication 2.5 MG: at 21:55

## 2023-06-01 NOTE — PROGRESS NOTES
[facility]     06/01/23 1400   Patient Data   Rehab Impairment Impairment of mobility, safety and Activities of Daily Living (ADLs) due to Orthopedic Disorders: Other Orthopedic   Etiologic Diagnosis Right Periprosthetic Hip Fracture   Date of Onset 05/27/23   Support System   Name Patient reports her support system is her sister in law who is local and can assist limitedly  Home Setup   Type of Home Single Level   Method of Entry Stairs   Number of Stairs 3  (1+ 2 with L rail up)   Number of Stairs in Home 2  (down into sunroom)   In Home Hand Rail Bilateral   First Floor Bathroom Full; Shower;Grab Bars  (walk in shower)   First Floor Bathroom Accessibility Grab bars by toilet;Grab bars in tub/shower; Shower chair   First Floor Setup Available Yes   Available Equipment Roller Walker;Single SARCELLES; Shower Chair  (2 RW which were not hers)   Baseline Information   Transportation    Prior Device(s) Used Single Point Cane   Prior IADL Participation   Meal Preparation Full Participation   Laundry Full Participation   Home Cleaning Full Participation   Prior Level of Function   Self-Care 3  Independent - Patient completed the activities by him/herself, with or without an assistive device, with no assistance from a helper  Indoor-Mobility (Ambulation) 3  Independent - Patient completed the activities by him/herself, with or without an assistive device, with no assistance from a helper  Stairs 3  Independent - Patient completed the activities by him/herself, with or without an assistive device, with no assistance from a helper  Functional Cognition 3  Independent - Patient completed the activities by him/herself, with or without an assistive device, with no assistance from a helper  Prior Device Used Z   None of the above   Falls in the Last Year   Number of falls in the past 12 months 1   Type of Injury Associated with Fall Major injury   Restrictions/Precautions   Precautions Fall Risk;Pain RLE Weight Bearing Per Order WBAT   Pain Assessment   Pain Assessment Tool 0-10   Pain Score 7   Pain Location/Orientation Orientation: Right;Location: Hip;Location: Leg   Pain Onset/Description Onset: Ongoing   Effect of Pain on Daily Activities increased pain with mobility   Patient's Stated Pain Goal No pain   Hospital Pain Intervention(s) Cold applied  (15 minutes during session)   Toilet Transfer   Surface Assessed Standard Commode   Transfer Technique Standard   Limitations Noted In Endurance;LE Strength   Adaptive Equipment Walker   Positioning Concerns LE Support   Type of Assistance Needed Physical assistance   Physical Assistance Level 51%-75%   Comment mod Ax1 SPT with RW; increased time required   Toilet Transfer CARE Score 2   Transfer Bed/Chair/Wheelchair   Limitations Noted In Balance; Endurance;LE Strength   Adaptive Equipment Roller Walker   Type of Assistance Needed Physical assistance   Physical Assistance Level 51%-75%   Comment increased time required; utilized RW; cues for LE sequencing   Chair/Bed-to-Chair Transfer CARE Score 2   Roll Left and Right   Type of Assistance Needed Physical assistance   Physical Assistance Level 76% or more   Roll Left and Right CARE Score 2   Sit to Lying   Type of Assistance Needed Physical assistance   Physical Assistance Level 76% or more   Comment assist BLE   Sit to Lying CARE Score 2   Lying to Sitting on Side of Bed   Comment OOB in chair upon arrival   Sit to Stand   Type of Assistance Needed Physical assistance   Physical Assistance Level 51%-75%   Comment increased time; boost required   Sit to Stand CARE Score 2   Picking Up Object   Reason if not Attempted Safety concerns   Picking Up Object CARE Score 88   Car Transfer   Reason if not Attempted Safety concerns   Car Transfer CARE Score 88   Ambulation   Primary Mode of Locomotion Prior to Admission Walk   Distance Walked (feet) 20 ft  (+ 10)   Assist Device Roller Walker   Gait Pattern Antalgic; Inconsistant [place];Decreased foot clearance;Decreased R stance; Improper weight shift   Limitations Noted In Endurance; Heel Strike;Speed;Balance;Sensation   Provided Assistance with: Balance;Weight Shift   Walk 10 Feet   Type of Assistance Needed Physical assistance   Physical Assistance Level 51%-75%   Comment increased time; slightly anxious   Walk 10 Feet CARE Score 2   Walk 50 Feet with Two Turns   Reason if not Attempted Safety concerns   Walk 50 Feet with Two Turns CARE Score 88   Walk 150 Feet   Reason if not Attempted Safety concerns   Walk 150 Feet CARE Score 88   Walking 10 Feet on Uneven Surfaces   Reason if not Attempted Safety concerns   Walking 10 Feet on Uneven Surfaces CARE Score 88   Wheel 50 Feet with Two Turns   Reason if not Attempted Activity not applicable   Wheel 50 Feet with Two Turns CARE Score 9   Wheel 150 Feet   Reason if not Attempted Activity not applicable   Wheel 389 Feet CARE Score 9   Curb or Single Stair   Reason if not Attempted Safety concerns   1 Step (Curb) CARE Score 88   4 Steps   Reason if not Attempted Safety concerns   4 Steps CARE Score 88   12 Steps   Reason if not Attempted Activity not applicable   12 Steps CARE Score 9   Comprehension   QI: Comprehension 4  Undestands: Clear comprehension without cues or repetitions   Expression   QI: Expression 4  Express complex messages without difficulty and with speech that is clear and easy to [place]   RLE Assessment   RLE Assessment   (3-/5)   LLE Assessment   LLE Assessment WFL   RUE Assessment   RUE Assessment WFL   LUE Assessment   LUE Assessment WFL   Coordination   Movements are Fluid and Coordinated 0   Coordination and Movement Description antalgic   Sensation   Additional Comments h/o BLE peripheral neuropathy   Cognition   Overall Cognitive Status WFL   Arousal/Participation Alert; Cooperative   Attention Within functional limits   Orientation Level Oriented X4   Memory Decreased short term memory;Decreased "recall of recent events;Decreased recall of precautions   Vision   Vision Comments Wears glasses   Objective Measure   PT Measure(s) Patient educated on PT POC, goal setting and purpose of rehab  Educated on [facility] expectations and therapy scheduling   Therapeutic Exercise   Therapeutic Exercise/Activity RLE 3x10 LAQ, hip flexion, hip ABD/ADD; RLE heel cord and HS gentle stretch TERT 2 minutes   Discharge Information   Vocational Plan Retired/not working   Patient's Discharge Plan return home at mod(I) level   Patient's Rehab Expectations \"to be able to do everything on my own again\"   Barriers to Discharge Home No Family Support; Unsafe Home Setup; Decreased Strength;Decreased Endurance; Safety Considerations;Pain   Impressions Pt is 77 y o  female seen for PT evaluation s/p admit to [facility] on 5/31/2023 for rehab to maximize her functional mobility (I) and safety  She presented initially  w/ 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 ([address] ) and underwent an ORIF 5/28/23  She presents to rehab with pain and is deemed WBAT  Comorbidities affecting pt's physical performance at time of assessment include: spinal stenosis, aortic stenosis, PAF, peripheral neuropathy and chronic kidney disease  PTA, pt was independent w/ all functional mobility w/ SPC, ambulates unrestricted distances and all terrain, ambulates household distances and lives w/ self in 1 level home with 3 BLAS  Personal factors affecting pt at time of IE include: inaccessible home environment, stairs to enter home, inability to navigate level surfaces w/o external assistance, unable to perform dynamic tasks in community, positive fall history, anxiety, inability to perform IADLs, inability to perform ADLs and inability to live alone   Please find objective findings from PT assessment regarding body systems outlined above with impairments and limitations including weakness, decreased ROM, impaired balance, decreased " endurance, gait deviations, pain, decreased activity tolerance, decreased functional mobility tolerance, decreased safety awareness, fall risk, orthopedic restrictions and decreased skin integrity  She required overall mod/max A for mobility, required increased time, required use of a RW, and was limited by pain  She does present highly motivated and a excellent rehab candidate  She is expected to need appx 2 weeks to achieve mod(I) goals     PT Therapy Minutes   PT Time In 1400   PT Time Out 1530   PT Total Time (minutes) 90   PT Mode of treatment - Individual (minutes) 90   PT Mode of treatment - Concurrent (minutes) 0   PT Mode of treatment - Group (minutes) 0   PT Mode of treatment - Co-treat (minutes) 0   PT Mode of Treatment - Total time(minutes) 90 minutes   PT Cumulative Minutes 90   Cumulative Minutes   Cumulative therapy minutes 195

## 2023-06-01 NOTE — PLAN OF CARE
Problem: PAIN - ADULT  Goal: Verbalizes/displays adequate comfort level or baseline comfort level  Description: Interventions:  - Encourage patient to monitor pain and request assistance  - Assess pain using appropriate pain scale  - Administer analgesics based on type and severity of pain and evaluate response  - Implement non-pharmacological measures as appropriate and evaluate response  - Consider cultural and social influences on pain and pain management  - Notify physician/advanced practitioner if interventions unsuccessful or patient reports new pain  Outcome: Progressing     Problem: INFECTION - ADULT  Goal: Absence or prevention of progression during hospitalization  Description: INTERVENTIONS:  - Assess and monitor for signs and symptoms of infection  - Monitor lab/diagnostic results  - Monitor all insertion sites, i e  indwelling lines, tubes, and drains  - Monitor endotracheal if appropriate and nasal secretions for changes in amount and color  - [place] appropriate cooling/warming therapies per order  - Administer medications as ordered  - Instruct and encourage patient and family to use good hand hygiene technique  - Identify and instruct in appropriate isolation precautions for identified infection/condition  Outcome: Progressing     Problem: SAFETY ADULT  Goal: Patient will remain free of falls  Description: INTERVENTIONS:  - Educate patient/family on patient safety including physical limitations  - Instruct patient to call for assistance with activity   - Consult OT/PT to assist with strengthening/mobility   - Keep Call bell within reach  - Keep bed low and locked with side rails adjusted as appropriate  - Keep care items and personal belongings within reach  - Initiate and maintain comfort rounds  - Make Fall Risk Sign visible to staff  - Offer Toileting every 2 Hours, in advance of need  - Initiate/Maintain bed/chair alarm  - Obtain necessary fall risk management equipment: non skid footwear  - Apply yellow socks and bracelet for high fall risk patients  - Consider moving patient to room near nurses station  Outcome: Progressing  Goal: Maintain or return to baseline ADL function  Description: INTERVENTIONS:  -  Assess patient's ability to carry out ADLs; assess patient's baseline for ADL function and identify physical deficits which impact ability to perform ADLs (bathing, care of mouth/teeth, toileting, grooming, dressing, etc )  - Assess/evaluate cause of self-care deficits   - Assess range of motion  - Assess patient's mobility; develop plan if impaired  - Assess patient's need for assistive devices and provide as appropriate  - Encourage maximum independence but intervene and supervise when necessary  - Involve family in performance of ADLs  - Assess for home care needs following discharge   - Consider OT consult to assist with ADL evaluation and planning for discharge  - Provide patient education as appropriate  Outcome: Progressing  Goal: Maintains/Returns to pre admission functional level  Description: INTERVENTIONS:  - Perform BMAT or MOVE assessment daily    - Set and communicate daily mobility goal to care team and patient/family/caregiver  - Collaborate with rehabilitation services on mobility goals if consulted  - Perform Range of Motion 3 times a day  - Reposition patient every 2 hours    - Dangle patient 3 times a day  - Stand patient 3 times a day  - Ambulate patient 3 times a day  - Out of bed to chair 3 times a day   - Out of bed for meals 3 times a day  - Out of bed for toileting  - Record patient progress and toleration of activity level   Outcome: Progressing     Problem: DISCHARGE PLANNING  Goal: Discharge to home or other facility with appropriate resources  Description: INTERVENTIONS:  - Identify barriers to discharge w/patient and caregiver  - Arrange for needed discharge resources and transportation as appropriate  - Identify discharge learning needs (meds, wound care, etc )  - Arrange for interpretive services to assist at discharge as needed  - Refer to Case Management Department for coordinating discharge planning if the patient needs post-hospital services based on physician/advanced practitioner order or complex needs related to functional status, cognitive ability, or social support system  Outcome: Progressing     Problem: Prexisting or High Potential for Compromised Skin Integrity  Goal: Skin integrity is maintained or improved  Description: INTERVENTIONS:  - Identify patients at risk for skin breakdown  - Assess and monitor skin integrity  - Assess and monitor nutrition and hydration status  - Monitor labs   - Assess for incontinence   - Turn and reposition patient  - Assist with mobility/ambulation  - Relieve pressure over bony prominences  - Avoid friction and shearing  - Provide appropriate hygiene as needed including keeping skin clean and dry  - Evaluate need for skin moisturizer/barrier cream  - Collaborate with interdisciplinary team   - Patient/family teaching  - Consider wound care consult   Outcome: Progressing

## 2023-06-01 NOTE — ASSESSMENT & PLAN NOTE
Improving   ICE prn   APAP 975mg TID   Oxycodone 2 5-5mg Q4H PRN (1-2 per day)   [place] PDMP website checked and no concerning Rx's or Rx patterns noted    - Will Rx Oxy 5mg tab disp #7 - take 0 5-1 tab BID PRN for mod-severe pain on d/c -- sent in   - Patient reviewed risks of injury/fall - and understands   Lyrica 100mg BID (chronic for neuropathy)  Monitor for oversedation, AMS/delirium, and respiratory depression - none

## 2023-06-01 NOTE — ASSESSMENT & PLAN NOTE
Monitor skin for increased infection/breakdown/wounds which patient is at higher risk for  Skilled PT/OT   Fall precautions

## 2023-06-01 NOTE — PROGRESS NOTES
[facility] PT EVAL- GOALS     06/01/23 1400   Rehab Team Goals   Transfer Team Goal Patient will be independent with transfers with least restrictive device upon completion of rehab program   Rehab Team Interventions   PT Interventions Gait Training; Therapeutic Exercise;Neuromuscualr Reeducation;Transfer Training;Community Reintegration;Bed Mobility;Modalities   Toileting Transfer Goal   Toilet transfer Goal 06  Independent - Patient completes the activity by him/herself with no assistance from a helper  Assistive Device   (LRAD)   Status Ongoing; Target goal - two weeks   Intervention Balance Work;Assistive Device   PT Transfer Goal   Roll left and right Goal 06  Independent - Patient completes the activity by him/herself with no assistance from a helper  Sit to lying Goal 06  Independent - Patient completes the activity by him/herself with no assistance from a helper  Lying to sitting on side of bed Goal 06  Independent - Patient completes the activity by him/herself with no assistance from a helper  Sit to stand Goal 06  Independent - Patient completes the activity by him/herself with no assistance from a helper  Chair/bed-to-chair transfer Goal 06  Independent - Patient completes the activity by him/herself with no assistance from a helper  Car Transfer Goal 05  Setup or clean-up assistance - [place] SETS UP or CLEANS UP, patient completes activity  [place] assists only prior to or following the activity  Assistive Device   (LRAD)   Environment Level Surface; Uneven Surface;Tile Floor   Status Ongoing; Target goal - two weeks   Locomotion Goal   Primary discharge mode of locomotion Walking   Target Walk Distance 100 ft  (HH DISTANCES FOR SAFE D/C PLANNING; INC DISTANCE FOR COMMUNITY REINTEGRATION/ENDURANCE)   Assist Device Roller Walker   Gait Pattern Improvement Antalgic   Environment Level Surface; Well Lit; Tile Floor   Walk 10 feet Goal 06   Independent - Patient completes the activity by him/herself with no assistance from a helper  Walk 50 feet with 2 turns Goal 06  Independent - Patient completes the activity by him/herself with no assistance from a helper  Walk 150 feet Goal 06  Independent - Patient completes the activity by him/herself with no assistance from a helper  Walking 10 feet on uneven surface 05  Setup or clean-up assistance - [place] SETS UP or CLEANS UP, patient completes activity  [place] assists only prior to or following the activity  Walking Goal Status Ongoing; Target goal - two weeks   Wheel 50 feet with 2 turns Goal 09  Not applicable   Wheel 584 feet Goal 09  Not applicable   Stairs Goal   1 step or curb goal 05  Setup or clean-up assistance - [place] SETS UP or CLEANS UP, patient completes activity  [place] assists only prior to or following the activity  4 steps Goal 05  Setup or clean-up assistance - [place] SETS UP or CLEANS UP, patient completes activity  [place] assists only prior to or following the activity  12 steps Goal 09  Not applicable   Status Ongoing; Target goal - two weeks   Object Retrieval Goal   Picking up object Goal 06  Independent - Patient completes the activity by him/herself with no assistance from a helper     Assistive Device  Reacher   Small Object Picked Up MARKER

## 2023-06-01 NOTE — H&P
PHYSICAL MEDICINE AND REHABILITATION H&P/ADMISSION NOTE  Nimo Pulse 77 y o  female MRN: [id number]  Unit/Bed#: [facility] 961-01 Encounter: [id number]     Rehab Diagnosis: Orthopedic Disorders:  08 2  Femur (Shaft) Fracture*  Etiologic Diagnosis:  Right Periprosthetic Hip Fracture    History of Present Illness:   76-year-old female with a past medical history of A-fib on Coumadin, hypertension, hyperlipidemia, obesity, peripheral neuropathy, aortic stenosis, bilateral total hip arthroplasty, right total hip arthroplasty revision who stood up from kneeling and felt a crack in her right hip and then slipped and fell with resultant right leg pain who was found to have right periprosthetic hip fracture -described by radiology is acute minimally displaced periprosthetic fracture of the right proximal femoral diaphysis adjacent to the femoral stem of the right total hip arthroplasty  CT head was negative for acute findings  Patient underwent ORIF of right periprosthetic hip fracture on 5/28  Patient was started on Lovenox for VTE prophylaxis and cleared for Coumadin on 5/31  Patient has had some acute blood loss anemia, pain, severe constipation, and significant decline in ADLs and mobility  Chief complaint:   Pain     Subjective: On eval, patient reports moderately significant right hip and thigh pain worse with weightbearing and movements  She denies lightheadedness, shortness of breath, fever, chills, sweats  She reports chronic neuropathy and decreased sensation in her lower legs without acute changes  Patient reports constipation with decreased gas without abdominal pain or nausea or vomiting which started close to a week ago  She reports urinating adequately  She denies calf pain or other new complaints  Review of Systems: A 10 point ROS was performed; negative except as noted above       * 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 [facility])  Assessment & Plan  Status post surgical repair via ORIF by Dr Paul Loyd  on 5/28/23  • Weight-bearing as tolerated on affected limb   • Monitor incision/area for infection or dehiscence/impaired healing   • POD#14 is Enoch Mackey 6/11  • Monitor for signs/symptoms of VTE, atelectasis, acute blood loss anemia, or other infection   • Recommend acute comprehensive interdisciplinary inpatient rehabilitation to include intensive skilled therapies (PT, OT) as outlined with oversight and management by rehabilitation physician as well as inpatient rehab level nursing, case management and weekly interdisciplinary team meetings  • Follow-up with Ortho Sx after d/c and ortho if needed during ARC course       Obesity  Assessment & Plan   on appropriate nutrition and activity  Adjustments accordingly during skilled therapy and with rehab nursing  Monitor skin closely for breakdown, wounds, rashes; keep clean and dry, turning Q2H   Follow-up with PCP after d/c      Encounter for skin care  Assessment & Plan  - Increased risk of skin wounds/breakdown/rashes due to recent immobility and co-morbidities   - 2 nurse/staff full skin check for abnormal findings on admission - obtain photos PRN  - Turn patient Q2H in bed (use pillows or wedges), encourage wt shifts every 10-15 minutes in chair  - Patient and if available caregiver education   - Hydragaurd to buttocks and sacrum BID & PRN  - Optimal bowel/bladder hygiene; keep skin clean and dry   - EHOB waffle cushion to chair/WC when OOB  - Nursing to document in chart and Notify MD if buttock, sacrum, heel, or other skin site develops erythema, skin breakdown, or rashes as soon as possible  If patient is soiling themselves with urine or stool notify MD   If you are unable to maintain skin integrity and prevent erythema due to frequency of soiling notify MD as soon as possible as well         Peripheral neuropathy  Assessment & Plan  Monitor skin for increased infection/breakdown/wounds which patient is at higher risk "for  Skilled PT/OT   Fall precautions      Abnormal finding on radiology exam  Assessment & Plan  Per discharging provider to [facility]   \"Findings and Follow up required:                              1) Scattered less than 3 mm nodular densities in the posterior right upper lobe of the lung as well as a calcified granuloma in the right lower lobe of the lung posteriorly were noted incidentally on your trauma imaging  A malignancy (cancer) cannot be completely excluded based on trauma imaging alone  Recommend short-term outpatient follow-up with primary care provider to review the finding and for further surveillance as indicated                               2)  A subcentimeter cystic hypodensity posterior right lobe of the liver was incidentally identified on your trauma imaging and is unchanged compared to prior imaging  A malignancy (cancer) cannot be completely excluded based on trauma imaging alone  Recommend short-term outpatient follow-up with primary care provider to review the finding and for further surveillance as indicated                               3) A left adrenal gland 1 4 x 0 9 cm nodule was incidentally identified under trauma imaging and is unchanged compared to prior imaging  A malignancy (cancer) cannot be completely excluded based on trauma imaging alone  Recommend short-term outpatient follow-up with primary care provider to review the finding and for further surveillance as indicated                               4) Subcentimeter hypodensities in your kidneys were incidentally identified under trauma imaging and are suggestive of benign cysts  A malignancy (cancer) cannot be completely excluded based on trauma imaging alone    Recommend short-term outpatient follow-up with primary care provider to review the finding and for further surveillance as indicated                               5) A new small umbilical hernia containing nonobstructed bowel loops and fat was incidentally identified " "under trauma imaging as well as a previously seen right para-midline ventral hernia defect containing adherent bowel loops in a nonobstructive pattern  No further work-up or intervention necessary for these findings at this time  Recommend short-term outpatient follow-up with primary care provider to review the finding and for further surveillance as indicated                   Follow up should be done within 2 week(s)     The above noted incidental findings were discussed with the patient  The patient demonstrated understanding of the findings and the follow-up recommendations      Please notify the following clinician to assist with the follow up:              Dr Carlos Lofton"    Anemia  Assessment & Plan  Consult(ed) IM and comanagement with their service during ARC course   Monitor H/H, vitals, signs/symptoms of acute bleeding      At risk for venous thromboembolism (VTE)  Assessment & Plan  SCDs, ambulation, and lovenox 30mg BID (until warfarin tx)       Pain  Assessment & Plan  APAP 975mg TID   Oxycodone 2 5-5mg Q4H PRN  Lyrica 100mg BID (chronic for neuropathy(    Monitor for oversedation, AMS/delirium, and respiratory depression   If being administered - hold opiates, muscle relaxants, benzodiazepines, and gabapentin for oversedation, AMS, or RR<12    Counseled on and continue to encourage deep breathing/relaxation/behavioral pain management techniques      Constipation  Assessment & Plan  Significant on admission to [facility] with last Bm near a week ago; no current signs of symptoms obstruction  - Continue to monitor for S/S of obstruction; hold stimulant laxatives and obtain imaging if concern develops  - Provide: miralax x1, then provided mineral oil enema x1 (declined duclolax supp and senna)  - Colace BID, miralax daily (consider BID if needed)  - PRN bowel regimen  - Limiting constipating medications if possible  - Ensure adequate hydration       Pure hypercholesterolemia  Assessment & Plan  Statin " PAF (paroxysmal atrial fibrillation) ([facility])  Assessment & Plan  IM consulted and with overall management at their discretion during ARC course  Rate control: Toprol XL 50mg HS  Antithrombotic: Warfarin cleared to resume   (patient is frustrated with checking her INR and was not always checking it and adjusting med dose on own at time; she did voice she would be open to consider NOAC - consider discussing with cards in IP setting otherwise OP follow-up)      Non-rheumatic aortic stenosis  Assessment & Plan  EF 65%, G2DD, Mild-mod AS, mod-severe elevated RVSP  IM consulted and with overall management at their discretion during ARC course  Monitor vitals, fluid status  OP Cards follow-up        Disposition:   Home with estimate length of stay of 2 weeks from admission    Follow-up:   PCP  Orthopedics  Cardiology    CODE: Level 3: DNAR and DNI discussed with patient    Restrictions include: Fall precautions     ---------------------------------------------------------------------------------------------------------------------      OBJECTIVE:    Physical Exam:  05/31/23 1453 98 5 °F (36 9 °C) 89 18 148/63 96 % None (Room air)     Vitals above reviewed on date of encounter    General: Awake, alert in NAD  HENT: NCAT, MMM  Neck: Supple, no lymphadenopathy  Respiratory: Unlabored breathing, breath sounds equal, Lungs CTA, no wheezes, rales, or rhonchi  Cardiovascular: Regular rate and rhythm, no murmurs, rubs, or gallops  Gastrointestinal: Soft, non-tender, somewhat-distended, decreased but active bowel sounds  Genitourinary: No dowd  SkiN/MSK/Extremities:  Distal extremities appropriately warm, normal cap refill distally, right leg with some generalized edema and ecchymosis    Surgical dressing in place with subtle strikethrough of sanguinous drainage  Neurologic/Psych:   MENTAL STATUS: awake, oriented to person, place, time, and situation  Affect: Euthymic   CN II-XII: grossly intact   Strength/MMT: Near full without "testing right proximal lower extremity    Laboratory:    Results from last 7 days   Lab Units 05/31/23  0538 05/30/23  0444 05/28/23  0445   HEMATOCRIT % 30 9* 28 5* 34 6*   HEMOGLOBIN g/dL 9 8* 9 0* 11 0*   WBC Thousand/uL 8 70 11 26* 7 22     Results from last 7 days   Lab Units 05/31/23  0538 05/30/23  0444 05/28/23  0445 05/27/23  0854   ALT U/L  --   --   --  30   AST U/L  --   --   --  24   BUN mg/dL 14 14 12 15   CHLORIDE mmol/L 108 109* 108 109*   CREATININE mg/dL 0 42* 0 58* 0 60 0 57*   GLUCOSE, ISTAT mg/dl  --   --   --  140   POTASSIUM mmol/L 4 1 4 2 4 0 4 2     Results from last 7 days   Lab Units 06/01/23  0958 05/31/23  1633 05/28/23  0445   INR  1 15 1 07 1 49*   PROTIME seconds 15 0* 14 1 18 3*        Wt Readings from Last 1 Encounters:   05/31/23 78 5 kg (173 lb 1 oz)     Estimated body mass index is 34 95 kg/m² as calculated from the following:    Height as of this encounter: 4' 11\" (1 499 m)  Weight as of this encounter: 78 5 kg (173 lb 1 oz)  Imaging: reviewed    Per EMR:  Past Medical History:   Past Surgical History:   Family History:   Social history:   Past Medical History:   Diagnosis Date   • Chronic kidney disease     Horse shoe kidney   • Colitis    • Hyperlipidemia     Past Surgical History:   Procedure Laterality Date   • ABDOMINAL ADHESION SURGERY N/A 11/27/2021    Procedure: LYSIS ADHESIONS;  Surgeon: Manan Sotelo MD;  [facility];  Service: General   • BACK SURGERY      2 rods placed in back   • COLON SURGERY     • COLONOSCOPY N/A 5/4/2018    Procedure: COLONOSCOPY;  Surgeon: Asuncion Tinoco MD;  Location: [facility];   Service: Colorectal   • JOINT REPLACEMENT Bilateral    • LAPAROTOMY N/A 11/27/2021    Procedure: LAPAROTOMY EXPLORATORY; EXPLANTATION OF MESH;  Surgeon: Manan Sotelo MD;  [facility];  Service: General   • ORIF FEMUR FRACTURE Right 5/28/2023    Procedure: OPEN REDUCTION W/ INTERNAL FIXATION (ORIF) FEMUR- ORIF right siri-prosthetic hip " fracture;  Surgeon: Linda Grissom MD;  [facility];  Service: Orthopedics   • TN REPAIR FIRST ABDOMINAL WALL HERNIA N/A 2022    Procedure: REPAIR HERNIA INCISIONAL, LYSIS OF ADHESIONS, EXPLANTATION OF MESH, REPAIR OF ENTEROTOMY;  Surgeon: Nidhi Singh MD;  [facility];  Service: General     Family History   Problem Relation Age of Onset   • No Known Problems Mother    • No Known Problems Father       Social History     Socioeconomic History   • Marital status:      Spouse name: Not on file   • Number of children: Not on file   • Years of education: Not on file   • Highest education level: Not on file   Occupational History   • Not on file   Tobacco Use   • Smoking status: Former     Types: Cigarettes     Quit date: 5     Years since quittin 4   • Smokeless tobacco: Never   Vaping Use   • Vaping Use: Never used   Substance and Sexual Activity   • Alcohol use: Yes     Comment: once in awhile   • Drug use: No   • Sexual activity: Not on file   Other Topics Concern   • Not on file   Social History Narrative   • Not on file     Social Determinants of Health     Financial Resource Strain: Not on file   Food Insecurity: No Food Insecurity (2022)    Hunger Vital Sign    • Worried About Running Out of Food in the Last Year: Never true    • Ran Out of Food in the Last Year: Never true   Transportation Needs: No Transportation Needs (2022)    PRAPARE - Transportation    • Lack of Transportation (Medical): No    • Lack of Transportation (Non-Medical):  No   Physical Activity: Not on file   Stress: Not on file   Social Connections: Not on file   Intimate Partner Violence: Not on file   Housing Stability: Low Risk  (2022)    Housing Stability Vital Sign    • Unable to Pay for Housing in the Last Year: No    • Number of Places Lived in the Last Year: 1    • Unstable Housing in the Last Year: No          Current Medical Diagnosis Medications Allergies   Patient Active Problem List   Diagnosis   • Spinal stenosis of lumbar region   • Mixed hyperlipidemia   • Other chest pain   • Intestinal adhesions   • Incisional hernia, without obstruction or gangrene   • Postoperative visit   • Non-rheumatic aortic stenosis   • PAF (paroxysmal [facility] fibrillation) ([facility] )   • Fall   • Pure hypercholesterolemia   • 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 ([facility] )   • Constipation   • Pain   • At risk for venous thromboembolism (VTE)   • Anemia   • Abnormal finding on radiology exam   • Peripheral neuropathy   • Encounter for skin care   • Obesity      Current Facility-Administered Medications:   •  acetaminophen (TYLENOL) tablet 975 mg, 975 mg, Oral, Q6H [address], Frandy Jeff MD, 975 mg at 06/01/23 1230  •  bisacodyl (DULCOLAX) rectal suppository 10 mg, 10 mg, Rectal, Daily PRN, Frandy Jeff MD  •  docusate sodium (COLACE) capsule 100 mg, 100 mg, Oral, BID, Frandy Jeff MD, 100 mg at 06/01/23 0940  •  enoxaparin (LOVENOX) subcutaneous injection 30 mg, 30 mg, Subcutaneous, Q12H [address], Frandy Jeff MD, 30 mg at 06/01/23 0940  •  latanoprost (XALATAN) 0 005 % ophthalmic solution 1 drop, 1 drop, Both Eyes, HS, RAYMON Baum, 1 drop at 05/31/23 2203  •  lidocaine (URO-JET) 2 % urethral/mucosal gel, , Topical, Q4H PRN, Frandy Jeff MD  •  metoprolol succinate (TOPROL-XL) 24 hr tablet 50 mg, 50 mg, Oral, HS, Frandy Jeff MD, 50 mg at 05/31/23 2202  •  montelukast (SINGULAIR) tablet 10 mg, 10 mg, Oral, HS, Frandy Jeff MD, 10 mg at 05/31/23 2202  •  oxyCODONE (ROXICODONE) IR tablet 5 mg, 5 mg, Oral, Q4H PRN, Frandy Jeff MD  •  oxyCODONE (ROXICODONE) split tablet 2 5 mg, 2 5 mg, Oral, Q4H PRN, Frandy Jeff MD, 2 5 mg at 06/01/23 1231  •  polyethylene glycol (MIRALAX) packet 17 g, 17 g, Oral, Daily PRN, Frandy Jeff MD, 17 g at 05/31/23 1706  •  polyethylene glycol (MIRALAX) packet 17 g, 17 g, "Oral, Daily, Carlyle Loza MD  •  pravastatin (PRAVACHOL) tablet 40 mg, 40 mg, Oral, Daily With Guera Regan MD, 40 mg at 05/31/23 1706  •  pregabalin (LYRICA) capsule 100 mg, 100 mg, Oral, BID, Carlyle Loza MD, 100 mg at 06/01/23 0940  •  warfarin (COUMADIN) tablet 5 mg, 5 mg, Oral, Daily (warfarin), RAYMON Cruz, 5 mg at 05/31/23 1749 Allergies   Allergen Reactions   • Hydromorphone Other (See Comments)     \"it stopped my heart\"  hallucinations              Prior Level of Function and social history:    She lives in [facility] single family home  Alethea De Souza is  and lives alone  Self Care: Independent, Indoor Mobility: Modified Independent, Stairs (in/outdoor): Modified Independent and Cognition: Independent  Prior to patient's admission, patient was fully Independent with ADLs and IADLs, including driving  She was Independent with mobility, only using a RW as needed, mostly at       FALLS IN THE LAST 6 MONTHS: 1 to 4     HOME ENVIRONMENT:  The living area: can live on one level  There are 2 steps to enter the home with 2 steps inside the home down to sunken living room  The patient will not have 24 hour supervision/physical assistance available upon discharge  Patient has a supportive, local sister-in-law that is able to provide limited assistance, as she works  Patient also has supportive neighbors  Current Level of Function:    Mobility:  Transfers moderate assist x2  Ambulation/Mobility 3 feet moderate assist x2    ADLs:  Max assist lower body dressing, toileting, lower body bathing  Min assist upper body bathing upper body dressing    Potential Barriers to Discharge:  Co-morbidities (see above), new functional deficits, pain, deconditioning, lack of support at home    Tolerance for three hours of therapy per therapy day: adequate     Rehabilitation Prognosis: good       Rehabilitation Plan/Therapy Interventions:  This patient will have close medical " and rehabilitation oversight from a physical medicine and rehabilitation physician and if needed an internal medicine physician to manage the complexity of medical issues to optimize health, ability to participate in therapy, quality of life, and functional outcomes  This patient requires 24 hour rehabilitation nursing to address bowel and bladder management, (pain management if listed below), medication administration, positioning/skin monitoring, fall/injury prevention, and VTE prophylaxis  Physical and occupational therapy: Patient requires PT, OT to improve functional mobility, transfers, upper and lower body strengthening, conditioning, balance, and gait training with appropriate assistive device  PT and OT will be provided approximately 5 times per week for 90 minutes per day  Skilled therapists and nursing will also provide patient/family safety education and training  / will work to ensure proper communication between patient (+/- family) and staff regarding the overall rehabilitation and medical process while patient is in the acute rehabilitation center  / will work with patient (and if applicable family and community resources) to optimize safe discharge  Discharge Planning:    Estimated length of stay:   16 days  Family/Patient Goals:  Patient/family's goals: Return to previous home/apartment  Mobility Goals:   Largely modified independent    Activities of Daily Living (ADLs) Goals:  Largely modified independent    Rehabilitation and discharge goals discussed with the patient and/or family  Case Managment and Social Work to review patient/family resources and to coordinate Discharge Planning  Patient and Family Education and Training:  Rehabilitation and discharge goals discussed with the patient and/or family  Patient/family education/training needs to be discussed in weekly team meeting  Other equipment:  To be "determined    Medical Necessity Criteria for ARC Admission:  The preadmission screen, post-admission physical evaluation, overall plan of care and admissions order demonstrate a reasonable expectation that the following criteria were met at the time of admission to the [facility]  (See \"Specific areas of management and oversight in ARC setting\" for additional details on medical necessity as outlined below)  1  The patient requires active and ongoing therapeutic intervention of multiple therapy disciplines (physical therapy, occupational therapy, speech-language pathology, or prosthetics/orthotics), one of which is physical or occupational therapy  2  Patient requires an intensive rehabilitation therapy program, as defined in Chapter 1, section 110 2 2 of the CMS Medicare Policy Manual  This intensive rehabilitation therapy program will consist of at least 3 hours of therapy per day at least 5 days per week or at least 15 hours of intensive rehabilitation therapy within a 7 consecutive day period, beginning with the date of admission to the [facility]  3  The patient is reasonably expected to actively participate in, and benefit significantly from, the intensive rehabilitation therapy program as defined in Chapter 1, section 110 2 2 of the CMS Medicare Policy Manual at this time of admission to the [facility]  She can reasonably be expected to make measurable improvement (that will be of practical value to improve the patient’s functional capacity or adaptation to impairments) as a result of the rehabilitation treatment, as defined in section 110 3, and such improvement can be expected to be made within the prescribed period of time  As noted in the CMS Medicare Policy Manual, the patient need not be expected to achieve complete independence in the domain of self-care nor be expected to return to his or her prior level of functioning in order to meet this standard    4  The patient must require physician supervision by a " rehabilitation physician  As such, a rehabilitation physician will conduct face-to-face visits with the patient at least 3 days per week throughout the patient’s stay in the [facility] to assess the patient both medically and functionally, as well as to modify the course of treatment as needed to maximize the patient’s capacity to benefit from the rehabilitation process  5  The patient requires an intensive and coordinated interdisciplinary approach to providing rehabilitation, as defined in Chapter 1, section 110 2 5 of the CMS Medicare Policy Manual  This will be achieved through periodic team conferences, conducted at least once in a 7-day period, and comprising of an interdisciplinary team of medical professionals consisting of: a rehabilitation physician, registered nurse,  and/or , and a licensed/certified therapist from each therapy discipline involved in treating the patient  Changes Since Pre-admission Assessment: None -This patient's participation in rehab continues to be reasonable, necessary and appropriate  CMS Required Post-Admission Physician Evaluation Elements  History and Physical, including medical history, functional history and active comorbidities as in above text  Post-Admission Physician Evaluation:  The patient has the potential to make improvement and is in need of physical, occupational, and/or therapy services  The patient may also need nutritional services  Given the patient's complex medical condition and risk of further medical complications, rehabilitative services cannot be safely provided at a lower level of care, such as a skilled nursing facility  I have reviewed the patient's functional and medical status at the time of the preadmission screening and they are the same as on the day of this admission  I acknowledge that I have personally performed a full physical examination on this patient within 24 hours of admission   The patient demonstrated understanding the rehabilitation program and the discharge process after we discussed them  Agree in entirety: yes  Minor adaptions: none    Major changes: none    Specific areas of management and oversight in ARC setting:    Cardiopulmonary function/Anemia: Ensure cardiopulmonary stability and optimize cardiopulmonary function not only at rest but with activity as patient's activity level significantly increases in acute rehab compared with prior to transfer in preparation for safe discharge from [facility]  Must closely and frequently monitor blood pressure, HR, anemia to ensure adequate cardiac output during ADLs and ambulation as patient is at increased risk for orthostatic hypotension/syncope and potential injury if not monitored for and managed adequately  Blood pressure management:    Frequent monitoring of blood pressure with appropriate adjustments in blood pressure medication management to optimize blood pressure control and prevent/limit renal complications  Monitoring impact of blood pressure and side-effects of blood pressure medications at rest and with activity  Pain management:  Pain will improve with frequent evaluation of pain, careful adjustments in medications, frequent re-evaluation of patient's pain and medical/neurologic status to ensure optimal pain control, avoidance of potential serious and even life-threatening side-effects and drug interactions, as well as weaning pain medications as soon as possible to decrease risk of short and long-term use  Neurologic Disorder: neuropathy causing impaired mobility, ADLs, and gait:  intensive skilled therapies with physical therapy and occupational therapy with close oversight and management by rehab specialized physician in acute rehabilitation setting to most expeditiously and effectively improve functional mobility, transfers, upper and lower body strengthening, conditioning, balance, and gait training with appropriate assistive device  Patient will have optimal supervision and management of patient's underlying neurologic disorder with specialized rehabilitation physician during this period of recovery to ensure most expeditious and optimal recovery with decreased risks of fall/injury and other complications including acute worsening of neuro disorder, decrease risk of VTE, PNA, and skin ulceration  Orthopedic Disorder: fx causing impaired mobility, ADLs, and gait:  intensive skilled therapies with physical therapy and occupational therapy with close oversight and management by rehab specialized physician in acute rehabilitation setting to most expeditiously and effectively improve functional mobility, transfers, upper and lower body strengthening, conditioning, balance, and gait training with appropriate assistive device  Patient will have optimal supervision and management of patient's underlying orthopedic disorder with specialized rehabilitation physician during this period of recovery to ensure most expeditious and optimal recovery with decreased risks of fall/injury and other complications including acute worsening of ortho disorder, decrease risk of VTE, PNA, and skin ulceration  Inpatient rehabilitation education/teaching: To be provided to patient and typically family/caregiver (if able to be identified) by all skilled therapists, rehab nursing, case management, and rehab specialized physician to ensure optimal recovery and decrease risks of complications in both acute rehabilitation setting as well as after discharge  Bowel dysfunction: Appropriate bowel management with appropriate toileting program from rehab nursing and staff with oversight management by rehabilitation physicians which include adjustments in medications to optimize appropriate bowel function and prevent/decrease risk of constipation and bowel obstruction  Obesity:  Close monitoring of nutrition status, nutrition specialist with adjustments in diet     on appropriate short and long term nutrition and activity  Obesity increases complexity of patient's overall condition and causes unique challenges during this part of patient's recovery process  Supervise and if necessary make adjustments in rehab nursing care and skilled therapy care to ensure appropriate toileting, bed mobility, other ADLs, and ambulation to decrease risk of falls/injuries, VTE, skin breakdown/ulceration and optimize functional recovery  Skin management: Increased risk of skin wounds/breakdown/rashes due to recent immobility and co-morbidities  Appropriate skin checks from nursing and as needed physician  Frequent turning, application of appropriate barrier creams/dressings, cushions  Patient/caregiver education  Optimal bowel/bladder hygiene and mobilization  ** Please Note: Fluency Direct voice to text software may have been used in the creation of this document  **    75 minutes or greater spent for this encounter which included a combination of face-to-face time with patient and non-face-to-face time which in part specifically includes management of leg fracture   Face-to-face time included extended discussion with patient regarding current condition, medical history, mood, medical/rehabilitation management, and disposition  Non face-to-face time included coordination of care with patient's co-managing AP and/or physician(s) thru communication and review of their recent documentation as well as reviewing vitals, bowel/bladder function, recent labs, diagnostic imaging, and notes from therapy, CM, and nursing  I personally performed the required components and examined the patient myself in person on 5/31/23        Akanksha Gardner MD, [address] [facility]  Physical Medicine and Rehabilitation  Brain Injury Medicine

## 2023-06-01 NOTE — ASSESSMENT & PLAN NOTE
Buttock/sacrum -   - Wound care c/s 6/14  B/L heels intact and blanching, preventative skin care ordered  Sacral/buttocks intact and hyperpigmented and blanching   - They felt no wound at this time  - Recommend  RN      Skin care plans:  1-Hydraguard to bilateral sacrum, buttock and heels BID and PRN  2-Elevate heels to offload pressure  3-Ehob cushion in chair when out of bed  4-Moisturize skin daily with skin nourishing cream   - Skin mobilization program with frequent standing and wt shift   - Increased risk of skin wounds/breakdown/rashes due to recent immobility and co-morbidities   - 2 nurse/staff full skin check for abnormal findings on admission - obtain photos PRN  - Turn patient Q2H in bed (use pillows or wedges), encourage wt shifts every 10-15 minutes in chair  - Patient and if available caregiver education   - Hydragaurd to buttocks and sacrum BID & PRN  - Optimal bowel/bladder hygiene; keep skin clean and dry   - ROHO cushion to chair/WC when OOB  - Nursing to document in chart and Notify MD if buttock, sacrum, heel, or other skin site develops erythema, skin breakdown, or rashes as soon as possible  If patient is soiling themselves with urine or stool notify MD   If you are unable to maintain skin integrity and prevent erythema due to frequency of soiling notify MD as soon as possible as well

## 2023-06-01 NOTE — PLAN OF CARE
Problem: PAIN - ADULT  Goal: Verbalizes/displays adequate comfort level or baseline comfort level  Description: Interventions:  - Encourage patient to monitor pain and request assistance  - Assess pain using appropriate pain scale  - Administer analgesics based on type and severity of pain and evaluate response  - Implement non-pharmacological measures as appropriate and evaluate response  - Consider cultural and social influences on pain and pain management  - Notify physician/advanced practitioner if interventions unsuccessful or patient reports new pain  Outcome: Progressing     Problem: INFECTION - ADULT  Goal: Absence or prevention of progression during hospitalization  Description: INTERVENTIONS:  - Assess and monitor for signs and symptoms of infection  - Monitor lab/diagnostic results  - Monitor all insertion sites, i e  indwelling lines, tubes, and drains  - Monitor endotracheal if appropriate and nasal secretions for changes in amount and color  - [place] appropriate cooling/warming therapies per order  - Administer medications as ordered  - Instruct and encourage patient and family to use good hand hygiene technique  - Identify and instruct in appropriate isolation precautions for identified infection/condition  Outcome: Progressing     Problem: SAFETY ADULT  Goal: Patient will remain free of falls  Description: INTERVENTIONS:  - Educate patient/family on patient safety including physical limitations  - Instruct patient to call for assistance with activity   - Consult OT/PT to assist with strengthening/mobility   - Keep Call bell within reach  - Keep bed low and locked with side rails adjusted as appropriate  - Keep care items and personal belongings within reach  - Initiate and maintain comfort rounds  - Make Fall Risk Sign visible to staff  - Offer Toileting every 2 Hours, in advance of need  - Initiate/Maintain bed/chair alarm  - Obtain necessary fall risk management equipment: alarms  - Apply yellow socks and bracelet for high fall risk patients  - Consider moving patient to room near nurses station  Outcome: Progressing  Goal: Maintain or return to baseline ADL function  Description: INTERVENTIONS:  -  Assess patient's ability to carry out ADLs; assess patient's baseline for ADL function and identify physical deficits which impact ability to perform ADLs (bathing, care of mouth/teeth, toileting, grooming, dressing, etc )  - Assess/evaluate cause of self-care deficits   - Assess range of motion  - Assess patient's mobility; develop plan if impaired  - Assess patient's need for assistive devices and provide as appropriate  - Encourage maximum independence but intervene and supervise when necessary  - Involve family in performance of ADLs  - Assess for home care needs following discharge   - Consider OT consult to assist with ADL evaluation and planning for discharge  - Provide patient education as appropriate  Outcome: Progressing  Goal: Maintains/Returns to pre admission functional level  Description: INTERVENTIONS:  - Perform BMAT or MOVE assessment daily    - Set and communicate daily mobility goal to care team and patient/family/caregiver  - Collaborate with rehabilitation services on mobility goals if consulted  - Perform Range of Motion 3 times a day  - Reposition patient every 2 hours    - Dangle patient 3 times a day  - Stand patient 3 times a day  - Ambulate patient 3 times a day  - Out of bed to chair 3 times a day   - Out of bed for meals 3 times a day  - Out of bed for toileting  - Record patient progress and toleration of activity level   Outcome: Progressing     Problem: DISCHARGE PLANNING  Goal: Discharge to home or other facility with appropriate resources  Description: INTERVENTIONS:  - Identify barriers to discharge w/patient and caregiver  - Arrange for needed discharge resources and transportation as appropriate  - Identify discharge learning needs (meds, wound care, etc )  - Arrange for interpretive services to assist at discharge as needed  - Refer to Case Management Department for coordinating discharge planning if the patient needs post-hospital services based on physician/advanced practitioner order or complex needs related to functional status, cognitive ability, or social support system  Outcome: Progressing     Problem: Prexisting or High Potential for Compromised Skin Integrity  Goal: Skin integrity is maintained or improved  Description: INTERVENTIONS:  - Identify patients at risk for skin breakdown  - Assess and monitor skin integrity  - Assess and monitor nutrition and hydration status  - Monitor labs   - Assess for incontinence   - Turn and reposition patient  - Assist with mobility/ambulation  - Relieve pressure over bony prominences  - Avoid friction and shearing  - Provide appropriate hygiene as needed including keeping skin clean and dry  - Evaluate need for skin moisturizer/barrier cream  - Collaborate with interdisciplinary team   - Patient/family teaching  - Consider wound care consult   Outcome: Progressing

## 2023-06-01 NOTE — ASSESSMENT & PLAN NOTE
Remains improved  Significant on admission to [facility] with last Bm near a week ago; no current signs of symptoms obstruction  - Continue to monitor for S/S of obstruction; hold stimulant laxatives and obtain imaging if concern develops  - Colace BID, miralax daily (consider BID if needed)  (Declines senna)   - PRN bowel regimen  - Limiting constipating medications if possible  - Ensure adequate hydration

## 2023-06-01 NOTE — ASSESSMENT & PLAN NOTE
Hb stable at 9 7 on 6/8 > improved to 11 on 6/12  Consult(ed) IM and comanagement with their service during ARC course   Monitor H/H, vitals, signs/symptoms of acute bleeding

## 2023-06-01 NOTE — CASE MANAGEMENT
Met w/pt and reviewed rehab routine and cm role  Pt lives alone in a ranch home with 2 leslie  Pt owns all necessary dme such as a roller walker, shower chair, spc and commode  Pt has had experience with [facility] services through [facility] and is aware she may need them again  Pt was made aware she may not be able to drive right away on dc and states she has a neighbor who can assist if needed, or she just won't leave the house  Pt states her neighbor went grocery shopping for her already  Pt uses [facility] for rx needs and confirmed they deliver  Cm reviewed team mtg process and potential los  Pt is on lovenox and states she is not concerned if needed at dc, she has given herself steroids in the past for her MS

## 2023-06-01 NOTE — ASSESSMENT & PLAN NOTE
IM consulted and with overall management at their discretion during ARC course  Rate control: Toprol XL 50mg HS  Antithrombotic: Warfarin dosing per IM   - Start with [place] RN labs; CM assistance with ensure adequate transport resources   (patient is frustrated with checking her INR and was not always checking it and adjusting med dose on own at time; she did voice she would be open to consider NOAC - spoke to IM who reviewed chart and in past NOACs were cost prohibitive   - New price check about $87/mo for Eliquis - pt declines and will continue warfarin

## 2023-06-01 NOTE — PROGRESS NOTES
06/01/23 0653   Patient Data   Rehab Impairment Projected [address] Diagnoses:  Impairment of mobility, safety and Activities of Daily Living (ADLs) due to Orthopedic Disorders:  08 9  Other Orthopedic   Etiologic Diagnosis Right Periprosthetic Hip Fracture   Date of Onset 05/27/23  (Surgery:  5/28/2023 ORIF Right Periprosthetic Hip Fracture)   Support System   Name Pt lives by herself  She has a sister in law who pt reports work 7 days a week, and one neighbopr whom she considers a friend  She endorses that PTA she was completely IND  Home Setup   Type of Home Single Level   Method of Entry Stairs   Number of Stairs 2   Number of Stairs in Home 2  (2 steps in/out of sunroom )   First Floor Bathroom Full; Shower;Curtain;Grab Bars   First Floor Bathroom Accessibility Grab bars by toilet;Grab bars in tub/shower; Shower chair   First Floor Setup Available Yes   Home Modification Comment pending progress   Available Equipment Shower Chair;Roller Walker;Single Point Cane;Bedside Commode   Baseline Information   Transportation    Prior [address](s) [address]; Shower Chair   Prior IADL Participation   Money Management   (PTA IND)   Meal Preparation   (PTA IND)   Laundry   (PTA IND)   Home Cleaning   (PTA IND)   Prior Level of Function   Self-Care 3  Independent - Patient completed the activities by him/herself, with or without an assistive device, with no assistance from a helper  Indoor-Mobility (Ambulation) 3  Independent - Patient completed the activities by him/herself, with or without an assistive device, with no assistance from a helper  Stairs 3  Independent - Patient completed the activities by him/herself, with or without an assistive device, with no assistance from a helper  Functional Cognition 3  Independent - Patient completed the activities by him/herself, with or without an assistive device, with no assistance from a helper  Prior Device Used Z   None of the above   Falls in the Last Year   Number of falls in the past 12 months 1   Type of Injury Associated with Fall Major injury  (lead to admission)   Patient Preference   Nickname (Patient Preference) [employer]   Psychosocial   Psychosocial (WDL) WDL   Patient Behaviors/Mood Cooperative   Restrictions/Precautions   Precautions Fall Risk;Pain;Supervision on toilet/commode;Bed/chair alarms   RLE Weight Bearing Per Order WBAT   ROM Restrictions No   Pain Assessment   Pain Assessment Tool Duval-Baker FACES   Duval-Baker FACES Pain Rating 10   Pain Location/Orientation Orientation: Right;Location: Hip   Pain Onset/Description Onset: Sudden   Eating Assessment   Type of Assistance Needed Independent   Physical Assistance Level No physical assistance   Eating CARE Score 6   Oral Hygiene   Type of Assistance Needed Physical assistance   Physical Assistance Level Total assistance   Comment PLOF pt completes in stance, pt requries Ax2 to complete in this manner  Pt completed seated w/ sup and set up assist    Oral Hygiene CARE Score 1   Tub/Shower Transfer   Reason Not Assessed Balance; Endurance; Safety;Sponge Bath   Findings PLOF pt showers in WIS w/ GB and shower chair  Shower/Bathe Self   Type of Assistance Needed Physical assistance   Physical Assistance Level Total assistance   Comment PLOF pt showers in WIS w/ shower chair and grab bars  Pt required modified technique during eval d/t high pain, dec balance, impaired mobility  LB completed bed level via rolling Ax2  UB SB EOB Yadiel of 1 and second person for SBA     Shower/Bathe Self CARE Score 1   Bathing   Assessed Bath Style Sponge Bath   Anticipated D/C Bath Style Shower   Dressing/Undressing Clothing   Type of Assistance Needed Physical assistance   Physical Assistance Level 25% or less   Comment Yadiel don [place] shirt EOB   Upper Body Dressing CARE Score 3   Type of Assistance Needed Physical assistance   Physical Assistance Level Total assistance   Comment brief donned bed level Ax2, Assist to thread shorts to thigh height while seated  CM over hips in stance at RW Ax2  Pt uses LHAE at baseline  Lower Body Dressing CARE Score 1   Putting On/Taking Off Footwear   Type of Assistance Needed Physical assistance   Physical Assistance Level Total assistance   Comment TA don/doff hospital socks d/t high pain  PLOF pt uses LHAE to don/doff footwear  Putting On/Taking Off Footwear CARE Score 1   Toileting Hygiene   Type of Assistance Needed Physical assistance   Physical Assistance Level Total assistance   Comment total assist x2 in stnace w/ RW at [place] 6 Score 1   Toilet Transfer   Type of Assistance Needed Physical assistance   Physical Assistance Level Total assistance   Comment Ax2 SPT EOB>BSC>recliner  Allow pt extra time for mobility to manage her pain and inc IND  Toilet Transfer CARE Score 1   Bowel/Bladder Management   Findings Pt w/ use of purewick overnight and at start of eval  On BSC, pt w/ Large, soft BM, continent  Lying to Sitting on Side of Bed   Type of Assistance Needed Physical assistance   Physical Assistance Level Total assistance   Comment Ax2, use of bed rail, HOB elevated, extra time to complete   Lying to Sitting on Side of Bed CARE Score 1   Sit to Stand   Type of Assistance Needed Physical assistance   Physical Assistance Level Total assistance   Comment ModA x1 to RW w/ second person as standby assist    Sit to Stand CARE Score 1   Walk 10 Feet   Comment high pain, anxiety   Reason if not Attempted Safety concerns   Walk 10 Feet CARE Score 88   Comprehension   QI: Comprehension 4  Undestands: Clear comprehension without cues or repetitions   Expression   QI: Expression 4   Express complex messages without difficulty and with speech that is clear and easy to [place]   RUE Assessment   RUE Assessment X  [facility] for basic ADLs but hx rotator cuff injury, limited shoulder flx AROM, can acheive full ROM w/ self assist )   LUE Assessment   LUE "Assessment WFL   Coordination   Movements are Fluid and Coordinated 0   Coordination and Movement Description antalgic   Sensation   Additional Comments Neuropathy BLE > BUE   Cognition   Overall Cognitive Status WFL   Arousal/Participation Alert   Attention Attends with cues to redirect   Orientation Level Oriented X4   Memory Decreased recall of precautions   Following Commands Follows one step commands with increased time or repetition   Comments tangential   Vision   Vision Comments Pt reports Patsy Crease are going bad,\" has trifocals, may need to be assessed further  Perception   Inattention/Neglect Appears intact   Objective Measure   OT Measure(s) TBA as appropriate   Discharge Information   Vocational Plan Retired/not working   Patient's Discharge Plan return home w/ ongiong therapy services   Patient's Rehab Expectations \"I want to be able to walk, everything else will come after that\"   Barriers to Discharge Home No Family Support; Unsafe Home Setup; Decreased Strength;Decreased Endurance;Pain; Safety Considerations   Impressions Pt is a 77 y o  female seen for OT evaluation following admission to  Periprosthetic Hip Fracture  OT evaluation and assessment of strength, ADL function, and functional transfers completed  Prior to admission Pt was completing ADLs, IADLs at independent with use of no device  Pt lives alone but has sister in law that may be able to assist sparingly  Pt has increase pain affecting at this time combined with her other limitations which include: past medical h/x, fall risk, limited AROM, edema  Pt is currently limited due to the following deficits impacting occupational performance, activity tolerance, UE strength, UE ROM, Pain, LE Strength, LE ROM  The patient has shown a decline from prior level of function  The patient participates in identification of personal goals to address in Occupational Therapy   Recommend skilled OT services for I/ADL retraining to support the ability to safely " participate in daily occupations safely and independently in the most least restrictive way  Pt demonstrates Good rehab potential to meet LTG's, Anticipate 2-3wk LOS Occupational Therapy plan of care will focus on Remedial strategies  Treatment focus will include:   ADL re-training, fxnl xfers, standing tolerance, standing balance, UE strengthening, UE endurance, DME training/education, family training/education, EC techniques/education, healthy coping education, leisure pursuits, and sitting balance     OT Therapy Minutes   OT Time In 0700   OT Time Out 0845   OT Total Time (minutes) 105   OT Mode of treatment - Individual (minutes) 105   OT Mode of treatment - Concurrent (minutes) 0   OT Mode of treatment - Group (minutes) 0   OT Mode of treatment - Co-treat (minutes) 0   OT Mode of Treatment - Total time(minutes) 105 minutes   OT Cumulative Minutes 105   Cumulative Minutes   Cumulative therapy minutes 105

## 2023-06-01 NOTE — ASSESSMENT & PLAN NOTE
EF 65%, G2DD, Mild-mod AS, mod-severe elevated RVSP  IM consulted and with overall management at their discretion during ARC course  Monitor vitals, fluid status  OP Cards follow-up

## 2023-06-01 NOTE — TREATMENT PLAN
Individualized Plan of Colton Bermudez 77 y o  female MRN: [id number]  Unit/Bed#: -01 Encounter: [id number]     PATIENT INFORMATION  ADMISSION DATE: 5/31/2023  2:46 PM YENNY CATEGORY:Orthopedic Disorders:  08 2  Femur (Shaft) Fracture   ADMISSION DIAGNOSIS: Right Periprosthetic Hip Fracture EXPECTED LOS: 16 days     MEDICAL/FUNCTIONAL PROGNOSIS  Pre-admit screens and post-admit evaluations reviewed and are consistent  Based on my assessment of the patient's medical conditions and current functional status, the prognosis for attaining medical and functional goals or the IRF stay is:  Good  Patient is appropriate for acute rehabilitation  Medical Goals:   Patient will be medically stable for discharge back to community setting upon completion or acute rehab program and patient/family will be able to manage medical conditions and comorbid conditions with medications and appropriate follow up upon completion of rehab program   Cardiopulmonary function/Anemia: Ensure cardiopulmonary stability and optimize cardiopulmonary function not only at rest but with activity as patient's activity level significantly increases in acute rehab compared with prior to transfer in preparation for safe discharge from [facility]  Must closely and frequently monitor blood pressure, HR, anemia to ensure adequate cardiac output during ADLs and ambulation as patient is at increased risk for orthostatic hypotension/syncope and potential injury if not monitored for and managed adequately  Blood pressure management:    Frequent monitoring of blood pressure with appropriate adjustments in blood pressure medication management to optimize blood pressure control and prevent/limit renal complications  Monitoring impact of blood pressure and side-effects of blood pressure medications at rest and with activity    Pain management:  Pain will improve with frequent evaluation of pain, careful adjustments in medications, frequent re-evaluation of patient's pain and medical/neurologic status to ensure optimal pain control, avoidance of potential serious and even life-threatening side-effects and drug interactions, as well as weaning pain medications as soon as possible to decrease risk of short and long-term use  Neurologic Disorder: neuropathy causing impaired mobility, ADLs, and gait:  intensive skilled therapies with physical therapy and occupational therapy with close oversight and management by rehab specialized physician in acute rehabilitation setting to most expeditiously and effectively improve functional mobility, transfers, upper and lower body strengthening, conditioning, balance, and gait training with appropriate assistive device  Patient will have optimal supervision and management of patient's underlying neurologic disorder with specialized rehabilitation physician during this period of recovery to ensure most expeditious and optimal recovery with decreased risks of fall/injury and other complications including acute worsening of neuro disorder, decrease risk of VTE, PNA, and skin ulceration      Orthopedic Disorder: fx causing impaired mobility, ADLs, and gait:  intensive skilled therapies with physical therapy and occupational therapy with close oversight and management by rehab specialized physician in acute rehabilitation setting to most expeditiously and effectively improve functional mobility, transfers, upper and lower body strengthening, conditioning, balance, and gait training with appropriate assistive device  Patient will have optimal supervision and management of patient's underlying orthopedic disorder with specialized rehabilitation physician during this period of recovery to ensure most expeditious and optimal recovery with decreased risks of fall/injury and other complications including acute worsening of ortho disorder, decrease risk of VTE, PNA, and skin ulceration    Inpatient rehabilitation education/teaching: To be provided to patient and typically family/caregiver (if able to be identified) by all skilled therapists, rehab nursing, case management, and rehab specialized physician to ensure optimal recovery and decrease risks of complications in both acute rehabilitation setting as well as after discharge  Bowel dysfunction: Appropriate bowel management with appropriate toileting program from rehab nursing and staff with oversight management by rehabilitation physicians which include adjustments in medications to optimize appropriate bowel function and prevent/decrease risk of constipation and bowel obstruction  Obesity:  Close monitoring of nutrition status, nutrition specialist with adjustments in diet   on appropriate short and long term nutrition and activity  Obesity increases complexity of patient's overall condition and causes unique challenges during this part of patient's recovery process  Supervise and if necessary make adjustments in rehab nursing care and skilled therapy care to ensure appropriate toileting, bed mobility, other ADLs, and ambulation to decrease risk of falls/injuries, VTE, skin breakdown/ulceration and optimize functional recovery  Skin management: Increased risk of skin wounds/breakdown/rashes due to recent immobility and co-morbidities  Appropriate skin checks from nursing and as needed physician  Frequent turning, application of appropriate barrier creams/dressings, cushions  Patient/caregiver education  Optimal bowel/bladder hygiene and mobilization          ANTICIPATED DISCHARGE DISPOSITION AND SERVICES  COMMUNITY SETTING:    Previous community setting   ANTICIPATED FOLLOW-UP SERVICE:   Home Health Services: PT, OT     DISCIPLINE SPECIFIC PLANS:  Required Disciplines & Services: PT, OT, Rehabilitation Physician, Rehabillitation Nursing, Case Management, Dietary    REQUIRED THERAPY (expected):   Therapy Hours per Day Days per Week Tx Days (Days in ARF)   Physical Therapy 1 5 5 16   Occupational Therapy 1 5 5 16   NOTE: Additional therapy time(s) may be added as appropriate to meet patient needs and to achieve functional goals  ANTICIPATED FUNCTIONAL OUTCOMES:  ADL: Patient will be largely modified independent with ADLs upon completion of rehab program    Bladder/Bowel: Patient will be modified independent with bladder/bowel management upon completion of rehab program   Transfers: Patient will be modified independent with transfers upon completion of rehab program   Locomotion: Patient will be at or near modified independent with locomotion (wheelchair or ambulation) upon completion of rehab program     DISCHARGE PLANNING NEEDS  Equipment needs: To be determined at team conference  REHAB ANTICIPATED PARTICIPATION RESTRICTIONS:  Uncertain at this time  To be determined closer to discharge

## 2023-06-01 NOTE — PROGRESS NOTES
Pastoral Care Progress Note    2023  Patient: Jaime Tiwari : 1945  Admission Date & Time: 2023 1446  MRN: [id number] CSN: [id number]      Made introduction to Pt, who shared about her [place], and that her  has already visited her twice  Described her various health challenges, notably the broken hip that she's rehabbing now  I listened with care and empathy, encouraged her in her rehab journey, prayed for her healing   remains available                 Chaplaincy Interventions Utilized:   Empowerment: Encouraged self-care    Exploration: Explored emotional needs & resources and Explored spiritual needs & resources    Collaboration: Consulted with interdisciplinary team    Relationship Building: Cultivated a relationship of care and support    Ritual: Provided prayer    Chaplaincy Outcomes Achieved:  Emotional resources utilized and Expressed gratitude    Spiritual Coping Strategies Utilized:   Spiritual comfort       23 1000   Clinical Encounter Type   Visited With Patient   Routine Visit Introduction

## 2023-06-01 NOTE — WOUND OSTOMY CARE
Patient seen today for wound care consult concerned for abdominal folds excoriation  Patient assessed from head to toe  Patient's abdominal folds are not intact, sacral/buttocks, and B/L heels intact and blanching  Skin care orders are placed and wound care will sign off             Amy PARKN, RN, Zachariah Brooks

## 2023-06-01 NOTE — PROGRESS NOTES
[facility] OT GOALS   06/01/23 0700   Rehab Team Goals   ADL Team Goal Patient will be independent with ADLs with least restrictive device upon completion of rehab program   Rehab Team Interventions   OT Interventions Self Care;Home Management; Therapeutic Exercise;Community Reintegration;Cognitive Retraining;Energy Conservation;Patient/Family Education   Eating Goal   Eating Goal 06  Independent - Patient completes the activity by him/herself with no assistance from a helper  Status Ongoing; Target goal - two weeks; Target goal - three weeks   Grooming Goal   Oral Hygiene Goal 06  Independent - Patient completes the activity by him/herself with no assistance from a helper  Status Ongoing; Target goal - two weeks; Target goal - three weeks   Tub/Shower Transfer Goal   Method Shower Stall  (Mod I)   Status Ongoing; Target goal - two weeks; Target goal - three weeks   Bathing Goal   Shower/bathe self Goal 06  Independent - Patient completes the activity by him/herself with no assistance from a helper  Status Ongoing; Target goal - two weeks; Target goal - three weeks   Upper Body Dressing Goal   Upper body dressing Goal 06  Independent - Patient completes the activity by him/herself with no assistance from a helper  Status Ongoing; Target goal - two weeks; Target goal - three weeks   Lower Body Dressing Goal   Lower body dressing Goal 06  Independent - Patient completes the activity by him/herself with no assistance from a helper  Putting on/taking off footwear Goal 06  Independent - Patient completes the activity by him/herself with no assistance from a helper  Status Ongoing; Target goal - two weeks; Target goal - three weeks   Toileting Transfer Goal   Toilet transfer Goal 06  Independent - Patient completes the activity by him/herself with no assistance from a helper  Status Ongoing; Target goal - two weeks; Target goal - three weeks   Toileting Goal   Toileting hygiene Goal 06   Independent - Patient completes the activity by him/herself with no assistance from a helper  Status Ongoing; Target goal - two weeks; Target goal - three weeks   Comprehension Goal   Comprehension Assist Level Moderate Kaiser   Status Ongoing; Target goal - two weeks; Target goal - three weeks   Expression Goal   Expression Assist Level Moderate [facility]   Status Ongoing; Target goal - two weeks; Target goal - three weeks   Meal Prep and Kitchen Mobility   Assist Level Independent   Status Ongoing; Target goal - two weeks; Target goal - three weeks   Medication Management   Assist Level Independent   Status Ongoing; Target goal - two weeks; Target goal - three weeks   Laundry   Assist Level Independent   Status Ongoing; Target goal - two weeks; Target goal - three weeks

## 2023-06-01 NOTE — PROGRESS NOTES
Internal Medicine Progress Note  Patient: Brit Henson  Age/sex: 77 y o  female  Medical Record #: [id number]      ASSESSMENT/PLAN: (Interval History)  Brit Henson is seen and examined and management for following issues:    Right periprosthetic femur fracture  • S/p ORIF 5/28  • WBAT     Hx PAF  • Home:  Toprol 50mg qHS/Coumadin 6 25 mg (2 1/2 tabs of 2 5mg tabs) on Mondays and 5mg the rest of the days of the week  • Here:  Toprol 50mg qHS/Coumadin 5mg qd (restarted 5/31/23)  • She did tell Dr Bailey Rowan that she adjusts her Coumadin on her own  • [facility] does her INRs and she has 2 5mg tabs  • INR AM x 3 with today's pending  • Continue DVT prophylaxis until INR is 2 0 or greater     HLD  • Continue Pravachol as a sub for home home Simvastatin     Peripheral neuropathy  • Continue Lyrica     Aortic stenosis  • ECHO 7/2022:  LVEF 65%/gr 2 DD, mild-mod AS, mild TR/MR, RV systolic pressure is mod-severe elevated with systolic pressure 59 mmHg  • euvolemic        Discharge date:  Team    The above assessment and plan was reviewed and updated as determined by my evaluation of the patient on 6/1/2023  Labs:   Results from last 7 days   Lab Units 05/31/23  0538 05/30/23  0444   HEMATOCRIT % 30 9* 28 5*   HEMOGLOBIN g/dL 9 8* 9 0*   PLATELETS Thousands/uL 218 219   WBC Thousand/uL 8 70 11 26*     Results from last 7 days   Lab Units 05/31/23  0538 05/30/23  0444 05/28/23  0445 05/27/23  0854   BUN mg/dL 14 14   < > 15   CALCIUM mg/dL 8 6 8 6   < > 8 5   CHLORIDE mmol/L 108 109*   < > 109*   CO2 mmol/L 27 30   < > 24   CO2, I-STAT mmol/L  --   --   --  26   CREATININE mg/dL 0 42* 0 58*   < > 0 57*   GLUCOSE, ISTAT mg/dl  --   --   --  140   POTASSIUM mmol/L 4 1 4 2   < > 4 2   SODIUM mmol/L 139 139   < > 137    < > = values in this interval not displayed           Results from last 7 days   Lab Units 05/31/23  1633 05/28/23  0445   INR  1 07 1 49*           Review of Scheduled Meds:  Current Facility-Administered "Medications   Medication Dose Route Frequency Provider Last Rate   • acetaminophen  975 mg Oral Q6H [address] Meaghan Hills MD     • bisacodyl  10 mg Rectal Daily PRN Meaghan Hills MD     • docusate sodium  100 mg Oral BID Meaghan Hills MD     • enoxaparin  30 mg Subcutaneous Q12H [address] Meaghan Hills MD     • latanoprost  1 drop Both Eyes HS RAYMON Byrd     • lidocaine   Topical Q4H PRN Meaghan Hills MD     • metoprolol succinate  50 mg Oral HS Meaghan Hills MD     • montelukast  10 mg Oral HS Meaghan Hills MD     • oxyCODONE  5 mg Oral Q4H PRN Meaghan Hills MD     • oxyCODONE  2 5 mg Oral Q4H PRN Meaghan Hills MD     • polyethylene glycol  17 g Oral Daily PRN Meaghan Hills MD     • pravastatin  40 mg Oral Daily With Ajith Kaplan MD     • pregabalin  100 mg Oral BID Meaghan Hills MD     • warfarin  5 mg Oral Daily (warfarin) RAYMON Weller         Subjective/ HPI: Patient seen and examined  Patients overnight issues or events were reviewed with nursing or staff during rounds or morning huddle session  New or overnight issues include the following:     No new or overnight issues  Offers no complaints    ROS:   A 10 point ROS was performed; negative except as noted above         Imaging:     No orders to display       *Labs /Radiology studies reviewed  *Medications reviewed and reconciled as needed  *Please refer to order section for additional ordered labs studies  *Case discussed with primary attending during morning huddle case rounds    Physical Examination:  Vitals:   Vitals:    05/31/23 1453 05/31/23 2155 06/01/23 0451   BP: 148/63 130/60 106/53   BP Location: Right arm Right arm Right arm   Pulse: 89 99 68   Resp: 18 18 17   Temp: 98 5 °F (36 9 °C) 98 6 °F (37 °C) 98 1 °F (36 7 °C)   TempSrc: Oral Oral Oral   SpO2: 96% 95% 95%   Weight: 78 5 kg (173 lb 1 oz)     Height: 4' 11\" (1 499 m)   " General Appearance: no distress, conversive  HEENT:  External ear normal   Nose normal w/o drainage  Mucous membranes are moist  Oropharynx is clear  Conjunctiva clear w/o icterus or redness  Neck:  Supple, normal ROM  Lungs: BBS without crackles/wheeze/rhonchi; respirations unlabored with normal inspiratory/expiratory effort  No retractions noted  On RA  CV: regular rate and rhythm; no rubs/gallops, +murmur  PMI normal   ABD: Abdomen is soft  Bowel sounds all quadrants  Nontender with no distention  EXT: no edema  Skin: normal turgor, normal texture, no rashes  Psych: affect normal, mood normal  Neuro: AAO     The above physical exam was reviewed and updated as determined by my evaluation of the patient on 6/1/2023  Invasive Devices     Peripheral Intravenous Line  Duration           Peripheral IV 05/29/23 Left;Ventral (anterior) Forearm 2 days          Drain  Duration           External Urinary Catheter 4 days                   VTE Pharmacologic Prophylaxis: Enoxaparin  Code Status: Level 3 - DNAR and DNI  Current Length of Stay: 1 day(s)      Total time spent:  30 minutes with more than 50% spent counseling/coordinating care  Counseling includes discussion with patient re: progress  and discussion with patient of his/her current medical state/information  Coordination of patient's care was performed in conjunction with primary service  Time invested included review of patient's labs, vitals, and management of their comorbidities with continued monitoring  In addition, this patient was discussed with medical team including physician and advanced extenders  The care of the patient was extensively discussed and appropriate treatment plan was formulated unique for this patient  Medical decision making for the day was made by supervising physician unless otherwise noted in their attestation statement  ** Please Note:  voice to text software may have been used in the creation of this document  Although proof errors in transcription or interpretation are a potential of such software**

## 2023-06-01 NOTE — PROGRESS NOTES
Physical Medicine and Rehabilitation Progress Note  Abel Fisher 77 y o  female MRN: [id number]  Unit/Bed#: [facility] 961-01 Encounter: [id number]      Assessment & Plan:     Decline in ADLs and mobility: Functional assessment        FIM  Care Score  Admit Score Recent Score    Total assist  1-100% or 2p    Tot     Max assist 2-51-75%    Sub     Mod assist 3- 26-50%  Par     Min assist 3- 25% or < Par     CG assist 4  TA     Sup/Setup 4-5 Sup     Mod-I/Indep 6 MI      Transfers  Mod-Significant assist     Ambulation   Total assist      Stairs   Total assist/NT      Goal: Mod indep for most ADLs and  for mobility  Major barriers:  Pain, decreased ROM, deconditioning, lack of assistance at home  Dispo: Home with [facility] 16 days from admission      * 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 [facility])  Assessment & Plan  Tolerating ARC setting early in course   Status post surgical repair via ORIF by Dr Mesha Carrasquillo  on 5/28/23  • Weight-bearing as tolerated on affected limb   • Monitor incision/area for infection or dehiscence/impaired healing   • POD#14 is Herson Mink 6/11  • Monitor for signs/symptoms of VTE, atelectasis, acute blood loss anemia, or other infection   • Continue acute comprehensive interdisciplinary inpatient rehabilitation to include intensive skilled therapies (PT, OT) as outlined with oversight and management by rehabilitation physician as well as inpatient rehab level nursing, case management and weekly interdisciplinary team meetings     • Follow-up with Ortho Sx after d/c and ortho if needed during ARC course       Obesity  Assessment & Plan   on appropriate nutrition and activity  Adjustments accordingly during skilled therapy and with rehab nursing  Monitor skin closely for breakdown, wounds, rashes; keep clean and dry, turning Q2H   Follow-up with PCP after d/c      Encounter for skin care  Assessment & Plan  - Increased risk of skin wounds/breakdown/rashes due to recent immobility "and co-morbidities   - 2 nurse/staff full skin check for abnormal findings on admission - obtain photos PRN  - Turn patient Q2H in bed (use pillows or wedges), encourage wt shifts every 10-15 minutes in chair  - Patient and if available caregiver education   - Hydragaurd to buttocks and sacrum BID & PRN  - Optimal bowel/bladder hygiene; keep skin clean and dry   - EHOB waffle cushion to chair/WC when OOB  - Nursing to document in chart and Notify MD if buttock, sacrum, heel, or other skin site develops erythema, skin breakdown, or rashes as soon as possible  If patient is soiling themselves with urine or stool notify MD   If you are unable to maintain skin integrity and prevent erythema due to frequency of soiling notify MD as soon as possible as well  Peripheral neuropathy  Assessment & Plan  Monitor skin for increased infection/breakdown/wounds which patient is at higher risk for  Skilled PT/OT   Fall precautions      Abnormal finding on radiology exam  Assessment & Plan  Per discharging provider to [facility]   \"Findings and Follow up required:                              1) Scattered less than 3 mm nodular densities in the posterior right upper lobe of the lung as well as a calcified granuloma in the right lower lobe of the lung posteriorly were noted incidentally on your trauma imaging  A malignancy (cancer) cannot be completely excluded based on trauma imaging alone  Recommend short-term outpatient follow-up with primary care provider to review the finding and for further surveillance as indicated                               2)  A subcentimeter cystic hypodensity posterior right lobe of the liver was incidentally identified on your trauma imaging and is unchanged compared to prior imaging  A malignancy (cancer) cannot be completely excluded based on trauma imaging alone  Recommend short-term outpatient follow-up with primary care provider to review the finding and for further surveillance as indicated   " "                             3) A left adrenal gland 1 4 x 0 9 cm nodule was incidentally identified under trauma imaging and is unchanged compared to prior imaging  A malignancy (cancer) cannot be completely excluded based on trauma imaging alone  Recommend short-term outpatient follow-up with primary care provider to review the finding and for further surveillance as indicated                               4) Subcentimeter hypodensities in your kidneys were incidentally identified under trauma imaging and are suggestive of benign cysts  A malignancy (cancer) cannot be completely excluded based on trauma imaging alone  Recommend short-term outpatient follow-up with primary care provider to review the finding and for further surveillance as indicated                               5) A new small umbilical hernia containing nonobstructed bowel loops and fat was incidentally identified under trauma imaging as well as a previously seen right para-midline ventral hernia defect containing adherent bowel loops in a nonobstructive pattern  No further work-up or intervention necessary for these findings at this time  Recommend short-term outpatient follow-up with primary care provider to review the finding and for further surveillance as indicated                   Follow up should be done within 2 week(s)     The above noted incidental findings were discussed with the patient    The patient demonstrated understanding of the findings and the follow-up recommendations      Please notify the following clinician to assist with the follow up:              Dr Kaylee France"    Anemia  Assessment & Plan  Consult(ed) IM and comanagement with their service during ARC course   Monitor H/H, vitals, signs/symptoms of acute bleeding      At risk for venous thromboembolism (VTE)  Assessment & Plan  SCDs, ambulation, and lovenox 30mg BID (until warfarin tx)       Pain  Assessment & Plan  Adequate control   APAP 975mg TID   Oxycodone " 2 5-5mg Q4H PRN  Lyrica 100mg BID (chronic for neuropathy(    Monitor for oversedation, AMS/delirium, and respiratory depression   If being administered - hold opiates, muscle relaxants, benzodiazepines, and gabapentin for oversedation, AMS, or RR<12  Counseled on and continue to encourage deep breathing/relaxation/behavioral pain management techniques      Constipation  Assessment & Plan  Improved with large BM 6/1   Significant on admission to [facility] with last Bm near a week ago; no current signs of symptoms obstruction  - Continue to monitor for S/S of obstruction; hold stimulant laxatives and obtain imaging if concern develops  - Colace BID, miralax daily (consider BID if needed)  (Declines senna)   - PRN bowel regimen  - Limiting constipating medications if possible  - Ensure adequate hydration       Pure hypercholesterolemia  Assessment & Plan  Statin     PAF (paroxysmal atrial fibrillation) (HCC)  Assessment & Plan  IM consulted and with overall management at their discretion during ARC course  Rate control: Toprol XL 50mg HS  Antithrombotic: Warfarin cleared to resume   (patient is frustrated with checking her INR and was not always checking it and adjusting med dose on own at time; she did voice she would be open to consider NOAC - consider discussing with cards in IP setting otherwise OP follow-up)      Non-rheumatic aortic stenosis  Assessment & Plan  EF 65%, G2DD, Mild-mod AS, mod-severe elevated RVSP  IM consulted and with overall management at their discretion during ARC course  Monitor vitals, fluid status  OP Cards follow-up        Other Medical Issues:  • Monitor for     Follow-up providers and other issues to be followed up after discharge  Cards  PCP  Ortho    CODE: Level 3: DNAR and DNI    Restrictions include: Fall precautions    Objective:     Allergies per EMR  Diagnostic Studies: Reviewed, no new imaging  No orders to display     See above as well    Laboratory: Labs reviewed  Results from last 7 days   Lab Units 05/31/23  0538 05/30/23  0444 05/28/23  0445   HEMATOCRIT % 30 9* 28 5* 34 6*   HEMOGLOBIN g/dL 9 8* 9 0* 11 0*   WBC Thousand/uL 8 70 11 26* 7 22     Results from last 7 days   Lab Units 05/31/23  0538 05/30/23  0444 05/28/23  0445 05/27/23  0854   ALT U/L  --   --   --  30   AST U/L  --   --   --  24   BUN mg/dL 14 14 12 15   CHLORIDE mmol/L 108 109* 108 109*   CREATININE mg/dL 0 42* 0 58* 0 60 0 57*   GLUCOSE, ISTAT mg/dl  --   --   --  140   POTASSIUM mmol/L 4 1 4 2 4 0 4 2   SODIUM mmol/L 139 139 136 137     Results from last 7 days   Lab Units 06/01/23  0958 05/31/23  1633 05/28/23  0445   INR  1 15 1 07 1 49*   PROTIME seconds 15 0* 14 1 18 3*        Drug regimen reviewed, all potential adverse effects identified and addressed:    Scheduled Meds:  Current Facility-Administered Medications   Medication Dose Route Frequency Provider Last Rate   • acetaminophen  975 mg Oral Q6H [address] Daniel Badillo MD     • bisacodyl  10 mg Rectal Daily PRN Daniel Badillo MD     • docusate sodium  100 mg Oral BID Daniel Badillo MD     • enoxaparin  30 mg Subcutaneous Q12H [address] Daniel Badillo MD     • latanoprost  1 drop Both Eyes HS RAYMON Hernadez     • lidocaine   Topical Q4H PRN Daniel Badillo MD     • metoprolol succinate  50 mg Oral HS Daniel Badillo MD     • montelukast  10 mg Oral HS Daniel Badillo MD     • oxyCODONE  5 mg Oral Q4H PRN Daniel Badillo MD     • oxyCODONE  2 5 mg Oral Q4H PRN Daniel Badillo MD     • polyethylene glycol  17 g Oral Daily PRN Daniel Badillo MD     • polyethylene glycol  17 g Oral Daily Daniel Badillo MD     • pravastatin  40 mg Oral Daily With Stefan Yuen MD     • pregabalin  100 mg Oral BID Daniel Badillo MD     • warfarin  5 mg Oral Daily (warfarin) RAYMON Hernadez         Chief Complaints:  Hip pain     Subjective:      On eval, patient reports feeling better after large bowel movement today  She reports some difficulty sleeping overnight  Patient reports hip pain getting a little bit better  She denies lightheadedness, fever, chills, sweats, abdominal pain, or other new complaints  ROS: A 10 point ROS was performed; negative except as noted above  Physical Exam:  Temp:  [98 1 °F (36 7 °C)-99 5 °F (37 5 °C)] 99 5 °F (37 5 °C)  HR:  [68-99] 74  Resp:  [12-18] 12  BP: (106-148)/(53-63) 132/60  SpO2:  [95 %-96 %] 96 %    GEN:  Lying in bed in NAD   HEENT/NECK: Normocephalic, atraumatic, moist mucous membranes   CARDIAC: Regular rate rhythm, no murmers, no rubs, no gallops  LUNGS:  clear to auscultation, no wheezes, rales, or rhonchi  ABDOMEN: Soft, non-tender, non-distended, normal active bowel sounds  EXTREMITIES/SKIN:  Stable right greater than left lower extremity edema without calf tenderness to palpation; no obvious signs of infection  NEURO:   MENTAL STATUS:  Appropriate wakefulness and interaction   PSYCH:  Affect:  Euthymic     HPI:  76-year-old female with a past medical history of A-fib on Coumadin, hypertension, hyperlipidemia, obesity, peripheral neuropathy, aortic stenosis, bilateral total hip arthroplasty, right total hip arthroplasty revision who stood up from kneeling and felt a crack in her right hip and then slipped and fell with resultant right leg pain who was found to have right periprosthetic hip fracture -described by radiology is acute minimally displaced periprosthetic fracture of the right proximal femoral diaphysis adjacent to the femoral stem of the right total hip arthroplasty  CT head was negative for acute findings  Patient underwent ORIF of right periprosthetic hip fracture on 5/28  Patient was started on Lovenox for VTE prophylaxis and cleared for Coumadin on 5/31    Patient has had some acute blood loss anemia, pain, severe constipation, and significant decline in ADLs and mobility        ** Please Note: Fluency Direct voice to text software may have been used in the creation of this document   **

## 2023-06-01 NOTE — ASSESSMENT & PLAN NOTE
- Function continues to improving; doing well with IRPs > continue   - Plan for d/c Friday now with [facility] RN and PT     Status post surgical repair via ORIF by Dr Charissa Kaur  on 5/28/23  • Weight-bearing as tolerated on affected limb   • Monitor incision/area for infection or dehiscence/impaired healing - improved drainage, no signs of infection   • POD#14 is Magi Garcia 6/11 > ortho c/s - no concerns  • 6/11 - XR Right hip arthroplasty with cerclage wires for a periprosthetic fracture '  • Patient reported significant pain after transfer earlier in course; right femur x-ray without acute concerns 6/2  • Monitor for signs/symptoms of VTE, atelectasis, acute blood loss anemia, or other infection   • Continue acute comprehensive interdisciplinary inpatient rehabilitation to include intensive skilled therapies (PT, OT) as outlined with oversight and management by rehabilitation physician as well as inpatient rehab level nursing, case management and weekly interdisciplinary team meetings     • Follow-up with Ortho Sx after d/c and ortho if needed during ARC course

## 2023-06-02 ENCOUNTER — APPOINTMENT (OUTPATIENT)
Dept: RADIOLOGY | Facility: HOSPITAL | Age: 78
DRG: 561 | End: 2023-06-02
Payer: MEDICARE

## 2023-06-02 LAB
INR PPP: 1.09 (ref 0.84–1.19)
PROTHROMBIN TIME: 14.3 SECONDS (ref 11.6–14.5)

## 2023-06-02 PROCEDURE — 97116 GAIT TRAINING THERAPY: CPT

## 2023-06-02 PROCEDURE — 97110 THERAPEUTIC EXERCISES: CPT

## 2023-06-02 PROCEDURE — 85610 PROTHROMBIN TIME: CPT | Performed by: NURSE PRACTITIONER

## 2023-06-02 PROCEDURE — 99232 SBSQ HOSP IP/OBS MODERATE 35: CPT

## 2023-06-02 PROCEDURE — 97530 THERAPEUTIC ACTIVITIES: CPT

## 2023-06-02 PROCEDURE — 73552 X-RAY EXAM OF FEMUR 2/>: CPT

## 2023-06-02 PROCEDURE — 97535 SELF CARE MNGMENT TRAINING: CPT

## 2023-06-02 PROCEDURE — 99232 SBSQ HOSP IP/OBS MODERATE 35: CPT | Performed by: INTERNAL MEDICINE

## 2023-06-02 RX ADMIN — ACETAMINOPHEN 975 MG: 325 TABLET, FILM COATED ORAL at 00:18

## 2023-06-02 RX ADMIN — OXYCODONE HYDROCHLORIDE 5 MG: 5 TABLET ORAL at 08:11

## 2023-06-02 RX ADMIN — POLYETHYLENE GLYCOL 3350 17 G: 17 POWDER, FOR SOLUTION ORAL at 08:03

## 2023-06-02 RX ADMIN — LATANOPROST 1 DROP: 50 SOLUTION OPHTHALMIC at 00:20

## 2023-06-02 RX ADMIN — ENOXAPARIN SODIUM 30 MG: 30 INJECTION SUBCUTANEOUS at 08:03

## 2023-06-02 RX ADMIN — OXYCODONE HYDROCHLORIDE 5 MG: 5 TABLET ORAL at 21:42

## 2023-06-02 RX ADMIN — ENOXAPARIN SODIUM 30 MG: 30 INJECTION SUBCUTANEOUS at 21:42

## 2023-06-02 RX ADMIN — DOCUSATE SODIUM 100 MG: 100 CAPSULE, LIQUID FILLED ORAL at 08:03

## 2023-06-02 RX ADMIN — PRAVASTATIN SODIUM 40 MG: 40 TABLET ORAL at 18:12

## 2023-06-02 RX ADMIN — LATANOPROST 1 DROP: 50 SOLUTION OPHTHALMIC at 21:48

## 2023-06-02 RX ADMIN — DOCUSATE SODIUM 100 MG: 100 CAPSULE, LIQUID FILLED ORAL at 18:12

## 2023-06-02 RX ADMIN — ACETAMINOPHEN 975 MG: 325 TABLET, FILM COATED ORAL at 18:11

## 2023-06-02 RX ADMIN — ACETAMINOPHEN 975 MG: 325 TABLET, FILM COATED ORAL at 13:08

## 2023-06-02 RX ADMIN — ACETAMINOPHEN 975 MG: 325 TABLET, FILM COATED ORAL at 05:41

## 2023-06-02 RX ADMIN — OXYCODONE HYDROCHLORIDE 5 MG: 5 TABLET ORAL at 14:30

## 2023-06-02 RX ADMIN — MONTELUKAST SODIUM 10 MG: 10 TABLET, COATED ORAL at 21:42

## 2023-06-02 RX ADMIN — WARFARIN SODIUM 5 MG: 5 TABLET ORAL at 18:12

## 2023-06-02 RX ADMIN — PREGABALIN 100 MG: 100 CAPSULE ORAL at 08:03

## 2023-06-02 RX ADMIN — PREGABALIN 100 MG: 100 CAPSULE ORAL at 18:11

## 2023-06-02 NOTE — PROGRESS NOTES
Physical Medicine and Rehabilitation Progress Note  Dimas Samayoa 77 y o  female MRN: [id number]  Unit/Bed#: [facility] 961-01 Encounter: [id number]      Assessment & Plan:     Decline in ADLs and mobility: Functional assessment - function improving        FIM  Care Score  Admit Score Recent Score    Total assist  1-100% or 2p    Tot Other ADLs     Max assist 2-51-75%    Sub     Mod assist 3- 26-50%  Par     Min assist 3- 25% or < Par UBD    CG assist 4  TA     Sup/Setup 4-5 Sup     Mod-I/Indep 6 MI      Transfers  Mod-Significant assist Mod assist     Ambulation   Total assist  10 ft mod assist     Stairs   Total assist/NT      Goal: Mod indep for most ADLs and  for mobility  Major barriers:  Pain, decreased ROM, deconditioning, lack of assistance at home  Dispo: Home with [facility] 16 days from admission      * 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 (HCC)  Assessment & Plan  Patient reported significant pain after transfer with PCA recently; discussed with nursing/NM who will follow-up with provider; Grossly stable; pain somewhat increased and patient concerned; no signs of infection; function improving nevertheless early in course   - Obtain R femur follow-up XR; ICE PRN; monitor   Status post surgical repair via ORIF by Dr Rosa Leslie  on 5/28/23  • Weight-bearing as tolerated on affected limb   • Monitor incision/area for infection or dehiscence/impaired healing   • POD#14 is Tadeo Kari 6/11  • Monitor for signs/symptoms of VTE, atelectasis, acute blood loss anemia, or other infection   • Continue acute comprehensive interdisciplinary inpatient rehabilitation to include intensive skilled therapies (PT, OT) as outlined with oversight and management by rehabilitation physician as well as inpatient rehab level nursing, case management and weekly interdisciplinary team meetings     • Follow-up with Ortho Sx after d/c and ortho if needed during ARC course       Obesity  Assessment & Plan   on appropriate "nutrition and activity  Adjustments accordingly during skilled therapy and with rehab nursing  Monitor skin closely for breakdown, wounds, rashes; keep clean and dry, turning Q2H   Follow-up with PCP after d/c      Encounter for skin care  Assessment & Plan  - Increased risk of skin wounds/breakdown/rashes due to recent immobility and co-morbidities   - 2 nurse/staff full skin check for abnormal findings on admission - obtain photos PRN  - Turn patient Q2H in bed (use pillows or wedges), encourage wt shifts every 10-15 minutes in chair  - Patient and if available caregiver education   - Hydragaurd to buttocks and sacrum BID & PRN  - Optimal bowel/bladder hygiene; keep skin clean and dry   - EHOB waffle cushion to chair/WC when OOB  - Nursing to document in chart and Notify MD if buttock, sacrum, heel, or other skin site develops erythema, skin breakdown, or rashes as soon as possible  If patient is soiling themselves with urine or stool notify MD   If you are unable to maintain skin integrity and prevent erythema due to frequency of soiling notify MD as soon as possible as well  Peripheral neuropathy  Assessment & Plan  Monitor skin for increased infection/breakdown/wounds which patient is at higher risk for  Skilled PT/OT   Fall precautions      Abnormal finding on radiology exam  Assessment & Plan  Per discharging provider to [facility]   \"Findings and Follow up required:                              1) Scattered less than 3 mm nodular densities in the posterior right upper lobe of the lung as well as a calcified granuloma in the right lower lobe of the lung posteriorly were noted incidentally on your trauma imaging  A malignancy (cancer) cannot be completely excluded based on trauma imaging alone    Recommend short-term outpatient follow-up with primary care provider to review the finding and for further surveillance as indicated                               2)  A subcentimeter cystic hypodensity posterior right " "lobe of the liver was incidentally identified on your trauma imaging and is unchanged compared to prior imaging  A malignancy (cancer) cannot be completely excluded based on trauma imaging alone  Recommend short-term outpatient follow-up with primary care provider to review the finding and for further surveillance as indicated                               3) A left adrenal gland 1 4 x 0 9 cm nodule was incidentally identified under trauma imaging and is unchanged compared to prior imaging  A malignancy (cancer) cannot be completely excluded based on trauma imaging alone  Recommend short-term outpatient follow-up with primary care provider to review the finding and for further surveillance as indicated                               4) Subcentimeter hypodensities in your kidneys were incidentally identified under trauma imaging and are suggestive of benign cysts  A malignancy (cancer) cannot be completely excluded based on trauma imaging alone  Recommend short-term outpatient follow-up with primary care provider to review the finding and for further surveillance as indicated                               5) A new small umbilical hernia containing nonobstructed bowel loops and fat was incidentally identified under trauma imaging as well as a previously seen right para-midline ventral hernia defect containing adherent bowel loops in a nonobstructive pattern  No further work-up or intervention necessary for these findings at this time  Recommend short-term outpatient follow-up with primary care provider to review the finding and for further surveillance as indicated                   Follow up should be done within 2 week(s)     The above noted incidental findings were discussed with the patient    The patient demonstrated understanding of the findings and the follow-up recommendations      Please notify the following clinician to assist with the follow up:              Dr Spence Said"    Anemia  Assessment & " Plan  Consult(ed) IM and comanagement with their service during ARC course   Monitor H/H, vitals, signs/symptoms of acute bleeding      At risk for venous thromboembolism (VTE)  Assessment & Plan  SCDs, ambulation, and lovenox 30mg BID (until warfarin tx)   INR 1 09 on 6/2       Pain  Assessment & Plan  Somewhat increased today  ICE prn   APAP 975mg TID   Oxycodone 2 5-5mg Q4H PRN  Lyrica 100mg BID (chronic for neuropathy(    Monitor for oversedation, AMS/delirium, and respiratory depression   If being administered - hold opiates, muscle relaxants, benzodiazepines, and gabapentin for oversedation, AMS, or RR<12    Counseled on and continue to encourage deep breathing/relaxation/behavioral pain management techniques      Constipation  Assessment & Plan  Improved with large BM 6/1   Significant on admission to [facility] with last Bm near a week ago; no current signs of symptoms obstruction  - Continue to monitor for S/S of obstruction; hold stimulant laxatives and obtain imaging if concern develops  - Colace BID, miralax daily (consider BID if needed)  (Declines senna)   - PRN bowel regimen  - Limiting constipating medications if possible  - Ensure adequate hydration       Pure hypercholesterolemia  Assessment & Plan  Statin     PAF (paroxysmal atrial fibrillation) (HCC)  Assessment & Plan  IM consulted and with overall management at their discretion during ARC course  Rate control: Toprol XL 50mg HS  Antithrombotic: Warfarin cleared to resume   (patient is frustrated with checking her INR and was not always checking it and adjusting med dose on own at time; she did voice she would be open to consider NOAC - consider discussing with cards in IP setting otherwise OP follow-up)      Non-rheumatic aortic stenosis  Assessment & Plan  EF 65%, G2DD, Mild-mod AS, mod-severe elevated RVSP  IM consulted and with overall management at their discretion during ARC course  Monitor vitals, fluid status  OP Cards follow-up      Other Medical Issues:  • Monitor for     Follow-up providers and other issues to be followed up after discharge  Cards  PCP  Ortho    CODE: Level 3: DNAR and DNI    Restrictions include: Fall precautions    Objective: Allergies per EMR  Diagnostic Studies: Reviewed, no new imaging  XR femur 2 vw right   Final Result by Terence Singh MD (06/03 0740)      No acute osseous abnormality  Postoperative changes              Workstation performed: [id number]           See above as well    Laboratory: Labs reviewed  Results from last 7 days   Lab Units 05/31/23  0538 05/30/23  0444 05/28/23  0445   HEMATOCRIT % 30 9* 28 5* 34 6*   HEMOGLOBIN g/dL 9 8* 9 0* 11 0*   WBC Thousand/uL 8 70 11 26* 7 22     Results from last 7 days   Lab Units 05/31/23  0538 05/30/23  0444 05/28/23  0445   BUN mg/dL 14 14 12   CHLORIDE mmol/L 108 109* 108   CREATININE mg/dL 0 42* 0 58* 0 60   POTASSIUM mmol/L 4 1 4 2 4 0   SODIUM mmol/L 139 139 136     Results from last 7 days   Lab Units 06/02/23  0435 06/01/23  0958 05/31/23  1633   INR  1 09 1 15 1 07   PROTIME seconds 14 3 15 0* 14 1        Drug regimen reviewed, all potential adverse effects identified and addressed:    Scheduled Meds:  Current Facility-Administered Medications   Medication Dose Route Frequency Provider Last Rate   • acetaminophen  975 mg Oral Q6H [address] Juventino Bowen MD     • bisacodyl  10 mg Rectal Daily PRN Juventino Bowen MD     • docusate sodium  100 mg Oral BID Juventino Bowen MD     • enoxaparin  30 mg Subcutaneous Q12H [address] Juventino Bowen MD     • latanoprost  1 drop Both Eyes HS RAYMON Banegas     • lidocaine   Topical Q4H PRN Juventino Bowen MD     • metoprolol succinate  50 mg Oral HS Juventino Bowen MD     • montelukast  10 mg Oral HS Juventino Bowen MD     • oxyCODONE  5 mg Oral Q4H PRN Juventino Bowen MD     • oxyCODONE  2 5 mg Oral Q4H PRN Juventino Bowen MD     • polyethylene glycol  17 g Oral Daily PRN Mile Brooke MD     • polyethylene glycol  17 g Oral Daily Mile Brooke MD     • pravastatin  40 mg Oral Daily With Patricia Branch MD     • pregabalin  100 mg Oral BID Mile Brooke MD     • warfarin  5 mg Oral Daily (warfarin) RAYMON High         Chief Complaints:  Hip pain     Subjective: On eval, patient reports that she had sudden severe R thigh/leg pain when provider turned her in bed when transferring OOB and felt she was pulled too hard  She reports pain better now but somewhat increased from yesterday and is concerned with it and would like an X-ray  She denies fever, chills, SOB, lightheadedness, constipation, calf pain or other new complaints  ROS: A 10 point ROS was performed; negative except as noted above  Physical Exam:  06/02/23 0450 98 3 °F (36 8 °C) 62 18 111/55 79 98 % None (Room air)     Vitals above reviewed on date of encounter    GEN:  Lying in bed in NAD   HEENT/NECK: MMM  CARDIAC: Regular rate rhythm, no murmers, no rubs, no gallops  LUNGS:  clear to auscultation, no wheezes, rales, or rhonchi  ABDOMEN: Soft, non-tender, non-distended, normal active bowel sounds  EXTREMITIES/SKIN:  Stable right greater than left lower extremity edema without calf tenderness to palpation; no obvious signs of infection;  Incision with some serosanginous drainage but no signs of infection  NEURO:   MENTAL STATUS:  Appropriate wakefulness and interaction   PSYCH:  Affect:  Somewhat frustrated    HPI:  74-year-old female with a past medical history of A-fib on Coumadin, hypertension, hyperlipidemia, obesity, peripheral neuropathy, aortic stenosis, bilateral total hip arthroplasty, right total hip arthroplasty revision who stood up from kneeling and felt a crack in her right hip and then slipped and fell with resultant right leg pain who was found to have right periprosthetic hip fracture -described by radiology is acute minimally displaced periprosthetic fracture of the right proximal femoral diaphysis adjacent to the femoral stem of the right total hip arthroplasty  CT head was negative for acute findings  Patient underwent ORIF of right periprosthetic hip fracture on 5/28  Patient was started on Lovenox for VTE prophylaxis and cleared for Coumadin on 5/31  Patient has had some acute blood loss anemia, pain, severe constipation, and significant decline in ADLs and mobility        ** Please Note: Fluency Direct voice to text software may have been used in the creation of this document  **    I personally performed the required components and examined the patient myself in person on 6/2/23

## 2023-06-02 NOTE — PROGRESS NOTES
06/02/23 1030   Pain Assessment   Pain Assessment Tool 0-10   Pain Score 5   Pain Location/Orientation Orientation: Right;Location: Hip;Location: Leg   Hospital Pain Intervention(s) Emotional support;Repositioned   Restrictions/Precautions   Precautions Fall Risk;Pain   RLE Weight Bearing Per Order WBAT   ROM Restrictions No   Sit to Stand   Type of Assistance Needed Physical assistance   Physical Assistance Level 26%-50%   Comment min/modA pending surface height  Sit to Stand CARE Score 3   Bed-Chair Transfer   Type of Assistance Needed Physical assistance   Physical Assistance Level 26%-50%   Comment min/modA with RW, inc time  Chair/Bed-to-Chair Transfer CARE Score 3   Exercise Tools   Other Exercise Tool 1 UB strenghtening with use of 3# dowel bar moving through bicep curls, elbow extension, chest press, and shoulder flexion to shoulder height only due to baseline RUE ROM deficits  Pt completes 3 x 10 each to inc UB strength and endurance for inc indpeendneec with ADL tasks and functional txfers  Cognition   Overall Cognitive Status WFL   Arousal/Participation Alert; Cooperative   Attention Within functional limits   Orientation Level Oriented X4   Memory Decreased short term memory;Decreased recall of recent events;Decreased recall of precautions   Following Commands Follows one step commands with increased time or repetition   Activity Tolerance   Activity Tolerance Patient tolerated treatment well   Assessment   Treatment Assessment Pt participated in skilled OT services with focus on rapport building, functional txfers, and strengthening  Pt currently limited by pain, dec standing tolerance/balance, dec strength, dec endurance, dec act bryan  Pt will continue to benefit from skilled OT services with focus on above mentioned deficits to inc safety and independence with I/ADL tasks     Prognosis Fair   Problem List Decreased strength;Decreased endurance;Decreased skin integrity;Orthopedic restrictions;Pain;Decreased range of motion; Impaired balance;Decreased mobility   Plan   Treatment/Interventions ADL retraining;Functional transfer training; Therapeutic exercise; Endurance training;Patient/family training;Equipment eval/education; Bed mobility; Compensatory technique education   Progress Progressing toward goals   OT Therapy Minutes   OT Time In 1030   OT Time Out 1130   OT Total Time (minutes) 60   OT Mode of treatment - Individual (minutes) 60   OT Mode of treatment - Concurrent (minutes) 0   OT Mode of treatment - Group (minutes) 0   OT Mode of treatment - Co-treat (minutes) 0   OT Mode of Treatment - Total time(minutes) 60 minutes   OT Cumulative Minutes 165   Therapy Time missed   Time missed?  No

## 2023-06-02 NOTE — PLAN OF CARE
Problem: PAIN - ADULT  Goal: Verbalizes/displays adequate comfort level or baseline comfort level  Description: Interventions:  - Encourage patient to monitor pain and request assistance  - Assess pain using appropriate pain scale  - Administer analgesics based on type and severity of pain and evaluate response  - Implement non-pharmacological measures as appropriate and evaluate response  - Consider cultural and social influences on pain and pain management  - Notify physician/advanced practitioner if interventions unsuccessful or patient reports new pain  Outcome: Progressing     Problem: INFECTION - ADULT  Goal: Absence or prevention of progression during hospitalization  Description: INTERVENTIONS:  - Assess and monitor for signs and symptoms of infection  - Monitor lab/diagnostic results  - Monitor all insertion sites, i e  indwelling lines, tubes, and drains  - Monitor endotracheal if appropriate and nasal secretions for changes in amount and color  - [place] appropriate cooling/warming therapies per order  - Administer medications as ordered  - Instruct and encourage patient and family to use good hand hygiene technique  - Identify and instruct in appropriate isolation precautions for identified infection/condition  Outcome: Progressing     Problem: SAFETY ADULT  Goal: Patient will remain free of falls  Description: INTERVENTIONS:  - Educate patient/family on patient safety including physical limitations  - Instruct patient to call for assistance with activity   - Consult OT/PT to assist with strengthening/mobility   - Keep Call bell within reach  - Keep bed low and locked with side rails adjusted as appropriate  - Keep care items and personal belongings within reach  - Initiate and maintain comfort rounds  - Make Fall Risk Sign visible to staff  - Offer Toileting every 2 Hours, in advance of need  - Initiate/Maintain bed/chair alarm  - Obtain necessary fall risk management equipment: alarms  - Apply yellow socks and bracelet for high fall risk patients  - Consider moving patient to room near nurses station  Outcome: Progressing  Goal: Maintain or return to baseline ADL function  Description: INTERVENTIONS:  -  Assess patient's ability to carry out ADLs; assess patient's baseline for ADL function and identify physical deficits which impact ability to perform ADLs (bathing, care of mouth/teeth, toileting, grooming, dressing, etc )  - Assess/evaluate cause of self-care deficits   - Assess range of motion  - Assess patient's mobility; develop plan if impaired  - Assess patient's need for assistive devices and provide as appropriate  - Encourage maximum independence but intervene and supervise when necessary  - Involve family in performance of ADLs  - Assess for home care needs following discharge   - Consider OT consult to assist with ADL evaluation and planning for discharge  - Provide patient education as appropriate  Outcome: Progressing  Goal: Maintains/Returns to pre admission functional level  Description: INTERVENTIONS:  - Perform BMAT or MOVE assessment daily    - Set and communicate daily mobility goal to care team and patient/family/caregiver  - Collaborate with rehabilitation services on mobility goals if consulted  - Perform Range of Motion 3 times a day  - Reposition patient every 2 hours    - Dangle patient 3 times a day  - Stand patient 3 times a day  - Ambulate patient 3 times a day  - Out of bed to chair 3 times a day   - Out of bed for meals 3 times a day  - Out of bed for toileting  - Record patient progress and toleration of activity level   Outcome: Progressing     Problem: DISCHARGE PLANNING  Goal: Discharge to home or other facility with appropriate resources  Description: INTERVENTIONS:  - Identify barriers to discharge w/patient and caregiver  - Arrange for needed discharge resources and transportation as appropriate  - Identify discharge learning needs (meds, wound care, etc )  - Arrange for interpretive services to assist at discharge as needed  - Refer to Case Management Department for coordinating discharge planning if the patient needs post-hospital services based on physician/advanced practitioner order or complex needs related to functional status, cognitive ability, or social support system  Outcome: Progressing     Problem: Prexisting or High Potential for Compromised Skin Integrity  Goal: Skin integrity is maintained or improved  Description: INTERVENTIONS:  - Identify patients at risk for skin breakdown  - Assess and monitor skin integrity  - Assess and monitor nutrition and hydration status  - Monitor labs   - Assess for incontinence   - Turn and reposition patient  - Assist with mobility/ambulation  - Relieve pressure over bony prominences  - Avoid friction and shearing  - Provide appropriate hygiene as needed including keeping skin clean and dry  - Evaluate need for skin moisturizer/barrier cream  - Collaborate with interdisciplinary team   - Patient/family teaching  - Consider wound care consult   Outcome: Progressing

## 2023-06-02 NOTE — PROGRESS NOTES
06/02/23 1400   Pain Assessment   Pain Assessment Tool 0-10   Pain Score 8   Pain Location/Orientation Orientation: Right;Location: Hip;Location: Leg   Hospital Pain Intervention(s) Repositioned;Cold applied  (notified nursing)   Restrictions/Precautions   Precautions Fall Risk;Pain   RLE Weight Bearing Per Order WBAT   ROM Restrictions No   Putting On/Taking Off Footwear   Type of Assistance Needed Adaptive equipment;Set-up / clean-up   Physical Assistance Level No physical assistance   Comment Pt has baseline knowledge of Rachel Early (has used for 40+ years) and requires no cueing for task completion, only requires set-up at this time  Putting On/Taking Off Footwear CARE Score 5   Sit to Stand   Type of Assistance Needed Physical assistance   Physical Assistance Level 26%-50%   Comment min/modA pending surface height  Sit to Stand CARE Score 3   Bed-Chair Transfer   Type of Assistance Needed Physical assistance   Physical Assistance Level 26%-50%   Comment min/modA with RW, inc time  Chair/Bed-to-Chair Transfer CARE Score 3   Cognition   Overall Cognitive Status WFL   Arousal/Participation Alert; Cooperative   Attention Within functional limits   Orientation Level Oriented X4   Memory Decreased short term memory;Decreased recall of recent events;Decreased recall of precautions   Following Commands Follows one step commands with increased time or repetition   Activity Tolerance   Activity Tolerance Patient tolerated treatment well   Assessment   Treatment Assessment Pt participated in skilled OT services with focus on functional txfers, LB dressing  Pt continues to be limited by pain in R hip  Education on deep breathing techniques, relaxation strategies, and educated on importance of pre medicating prior to therapies to reduce pain and inc participation  IP issued to R hip for 20 minutes, skin in tact before and post application   Pt will continue to benefit from skilled OT services with focus on standing tolerance/balacne, endurance, act bryan and strength  Prognosis Fair   Problem List Decreased strength;Decreased endurance;Decreased skin integrity;Orthopedic restrictions;Pain;Decreased range of motion; Impaired balance;Decreased mobility   Plan   Treatment/Interventions ADL retraining;Functional transfer training; Therapeutic exercise; Endurance training;Patient/family training;Equipment eval/education; Bed mobility; Compensatory technique education   Progress Progressing toward goals   OT Therapy Minutes   OT Time In 1400   OT Time Out 1430   OT Total Time (minutes) 30   OT Mode of treatment - Individual (minutes) 30   OT Mode of treatment - Concurrent (minutes) 0   OT Mode of treatment - Group (minutes) 0   OT Mode of treatment - Co-treat (minutes) 0   OT Mode of Treatment - Total time(minutes) 30 minutes   OT Cumulative Minutes 195   Therapy Time missed   Time missed?  No

## 2023-06-02 NOTE — PROGRESS NOTES
Internal Medicine Progress Note  Patient: Abel Fisher  Age/sex: 77 y o  female  Medical Record #: [id number]      ASSESSMENT/PLAN: (Interval History)  Abel Fisher is seen and examined and management for following issues:    Right periprosthetic femur fracture  • S/p ORIF 5/28  • WBAT     Hx PAF  • Home:  Toprol 50mg qHS/Coumadin 6 25 mg (2 1/2 tabs of 2 5mg tabs) on Mondays and 5mg the rest of the days of the week  • Here:  Toprol 50mg qHS/Coumadin 5mg qd (restarted 5/31/23)  • She did tell Dr Glen Wall that she adjusts her Coumadin on her own  • Amada Ellington does her INRs and she has 2 5mg tabs  • INR 6/4/23  • Continue Lovenox for DVT prophylaxis until INR is 2 0 or greater     HLD  • Continue Pravachol as a sub for home home Simvastatin     Peripheral neuropathy  • Continue Lyrica     Aortic stenosis  • ECHO 7/2022:  LVEF 65%/gr 2 DD, mild-mod AS, mild TR/MR, RV systolic pressure is mod-severe elevated with systolic pressure 59 mmHg  • euvolemic        Discharge date:  Team    The above assessment and plan was reviewed and updated as determined by my evaluation of the patient on 6/2/2023  Labs:   Results from last 7 days   Lab Units 05/31/23 0538 05/30/23  0444   HEMATOCRIT % 30 9* 28 5*   HEMOGLOBIN g/dL 9 8* 9 0*   PLATELETS Thousands/uL 218 219   WBC Thousand/uL 8 70 11 26*     Results from last 7 days   Lab Units 05/31/23  0538 05/30/23  0444 05/28/23  0445 05/27/23  0854   BUN mg/dL 14 14   < > 15   CALCIUM mg/dL 8 6 8 6   < > 8 5   CHLORIDE mmol/L 108 109*   < > 109*   CO2 mmol/L 27 30   < > 24   CO2, I-STAT mmol/L  --   --   --  26   CREATININE mg/dL 0 42* 0 58*   < > 0 57*   GLUCOSE, ISTAT mg/dl  --   --   --  140   POTASSIUM mmol/L 4 1 4 2   < > 4 2   SODIUM mmol/L 139 139   < > 137    < > = values in this interval not displayed           Results from last 7 days   Lab Units 06/02/23  0435 06/01/23  0958   INR  1 09 1 15           Review of Scheduled Meds:  Current Facility-Administered Medications   Medication Dose Route Frequency Provider Last Rate   • acetaminophen  975 mg Oral Q6H [address] Mile Brooke MD     • bisacodyl  10 mg Rectal Daily PRN Mile Brooke MD     • docusate sodium  100 mg Oral BID Mile Brooke MD     • enoxaparin  30 mg Subcutaneous Q12H [address] Mile Brooke MD     • latanoprost  1 drop Both Eyes HS RAYMON Barber     • lidocaine   Topical Q4H PRN Mile Brooke MD     • metoprolol succinate  50 mg Oral HS Mile Brooke MD     • montelukast  10 mg Oral HS Mile Brooke MD     • oxyCODONE  5 mg Oral Q4H PRN Mile Brooke MD     • oxyCODONE  2 5 mg Oral Q4H PRN Mile Brooke MD     • polyethylene glycol  17 g Oral Daily PRN Mile Brooke MD     • polyethylene glycol  17 g Oral Daily Mile Brooke MD     • pravastatin  40 mg Oral Daily With Patricia Branch MD     • pregabalin  100 mg Oral BID Mile Brooke MD     • warfarin  5 mg Oral Daily (warfarin) RAYMON High         Subjective/ HPI: Patient seen and examined  Patients overnight issues or events were reviewed with nursing or staff during rounds or morning huddle session  New or overnight issues include the following:     She said she had severe right leg pain when was transferred OOB last night  She is for an xray  No other complaints    ROS:   A 10 point ROS was performed; negative except as noted above         Imaging:     No orders to display       *Labs /Radiology studies reviewed  *Medications reviewed and reconciled as needed  *Please refer to order section for additional ordered labs studies  *Case discussed with primary attending during morning huddle case rounds    Physical Examination:  Vitals:   Vitals:    06/01/23 1416 06/01/23 2008 06/01/23 2149 06/02/23 0450   BP: 132/60 113/55 122/58 111/55   BP Location: Right arm Right arm Right arm Left arm   Pulse: 74 71 78 62   Resp: 12 17  18   Temp: 99 5 °F (37 5 °C) 98 2 °F (36 8 °C)  98 3 °F (36 8 °C)   TempSrc: Oral Oral  Oral   SpO2: 96% 98%  98%   Weight:       Height:           General Appearance: no distress, conversive  HEENT: PERRLA, conjuctiva normal; oropharynx clear; mucous membranes moist   Neck:  Supple, normal ROM  Lungs: CTA, normal respiratory effort, no retractions, expiratory effort normal  CV: regular rate and rhythm; no rubs/murmurs/gallops, PMI normal   ABD: soft; ND/NT; +BS  EXT: tr RLE edema  Skin: normal turgor, normal texture, no rashes  Psych: affect normal, mood normal  Neuro: AAO      The above physical exam was reviewed and updated as determined by my evaluation of the patient on 6/2/2023  Invasive Devices     Peripheral Intravenous Line  Duration           Peripheral IV 05/29/23 Left;Ventral (anterior) Forearm 4 days          Drain  Duration           External Urinary Catheter 5 days                   VTE Pharmacologic Prophylaxis: Enoxaparin  Code Status: Level 3 - DNAR and DNI  Current Length of Stay: 2 day(s)      Total time spent:  30 minutes with more than 50% spent counseling/coordinating care  Counseling includes discussion with patient re: progress  and discussion with patient of his/her current medical state/information  Coordination of patient's care was performed in conjunction with primary service  Time invested included review of patient's labs, vitals, and management of their comorbidities with continued monitoring  In addition, this patient was discussed with medical team including physician and advanced extenders  The care of the patient was extensively discussed and appropriate treatment plan was formulated unique for this patient  Medical decision making for the day was made by supervising physician unless otherwise noted in their attestation statement  ** Please Note:  voice to text software may have been used in the creation of this document   Although proof errors in transcription or interpretation are a potential of such software**

## 2023-06-02 NOTE — PROGRESS NOTES
06/02/23 0930   Pain Assessment   Pain Assessment Tool 0-10   Pain Score 5   Pain Location/Orientation Orientation: Right;Location: Hip;Location: Leg   Pain Onset/Description Onset: Ongoing   Effect of Pain on Daily Activities Pain seemed to be more controlled with the administration of pain medication appx 1 hour earlier   Restrictions/Precautions   Precautions Fall Risk;Pain   RLE Weight Bearing Per Order WBAT   Cognition   Overall Cognitive Status WFL   Subjective   Subjective Patient ready to participate in PT session   Sit to Stand   Type of Assistance Needed Physical assistance   Physical Assistance Level 26%-50%   Comment increased time; with RW   Sit to Stand CARE Score 3   Bed-Chair Transfer   Type of Assistance Needed Physical assistance   Physical Assistance Level 26%-50%   Comment increased time; with RW   Chair/Bed-to-Chair Transfer CARE Score 3   Transfer Bed/Chair/Wheelchair   Limitations Noted In Pain Management;LE Strength   Adaptive Equipment Roller Walker   Walk 10 Feet   Type of Assistance Needed Physical assistance   Physical Assistance Level 26%-50%   Comment wiht RW   Walk 10 Feet CARE Score 3   Ambulation   Primary Mode of Locomotion Prior to Admission Walk   Distance Walked (feet) 42 ft   Assist Device Roller Walker   Gait Pattern Antalgic;Decreased R stance; Improper weight shift   Limitations Noted In Endurance; Heel Strike; Sequencing;Speed;Strength;Swing   Provided Assistance with: Balance   Does the patient walk? 2  Yes   Therapeutic Interventions   Strengthening RLE 3x10 LAQ, seated hip flexion, hip ABD/ADD, AAROM as needed   Flexibility RLE heel cord stretch TERT 2 minutes   Assessment   Treatment Assessment PT treatment session performed in room 2* time constraints  Patient also can be tangential and needed increased time for all mobility  Pain seemed to be more controlled this session, evident through the improvement in gait distance and transfer status   RLE weakness and pain however remain barriers to discharge  Patient lives alone and will need to achieve  (I) with all mobility  She also has 2-3 BLAS home which will need to be a focus of PT intervention in upcoming days, as appropriate  Goals set for 2 weks     Problem List Decreased strength;Decreased endurance;Decreased skin integrity;Orthopedic restrictions;Pain   Barriers to Discharge Inaccessible home environment;Decreased caregiver support   PT Therapy Minutes   PT Time In 0930   PT Time Out 1000   PT Total Time (minutes) 30   PT Mode of treatment - Individual (minutes) 30   PT Mode of treatment - Concurrent (minutes) 0   PT Mode of treatment - Group (minutes) 0   PT Mode of treatment - Co-treat (minutes) 0   PT Mode of Treatment - Total time(minutes) 30 minutes   PT Cumulative Minutes 180

## 2023-06-02 NOTE — PROGRESS NOTES
"   06/02/23 0800   Pain Assessment   Pain Assessment Tool 0-10   Pain Score 8   Pain Location/Orientation Orientation: Right;Location: Hip;Location: Leg   Pain Onset/Description Onset: Ongoing   Effect of Pain on Daily Activities Patient with c/o numbess and \"inability to move the leg\"   Muscles firing and patient able to move however may be ? related to anxiety vs inhibition related to the fear of pain with moving   Restrictions/Precautions   Precautions Fall Risk;Pain   RLE Weight Bearing Per Order WBAT   Cognition   Overall Cognitive Status WFL   Subjective   Subjective Patient ready to participate in PT session   Lying to Sitting on Side of Bed   Type of Assistance Needed Physical assistance   Physical Assistance Level 51%-75%   Comment assist with RLE and trunk to pull to sit going out on R side   Lying to Sitting on Side of Bed CARE Score 2   Sit to Stand   Type of Assistance Needed Physical assistance   Physical Assistance Level 26%-50%   Comment increased time needed   Sit to Stand CARE Score 3   Bed-Chair Transfer   Type of Assistance Needed Physical assistance   Physical Assistance Level 26%-50%   Comment SPT with RW; increased time needed   Chair/Bed-to-Chair Transfer CARE Score 3   Transfer Bed/Chair/Wheelchair   Limitations Noted In Pain Management;LE Strength   Adaptive Equipment Roller Walker   Car Transfer   Reason if not Attempted Safety concerns   Car Transfer CARE Score 88   Walk 10 Feet   Type of Assistance Needed Physical assistance   Physical Assistance Level 26%-50%   Comment RW; increased time; slow christen   Walk 10 Feet CARE Score 3   Walk 50 Feet with Two Turns   Reason if not Attempted Safety concerns   Walk 50 Feet with Two Turns CARE Score 88   Walk 150 Feet   Reason if not Attempted Safety concerns   Walk 150 Feet CARE Score 88   Walking 10 Feet on Uneven Surfaces   Reason if not Attempted Safety concerns   Walking 10 Feet on Uneven Surfaces CARE Score 88   Ambulation   Primary Mode of " Locomotion Prior to Admission Walk   Distance Walked (feet) 10 ft   Assist Device Roller Walker   Gait Pattern Antalgic;Decreased R stance; Improper weight shift   Limitations Noted In Endurance;Speed;Strength   Provided Assistance with: Balance   Does the patient walk? 2  Yes   Wheel 50 Feet with Two Turns   Reason if not Attempted Activity not applicable   Wheel 50 Feet with Two Turns CARE Score 9   Wheel 150 Feet   Reason if not Attempted Activity not applicable   Wheel 196 Feet CARE Score 9   Curb or Single Stair   Reason if not Attempted Safety concerns   1 Step (Curb) CARE Score 88   4 Steps   Reason if not Attempted Safety concerns   4 Steps CARE Score 88   12 Steps   Reason if not Attempted Activity not applicable   12 Steps CARE Score 9   Picking Up Object   Comment (S)  can trial next session   Toilet Transfer   Type of Assistance Needed Physical assistance   Physical Assistance Level 26%-50%   Comment SPT with RW; increased time needed; able to perform single hand release to assist with pants management; needed assist for hygiene   Toilet Transfer CARE Score 3   Therapeutic Interventions   Strengthening Supine at the start of session: 20x DF/PF BLE, 20x quad sets BLE, 20x gluteal sets BLE; 3x10 RLE AAROM SLR, 3x10 RLE AAROM hip ABD  Assessment   Treatment Assessment Patient engaged in PT treatment session focused on LE strengthening, transfers, and gait training  Patient able to progress consistently to an Ax1 (needed 2 people during OT eval yesterday)  She c/o RLE numbness and pain (took pain medication at the start of session) however was able to complete functional mobility well without any concerns for buckling, etc  She was encouraged to take pain medication prior to therapy session to seek out the full benefit of the interventions; verbalized understanding but will benefit from continued reinforcement  MD made aware of her complaints   At this time, she remains below her baseline and will benefit from continued skilled PT intervention to maximize functional mobility (I) and safety  Problem List Decreased strength;Decreased endurance;Decreased skin integrity;Orthopedic restrictions;Pain   Barriers to Discharge Inaccessible home environment;Decreased caregiver support   Plan   Treatment/Interventions Functional transfer training;LE strengthening/ROM; Therapeutic exercise; Endurance training;Patient/family training;Equipment eval/education;Gait training;Bed mobility   Progress Progressing toward goals   PT Therapy Minutes   PT Time In 0800   PT Time Out 0900   PT Total Time (minutes) 60   PT Mode of treatment - Individual (minutes) 60   PT Mode of treatment - Concurrent (minutes) 0   PT Mode of treatment - Group (minutes) 0   PT Mode of treatment - Co-treat (minutes) 0   PT Mode of Treatment - Total time(minutes) 60 minutes   PT Cumulative Minutes 150

## 2023-06-02 NOTE — PLAN OF CARE
Problem: PAIN - ADULT  Goal: Verbalizes/displays adequate comfort level or baseline comfort level  Description: Interventions:  - Encourage patient to monitor pain and request assistance  - Assess pain using appropriate pain scale  - Administer analgesics based on type and severity of pain and evaluate response  - Implement non-pharmacological measures as appropriate and evaluate response  - Consider cultural and social influences on pain and pain management  - Notify physician/advanced practitioner if interventions unsuccessful or patient reports new pain  Outcome: Progressing     Problem: INFECTION - ADULT  Goal: Absence or prevention of progression during hospitalization  Description: INTERVENTIONS:  - Assess and monitor for signs and symptoms of infection  - Monitor lab/diagnostic results  - Monitor all insertion sites, i e  indwelling lines, tubes, and drains  - Monitor endotracheal if appropriate and nasal secretions for changes in amount and color  - [place] appropriate cooling/warming therapies per order  - Administer medications as ordered  - Instruct and encourage patient and family to use good hand hygiene technique  - Identify and instruct in appropriate isolation precautions for identified infection/condition  Outcome: Progressing     Problem: SAFETY ADULT  Goal: Patient will remain free of falls  Description: INTERVENTIONS:  - Educate patient/family on patient safety including physical limitations  - Instruct patient to call for assistance with activity   - Consult OT/PT to assist with strengthening/mobility   - Keep Call bell within reach  - Keep bed low and locked with side rails adjusted as appropriate  - Keep care items and personal belongings within reach  - Initiate and maintain comfort rounds  - Make Fall Risk Sign visible to staff  - Offer Toileting every 2 Hours, in advance of need  - Initiate/Maintain bed/chair alarm  - Obtain necessary fall risk management equipment: non skid footwear  - Apply yellow socks and bracelet for high fall risk patients  - Consider moving patient to room near nurses station  Outcome: Progressing  Goal: Maintain or return to baseline ADL function  Description: INTERVENTIONS:  -  Assess patient's ability to carry out ADLs; assess patient's baseline for ADL function and identify physical deficits which impact ability to perform ADLs (bathing, care of mouth/teeth, toileting, grooming, dressing, etc )  - Assess/evaluate cause of self-care deficits   - Assess range of motion  - Assess patient's mobility; develop plan if impaired  - Assess patient's need for assistive devices and provide as appropriate  - Encourage maximum independence but intervene and supervise when necessary  - Involve family in performance of ADLs  - Assess for home care needs following discharge   - Consider OT consult to assist with ADL evaluation and planning for discharge  - Provide patient education as appropriate  Outcome: Progressing  Goal: Maintains/Returns to pre admission functional level  Description: INTERVENTIONS:  - Perform BMAT or MOVE assessment daily    - Set and communicate daily mobility goal to care team and patient/family/caregiver  - Collaborate with rehabilitation services on mobility goals if consulted  - Perform Range of Motion 3 times a day  - Reposition patient every 2 hours    - Dangle patient 3 times a day  - Stand patient 3 times a day  - Ambulate patient 3 times a day  - Out of bed to chair 3 times a day   - Out of bed for meals 3 times a day  - Out of bed for toileting  - Record patient progress and toleration of activity level   Outcome: Progressing     Problem: DISCHARGE PLANNING  Goal: Discharge to home or other facility with appropriate resources  Description: INTERVENTIONS:  - Identify barriers to discharge w/patient and caregiver  - Arrange for needed discharge resources and transportation as appropriate  - Identify discharge learning needs (meds, wound care, etc )  - Arrange for interpretive services to assist at discharge as needed  - Refer to Case Management Department for coordinating discharge planning if the patient needs post-hospital services based on physician/advanced practitioner order or complex needs related to functional status, cognitive ability, or social support system  Outcome: Progressing     Problem: Prexisting or High Potential for Compromised Skin Integrity  Goal: Skin integrity is maintained or improved  Description: INTERVENTIONS:  - Identify patients at risk for skin breakdown  - Assess and monitor skin integrity  - Assess and monitor nutrition and hydration status  - Monitor labs   - Assess for incontinence   - Turn and reposition patient  - Assist with mobility/ambulation  - Relieve pressure over bony prominences  - Avoid friction and shearing  - Provide appropriate hygiene as needed including keeping skin clean and dry  - Evaluate need for skin moisturizer/barrier cream  - Collaborate with interdisciplinary team   - Patient/family teaching  - Consider wound care consult   Outcome: Progressing

## 2023-06-03 PROCEDURE — 97530 THERAPEUTIC ACTIVITIES: CPT

## 2023-06-03 PROCEDURE — 99232 SBSQ HOSP IP/OBS MODERATE 35: CPT | Performed by: INTERNAL MEDICINE

## 2023-06-03 PROCEDURE — 97535 SELF CARE MNGMENT TRAINING: CPT

## 2023-06-03 PROCEDURE — 97110 THERAPEUTIC EXERCISES: CPT

## 2023-06-03 PROCEDURE — 99232 SBSQ HOSP IP/OBS MODERATE 35: CPT | Performed by: PHYSICAL MEDICINE & REHABILITATION

## 2023-06-03 RX ADMIN — DOCUSATE SODIUM 100 MG: 100 CAPSULE, LIQUID FILLED ORAL at 08:15

## 2023-06-03 RX ADMIN — DOCUSATE SODIUM 100 MG: 100 CAPSULE, LIQUID FILLED ORAL at 17:13

## 2023-06-03 RX ADMIN — PREGABALIN 100 MG: 100 CAPSULE ORAL at 08:15

## 2023-06-03 RX ADMIN — POLYETHYLENE GLYCOL 3350 17 G: 17 POWDER, FOR SOLUTION ORAL at 08:15

## 2023-06-03 RX ADMIN — LATANOPROST 1 DROP: 50 SOLUTION OPHTHALMIC at 21:32

## 2023-06-03 RX ADMIN — OXYCODONE HYDROCHLORIDE 5 MG: 5 TABLET ORAL at 18:48

## 2023-06-03 RX ADMIN — METOPROLOL SUCCINATE 50 MG: 50 TABLET, EXTENDED RELEASE ORAL at 21:31

## 2023-06-03 RX ADMIN — ACETAMINOPHEN 975 MG: 325 TABLET, FILM COATED ORAL at 17:13

## 2023-06-03 RX ADMIN — PREGABALIN 100 MG: 100 CAPSULE ORAL at 17:13

## 2023-06-03 RX ADMIN — ENOXAPARIN SODIUM 30 MG: 30 INJECTION SUBCUTANEOUS at 08:15

## 2023-06-03 RX ADMIN — ACETAMINOPHEN 975 MG: 325 TABLET, FILM COATED ORAL at 05:04

## 2023-06-03 RX ADMIN — PRAVASTATIN SODIUM 40 MG: 40 TABLET ORAL at 17:13

## 2023-06-03 RX ADMIN — Medication 2.5 MG: at 12:31

## 2023-06-03 RX ADMIN — Medication 2.5 MG: at 05:04

## 2023-06-03 RX ADMIN — WARFARIN SODIUM 5 MG: 5 TABLET ORAL at 17:13

## 2023-06-03 RX ADMIN — ACETAMINOPHEN 975 MG: 325 TABLET, FILM COATED ORAL at 23:22

## 2023-06-03 RX ADMIN — ACETAMINOPHEN 975 MG: 325 TABLET, FILM COATED ORAL at 12:31

## 2023-06-03 RX ADMIN — ENOXAPARIN SODIUM 30 MG: 30 INJECTION SUBCUTANEOUS at 21:31

## 2023-06-03 RX ADMIN — MONTELUKAST SODIUM 10 MG: 10 TABLET, COATED ORAL at 21:32

## 2023-06-03 RX ADMIN — Medication 2.5 MG: at 01:32

## 2023-06-03 NOTE — PROGRESS NOTES
06/03/23 1330   Pain Assessment   Pain Assessment Tool 0-10   Pain Score 6   Pain Location/Orientation Orientation: Right;Location: Hip   Restrictions/Precautions   Precautions Fall Risk;Pain   RLE Weight Bearing Per Order WBAT   Subjective   Subjective pt agreeable to perform skilled PT   Sit to Stand   Type of Assistance Needed Physical assistance   Physical Assistance Level 26%-50%   Sit to Stand CARE Score 3   Bed-Chair Transfer   Type of Assistance Needed Physical assistance; Adaptive equipment   Physical Assistance Level 26%-50%   Comment using RW extra ttime   Chair/Bed-to-Chair Transfer CARE Score 3   Transfer Bed/Chair/Wheelchair   Adaptive Equipment Roller Walker   Car Transfer   Reason if not Attempted Safety concerns   Car Transfer CARE Score 88   Walk 10 Feet   Type of Assistance Needed Physical assistance   Physical Assistance Level 26%-50%   Comment RW   Walk 10 Feet CARE Score 3   Walk 50 Feet with Two Turns   Reason if not Attempted Safety concerns   Walk 50 Feet with Two Turns CARE Score 88   Walk 150 Feet   Reason if not Attempted Safety concerns   Walk 150 Feet CARE Score 88   Walking 10 Feet on Uneven Surfaces   Reason if not Attempted Safety concerns   Walking 10 Feet on Uneven Surfaces CARE Score 88   Ambulation   Primary Mode of Locomotion Prior to Admission Walk   Distance Walked (feet) 40 ft   Assist Device Roller Walker   Findings c/o pain right hip using RW WCfollow   Does the patient walk? 2   Yes   Wheel 50 Feet with Two Turns   Reason if not Attempted Activity not applicable   Wheel 50 Feet with Two Turns CARE Score 9   Wheel 150 Feet   Reason if not Attempted Activity not applicable   Wheel 243 Feet CARE Score 9   Curb or Single Stair   Reason if not Attempted Environmental limitations   1 Step (Curb) CARE Score 10   4 Steps   Reason if not Attempted Safety concerns   4 Steps CARE Score 88   12 Steps   Reason if not Attempted Safety concerns   12 Steps CARE Score 88   Picking Up Object   Type of Assistance Needed Physical assistance   Physical Assistance Level 51%-75%   Comment reacher marker using RW for support   Picking Up Object CARE Score 2   Therapeutic Interventions   Strengthening RLE 3x10 LAQ, seated hip flexion, hip ABD/ADD, AAROM as needed   Balance standing balance   Assessment   Treatment Assessment pt focus on TE to strengthenLE and short anbulation with RW WC follow to inc ambulation but pt needs extra time to perform gait pattern ,pt also need extra time to complete xfer safety with RW   At this time, she remains below her baseline and will benefit from continued skilled PT intervention to maximize functional mobility (I) and safety  Barriers to Discharge Inaccessible home environment;Decreased caregiver support   Plan   Progress Progressing toward goals   PT Therapy Minutes   PT Time In 1330   PT Time Out 1500   PT Total Time (minutes) 90   PT Mode of treatment - Individual (minutes) 60   PT Mode of treatment - Concurrent (minutes) 30   PT Mode of treatment - Group (minutes) 0   PT Mode of treatment - Co-treat (minutes) 0   PT Mode of Treatment - Total time(minutes) 90 minutes   PT Cumulative Minutes 270   Therapy Time missed   Time missed?  No

## 2023-06-03 NOTE — PROGRESS NOTES
06/03/23 0930   Pain Assessment   Pain Assessment Tool 0-10   Pain Score 3   Pain Location/Orientation Orientation: Right;Location: Hip   Pain Onset/Description Onset: Ongoing   Patient's Stated Pain Goal No pain   Restrictions/Precautions   Precautions Fall Risk;Pain   RLE Weight Bearing Per Order WBAT   ROM Restrictions No   Lying to Sitting on Side of Bed   Type of Assistance Needed Physical assistance   Physical Assistance Level 26%-50%   Comment Min A for RLE and trunk management   Lying to Sitting on Side of Bed CARE Score 3   Sit to Stand   Type of Assistance Needed Physical assistance   Physical Assistance Level 25% or less   Comment CGA/Min A when provided w/ increased time; becomes easily anxious yet able to manage w/ slowed pace   Sit to Stand CARE Score 3   Bed-Chair Transfer   Type of Assistance Needed Physical assistance   Physical Assistance Level 25% or less   Comment CGA/Min A using RW; slowed pace yet G tolerance overall   Chair/Bed-to-Chair Transfer CARE Score 3   Toileting Hygiene   Type of Assistance Needed Physical assistance   Physical Assistance Level 76% or more   Comment max Ax1 to perform bowel hygiene and A w/ clothing management  Pt able to perform unilateral release and A on L side w/ clothing  Stated she had trouble prior to surgery w/ reaching after having a BM   Toileting Hygiene CARE Score 2   Toilet Transfer   Type of Assistance Needed Physical assistance   Physical Assistance Level 25% or less   Comment Min A using SPT to [facility] 2* urgency  [place] she had to immediately go upon standing and could not make it to standard toilet   Toilet Transfer CARE Score 3   Toilet Transfer   Surface Assessed Standard Commode   Transfer Technique Stand Pivot   Limitations Noted In Endurance; Safety;LE Strength;Confidence;Balance   Adaptive Equipment Walker   Functional Standing Tolerance   Time 15 minutes   Activity Solitaire   Comments Pt participated in card game activity at table top w/ focus on standing tolerance/balance and weight shifting through RLE for improved tolerance/independence w/ functional transfers/mob , and I/ADLs  Pt w/ no complaints of increased pain  Noted to weight shift independently when provided w/ increased time  Tolerated 15 minutes in supported/unsupported stance  Briefly able to tolerate unsupported stance when holding/reaching for cards  Mostly supported self on table top and engaged in unilateral release  Pt w/ G tolerance overall  Required CS   Cognition   Overall Cognitive Status [facility]   Arousal/Participation Alert; Cooperative   Attention Within functional limits   Orientation Level Oriented X4   Memory Decreased short term memory   Following Commands Follows multistep commands with increased time or repetition   Comments tangential yet pleasant and appreciative   Assessment   Treatment Assessment Pt participated in 90 minute skilled OT session w/ focus on bed mob , functional transfer training, compensatory technique education, energy conservation, toileting, functional standing tolerance/balance, and activity tolerance  Details regarding functional performance noted above  Pt w/ improved tolerance to pain overall; stated she felt better standing at times  Improved tolerance to weight shifting/WBing through RLE in stance  When provided w/ increased time and allowed a slowed pace, pt able to complete functional transfer w/ CGA/Min A  Tolerated 15 minutes functional standing w/o complaints of LOB  Pt does require encouragement to improve activity levels at times; receptive to same  Required Ax1 for all aspects of treatment  Pt remains limited by pain, generalized deconditioning, fear/anxiety, impaired dynamic standing balance, and decreased activity tolerance    Pt will continue to benefit from skilled services in this setting w/ focus on above mentioned deficits following established OT POC to maximize safety and independence w/ functional transfers and I/ADL performance  Prognosis Fair   Problem List Decreased strength;Decreased range of motion;Decreased endurance; Impaired balance;Decreased mobility;Pain;Orthopedic restrictions   Barriers to Discharge Inaccessible home environment;Decreased caregiver support   Barriers to Discharge Comments lives alone; minimal supports   Plan   Treatment/Interventions ADL retraining;Functional transfer training; Therapeutic exercise; Endurance training;Patient/family training;Equipment eval/education; Bed mobility; Compensatory technique education   Progress Progressing toward goals   Recommendation   OT Discharge Recommendation   (TBD pending progress)   OT Therapy Minutes   OT Time In 0930   OT Time Out 1100   OT Total Time (minutes) 90   OT Mode of treatment - Individual (minutes) 90   OT Mode of treatment - Concurrent (minutes) 0   OT Mode of treatment - Group (minutes) 0   OT Mode of treatment - Co-treat (minutes) 0   OT Mode of Treatment - Total time(minutes) 90 minutes   OT Cumulative Minutes 285   Therapy Time missed   Time missed?  No

## 2023-06-03 NOTE — PROGRESS NOTES
Internal Medicine Progress Note  Patient: Shane Buchanan  Age/sex: 77 y o  female  Medical Record #: [id number]      ASSESSMENT/PLAN: (Interval History)  Shane Buchanan is seen and examined and management for following issues:    Right periprosthetic femur fracture  • S/p ORIF 5/28  • WBAT  • X-ray 6/2/23 with post-op changes      Hx PAF  • Home:  Toprol 50mg qHS/Coumadin 6 25 mg (2 1/2 tabs of 2 5mg tabs) on Mondays and 5mg the rest of the days of the week  • Here:  Toprol 50mg qHS/Coumadin 5mg qd (restarted 5/31/23)  • She did tell Dr Elliot Young that she adjusts her Coumadin on her own  • [facility] does her INRs and she has 2 5mg tabs  • INR 6/4/23  • Continue Lovenox for DVT prophylaxis until INR is 2 0 or greater     HLD  • Continue Pravachol as a sub for home home Simvastatin     Peripheral neuropathy  • Continue Lyrica     Aortic stenosis  • ECHO 7/2022:  LVEF 65%/gr 2 DD, mild-mod AS, mild TR/MR, RV systolic pressure is mod-severe elevated with systolic pressure 59 mmHg  • euvolemic        Discharge date:  Team    The above assessment and plan was reviewed and updated as determined by my evaluation of the patient on 6/3/2023      Labs:   Results from last 7 days   Lab Units 05/31/23  0538 05/30/23  0444   HEMATOCRIT % 30 9* 28 5*   HEMOGLOBIN g/dL 9 8* 9 0*   PLATELETS Thousands/uL 218 219   WBC Thousand/uL 8 70 11 26*     Results from last 7 days   Lab Units 05/31/23  0538 05/30/23  0444   BUN mg/dL 14 14   CALCIUM mg/dL 8 6 8 6   CHLORIDE mmol/L 108 109*   CO2 mmol/L 27 30   CREATININE mg/dL 0 42* 0 58*   POTASSIUM mmol/L 4 1 4 2   SODIUM mmol/L 139 139         Results from last 7 days   Lab Units 06/02/23  0435 06/01/23  0958   INR  1 09 1 15           Review of Scheduled Meds:  Current Facility-Administered Medications   Medication Dose Route Frequency Provider Last Rate   • acetaminophen  975 mg Oral Q6H [address] Juventino Bowen MD     • bisacodyl  10 mg Rectal Daily PRN Juventino Bowen MD     • docusate sodium  100 mg Oral BID Kaylee Bose MD     • enoxaparin  30 mg Subcutaneous Q12H [address] Kaylee Bose MD     • latanoprost  1 drop Both Eyes HS RAYMON Berman     • lidocaine   Topical Q4H PRN Kaylee Bose MD     • metoprolol succinate  50 mg Oral HS Kaylee Bose MD     • montelukast  10 mg Oral HS Kaylee Bose MD     • oxyCODONE  5 mg Oral Q4H PRN Kaylee Bose MD     • oxyCODONE  2 5 mg Oral Q4H PRN Kaylee Bose MD     • polyethylene glycol  17 g Oral Daily PRN Kaylee Bose MD     • polyethylene glycol  17 g Oral Daily Kaylee Bose MD     • pravastatin  40 mg Oral Daily With Mariela Maldonado MD     • pregabalin  100 mg Oral BID Kaylee Bose MD     • warfarin  5 mg Oral Daily (warfarin) RAYMON Gandhi         Subjective/ HPI: Patient seen and examined  Patients overnight issues or events were reviewed with nursing or staff during rounds or morning huddle session  New or overnight issues include the following:     Pt seen in her room  She states that she is doing well  She denies any current complaints  ROS:   A 10 point ROS was performed; negative except as noted above  Imaging:     XR femur 2 vw right   Final Result by Klarissa Navas MD (06/03 5681)      No acute osseous abnormality  Postoperative changes              Workstation performed: [id number]             *Labs /Radiology studies reviewed  *Medications reviewed and reconciled as needed  *Please refer to order section for additional ordered labs studies  *Case discussed with primary attending during morning huddle case rounds    Physical Examination:  Vitals:   Vitals:    06/02/23 0450 06/02/23 1349 06/02/23 2100 06/03/23 0456   BP: 111/55 117/56 106/54 127/56   BP Location: Left arm Right arm Left arm Right arm   Pulse: 62 (!) 110 97 74   Resp: 18 12 18 18   Temp: 98 3 °F (36 8 °C) 98 1 °F (36 7 °C) 98 6 °F (37 °C) 98 2 °F (36 8 °C)   TempSrc: Oral Oral Oral Oral   SpO2: 98% 96% 95% 98%   Weight:       Height:           General Appearance: no distress, conversive  HEENT: PERRLA, conjuctiva normal; oropharynx clear; mucous membranes moist   Neck:  Supple, normal ROM  Lungs: CTA, normal respiratory effort, no retractions, expiratory effort normal  CV: regular rate and rhythm; no rubs/murmurs/gallops, PMI normal   ABD: soft; ND/NT; +BS  EXT: Trace Rt LE edema  Skin: normal turgor, normal texture, no rashes   Psych: affect normal, mood normal  Neuro: AAO      The above physical exam was reviewed and updated as determined by my evaluation of the patient on 6/3/2023  Invasive Devices     Drain  Duration           External Urinary Catheter 6 days                   VTE Pharmacologic Prophylaxis: Enoxaparin  Code Status: Level 3 - DNAR and DNI  Current Length of Stay: 3 day(s)      Total time spent:  30 minutes with more than 50% spent counseling/coordinating care  Counseling includes discussion with patient re: progress  and discussion with patient of his/her current medical state/information  Coordination of patient's care was performed in conjunction with primary service  Time invested included review of patient's labs, vitals, and management of their comorbidities with continued monitoring  In addition, this patient was discussed with medical team including physician and advanced extenders  The care of the patient was extensively discussed and appropriate treatment plan was formulated unique for this patient  Medical decision making for the day was made by supervising physician unless otherwise noted in their attestation statement  ** Please Note:  voice to text software may have been used in the creation of this document   Although proof errors in transcription or interpretation are a potential of such software**

## 2023-06-03 NOTE — PROGRESS NOTES
Physical Medicine and Rehabilitation Progress Note  Shane Buchanan 77 y o  female MRN: [id number]  Unit/Bed#: -01 Encounter: [id number]    Chief Complaint: Feeling better today  Interval History: No acute events since the issue with the transfer last night  Pain in her hip has improved and she is in better spirits today  Reviewed XR with her, which she is relieved about  She states she has generally been happy with the care she receives here  She denies any new CP, SOB, fevers, chills, N/V, abdominal pain  Last BM 6/1    Assessment/Plan - Continue plan of care as per primary attending, unless otherwise stated below:      · Periprosthetic R hip fracture: s/p ORIF with Dr Evangelina Trinidad on 5/28  · Reviewed XR after increased pain: no acute findings or abnormalities  · WBAT  · Local incisional care  · PT/OT 3-5 hours/day, 5-7 days/week  · Obesity: Counseling  · Peripheral Neuropathy: Skin care and pain mgmt as per primary team  · Anemia: Monitor, transfuse as appropriate  · Constipation: Last BM 6/1  Continue current regimen  Adjust as appropriate  · HLD: Continue therapeutic substitution for home simvastatin  · PAfib: Toprol (although did not get last night), restarted on coumadin  INR in the AM  Discontinue Lovenox if therapeutic  · Aortic Stenosis (mild-mod): Continue current management, monitor for symptoms  · Pain: No changes to current regimen  · DVT PPx:  SCDs, ambulation, lovenox bridge to warfarin  Lovenox is ppx dosed  Total visit time: 35 minutes, with more than 50% spent counseling/coordinating care  Counseling includes discussion with patient re: progress in therapies, functional issues observed by therapy staff, and discussion with patient regarding their current medical state and wellbeing  Coordination of patient's care was performed in conjunction with Internal Medicine service to monitor patient's labs, vitals, and management of their comorbidities      Andree Christine MD  Physical Medicine and Rehabilitation      Review of Systems: A 10 point review of systems was negative except for what is noted in the HPI          Current Facility-Administered Medications:   •  acetaminophen (TYLENOL) tablet 975 mg, 975 mg, Oral, Q6H [address], Juventino Bowen MD, 975 mg at 06/03/23 9686  •  bisacodyl (DULCOLAX) rectal suppository 10 mg, 10 mg, Rectal, Daily PRN, Juventino Bowen MD  •  docusate sodium (COLACE) capsule 100 mg, 100 mg, Oral, BID, Juventino Bowen MD, 100 mg at 06/03/23 0815  •  enoxaparin (LOVENOX) subcutaneous injection 30 mg, 30 mg, Subcutaneous, Q12H [address], Juventino Bowen MD, 30 mg at 06/03/23 0815  •  latanoprost (XALATAN) 0 005 % ophthalmic solution 1 drop, 1 drop, Both Eyes, HS, Daryle Pillow Harleman, CRNP, 1 drop at 06/02/23 2148  •  lidocaine (URO-JET) 2 % urethral/mucosal gel, , Topical, Q4H PRN, Juventino Bowen MD  •  metoprolol succinate (TOPROL-XL) 24 hr tablet 50 mg, 50 mg, Oral, HS, Juventino Bowen MD, 50 mg at 06/01/23 2155  •  montelukast (SINGULAIR) tablet 10 mg, 10 mg, Oral, HS, Juventino Bowen MD, 10 mg at 06/02/23 2142  •  oxyCODONE (ROXICODONE) IR tablet 5 mg, 5 mg, Oral, Q4H PRN, Juventino Bowen MD, 5 mg at 06/02/23 2142  •  oxyCODONE (ROXICODONE) split tablet 2 5 mg, 2 5 mg, Oral, Q4H PRN, Juventino Bowen MD, 2 5 mg at 06/03/23 0504  •  polyethylene glycol (MIRALAX) packet 17 g, 17 g, Oral, Daily PRN, Juventino Bowen MD, 17 g at 05/31/23 1706  •  polyethylene glycol (MIRALAX) packet 17 g, 17 g, Oral, Daily, Juventino Bowen MD, 17 g at 06/03/23 0815  •  pravastatin (PRAVACHOL) tablet 40 mg, 40 mg, Oral, Daily With Melvi Cesar MD, 40 mg at 06/02/23 1812  •  pregabalin (LYRICA) capsule 100 mg, 100 mg, Oral, BID, Juventino Bowen MD, 100 mg at 06/03/23 0868  •  warfarin (COUMADIN) tablet 5 mg, 5 mg, Oral, Daily (warfarin), Daryle Pillow Harleman, CRNP, 5 mg at 06/02/23 1812    Physical Exam:  Temp:  [98 1 °F (36 7 °C)-98 6 °F (37 °C)] 98 2 °F (36 8 °C)  HR:  [] 74  Resp:  [12-18] 18  BP: (106-127)/(54-56) 127/56  SpO2:  [95 %-98 %] 98 %    Gen: No acute distress, Well-nourished, well-appearing  HEENT: Moist mucus membranes, Normocephalic/Atraumatic  Cardiovascular: Regular rate, rhythm, S1/S2  Distal pulses palpable  Heme/Extr: No edema  Pulmonary: Non-labored breathing  Lungs CTAB  : No dowd  GI: Soft, non-tender, non-distended  BS+  Integumentary: Skin is warm, dry  Neuro: AAOx3, Speech is intact  Appropriate to questioning  Psych: Normal mood and affect  Laboratory:  Reviewed  Results from last 7 days   Lab Units 05/31/23  0538 05/30/23  0444 05/28/23  0445   HEMATOCRIT % 30 9* 28 5* 34 6*   HEMOGLOBIN g/dL 9 8* 9 0* 11 0*   WBC Thousand/uL 8 70 11 26* 7 22     Results from last 7 days   Lab Units 05/31/23  0538 05/30/23  0444 05/28/23  0445 05/27/23  0854   ALT U/L  --   --   --  30   AST U/L  --   --   --  24   BUN mg/dL 14 14 12 15   CHLORIDE mmol/L 108 109* 108 109*   CREATININE mg/dL 0 42* 0 58* 0 60 0 57*   GLUCOSE, ISTAT mg/dl  --   --   --  140   POTASSIUM mmol/L 4 1 4 2 4 0 4 2   SODIUM mmol/L 139 139 136 137     Results from last 7 days   Lab Units 06/02/23  0435 06/01/23  0958 05/31/23  1633   INR  1 09 1 15 1 07   PROTIME seconds 14 3 15 0* 14 1        Diagnostic Studies: Reviewed   XR femur 2 vw right   Final Result by Cody Alvarez MD (06/03 6237)      No acute osseous abnormality  Postoperative changes  Workstation performed: [id number]               ** Please Note: Fluency Direct voice to text software may have been used in the creation of this document   **

## 2023-06-04 LAB
INR PPP: 1.19 (ref 0.84–1.19)
PROTHROMBIN TIME: 15.3 SECONDS (ref 11.6–14.5)

## 2023-06-04 PROCEDURE — 99233 SBSQ HOSP IP/OBS HIGH 50: CPT | Performed by: INTERNAL MEDICINE

## 2023-06-04 PROCEDURE — 85610 PROTHROMBIN TIME: CPT | Performed by: NURSE PRACTITIONER

## 2023-06-04 RX ORDER — BISACODYL 5 MG/1
10 TABLET, DELAYED RELEASE ORAL ONCE
Status: DISCONTINUED | OUTPATIENT
Start: 2023-06-04 | End: 2023-06-10

## 2023-06-04 RX ORDER — WARFARIN SODIUM 7.5 MG/1
7.5 TABLET ORAL
Status: DISCONTINUED | OUTPATIENT
Start: 2023-06-04 | End: 2023-06-06

## 2023-06-04 RX ADMIN — MONTELUKAST SODIUM 10 MG: 10 TABLET, COATED ORAL at 21:24

## 2023-06-04 RX ADMIN — WARFARIN SODIUM 7.5 MG: 7.5 TABLET ORAL at 17:00

## 2023-06-04 RX ADMIN — DOCUSATE SODIUM 100 MG: 100 CAPSULE, LIQUID FILLED ORAL at 08:08

## 2023-06-04 RX ADMIN — OXYCODONE HYDROCHLORIDE 5 MG: 5 TABLET ORAL at 10:29

## 2023-06-04 RX ADMIN — METOPROLOL SUCCINATE 50 MG: 50 TABLET, EXTENDED RELEASE ORAL at 21:24

## 2023-06-04 RX ADMIN — PREGABALIN 100 MG: 100 CAPSULE ORAL at 17:00

## 2023-06-04 RX ADMIN — ENOXAPARIN SODIUM 30 MG: 30 INJECTION SUBCUTANEOUS at 08:08

## 2023-06-04 RX ADMIN — ENOXAPARIN SODIUM 30 MG: 30 INJECTION SUBCUTANEOUS at 21:25

## 2023-06-04 RX ADMIN — LATANOPROST 1 DROP: 50 SOLUTION OPHTHALMIC at 21:25

## 2023-06-04 RX ADMIN — ACETAMINOPHEN 975 MG: 325 TABLET, FILM COATED ORAL at 05:37

## 2023-06-04 RX ADMIN — ACETAMINOPHEN 975 MG: 325 TABLET, FILM COATED ORAL at 13:05

## 2023-06-04 RX ADMIN — PREGABALIN 100 MG: 100 CAPSULE ORAL at 08:08

## 2023-06-04 RX ADMIN — OXYCODONE HYDROCHLORIDE 5 MG: 5 TABLET ORAL at 18:14

## 2023-06-04 RX ADMIN — PRAVASTATIN SODIUM 40 MG: 40 TABLET ORAL at 17:00

## 2023-06-04 RX ADMIN — POLYETHYLENE GLYCOL 3350 17 G: 17 POWDER, FOR SOLUTION ORAL at 08:08

## 2023-06-04 NOTE — QUICK NOTE
PMR Quick Note     Reviewed chart  INR 1  19  Remains on Lovenox for DVT PPx  Will discuss with IM giving increased warfarin tonight  Last BM 6/1 - giving bisacodyl tabs today  Otherwise continue current plan of care      Stephy Holt MD  Physical Medicine and Rehabilitation

## 2023-06-04 NOTE — PROGRESS NOTES
Internal Medicine Progress Note  Patient: Patito Garcia  Age/sex: 77 y o  female  Medical Record #: [id number]      ASSESSMENT/PLAN: (Interval History)  Patito Garcia is seen and examined and management for following issues:    Right periprosthetic femur fracture  • S/p ORIF 5/28  • WBAT  • X-ray 6/2/23 with post-op changes      Hx PAF  • Home:  Toprol 50mg qHS/Coumadin 6 25 mg (2 1/2 tabs of 2 5mg tabs) on Mondays and 5mg the rest of the days of the week  • Here:  Toprol 50mg qHS/Coumadin 5mg qd (restarted 5/31/23)  • She did tell Dr Pia Hanson that she adjusts her Coumadin on her own  • [facility] does her INRs and she has 2 5mg tabs  • INR 1 19  • Will increase Coumadin to 7 5mg  • Continue Lovenox for DVT prophylaxis until INR is 2 0 or greater  • Repeat INR Tuesday     HLD  • Continue Pravachol as a sub for home home Simvastatin     Peripheral neuropathy  • Continue Lyrica     Aortic stenosis  • ECHO 7/2022:  LVEF 65%/gr 2 DD, mild-mod AS, mild TR/MR, RV systolic pressure is mod-severe elevated with systolic pressure 59 mmHg  • euvolemic     Discharge date:  Team    The above assessment and plan was reviewed and updated as determined by my evaluation of the patient on 6/4/2023      Labs:   Results from last 7 days   Lab Units 05/31/23  0538 05/30/23  0444   HEMATOCRIT % 30 9* 28 5*   HEMOGLOBIN g/dL 9 8* 9 0*   PLATELETS Thousands/uL 218 219   WBC Thousand/uL 8 70 11 26*     Results from last 7 days   Lab Units 05/31/23  0538 05/30/23  0444   BUN mg/dL 14 14   CALCIUM mg/dL 8 6 8 6   CHLORIDE mmol/L 108 109*   CO2 mmol/L 27 30   CREATININE mg/dL 0 42* 0 58*   POTASSIUM mmol/L 4 1 4 2   SODIUM mmol/L 139 139         Results from last 7 days   Lab Units 06/04/23  0458 06/02/23  0435   INR  1 19 1 09           Review of Scheduled Meds:  Current Facility-Administered Medications   Medication Dose Route Frequency Provider Last Rate   • acetaminophen  975 mg Oral Q6H [address] Lev Morfin MD     • bisacodyl  10 mg Oral Once Aggie Toth MD     • bisacodyl  10 mg Rectal Daily PRN Lety Avina MD     • docusate sodium  100 mg Oral BID Lety Avina MD     • enoxaparin  30 mg Subcutaneous Q12H [address] Lety Avina MD     • latanoprost  1 drop Both Eyes HS RAYMON Ojeda     • lidocaine   Topical Q4H PRN Lety Avina MD     • metoprolol succinate  50 mg Oral HS Lety Avina MD     • montelukast  10 mg Oral HS Lety Avina MD     • oxyCODONE  5 mg Oral Q4H PRN Lety Avina MD     • oxyCODONE  2 5 mg Oral Q4H PRN Lety Avina MD     • polyethylene glycol  17 g Oral Daily PRN Lety Avina MD     • polyethylene glycol  17 g Oral Daily Lety Avina MD     • pravastatin  40 mg Oral Daily With Wilson Monroe MD     • pregabalin  100 mg Oral BID Lety Avina MD     • warfarin  5 mg Oral Daily (warfarin) Fredderick Goldberg, CRNP         Subjective/ HPI: Patient seen and examined  Patients overnight issues or events were reviewed with nursing or staff during rounds or morning huddle session  New or overnight issues include the following:     Pt seen in her room  She does not believe she is making good progress  She denies any other complaints  ROS:   A 10 point ROS was performed; negative except as noted above  Imaging:     XR femur 2 vw right   Final Result by Lasha Tristan MD (06/03 1683)      No acute osseous abnormality  Postoperative changes              Workstation performed: [id number]             *Labs /Radiology studies reviewed  *Medications reviewed and reconciled as needed  *Please refer to order section for additional ordered labs studies  *Case discussed with primary attending during morning huddle case rounds    Physical Examination:  Vitals:   Vitals:    06/03/23 0456 06/03/23 1339 06/03/23 2057 06/04/23 0446   BP: 127/56 126/60 123/58 140/60   BP Location: Right arm Left arm Left arm Left arm Pulse: 74 80 78 69   Resp: 18 18 18 18   Temp: 98 2 °F (36 8 °C) 98 7 °F (37 1 °C) 98 7 °F (37 1 °C) 98 °F (36 7 °C)   TempSrc: Oral Oral Oral Oral   SpO2: 98% 96% 98% 98%   Weight:       Height:           General Appearance: no distress, conversive, interactive  HEENT: PERRLA, conjuctiva normal; oropharynx clear; mucous membranes moist   Neck:  Supple, normal ROM  Lungs: CTA, normal respiratory effort, no retractions, expiratory effort normal  CV: regular rate and rhythm; no rubs/murmurs/gallops, PMI normal   ABD: soft; ND/NT; +BS  EXT: Trace Rt LE edema  Skin: normal turgor, normal texture, no rashes   Psych: affect normal, mood normal  Neuro: AAO      The above physical exam was reviewed and updated as determined by my evaluation of the patient on 6/4/2023  Invasive Devices     None                    VTE Pharmacologic Prophylaxis: Enoxaparin  Code Status: Level 3 - DNAR and DNI  Current Length of Stay: 4 day(s)      Total time spent:  30 minutes with more than 50% spent counseling/coordinating care  Counseling includes discussion with patient re: progress  and discussion with patient of his/her current medical state/information  Coordination of patient's care was performed in conjunction with primary service  Time invested included review of patient's labs, vitals, and management of their comorbidities with continued monitoring  In addition, this patient was discussed with medical team including physician and advanced extenders  The care of the patient was extensively discussed and appropriate treatment plan was formulated unique for this patient  Medical decision making for the day was made by supervising physician unless otherwise noted in their attestation statement  ** Please Note:  voice to text software may have been used in the creation of this document   Although proof errors in transcription or interpretation are a potential of such software**

## 2023-06-05 LAB
ANION GAP SERPL CALCULATED.3IONS-SCNC: 1 MMOL/L (ref 4–13)
BASOPHILS # BLD AUTO: 0.07 THOUSANDS/ÂΜL (ref 0–0.1)
BASOPHILS NFR BLD AUTO: 1 % (ref 0–1)
BUN SERPL-MCNC: 14 MG/DL (ref 5–25)
CALCIUM SERPL-MCNC: 9 MG/DL (ref 8.3–10.1)
CHLORIDE SERPL-SCNC: 107 MMOL/L (ref 96–108)
CO2 SERPL-SCNC: 29 MMOL/L (ref 21–32)
CREAT SERPL-MCNC: 0.43 MG/DL (ref 0.6–1.3)
EOSINOPHIL # BLD AUTO: 0.58 THOUSAND/ÂΜL (ref 0–0.61)
EOSINOPHIL NFR BLD AUTO: 7 % (ref 0–6)
ERYTHROCYTE [DISTWIDTH] IN BLOOD BY AUTOMATED COUNT: 15.7 % (ref 11.6–15.1)
GFR SERPL CREATININE-BSD FRML MDRD: 97 ML/MIN/1.73SQ M
GLUCOSE P FAST SERPL-MCNC: 93 MG/DL (ref 65–99)
GLUCOSE SERPL-MCNC: 93 MG/DL (ref 65–140)
HCT VFR BLD AUTO: 32.1 % (ref 34.8–46.1)
HGB BLD-MCNC: 10 G/DL (ref 11.5–15.4)
IMM GRANULOCYTES # BLD AUTO: 0.04 THOUSAND/UL (ref 0–0.2)
IMM GRANULOCYTES NFR BLD AUTO: 1 % (ref 0–2)
LYMPHOCYTES # BLD AUTO: 1.86 THOUSANDS/ÂΜL (ref 0.6–4.47)
LYMPHOCYTES NFR BLD AUTO: 23 % (ref 14–44)
MCH RBC QN AUTO: 30.7 PG (ref 26.8–34.3)
MCHC RBC AUTO-ENTMCNC: 31.2 G/DL (ref 31.4–37.4)
MCV RBC AUTO: 99 FL (ref 82–98)
MONOCYTES # BLD AUTO: 1.09 THOUSAND/ÂΜL (ref 0.17–1.22)
MONOCYTES NFR BLD AUTO: 13 % (ref 4–12)
NEUTROPHILS # BLD AUTO: 4.47 THOUSANDS/ÂΜL (ref 1.85–7.62)
NEUTS SEG NFR BLD AUTO: 55 % (ref 43–75)
NRBC BLD AUTO-RTO: 0 /100 WBCS
PLATELET # BLD AUTO: 313 THOUSANDS/UL (ref 149–390)
PMV BLD AUTO: 8.8 FL (ref 8.9–12.7)
POTASSIUM SERPL-SCNC: 4.5 MMOL/L (ref 3.5–5.3)
RBC # BLD AUTO: 3.26 MILLION/UL (ref 3.81–5.12)
SODIUM SERPL-SCNC: 137 MMOL/L (ref 135–147)
WBC # BLD AUTO: 8.11 THOUSAND/UL (ref 4.31–10.16)

## 2023-06-05 PROCEDURE — 99232 SBSQ HOSP IP/OBS MODERATE 35: CPT | Performed by: INTERNAL MEDICINE

## 2023-06-05 PROCEDURE — 97530 THERAPEUTIC ACTIVITIES: CPT

## 2023-06-05 PROCEDURE — 99232 SBSQ HOSP IP/OBS MODERATE 35: CPT

## 2023-06-05 PROCEDURE — 97110 THERAPEUTIC EXERCISES: CPT

## 2023-06-05 PROCEDURE — 97116 GAIT TRAINING THERAPY: CPT

## 2023-06-05 PROCEDURE — 80048 BASIC METABOLIC PNL TOTAL CA: CPT | Performed by: NURSE PRACTITIONER

## 2023-06-05 PROCEDURE — 97535 SELF CARE MNGMENT TRAINING: CPT

## 2023-06-05 PROCEDURE — 85025 COMPLETE CBC W/AUTO DIFF WBC: CPT | Performed by: NURSE PRACTITIONER

## 2023-06-05 RX ADMIN — ACETAMINOPHEN 975 MG: 325 TABLET, FILM COATED ORAL at 18:35

## 2023-06-05 RX ADMIN — ENOXAPARIN SODIUM 30 MG: 30 INJECTION SUBCUTANEOUS at 08:48

## 2023-06-05 RX ADMIN — PREGABALIN 100 MG: 100 CAPSULE ORAL at 18:35

## 2023-06-05 RX ADMIN — ACETAMINOPHEN 975 MG: 325 TABLET, FILM COATED ORAL at 12:09

## 2023-06-05 RX ADMIN — Medication 2.5 MG: at 08:57

## 2023-06-05 RX ADMIN — ENOXAPARIN SODIUM 30 MG: 30 INJECTION SUBCUTANEOUS at 20:57

## 2023-06-05 RX ADMIN — PRAVASTATIN SODIUM 40 MG: 40 TABLET ORAL at 15:58

## 2023-06-05 RX ADMIN — MONTELUKAST SODIUM 10 MG: 10 TABLET, COATED ORAL at 21:00

## 2023-06-05 RX ADMIN — PREGABALIN 100 MG: 100 CAPSULE ORAL at 08:48

## 2023-06-05 RX ADMIN — Medication 2.5 MG: at 01:06

## 2023-06-05 RX ADMIN — OXYCODONE HYDROCHLORIDE 5 MG: 5 TABLET ORAL at 20:57

## 2023-06-05 RX ADMIN — ACETAMINOPHEN 975 MG: 325 TABLET, FILM COATED ORAL at 05:00

## 2023-06-05 RX ADMIN — Medication 2.5 MG: at 14:41

## 2023-06-05 RX ADMIN — WARFARIN SODIUM 7.5 MG: 7.5 TABLET ORAL at 18:35

## 2023-06-05 RX ADMIN — LATANOPROST 1 DROP: 50 SOLUTION OPHTHALMIC at 21:00

## 2023-06-05 RX ADMIN — METOPROLOL SUCCINATE 50 MG: 50 TABLET, EXTENDED RELEASE ORAL at 21:00

## 2023-06-05 NOTE — PROGRESS NOTES
Internal Medicine Progress Note  Patient: Angely Garcia  Age/sex: 77 y o  female  Medical Record #: [id number]      ASSESSMENT/PLAN: (Interval History)  Angely Garcia is seen and examined and management for following issues:    Right periprosthetic femur fracture  • S/p ORIF 5/28  • WBAT  • X-ray 6/2/23 with post-op changes      Hx PAF  • Home:  Toprol 50mg qHS/Coumadin 6 25 mg (2 1/2 tabs of 2 5mg tabs) on Mondays and 5mg the rest of the days of the week  • Here:  Toprol 50mg qHS/Coumadin 7 5mg qd (inc 6/4/23)  • She did tell Dr Juaquin Perez that she adjusts her Coumadin on her own  • [facility] does her INRs and she has 2 5mg tabs  • EKG 5/27/23 = SR  • Continue Lovenox for DVT prophylaxis until INR is 2 0 or greater  • No INR today = for repeat INR Tuesday 6/6/23     HLD  • Continue Pravachol as a sub for home home Simvastatin     Peripheral neuropathy  • Continue Lyrica     Aortic stenosis  • ECHO 7/2022:  LVEF 65%/gr 2 DD, mild-mod AS, mild TR/MR, RV systolic pressure is mod-severe elevated with systolic pressure 59 mmHg  • euvolemic     Discharge date:  Team    The above assessment and plan was reviewed and updated as determined by my evaluation of the patient on 6/5/2023      Labs:   Results from last 7 days   Lab Units 06/05/23  0452 05/31/23  0538   HEMATOCRIT % 32 1* 30 9*   HEMOGLOBIN g/dL 10 0* 9 8*   PLATELETS Thousands/uL 313 218   WBC Thousand/uL 8 11 8 70     Results from last 7 days   Lab Units 06/05/23  0452 05/31/23  0538   BUN mg/dL 14 14   CALCIUM mg/dL 9 0 8 6   CHLORIDE mmol/L 107 108   CO2 mmol/L 29 27   CREATININE mg/dL 0 43* 0 42*   POTASSIUM mmol/L 4 5 4 1   SODIUM mmol/L 137 139         Results from last 7 days   Lab Units 06/04/23  0458 06/02/23  0435   INR  1 19 1 09           Review of Scheduled Meds:  Current Facility-Administered Medications   Medication Dose Route Frequency Provider Last Rate   • acetaminophen  975 mg Oral Q6H [address] Sedrick Bender MD     • bisacodyl  10 mg Oral Once Marisol Mills MD     • bisacodyl  10 mg Rectal Daily PRN Lev Morfin MD     • docusate sodium  100 mg Oral BID Lev Morfin MD     • enoxaparin  30 mg Subcutaneous Q12H [facility] Lev Morfin MD     • latanoprost  1 drop Both Eyes HS RAYMON Barahona     • lidocaine   Topical Q4H PRN Lev Morfin MD     • metoprolol succinate  50 mg Oral HS Lev Morfin MD     • montelukast  10 mg Oral HS Lev Morfin MD     • oxyCODONE  5 mg Oral Q4H PRN Lev Morfin MD     • oxyCODONE  2 5 mg Oral Q4H PRN Lev Morfin MD     • polyethylene glycol  17 g Oral Daily PRN Lev Morfin MD     • polyethylene glycol  17 g Oral Daily Lev Morfin MD     • pravastatin  40 mg Oral Daily With Mark Astorga MD     • pregabalin  100 mg Oral BID Lev Morfin MD     • warfarin  7 5 mg Oral Daily (warfarin) Gladys Landry PA-C         Subjective/ HPI: Patient seen and examined  Patients overnight issues or events were reviewed with nursing or staff during rounds or morning huddle session  New or overnight issues include the following:     No new or overnight issues  Offers no complaints    ROS:   A 10 point ROS was performed; negative except as noted above       *Labs /Radiology studies reviewed  *Medications reviewed and reconciled as needed  *Please refer to order section for additional ordered labs studies  *Case discussed with primary attending during morning huddle case rounds    Physical Examination:  Vitals:   Vitals:    06/04/23 0446 06/04/23 1342 06/04/23 2047 06/05/23 0443   BP: 140/60 140/77 119/58 134/58   BP Location: Left arm Right arm Left arm Left arm   Pulse: 69 73 75 74   Resp: 18 16 18 18   Temp: 98 °F (36 7 °C) 97 9 °F (36 6 °C) 98 4 °F (36 9 °C) 97 9 °F (36 6 °C)   TempSrc: Oral Oral Oral Oral   SpO2: 98% 96% 98% 98%   Weight:       Height:           General Appearance: no distress, conversive  HEENT: PERRLA, conjuctiva normal; oropharynx clear; mucous membranes moist   Neck:  Supple, normal ROM  Lungs: CTA, normal respiratory effort, no retractions, expiratory effort normal  CV: regular rate and rhythm; no rubs/murmurs/gallops, PMI normal   ABD: soft; ND/NT; +BS  EXT: no edema  Skin: normal turgor, normal texture, no rashes  Psych: affect normal, mood normal  Neuro: AAO      The above physical exam was reviewed and updated as determined by my evaluation of the patient on 6/5/2023  Invasive Devices     None                    VTE Pharmacologic Prophylaxis: Enoxaparin  Code Status: Level 3 - DNAR and DNI  Current Length of Stay: 5 day(s)      Total time spent:  30 minutes with more than 50% spent counseling/coordinating care  Counseling includes discussion with patient re: progress  and discussion with patient of his/her current medical state/information  Coordination of patient's care was performed in conjunction with primary service  Time invested included review of patient's labs, vitals, and management of their comorbidities with continued monitoring  In addition, this patient was discussed with medical team including physician and advanced extenders  The care of the patient was extensively discussed and appropriate treatment plan was formulated unique for this patient  Medical decision making for the day was made by supervising physician unless otherwise noted in their attestation statement  ** Please Note:  voice to text software may have been used in the creation of this document   Although proof errors in transcription or interpretation are a potential of such software**

## 2023-06-05 NOTE — PROGRESS NOTES
"OT Daily Note   06/05/23 0700   Pain Assessment   Pain Assessment Tool 0-10   Pain Score 4   Pain Location/Orientation Orientation: Right;Location: Hip;Location: Leg   Restrictions/Precautions   Precautions Fall Risk;Pain   RLE Weight Bearing Per Order WBAT   ROM Restrictions No   Lifestyle   Autonomy \"I like to give myself a pep talk before I do anything  \"   Oral Hygiene   Type of Assistance Needed Set-up / clean-up   Physical Assistance Level No physical assistance   Comment Pt  sat EOB to perform oral hygiene w/ toothbrush/mouth wash  Oral Hygiene CARE Score 5   Shower/Bathe Self   Type of Assistance Needed Physical assistance   Physical Assistance Level 51%-75%   Comment Pt  need asisstance with buttock region and BLE  Pt  able to stand using RW to clean siri area  Shower/Bathe Self CARE Score 2   Upper Body Dressing   Type of Assistance Needed Set-up / clean-up   Physical Assistance Level No physical assistance   Comment Pt  just needs assistance to gather clothing  Upper Body Dressing CARE Score 5   Lower Body Dressing   Type of Assistance Needed Physical assistance   Physical Assistance Level 51%-75%   Comment Pt  uses adaptive equipment to put on LB clothing  Pt  needs assistance to don pants over hips and to hold LB clothing upon standing using RW  Lower Body Dressing CARE Score 2   Putting On/Taking Off Footwear   Type of Assistance Needed Set-up / clean-up   Physical Assistance Level No physical assistance   Comment Pt  able to don/doff socks using equipment  Pt  stated she usually wears untied shoes at home because she cant bend over to tie shoes  reccommend using long handle shoe horn next session to practice  Putting On/Taking Off Footwear CARE Score 5   Lying to Sitting on Side of Bed   Type of Assistance Needed Physical assistance   Physical Assistance Level 26%-50%   Comment MinAx1 pt  needed assistance in swinging legs slowly off the edge of bed  VC needed for pt  to grab bed rails   " Lying to Sitting on Side of Bed CARE Score 3   Sit to Stand   Type of Assistance Needed Incidental touching   Physical Assistance Level No physical assistance   Comment CGAx1 Pt is able to use RW to stand   Sit to Stand CARE Score 4   Bed-Chair Transfer   Type of Assistance Needed Incidental touching   Physical Assistance Level No physical assistance   Comment CGAx1 pt  is able to perform stand pivot from bed to chair using RW  Pt  has increased pain upon sitting when bending leg  Chair/Bed-to-Chair Transfer CARE Score 4   Toileting Hygiene   Type of Assistance Needed Physical assistance   Physical Assistance Level Total assistance   Comment Max Ax1 to don/doff pants over hips  Pt  stands using RW and needs assistance in cleaning buttocks region after bowel movement  Pt  is able to clean siri area  Toileting Hygiene CARE Score 1   Toilet Transfer   Type of Assistance Needed Incidental touching   Physical Assistance Level No physical assistance   Comment minAx1 Pt  uses RW to stand pivot to [facility]  Toilet Transfer CARE Score 4   Assessment   Treatment Assessment Pt  participated in 90 minute OT treatment session that focused on ADLs  Pt  has expressed increased pain in the morning the past few days which is really limiting her abilities to perform ADLs  All ADLs were performed at EOB due to limiting factors  With increased time pt  is able to complete ADLs using adaptive equipment  For furture sessions, Pt  would benefit from standing tolerance activities that also involve reaching back to simulate after bowel movement hygiene  Pt  would also benefit from [facility], [facility] to assist with don/doff footwear  At this time, pt  remains below her PLOF and would benefit from continued skilled OT intervention  Prognosis Good   Problem List Decreased strength;Decreased range of motion;Decreased endurance;Pain   Barriers to Discharge Decreased caregiver support   Barriers to Discharge Comments Pt  lives alone with no support  Plan   Treatment/Interventions ADL retraining;Functional transfer training; Therapeutic exercise; Endurance training;Equipment eval/education;OT;Bed mobility   Progress Progressing toward goals   OT Therapy Minutes   OT Time In 0700   OT Time Out 0830   OT Total Time (minutes) 90   OT Mode of treatment - Individual (minutes) 90   OT Mode of treatment - Concurrent (minutes) 0   OT Mode of treatment - Group (minutes) 0   OT Mode of treatment - Co-treat (minutes) 0   OT Mode of Treatment - Total time(minutes) 90 minutes   OT Cumulative Minutes 375

## 2023-06-05 NOTE — PLAN OF CARE
Problem: PAIN - ADULT  Goal: Verbalizes/displays adequate comfort level or baseline comfort level  Description: Interventions:  - Encourage patient to monitor pain and request assistance  - Assess pain using appropriate pain scale  - Administer analgesics based on type and severity of pain and evaluate response  - Implement non-pharmacological measures as appropriate and evaluate response  - Consider cultural and social influences on pain and pain management  - Notify physician/advanced practitioner if interventions unsuccessful or patient reports new pain  Outcome: Progressing     Problem: INFECTION - ADULT  Goal: Absence or prevention of progression during hospitalization  Description: INTERVENTIONS:  - Assess and monitor for signs and symptoms of infection  - Monitor lab/diagnostic results  - Monitor all insertion sites, i e  indwelling lines, tubes, and drains  - Monitor endotracheal if appropriate and nasal secretions for changes in amount and color  - Felda appropriate cooling/warming therapies per order  - Administer medications as ordered  - Instruct and encourage patient and family to use good hand hygiene technique  - Identify and instruct in appropriate isolation precautions for identified infection/condition  Outcome: Progressing     Problem: SAFETY ADULT  Goal: Patient will remain free of falls  Description: INTERVENTIONS:  - Educate patient/family on patient safety including physical limitations  - Instruct patient to call for assistance with activity   - Consult OT/PT to assist with strengthening/mobility   - Keep Call bell within reach  - Keep bed low and locked with side rails adjusted as appropriate  - Keep care items and personal belongings within reach  - Initiate and maintain comfort rounds  - Make Fall Risk Sign visible to staff  - Offer Toileting every 2 Hours, in advance of need  - Initiate/Maintain bed alarm  - Obtain necessary fall risk management equipment: bed alarm  - Apply yellow socks and bracelet for high fall risk patients  - Consider moving patient to room near nurses station  Outcome: Progressing  Goal: Maintain or return to baseline ADL function  Description: INTERVENTIONS:  -  Assess patient's ability to carry out ADLs; assess patient's baseline for ADL function and identify physical deficits which impact ability to perform ADLs (bathing, care of mouth/teeth, toileting, grooming, dressing, etc )  - Assess/evaluate cause of self-care deficits   - Assess range of motion  - Assess patient's mobility; develop plan if impaired  - Assess patient's need for assistive devices and provide as appropriate  - Encourage maximum independence but intervene and supervise when necessary  - Involve family in performance of ADLs  - Assess for home care needs following discharge   - Consider OT consult to assist with ADL evaluation and planning for discharge  - Provide patient education as appropriate  Outcome: Progressing  Goal: Maintains/Returns to pre admission functional level  Description: INTERVENTIONS:  - Perform BMAT or MOVE assessment daily    - Set and communicate daily mobility goal to care team and patient/family/caregiver  - Collaborate with rehabilitation services on mobility goals if consulted  - Perform Range of Motion 3 times a day  - Reposition patient every 2 hours    - Dangle patient 3 times a day  - Stand patient 3 times a day  - Ambulate patient 3 times a day  - Out of bed to chair 3 times a day   - Out of bed for meals 3 times a day  - Out of bed for toileting  - Record patient progress and toleration of activity level   Outcome: Progressing     Problem: DISCHARGE PLANNING  Goal: Discharge to home or other facility with appropriate resources  Description: INTERVENTIONS:  - Identify barriers to discharge w/patient and caregiver  - Arrange for needed discharge resources and transportation as appropriate  - Identify discharge learning needs (meds, wound care, etc )  - Arrange for interpretive services to assist at discharge as needed  - Refer to Case Management Department for coordinating discharge planning if the patient needs post-hospital services based on physician/advanced practitioner order or complex needs related to functional status, cognitive ability, or social support system  Outcome: Progressing     Problem: Prexisting or High Potential for Compromised Skin Integrity  Goal: Skin integrity is maintained or improved  Description: INTERVENTIONS:  - Identify patients at risk for skin breakdown  - Assess and monitor skin integrity  - Assess and monitor nutrition and hydration status  - Monitor labs   - Assess for incontinence   - Turn and reposition patient  - Assist with mobility/ambulation  - Relieve pressure over bony prominences  - Avoid friction and shearing  - Provide appropriate hygiene as needed including keeping skin clean and dry  - Evaluate need for skin moisturizer/barrier cream  - Collaborate with interdisciplinary team   - Patient/family teaching  - Consider wound care consult   Outcome: Progressing     Problem: MOBILITY - ADULT  Goal: Maintain or return to baseline ADL function  Description: INTERVENTIONS:  -  Assess patient's ability to carry out ADLs; assess patient's baseline for ADL function and identify physical deficits which impact ability to perform ADLs (bathing, care of mouth/teeth, toileting, grooming, dressing, etc )  - Assess/evaluate cause of self-care deficits   - Assess range of motion  - Assess patient's mobility; develop plan if impaired  - Assess patient's need for assistive devices and provide as appropriate  - Encourage maximum independence but intervene and supervise when necessary  - Involve family in performance of ADLs  - Assess for home care needs following discharge   - Consider OT consult to assist with ADL evaluation and planning for discharge  - Provide patient education as appropriate  Outcome: Progressing  Goal: Maintains/Returns to pre admission functional level  Description: INTERVENTIONS:  - Perform BMAT or MOVE assessment daily    - Set and communicate daily mobility goal to care team and patient/family/caregiver  - Collaborate with rehabilitation services on mobility goals if consulted  - Perform Range of Motion 3 times a day  - Reposition patient every 2 hours    - Dangle patient 3 times a day  - Stand patient 3 times a day  - Ambulate patient 3 times a day  - Out of bed to chair 3 times a day   - Out of bed for meals 3 times a day  - Out of bed for toileting  - Record patient progress and toleration of activity level   Outcome: Progressing

## 2023-06-05 NOTE — PROGRESS NOTES
Physical Medicine and Rehabilitation Progress Note  Edson Brown 77 y o  female MRN: [id number]  Unit/Bed#: -01 Encounter: [id number]      Assessment & Plan:     Decline in ADLs and mobility: Functional assessment - function improving        FIM  Care Score  Admit Score Recent Score    Total assist  1-100% or 2p    Tot Other ADLs   toileting hygiene   Max assist 2-51-75%    Sub   bathing, lower body dressing   Mod assist 3- 26-50%  Par     Min assist 3- 25% or < Par UBD    CG assist 4  TA     Sup/Setup 4-5 Sup   upper body dressing   Mod-I/Indep 6 MI      Transfers  Mod-Significant assist  contact-guard to min assist      Ambulation   Total assist  10 ft mod assist     Stairs   Total assist/NT      Goal: Mod indep for most ADLs and  for mobility  Major barriers:  Pain, decreased ROM, deconditioning, lack of assistance at home  Dispo: Home with [facility] 16 days from admission      * 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 (HCC)  Assessment & Plan  - Pain improving slowly, function improving; incision with slight serosanguineous drainage but without signs of infection or significant dehiscence, continue to monitor closely  - Patient reported significant pain after transfer earlier in course; right femur x-ray without acute concerns 6/2    Status post surgical repair via ORIF by Dr Braeden Anne  on 5/28/23  • Weight-bearing as tolerated on affected limb   • Monitor incision/area for infection or dehiscence/impaired healing   • POD#14 is Yanci Barajas 6/11  • Monitor for signs/symptoms of VTE, atelectasis, acute blood loss anemia, or other infection   • Continue acute comprehensive interdisciplinary inpatient rehabilitation to include intensive skilled therapies (PT, OT) as outlined with oversight and management by rehabilitation physician as well as inpatient rehab level nursing, case management and weekly interdisciplinary team meetings     • Follow-up with Ortho Sx after d/c and ortho if needed during "ARC course       Obesity  Assessment & Plan   on appropriate nutrition and activity  Adjustments accordingly during skilled therapy and with rehab nursing  Monitor skin closely for breakdown, wounds, rashes; keep clean and dry, turning Q2H   Follow-up with PCP after d/c      Encounter for skin care  Assessment & Plan  - Increased risk of skin wounds/breakdown/rashes due to recent immobility and co-morbidities   - 2 nurse/staff full skin check for abnormal findings on admission - obtain photos PRN  - Turn patient Q2H in bed (use pillows or wedges), encourage wt shifts every 10-15 minutes in chair  - Patient and if available caregiver education   - Hydragaurd to buttocks and sacrum BID & PRN  - Optimal bowel/bladder hygiene; keep skin clean and dry   - EHOB waffle cushion to chair/WC when OOB  - Nursing to document in chart and Notify MD if buttock, sacrum, heel, or other skin site develops erythema, skin breakdown, or rashes as soon as possible  If patient is soiling themselves with urine or stool notify MD   If you are unable to maintain skin integrity and prevent erythema due to frequency of soiling notify MD as soon as possible as well  Peripheral neuropathy  Assessment & Plan  Monitor skin for increased infection/breakdown/wounds which patient is at higher risk for  Skilled PT/OT   Fall precautions      Abnormal finding on radiology exam  Assessment & Plan  Per discharging provider to [facility]   \"Findings and Follow up required:                              1) Scattered less than 3 mm nodular densities in the posterior right upper lobe of the lung as well as a calcified granuloma in the right lower lobe of the lung posteriorly were noted incidentally on your trauma imaging  A malignancy (cancer) cannot be completely excluded based on trauma imaging alone    Recommend short-term outpatient follow-up with primary care provider to review the finding and for further surveillance as indicated                   " 2)  A subcentimeter cystic hypodensity posterior right lobe of the liver was incidentally identified on your trauma imaging and is unchanged compared to prior imaging  A malignancy (cancer) cannot be completely excluded based on trauma imaging alone  Recommend short-term outpatient follow-up with primary care provider to review the finding and for further surveillance as indicated                               3) A left adrenal gland 1 4 x 0 9 cm nodule was incidentally identified under trauma imaging and is unchanged compared to prior imaging  A malignancy (cancer) cannot be completely excluded based on trauma imaging alone  Recommend short-term outpatient follow-up with primary care provider to review the finding and for further surveillance as indicated                               4) Subcentimeter hypodensities in your kidneys were incidentally identified under trauma imaging and are suggestive of benign cysts  A malignancy (cancer) cannot be completely excluded based on trauma imaging alone  Recommend short-term outpatient follow-up with primary care provider to review the finding and for further surveillance as indicated                               5) A new small umbilical hernia containing nonobstructed bowel loops and fat was incidentally identified under trauma imaging as well as a previously seen right para-midline ventral hernia defect containing adherent bowel loops in a nonobstructive pattern  No further work-up or intervention necessary for these findings at this time  Recommend short-term outpatient follow-up with primary care provider to review the finding and for further surveillance as indicated                   Follow up should be done within 2 week(s)     The above noted incidental findings were discussed with the patient    The patient demonstrated understanding of the findings and the follow-up recommendations      Please notify the following clinician to assist with the "follow up:              Dr Sylvia Barker"    Anemia  Assessment & Plan  Hemoglobin stable 6/5  Consult(ed) IM and comanagement with their service during ARC course   Monitor H/H, vitals, signs/symptoms of acute bleeding      At risk for venous thromboembolism (VTE)  Assessment & Plan  SCDs, ambulation, and lovenox 30mg BID (until warfarin tx)   INR 1 19 on 6/4      Pain  Assessment & Plan  Improving somewhat  ICE prn   APAP 975mg TID   Oxycodone 2 5-5mg Q4H PRN  Lyrica 100mg BID (chronic for neuropathy(    Monitor for oversedation, AMS/delirium, and respiratory depression   If being administered - hold opiates, muscle relaxants, benzodiazepines, and gabapentin for oversedation, AMS, or RR<12    Counseled on and continue to encourage deep breathing/relaxation/behavioral pain management techniques      Constipation  Assessment & Plan  Remains improved  Significant on admission to [facility] with last Bm near a week ago; no current signs of symptoms obstruction  - Continue to monitor for S/S of obstruction; hold stimulant laxatives and obtain imaging if concern develops  - Colace BID, miralax daily (consider BID if needed)  (Declines senna)   - PRN bowel regimen  - Limiting constipating medications if possible  - Ensure adequate hydration       Pure hypercholesterolemia  Assessment & Plan  Statin     PAF (paroxysmal atrial fibrillation) (HCC)  Assessment & Plan  IM consulted and with overall management at their discretion during ARC course  Rate control: Toprol XL 50mg HS  Antithrombotic: Warfarin cleared to resume > mgmt per IM (Hold lovenox when INR tx)  (patient is frustrated with checking her INR and was not always checking it and adjusting med dose on own at time; she did voice she would be open to consider NOAC - consider discussing with cards in IP setting otherwise OP follow-up)      Non-rheumatic aortic stenosis  Assessment & Plan  EF 65%, G2DD, Mild-mod AS, mod-severe elevated RVSP  IM consulted and with overall " management at their discretion during ARC course  Monitor vitals, fluid status  OP Cards follow-up      Other Medical Issues:  • Monitor for     Follow-up providers and other issues to be followed up after discharge  Cards  PCP  Ortho    CODE: Level 3: DNAR and DNI    Restrictions include: Fall precautions    Objective: Allergies per EMR  Diagnostic Studies: Reviewed, no new imaging  XR femur 2 vw right   Final Result by Chula Baires MD (06/03 6337)      No acute osseous abnormality  Postoperative changes              Workstation performed: [id number]           See above as well    Laboratory: Labs reviewed  Results from last 7 days   Lab Units 06/05/23  0452 05/31/23  0538 05/30/23  0444   HEMATOCRIT % 32 1* 30 9* 28 5*   HEMOGLOBIN g/dL 10 0* 9 8* 9 0*   WBC Thousand/uL 8 11 8 70 11 26*     Results from last 7 days   Lab Units 06/05/23  0452 05/31/23  0538 05/30/23  0444   BUN mg/dL 14 14 14   CHLORIDE mmol/L 107 108 109*   CREATININE mg/dL 0 43* 0 42* 0 58*   POTASSIUM mmol/L 4 5 4 1 4 2   SODIUM mmol/L 137 139 139     Results from last 7 days   Lab Units 06/04/23  0458 06/02/23  0435 06/01/23  0958   INR  1 19 1 09 1 15   PROTIME seconds 15 3* 14 3 15 0*        Drug regimen reviewed, all potential adverse effects identified and addressed:    Scheduled Meds:  Current Facility-Administered Medications   Medication Dose Route Frequency Provider Last Rate   • acetaminophen  975 mg Oral Q6H [address] Bob Ortiz MD     • bisacodyl  10 mg Oral Once Mandi Stovall MD     • bisacodyl  10 mg Rectal Daily PRN Bob Ortiz MD     • docusate sodium  100 mg Oral BID Bob Ortiz MD     • enoxaparin  30 mg Subcutaneous Q12H [address] Bob Ortiz MD     • latanoprost  1 drop Both Eyes HS RAYMON Cooney     • lidocaine   Topical Q4H PRN Bob Ortiz MD     • metoprolol succinate  50 mg Oral HS Bob Ortiz MD     • montelukast  10 mg Oral HS Bob Ortiz MD • oxyCODONE  5 mg Oral Q4H PRN Katty Abreu MD     • oxyCODONE  2 5 mg Oral Q4H PRN Katty Abreu MD     • polyethylene glycol  17 g Oral Daily PRN Katty Abreu MD     • polyethylene glycol  17 g Oral Daily Katty Abreu MD     • pravastatin  40 mg Oral Daily With Curtis Green MD     • pregabalin  100 mg Oral BID Katty Abreu MD     • warfarin  7 5 mg Oral Daily (warfarin) [facility], PA-C         Chief Complaints:  Hip pain     Subjective: On eval, patient reports still fair amount of hip pain but slightly better  Pain is worse with activity and weightbearing  Patient denies lightheadedness, shortness of breath, constipation, fever, chills, or other new complaints  ROS: A 10 point ROS was performed; negative except as noted above         Physical Exam:  Vitals:    06/05/23 1451   BP: 124/56   Pulse: 70   Resp: 16   Temp: 98 8 °F (37 1 °C)   SpO2: 97%       GEN:  Sitting in NAD   HEENT/NECK: MMM  CARDIAC: Regular rate rhythm, no murmers, no rubs, no gallops  LUNGS:  clear to auscultation, no wheezes, rales, or rhonchi  ABDOMEN: Soft, non-tender, non-distended, normal active bowel sounds  EXTREMITIES/SKIN:  Right hip/thigh incision with slight serosanguineous drainage without signs of infection or significant dehiscence; mild right lower extremity edema without calf tenderness to palpation  NEURO:   MENTAL STATUS:  Appropriate wakefulness and interaction   PSYCH:  Affect:  Euthymic    HPI:  50-year-old female with a past medical history of A-fib on Coumadin, hypertension, hyperlipidemia, obesity, peripheral neuropathy, aortic stenosis, bilateral total hip arthroplasty, right total hip arthroplasty revision who stood up from kneeling and felt a crack in her right hip and then slipped and fell with resultant right leg pain who was found to have right periprosthetic hip fracture -described by radiology is acute minimally displaced periprosthetic fracture of the right proximal femoral diaphysis adjacent to the femoral stem of the right total hip arthroplasty  CT head was negative for acute findings  Patient underwent ORIF of right periprosthetic hip fracture on 5/28  Patient was started on Lovenox for VTE prophylaxis and cleared for Coumadin on 5/31  Patient has had some acute blood loss anemia, pain, severe constipation, and significant decline in ADLs and mobility        ** Please Note: Fluency Direct voice to text software may have been used in the creation of this document   **

## 2023-06-05 NOTE — PROGRESS NOTES
Pt seen at 1000, not 0830   06/05/23 0830   Pain Assessment   Pain Assessment Tool 0-10   Pain Score 5   Pain Location/Orientation Orientation: Right;Location: Hip;Location: Knee   Restrictions/Precautions   Precautions Fall Risk;Pain   RLE Weight Bearing Per Order WBAT   Cognition   Overall Cognitive Status WFL   Subjective   Subjective pt c/o knee pain when walking, pt reports it felt unstable   Sit to Stand   Type of Assistance Needed Incidental touching   Comment allow time to perform   Sit to Stand CARE Score 4   Bed-Chair Transfer   Type of Assistance Needed Physical assistance; Adaptive equipment   Physical Assistance Level 25% or less   Comment SPT with RW   Chair/Bed-to-Chair Transfer CARE Score 3   Walk 10 Feet   Type of Assistance Needed Physical assistance   Physical Assistance Level 26%-50%   Comment with RW   Walk 10 Feet CARE Score 3   Walk 50 Feet with Two Turns   Reason if not Attempted Safety concerns   Walk 50 Feet with Two Turns CARE Score 88   Walk 150 Feet   Reason if not Attempted Safety concerns   Walk 150 Feet CARE Score 88   Walking 10 Feet on Uneven Surfaces   Reason if not Attempted Safety concerns   Walking 10 Feet on Uneven Surfaces CARE Score 88   Ambulation   Distance Walked (feet) 30 ft  (x2)   Assist Device Roller Walker   Gait Pattern Antalgic; Inconsistant Liberty; Slow Liberty;Decreased foot clearance;Decreased R stance; Improper weight shift   Limitations Noted In Balance; Endurance; Heel Strike; Sequencing;Speed;Strength;Swing   Findings poor wt bearing on R, use of UEs on RW   Curb or Single Stair   Reason if not Attempted Safety concerns   1 Step (Curb) CARE Score 88   4 Steps   Reason if not Attempted Safety concerns   4 Steps CARE Score 88   12 Steps   Reason if not Attempted Safety concerns   12 Steps CARE Score 88   Toilet Transfer   Type of Assistance Needed Physical assistance   Physical Assistance Level 25% or less   Comment with RW; BS then raised toilet in bathroom "  Toilet Transfer CARE Score 3   Therapeutic Interventions   Strengthening seated LAQ and hip flex x20 B/L during rest breaks   Flexibility B heel cord and HS x5mins   Other STS 2x5 for functional strengthening   Assessment   Treatment Assessment pt on commode start of session and during session  pt was able to safely go on raised toilet in bathroom with use of grab bars  pt with good caryover with standing balance and tolerance during clothing management  pt cont to have decrease amb due to pain and poor wt bearing on RLE  pt felt R knee \"went out\" during walking which increased pain  pt with instability on that side, needing strength to improve  pt discouraged due to severe pain this time around and below baseline at this time  cont with balance, stability and endurance to maximize functional ind for safe d/c and achieve goals  Problem List Decreased strength;Decreased range of motion;Decreased endurance;Pain   Barriers to Discharge Decreased caregiver support   PT Barriers   Functional Limitation Transfers; Walking;Stair negotiation;Standing;Car transfers   Plan   Progress Progressing toward goals   Recommendation   PT Discharge Recommendation Home with home health rehabilitation   Equipment Recommended Walker   PT Therapy Minutes   PT Time In 1000   PT Time Out 1130   PT Total Time (minutes) 90   PT Mode of treatment - Individual (minutes) 90   PT Mode of treatment - Concurrent (minutes) 0   PT Mode of treatment - Group (minutes) 0   PT Mode of treatment - Co-treat (minutes) 0   PT Mode of Treatment - Total time(minutes) 90 minutes   PT Cumulative Minutes 360   Therapy Time missed   Time missed?  No     "

## 2023-06-06 LAB
INR PPP: 1.39 (ref 0.84–1.19)
PROTHROMBIN TIME: 17.3 SECONDS (ref 11.6–14.5)

## 2023-06-06 PROCEDURE — 97530 THERAPEUTIC ACTIVITIES: CPT

## 2023-06-06 PROCEDURE — 85610 PROTHROMBIN TIME: CPT | Performed by: PHYSICIAN ASSISTANT

## 2023-06-06 PROCEDURE — 99232 SBSQ HOSP IP/OBS MODERATE 35: CPT | Performed by: INTERNAL MEDICINE

## 2023-06-06 PROCEDURE — 97110 THERAPEUTIC EXERCISES: CPT

## 2023-06-06 PROCEDURE — 99232 SBSQ HOSP IP/OBS MODERATE 35: CPT

## 2023-06-06 PROCEDURE — 97116 GAIT TRAINING THERAPY: CPT

## 2023-06-06 RX ORDER — WARFARIN SODIUM 7.5 MG/1
7.5 TABLET ORAL
Status: DISCONTINUED | OUTPATIENT
Start: 2023-06-07 | End: 2023-06-08

## 2023-06-06 RX ORDER — WARFARIN SODIUM 5 MG/1
10 TABLET ORAL
Status: COMPLETED | OUTPATIENT
Start: 2023-06-06 | End: 2023-06-06

## 2023-06-06 RX ADMIN — PREGABALIN 100 MG: 100 CAPSULE ORAL at 08:18

## 2023-06-06 RX ADMIN — ACETAMINOPHEN 975 MG: 325 TABLET, FILM COATED ORAL at 17:14

## 2023-06-06 RX ADMIN — MONTELUKAST SODIUM 10 MG: 10 TABLET, COATED ORAL at 21:18

## 2023-06-06 RX ADMIN — PREGABALIN 100 MG: 100 CAPSULE ORAL at 17:14

## 2023-06-06 RX ADMIN — ACETAMINOPHEN 975 MG: 325 TABLET, FILM COATED ORAL at 05:06

## 2023-06-06 RX ADMIN — Medication 2.5 MG: at 20:22

## 2023-06-06 RX ADMIN — OXYCODONE HYDROCHLORIDE 5 MG: 5 TABLET ORAL at 13:34

## 2023-06-06 RX ADMIN — ACETAMINOPHEN 975 MG: 325 TABLET, FILM COATED ORAL at 12:37

## 2023-06-06 RX ADMIN — LATANOPROST 1 DROP: 50 SOLUTION OPHTHALMIC at 21:19

## 2023-06-06 RX ADMIN — WARFARIN SODIUM 10 MG: 5 TABLET ORAL at 17:15

## 2023-06-06 RX ADMIN — METOPROLOL SUCCINATE 50 MG: 50 TABLET, EXTENDED RELEASE ORAL at 21:17

## 2023-06-06 RX ADMIN — ENOXAPARIN SODIUM 30 MG: 30 INJECTION SUBCUTANEOUS at 21:18

## 2023-06-06 RX ADMIN — PRAVASTATIN SODIUM 40 MG: 40 TABLET ORAL at 16:18

## 2023-06-06 RX ADMIN — ENOXAPARIN SODIUM 30 MG: 30 INJECTION SUBCUTANEOUS at 10:50

## 2023-06-06 NOTE — PCC CARE MANAGEMENT
Pt is making good progress and is scheduled to return home later this week  Pt will be receiving [facility] services for contd rn and pt  A youth roller walker has been ordered for pt  Following to assist w/additional dc needs

## 2023-06-06 NOTE — PCC PHYSICAL THERAPY
Patient is working towards goals of full (I) for home  She was progressed 6/13/23 to IRP with use of RW and demosntrated good safety awareness during review of equipment management and decreasing fall risk factors  Patient to discharge home Friday with OPPT services at home (may need to switch to [address] services for VNA as patient with open wound on buttocks)  She was provided with HEP for continued strengthening at home  RW ordered and will be delivered prior to discharge

## 2023-06-06 NOTE — PLAN OF CARE
Problem: PAIN - ADULT  Goal: Verbalizes/displays adequate comfort level or baseline comfort level  Description: Interventions:  - Encourage patient to monitor pain and request assistance  - Assess pain using appropriate pain scale  - Administer analgesics based on type and severity of pain and evaluate response  - Implement non-pharmacological measures as appropriate and evaluate response  - Consider cultural and social influences on pain and pain management  - Notify physician/advanced practitioner if interventions unsuccessful or patient reports new pain  Outcome: Progressing     Problem: INFECTION - ADULT  Goal: Absence or prevention of progression during hospitalization  Description: INTERVENTIONS:  - Assess and monitor for signs and symptoms of infection  - Monitor lab/diagnostic results  - Monitor all insertion sites, i e  indwelling lines, tubes, and drains  - Monitor endotracheal if appropriate and nasal secretions for changes in amount and color  - Butler appropriate cooling/warming therapies per order  - Administer medications as ordered  - Instruct and encourage patient and family to use good hand hygiene technique  - Identify and instruct in appropriate isolation precautions for identified infection/condition  Outcome: Progressing     Problem: SAFETY ADULT  Goal: Patient will remain free of falls  Description: INTERVENTIONS:  - Educate patient/family on patient safety including physical limitations  - Instruct patient to call for assistance with activity   - Consult OT/PT to assist with strengthening/mobility   - Keep Call bell within reach  - Keep bed low and locked with side rails adjusted as appropriate  - Keep care items and personal belongings within reach  - Initiate and maintain comfort rounds  - Make Fall Risk Sign visible to staff  - Offer Toileting every 2 Hours, in advance of need  - Initiate/Maintain bed/chair alarm  - Obtain necessary fall risk management equipment: non skid footwear  - Apply yellow socks and bracelet for high fall risk patients  - Consider moving patient to room near nurses station  Outcome: Progressing  Goal: Maintain or return to baseline ADL function  Description: INTERVENTIONS:  -  Assess patient's ability to carry out ADLs; assess patient's baseline for ADL function and identify physical deficits which impact ability to perform ADLs (bathing, care of mouth/teeth, toileting, grooming, dressing, etc )  - Assess/evaluate cause of self-care deficits   - Assess range of motion  - Assess patient's mobility; develop plan if impaired  - Assess patient's need for assistive devices and provide as appropriate  - Encourage maximum independence but intervene and supervise when necessary  - Involve family in performance of ADLs  - Assess for home care needs following discharge   - Consider OT consult to assist with ADL evaluation and planning for discharge  - Provide patient education as appropriate  Outcome: Progressing  Goal: Maintains/Returns to pre admission functional level  Description: INTERVENTIONS:  - Perform BMAT or MOVE assessment daily    - Set and communicate daily mobility goal to care team and patient/family/caregiver  - Collaborate with rehabilitation services on mobility goals if consulted  - Perform Range of Motion 3 times a day  - Reposition patient every 2 hours    - Dangle patient 3 times a day  - Stand patient 3 times a day  - Ambulate patient 3 times a day  - Out of bed to chair 3 times a day   - Out of bed for meals 3 times a day  - Out of bed for toileting  - Record patient progress and toleration of activity level   Outcome: Progressing     Problem: DISCHARGE PLANNING  Goal: Discharge to home or other facility with appropriate resources  Description: INTERVENTIONS:  - Identify barriers to discharge w/patient and caregiver  - Arrange for needed discharge resources and transportation as appropriate  - Identify discharge learning needs (meds, wound care, etc )  - Arrange for interpretive services to assist at discharge as needed  - Refer to Case Management Department for coordinating discharge planning if the patient needs post-hospital services based on physician/advanced practitioner order or complex needs related to functional status, cognitive ability, or social support system  Outcome: Progressing     Problem: Prexisting or High Potential for Compromised Skin Integrity  Goal: Skin integrity is maintained or improved  Description: INTERVENTIONS:  - Identify patients at risk for skin breakdown  - Assess and monitor skin integrity  - Assess and monitor nutrition and hydration status  - Monitor labs   - Assess for incontinence   - Turn and reposition patient  - Assist with mobility/ambulation  - Relieve pressure over bony prominences  - Avoid friction and shearing  - Provide appropriate hygiene as needed including keeping skin clean and dry  - Evaluate need for skin moisturizer/barrier cream  - Collaborate with interdisciplinary team   - Patient/family teaching  - Consider wound care consult   Outcome: Progressing     Problem: MOBILITY - ADULT  Goal: Maintain or return to baseline ADL function  Description: INTERVENTIONS:  -  Assess patient's ability to carry out ADLs; assess patient's baseline for ADL function and identify physical deficits which impact ability to perform ADLs (bathing, care of mouth/teeth, toileting, grooming, dressing, etc )  - Assess/evaluate cause of self-care deficits   - Assess range of motion  - Assess patient's mobility; develop plan if impaired  - Assess patient's need for assistive devices and provide as appropriate  - Encourage maximum independence but intervene and supervise when necessary  - Involve family in performance of ADLs  - Assess for home care needs following discharge   - Consider OT consult to assist with ADL evaluation and planning for discharge  - Provide patient education as appropriate  Outcome: Progressing  Goal: Maintains/Returns to pre admission functional level  Description: INTERVENTIONS:  - Perform BMAT or MOVE assessment daily    - Set and communicate daily mobility goal to care team and patient/family/caregiver  - Collaborate with rehabilitation services on mobility goals if consulted  - Perform Range of Motion 3 times a day  - Reposition patient every 2 hours    - Dangle patient 3 times a day  - Stand patient 3 times a day  - Ambulate patient 3 times a day  - Out of bed to chair 3 times a day   - Out of bed for meals 3 times a day  - Out of bed for toileting  - Record patient progress and toleration of activity level   Outcome: Progressing

## 2023-06-06 NOTE — PROGRESS NOTES
Internal Medicine Progress Note  Patient: Alethea De Souza  Age/sex: 77 y o  female  Medical Record #: [id number]      ASSESSMENT/PLAN: (Interval History)  Alethea De Souza is seen and examined and management for following issues:    Right periprosthetic femur fracture  • S/p ORIF 5/28  • WBAT  • X-ray 6/2/23 with post-op changes      Hx PAF  • Home:  Toprol 50mg qHS/Coumadin 6 25 mg (2 1/2 tabs of 2 5mg tabs) on Mondays and 5mg the rest of the days of the week  • Here:  Toprol 50mg qHS/Coumadin 7 5mg qd (inc 6/4/23)  • She did tell Dr Ameena Chavez that she adjusts her Coumadin on her own  • [facility] does her INRs and she has 2 5mg tabs  • EKG 5/27/23 = SR  • Continue Lovenox for DVT prophylaxis until INR is 2 0 or greater  • INR today 1 39 = will give 10mg tonight x 1 then back to 7 5mg  • Repeat INR Thursday 6/8/23     HLD  • Continue Pravachol as a sub for home home Simvastatin     Peripheral neuropathy  • Continue Lyrica     Aortic stenosis  • ECHO 7/2022:  LVEF 65%/gr 2 DD, mild-mod AS, mild TR/MR, RV systolic pressure is mod-severe elevated with systolic pressure 59 mmHg  • euvolemic       Discharge date:  Team      The above assessment and plan was reviewed and updated as determined by my evaluation of the patient on 6/6/2023      Labs:   Results from last 7 days   Lab Units 06/05/23  0452 05/31/23  0538   HEMATOCRIT % 32 1* 30 9*   HEMOGLOBIN g/dL 10 0* 9 8*   PLATELETS Thousands/uL 313 218   WBC Thousand/uL 8 11 8 70     Results from last 7 days   Lab Units 06/05/23  0452 05/31/23  0538   BUN mg/dL 14 14   CALCIUM mg/dL 9 0 8 6   CHLORIDE mmol/L 107 108   CO2 mmol/L 29 27   CREATININE mg/dL 0 43* 0 42*   POTASSIUM mmol/L 4 5 4 1   SODIUM mmol/L 137 139         Results from last 7 days   Lab Units 06/06/23  0833 06/04/23  0458   INR  1 39* 1 19           Review of Scheduled Meds:  Current Facility-Administered Medications   Medication Dose Route Frequency Provider Last Rate   • acetaminophen  975 mg Oral Q6H [facility] Lety Avina MD     • bisacodyl  10 mg Oral Once Aggie Toth MD     • bisacodyl  10 mg Rectal Daily PRN Lety Avina MD     • docusate sodium  100 mg Oral BID Lety Avina MD     • enoxaparin  30 mg Subcutaneous Q12H [address] Lety Avina MD     • latanoprost  1 drop Both Eyes HS RAYMON Ojeda     • lidocaine   Topical Q4H PRN Lety Avina MD     • metoprolol succinate  50 mg Oral HS Lety Avina MD     • montelukast  10 mg Oral HS Lety Avina MD     • oxyCODONE  5 mg Oral Q4H PRN Lety Avina MD     • oxyCODONE  2 5 mg Oral Q4H PRN Lety Avina MD     • polyethylene glycol  17 g Oral Daily PRN Lety Avina MD     • polyethylene glycol  17 g Oral Daily Lety Avina MD     • pravastatin  40 mg Oral Daily With Wilson Monroe MD     • pregabalin  100 mg Oral BID Lety Avina MD     • warfarin  7 5 mg Oral Daily (warfarin) Gladys Landry PA-C         Subjective/ HPI: Patient seen and examined  Patients overnight issues or events were reviewed with nursing or staff during rounds or morning huddle session  New or overnight issues include the following:     No new or overnight issues  Offers no complaints    ROS:   A 10 point ROS was performed; negative except as noted above       *Labs /Radiology studies reviewed  *Medications reviewed and reconciled as needed  *Please refer to order section for additional ordered labs studies  *Case discussed with primary attending during morning huddle case rounds    Physical Examination:  Vitals:   Vitals:    06/05/23 0443 06/05/23 1451 06/05/23 1958 06/06/23 0507   BP: 134/58 124/56 113/53 110/50   BP Location: Left arm Right arm Left arm Left arm   Pulse: 74 70 73 73   Resp: 18 16 18 18   Temp: 97 9 °F (36 6 °C) 98 8 °F (37 1 °C) 98 8 °F (37 1 °C) 98 2 °F (36 8 °C)   TempSrc: Oral Oral Oral Oral   SpO2: 98% 97% 97% 97%   Weight:       Height: General Appearance: no distress, conversive  HEENT:  External ear normal   Nose normal w/o drainage  Mucous membranes are moist  Oropharynx is clear  Conjunctiva clear w/o icterus or redness  Neck:  Supple, normal ROM  Lungs: BBS without crackles/wheeze/rhonchi; respirations unlabored with normal inspiratory/expiratory effort  No retractions noted  On RA  CV: regular rate and rhythm; no rubs/gallops, +murmur  PMI normal   ABD: Abdomen is soft  Bowel sounds all quadrants  Nontender with no distention  EXT: no edema  Skin: normal turgor, normal texture, no rashes  Psych: affect normal, mood normal  Neuro: AAO     The above physical exam was reviewed and updated as determined by my evaluation of the patient on 6/6/2023  Invasive Devices     None                    VTE Pharmacologic Prophylaxis: Enoxaparin  Code Status: Level 3 - DNAR and DNI  Current Length of Stay: 6 day(s)      Total time spent:  30 minutes with more than 50% spent counseling/coordinating care  Counseling includes discussion with patient re: progress  and discussion with patient of his/her current medical state/information  Coordination of patient's care was performed in conjunction with primary service  Time invested included review of patient's labs, vitals, and management of their comorbidities with continued monitoring  In addition, this patient was discussed with medical team including physician and advanced extenders  The care of the patient was extensively discussed and appropriate treatment plan was formulated unique for this patient  Medical decision making for the day was made by supervising physician unless otherwise noted in their attestation statement  ** Please Note:  voice to text software may have been used in the creation of this document   Although proof errors in transcription or interpretation are a potential of such software**

## 2023-06-06 NOTE — PROGRESS NOTES
"   06/06/23 7344   Pain Assessment   Pain Assessment Tool 0-10   Pain Score 3   Pain Location/Orientation Orientation: Right;Location: Hip;Location: Leg   Hospital Pain Intervention(s) Repositioned; Emotional support   Restrictions/Precautions   Precautions Fall Risk;Supervision on toilet/commode;Pain;Bed/chair alarms   RLE Weight Bearing Per Order WBAT   ROM Restrictions No   Sit to Stand   Type of Assistance Needed Incidental touching; Adaptive equipment;Verbal cues   Physical Assistance Level No physical assistance   Comment Inc time for task completion and at times cues for technique/hand placement  Sit to Stand CARE Score 4   Bed-Chair Transfer   Type of Assistance Needed Incidental touching; Adaptive equipment   Physical Assistance Level No physical assistance   Comment CGA with RW   Chair/Bed-to-Chair Transfer CARE Score 4   Health Management   Health Management Level of Assistance Independent   Health Management Pt engaged in simulate medication mgmt task  At baseline she simulates a 4X/day pill organizer with use of a shoe box, dividers, and paper medicine cups  She stacks each medicine cup for AM, noon, evening, bed  Pt overall very insightful into purposes of medications, was aware of baseline medications vs new medications and was able to complete task with 100% accuracy  Pt was noted to drop one medication on floor, was educated on use of towel under work surface to contain spills to avoid dropping onto floor, receptive to same  Functional Standing Tolerance   Time 3'58\", 2'57\", 4'26\"   Activity Pt engaged in parquetry activity while in stance at tabletop to promote standing balance/tolerance for inc independence with ADL task performance  Pt was overall CGA while in stance, she is limited by pain and fatigue with prolonged stance and does require but self initiates a seated rest break     Exercise Tools   Other Exercise Tool 1 UB strenghtening with use of 3# dowel bar moving through bicep curls, elbow " extension, chest press, and shoulder flexion to shoulder height only due to baseline RUE ROM deficits  Pt completes 3 x 10 each to inc UB strength and endurance for inc indpeendneec with ADL tasks and functional txfers  Cognition   Overall Cognitive Status WFL   Arousal/Participation Alert; Cooperative   Attention Within functional limits   Orientation Level Oriented X4   Memory Decreased short term memory   Following Commands Follows multistep commands with increased time or repetition   Activity Tolerance   Activity Tolerance Patient tolerated treatment well   Assessment   Treatment Assessment Pt participated in skilled OT services with focus on standing tolerance/balance, act bryan, endurance, strengthening, and medication mgmt  Pt continues to be limited by pain, dec standing balance/tolerance, dec RLE strength  Pt will continue to benefit from skilled OT services with focus on above mentioned deficits to inc safety and independence with I/ADL tasks  Would benefit from kitchen mobility/meal prep  Prognosis Good   Problem List Decreased strength;Decreased range of motion;Decreased endurance;Pain   Plan   Treatment/Interventions ADL retraining;Functional transfer training; Therapeutic exercise; Endurance training;Patient/family training;Equipment eval/education; Bed mobility; Compensatory technique education   Progress Progressing toward goals   Recommendation   OT Discharge Recommendation   (pending progress)   OT Therapy Minutes   OT Time In 0830   OT Time Out 1000   OT Total Time (minutes) 90   OT Mode of treatment - Individual (minutes) 90   OT Mode of treatment - Concurrent (minutes) 0   OT Mode of treatment - Group (minutes) 0   OT Mode of treatment - Co-treat (minutes) 0   OT Mode of Treatment - Total time(minutes) 90 minutes   OT Cumulative Minutes 465   Therapy Time missed   Time missed?  No

## 2023-06-06 NOTE — PROGRESS NOTES
06/06/23 1400   Pain Assessment   Pain Assessment Tool 0-10   Pain Score 5   Pain Location/Orientation Orientation: Right;Location: Hip   Pain Onset/Description Onset: Ongoing; Onset: Gradual   Patient's Stated Pain Goal No pain   Hospital Pain Intervention(s) Repositioned;Relaxation technique   Restrictions/Precautions   Precautions Fall Risk;Pain;Supervision on toilet/commode   RLE Weight Bearing Per Order WBAT   ROM Restrictions No   Cognition   Overall Cognitive Status WFL   Arousal/Participation Alert; Cooperative   Attention Within functional limits   Orientation Level Oriented X4   Memory Decreased short term memory   Following Commands Follows multistep commands with increased time or repetition   Sit to Stand   Type of Assistance Needed Incidental touching;Verbal cues; Adaptive equipment   Comment increased time with RW   Sit to Stand CARE Score 4   Bed-Chair Transfer   Type of Assistance Needed Incidental touching   Comment CGA with RW   Chair/Bed-to-Chair Transfer CARE Score 4   Transfer Bed/Chair/Wheelchair   Adaptive Equipment Roller Walker   Car Transfer   Comment (S)  trial next session   Reason if not Attempted Safety concerns   Car Transfer CARE Score 88   Walk 10 Feet   Type of Assistance Needed Physical assistance; Incidental touching; Adaptive equipment   Physical Assistance Level 25% or less   Comment MIN/CGA with RW, WC pulled behind for safety  Walk 10 Feet CARE Score 3   Walk 50 Feet with Two Turns   Type of Assistance Needed Physical assistance;Verbal cues; Adaptive equipment   Physical Assistance Level 25% or less   Comment LAKSHMI with RW   Walk 50 Feet with Two Turns CARE Score 3   Walk 150 Feet   Reason if not Attempted Safety concerns   Walk 150 Feet CARE Score 88   Walking 10 Feet on Uneven Surfaces   Comment (S)  trial next session   Reason if not Attempted Safety concerns   Walking 10 Feet on Uneven Surfaces CARE Score 88   Ambulation   Primary Mode of Locomotion Prior to Admission Walk "  Distance Walked (feet) 55 ft  (15')   Assist Device Roller Walker   Gait Pattern Antalgic; Inconsistant Liberty; Slow Liberty;Decreased foot clearance; Forward Flexion;Narrow AMBIKA;Step to;Decreased R stance; Improper weight shift   Limitations Noted In Endurance; Heel Strike;Posture;Speed;Strength;Swing   Provided Assistance with: Balance;Direction   Walk Assist Level Minimum Assist;Contact Guard   Does the patient walk? 2  Yes   Wheel 50 Feet with Two Turns   Reason if not Attempted Activity not applicable   Wheel 50 Feet with Two Turns CARE Score 9   Wheel 150 Feet   Reason if not Attempted Activity not applicable   Wheel 335 Feet CARE Score 9   Wheelchair mobility   Does the patient use a wheelchair? 0  No   Curb or Single Stair   Style negotiated Single stair   Type of Assistance Needed Physical assistance;Verbal cues; Adaptive equipment   Physical Assistance Level 51%-75%   Comment MODA on x3 4\" steps the 2 6\" steps with BHR's, retro descend  1 Step (Curb) CARE Score 2   4 Steps   Reason if not Attempted Safety concerns   4 Steps CARE Score 88   12 Steps   Reason if not Attempted Safety concerns   12 Steps CARE Score 88   Stairs   Type Stairs   # of Steps 2  (3)   Weight Bearing Precautions Fall Risk;RLE;WBAT   Assist Devices Bilateral Rail   Findings eduated pt on trialing curb step next session   Therapeutic Interventions   Strengthening seated LAQ, hip flex AAROM to RLE, ankle DF/PF, glute sets and hip ADD vs ball  3 x10 reps BLE, 2# to LLE  Flexibility B HC/HS stretch   Other Pt's shoes donned start of session  Other Comments   Comments increased education to pt regarding discontinued use of purewick at night  Pt states she gets up multiple times and it's \"too difficult\"  Showed understanding to pt but explained that we need to get pt back to her normal and unless she plans to have one at home we need to start working on SPT to a BSC in the evening   Pt showed understanding but was not happy with the idea " "of not using the purewick any longer  Refered to PT Viktoria to follow up in her session tomorrow  Assessment   Treatment Assessment Pt participated in skilled PT session with increased focus on gait, stair management, increased act tolerance and LE strengthening  Pt cont to be limited by increased pain to R hip with gait and stair management  Pt was able to amb up to 54' with RW and LAKSHMI, up/down 2, 6\" steps with BHR's and MODA, STS transfers with CGA and SPT with RW CGA/LAKSHMI  Pt's largest barrier is 2 steps to enter home, pt lives alone and has decreased support, ongoing pain and limited ROM to R hip and knee  Pt will cont to benefit from increased stair management, increased act tolerance, improved functional transfers and increased gait to decrease burden of care  Problem List Decreased strength;Decreased range of motion;Decreased endurance;Pain   Barriers to Discharge Decreased caregiver support   PT Barriers   Functional Limitation Transfers; Walking;Stair negotiation;Standing;Car transfers   Plan   Treatment/Interventions Functional transfer training;LE strengthening/ROM; Therapeutic exercise; Endurance training;Gait training;Bed mobility   Progress Progressing toward goals   Recommendation   PT Discharge Recommendation Home with home health rehabilitation   Equipment Recommended Walker   PT Therapy Minutes   PT Time In 1400   PT Time Out 1530   PT Total Time (minutes) 90   PT Mode of treatment - Individual (minutes) 90   PT Mode of treatment - Concurrent (minutes) 0   PT Mode of treatment - Group (minutes) 0   PT Mode of treatment - Co-treat (minutes) 0   PT Mode of Treatment - Total time(minutes) 90 minutes   PT Cumulative Minutes 450   Therapy Time missed   Time missed?  No     "

## 2023-06-06 NOTE — PROGRESS NOTES
Pastoral Care Progress Note    2023  Patient: Misa Ren : 1945  Admission Date & Time: 2023 1446  MRN: [id number] CSN: [id number]      Had an enjoyable visit with Pt who was talkative today, telling me life stories, the work she has done (EMT, Nursing, helping at a bar), and the evening she was pulled over by a  suspecting her of drunken driving when all she had to drink was a Coke  She has a good attitude and sense of humor in the midst of health challenges   provided listening ear, emotional support, and prayer, and remains available                 Chaplaincy Interventions Utilized:   Empowerment: Encouraged self-care    Exploration: Explored emotional needs & resources, Explored relational needs & resources, and Facilitated story telling    Collaboration: Encouraged adherence to treatment plan    Relationship Building: Cultivated a relationship of care and support and Listened empathically    Ritual: Provided prayer    Chaplaincy Outcomes Achieved:  Emotional resources utilized, Expressed gratitude, and Expressed humor       23 1300   Clinical Encounter Type   Visited With Patient   Routine Visit Follow-up   Latter-day Encounters   Latter-day Needs Prayer

## 2023-06-06 NOTE — PCC OCCUPATIONAL THERAPY
6/13/23:  Pt cont to be seen for skilled OT sessions focused on progression to IRPs w/RW (including nighttime IRPs w/RW and BSC placed at foot of bed), footwear mgmt, donning elastic shoe laces, and repetitive fxl transfers, for increased independence w/ADLs/IADLs and decreased caregiver burden  Pt cont to tolerate sessions well, demo G safety awareness during [facility] transfers and during in-room fxl mobility w/RW, and deemed appropriate for IRP progression  Pt cont to make good progress, but cont to be limited by decreased higher-level standing balance/tolerance, strength, LE pain, and act bryan  Pt would benefit from continued skilled OT focused on laundry mgmt, toileting, and footwear mgmt  6/7/23:  Pt making G progress towards achieving OT goals  Pt continues to be limited by dec standing bryan/balance, dec act bryan, dec endurance, dec RLE strength  Pt continues to require CGA/Sudeep pending levels of fatigue/pain for all txfers and ADL tasks  Pt will continue to benefit from skilled OT services following POC  ELOS of 2-3 weeks to achieve Shavon goals

## 2023-06-07 PROCEDURE — 97110 THERAPEUTIC EXERCISES: CPT

## 2023-06-07 PROCEDURE — 97530 THERAPEUTIC ACTIVITIES: CPT

## 2023-06-07 PROCEDURE — 99232 SBSQ HOSP IP/OBS MODERATE 35: CPT | Performed by: INTERNAL MEDICINE

## 2023-06-07 PROCEDURE — 99233 SBSQ HOSP IP/OBS HIGH 50: CPT

## 2023-06-07 RX ADMIN — OXYCODONE HYDROCHLORIDE 5 MG: 5 TABLET ORAL at 09:02

## 2023-06-07 RX ADMIN — ENOXAPARIN SODIUM 30 MG: 30 INJECTION SUBCUTANEOUS at 20:25

## 2023-06-07 RX ADMIN — ACETAMINOPHEN 975 MG: 325 TABLET, FILM COATED ORAL at 00:31

## 2023-06-07 RX ADMIN — ACETAMINOPHEN 975 MG: 325 TABLET, FILM COATED ORAL at 05:26

## 2023-06-07 RX ADMIN — METOPROLOL SUCCINATE 50 MG: 50 TABLET, EXTENDED RELEASE ORAL at 22:00

## 2023-06-07 RX ADMIN — MONTELUKAST SODIUM 10 MG: 10 TABLET, COATED ORAL at 22:13

## 2023-06-07 RX ADMIN — LATANOPROST 1 DROP: 50 SOLUTION OPHTHALMIC at 22:01

## 2023-06-07 RX ADMIN — PRAVASTATIN SODIUM 40 MG: 40 TABLET ORAL at 17:07

## 2023-06-07 RX ADMIN — ACETAMINOPHEN 975 MG: 325 TABLET, FILM COATED ORAL at 17:07

## 2023-06-07 RX ADMIN — OXYCODONE HYDROCHLORIDE 5 MG: 5 TABLET ORAL at 15:29

## 2023-06-07 RX ADMIN — ACETAMINOPHEN 975 MG: 325 TABLET, FILM COATED ORAL at 11:28

## 2023-06-07 RX ADMIN — ENOXAPARIN SODIUM 30 MG: 30 INJECTION SUBCUTANEOUS at 09:03

## 2023-06-07 RX ADMIN — PREGABALIN 100 MG: 100 CAPSULE ORAL at 09:03

## 2023-06-07 RX ADMIN — PREGABALIN 100 MG: 100 CAPSULE ORAL at 17:07

## 2023-06-07 RX ADMIN — OXYCODONE HYDROCHLORIDE 5 MG: 5 TABLET ORAL at 22:00

## 2023-06-07 RX ADMIN — WARFARIN SODIUM 7.5 MG: 7.5 TABLET ORAL at 17:06

## 2023-06-07 NOTE — PROGRESS NOTES
"   06/07/23 1000   Pain Assessment   Pain Assessment Tool 0-10   Pain Score 10 - Worst Possible Pain   Pain Location/Orientation Orientation: Right;Location: Leg   Hospital Pain Intervention(s) Cold applied;Repositioned   Restrictions/Precautions   Precautions Fall Risk;Pain;Supervision on toilet/commode   RLE Weight Bearing Per Order WBAT   ROM Restrictions No   Sit to Stand   Type of Assistance Needed Supervision;Verbal cues   Physical Assistance Level No physical assistance   Comment CS w RW   Sit to Stand CARE Score 4   Bed-Chair Transfer   Type of Assistance Needed Supervision; Adaptive equipment;Verbal cues   Physical Assistance Level No physical assistance   Comment CS w RW   Chair/Bed-to-Chair Transfer CARE Score 4   Exercise Tools   Other Exercise Tool 1 Pt engaged in b/l parish activity with use of 7# weight moving through all planes 3 x 10 each to increase UB strength, ROM, and endurance for increased independence with ADL tasks and functional transfers  Pt tolerates well with rest breaks to manage fatigue  Cognition   Overall Cognitive Status WFL   Arousal/Participation Alert; Cooperative   Attention Within functional limits   Orientation Level Oriented X4   Memory Decreased short term memory   Following Commands Follows multistep commands with increased time or repetition   Activity Tolerance   Activity Tolerance Patient limited by pain   Assessment   Treatment Assessment Pt participated in skilled OT services with focus on functional txfers, strengthening, and education  Pt reports significant pain this session and refuses to complete tasks in stance at this time or \"else I'll scream \" Pt reports having had pain medications and was provided with ice pack  Pt continues to require a great deal of emotional support and encouragement  Pt will continue to benefit from skilled OT services with focus on I/ADL retraining, hot meal prep, laundry mgmt, standing tolerance     Prognosis Good   Problem List " Decreased strength;Decreased endurance; Impaired balance;Orthopedic restrictions;Pain;Decreased range of motion;Decreased mobility   Plan   Treatment/Interventions ADL retraining;Functional transfer training; Therapeutic exercise; Endurance training;Patient/family training;Equipment eval/education; Bed mobility; Compensatory technique education   Progress Progressing toward goals   Recommendation   OT Discharge Recommendation   (pending progress)   OT Therapy Minutes   OT Time In 1000   OT Time Out 1100   OT Total Time (minutes) 60   OT Mode of treatment - Individual (minutes) 60   OT Mode of treatment - Concurrent (minutes) 0   OT Mode of treatment - Group (minutes) 0   OT Mode of treatment - Co-treat (minutes) 0   OT Mode of Treatment - Total time(minutes) 60 minutes   OT Cumulative Minutes 525   Therapy Time missed   Time missed?  No

## 2023-06-07 NOTE — PROGRESS NOTES
Pastoral Care Progress Note    2023  Patient: Shawn Morales : 1945  Admission Date & Time: 2023 1446  MRN: [id number] CSN: [id number]        Was able to visit Danilo Malin during a break in PT for a bit  Listened to her tell a new life story, offered fellowship/support, showed compassion when she said it was a hard day for her  Encouraged her to keep at it, as the healing process has ups and downs   remains available

## 2023-06-07 NOTE — PROGRESS NOTES
"   06/07/23 1230   Pain Assessment   Pain Assessment Tool 0-10   Pain Score 6   Pain Location/Orientation Orientation: Right;Location: Hip   Hospital Pain Intervention(s) Repositioned   Restrictions/Precautions   Precautions Fall Risk;Pain;Supervision on toilet/commode   RLE Weight Bearing Per Order WBAT   ROM Restrictions No   Sit to Stand   Type of Assistance Needed Supervision; Adaptive equipment   Physical Assistance Level No physical assistance   Sit to Stand CARE Score 4   Bed-Chair Transfer   Type of Assistance Needed Supervision; Adaptive equipment   Physical Assistance Level No physical assistance   Comment CS w RW   Chair/Bed-to-Chair Transfer CARE Score 4   Kitchen Mobility   Kitchen-Mobility Level Walker   Kitchen Activity Retrieve items;Transport items   Kitchen Mobility Comments Pt does not engage in hot meal prep this session, goal of session was to initiate kitchen mobility and educate pt on AE  Pt prefers folding walker tray after education and benefits from use of reacher for item retrieval outside [facility]BIKA  Pt able to successfully move around kitchen with use of folding walker tray to retrieve items from [facility] cabinets, lower cabinets, and fridge  Pt requires overall CS and min VC's for safe approach to surfaces  Functional Standing Tolerance   Time 8'25\"   Activity Pt engaged in standing tolerance/balance activity while in stance at tabletop to promote inc tolerance and safety with I/ADL tasks  Pt with far improved tolerance this session in comparison to previous sessions  Continues to be limited by pain, overall CGA while in stance  Cognition   Overall Cognitive Status WFL   Arousal/Participation Alert; Cooperative   Attention Within functional limits   Orientation Level Oriented X4   Memory Decreased short term memory   Following Commands Follows multistep commands with increased time or repetition   Activity Tolerance   Activity Tolerance Patient limited by pain   Assessment   Treatment Assessment Pt " participated in skilled OT services with focus on kitchen mobility and standing tolerance  Pt continues to be limited by pain, anxiousness, dec standing tolerance/balance, dec act bryan, dec strength  Pt will continue to benefit from skilled OT services with focus on I/ADL retraining to inc safety and independence with I/ADL tasks  Prognosis Good   Problem List Decreased strength;Decreased endurance; Impaired balance;Orthopedic restrictions;Pain;Decreased range of motion;Decreased mobility   Plan   Treatment/Interventions ADL retraining;Functional transfer training; Therapeutic exercise; Endurance training;Patient/family training;Equipment eval/education; Bed mobility; Compensatory technique education   Progress Progressing toward goals   Recommendation   OT Discharge Recommendation   (pending progress)   OT Therapy Minutes   OT Time In 1230   OT Time Out 1330   OT Total Time (minutes) 60   OT Mode of treatment - Individual (minutes) 60   OT Mode of treatment - Concurrent (minutes) 0   OT Mode of treatment - Group (minutes) 0   OT Mode of treatment - Co-treat (minutes) 0   OT Mode of Treatment - Total time(minutes) 60 minutes   OT Cumulative Minutes 585   Therapy Time missed   Time missed?  No

## 2023-06-07 NOTE — TEAM CONFERENCE
Acute RehabilitationTeam Conference Note  Date: 6/7/2023   Time: 10:39 AM       Patient Name:  Lonnie Tinoco       Medical Record Number: [id number]   YOB: 1945  Sex: Female          [facility]  [facility]  Payor Info:  Payor: Felisa Gonzalez / Plan: MEDICARE A AND B / Product Type: Medicare A & B Fee for Service /      Admitting Diagnosis: S/P right hip fracture [Z87 81]   Admit Date/Time:  5/31/2023  2:46 PM  Admission Comments: No comment available     Primary Diagnosis:  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 ([facility] [address] )  Principal Problem: 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 [facility])    Patient Active Problem List    Diagnosis Date Noted   • Peripheral neuropathy 06/01/2023   • Encounter for skin care 06/01/2023   • Obesity 06/01/2023   • Constipation 05/31/2023   • Pain 05/31/2023   • At risk for venous thromboembolism (VTE) 05/31/2023   • Anemia 05/31/2023   • Abnormal finding on radiology exam 05/31/2023   • 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 ([facility] [address] ) 05/28/2023   • Fall 05/27/2023   • Non-rheumatic aortic stenosis 03/02/2023   • PAF (paroxysmal atrial fibrillation) ([facility] [address] ) 03/02/2023   • Postoperative visit 08/15/2022   • Incisional hernia, without obstruction or gangrene 07/05/2022   • Intestinal adhesions 11/27/2021   • Mixed hyperlipidemia 06/07/2020   • Other chest pain 06/07/2020   • Pure hypercholesterolemia 07/18/2017   • Spinal stenosis of lumbar region 06/30/2017       Physical Therapy:    Weight Bearing Status: Weight Bearing as Tolerated  Transfers: Incidental Touching  Bed Mobility: Minimal Assistance, Moderate Assistance  Amulation Distance (ft): 55 feet  Ambulation: Minimal Assistance  Assistive Device for Ambulation: Roller Walker  Number of Stairs: 2  Assistive Device for Stairs: Bilateral Hand Rails  Stair Assistance:  Moderate Assistance  Assistive Device for Ramp: Roller Walker  Discharge "Recommendations: Home with:  [address] with[de-identified] First Floor Setup, Home Physical Therapy    6/6/23  Per evaluation: Pt is 77 y o  female seen for PT evaluation s/p admit to [facility] on 5/31/2023 for rehab to maximize her functional mobility (I) and safety  She presented initially  w/ 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 ([address] ) and underwent an ORIF 5/28/23  She presents to rehab with pain and is deemed WBAT  Comorbidities affecting pt's physical performance at time of assessment include: spinal stenosis, aortic stenosis, PAF, peripheral neuropathy and chronic kidney disease  PTA, pt was independent w/ all functional mobility w/ SPC, ambulates unrestricted distances and all terrain, ambulates household distances and lives w/ self in 1 level home with 3 BLAS  Personal factors affecting pt at time of IE include: inaccessible home environment, stairs to enter home, inability to navigate level surfaces w/o external assistance, unable to perform dynamic tasks in community, positive fall history, anxiety, inability to perform IADLs, inability to perform ADLs and inability to live alone  Pt cont to be limited by increased pain to R hip with gait and stair management  Pt was able to amb up to 54' with RW and LAKSHMI, up/down 2, 6\" steps with BHR's and MODA, STS transfers with CGA and SPT with RW CGA/LAKSHMI  Pt's largest barrier is 2 steps to enter home, pt lives alone and has decreased support, ongoing pain and limited ROM to R hip and knee  Pt will cont to benefit from increased stair management, increased act tolerance, improved functional transfers and increased gait to decrease burden of care  Occupational Therapy:  Eating: Independent  Grooming: Supervision  Bathing: Moderate Assistance  Bathing: Moderate Assistance  Upper Body Dressing: Supervision  Lower Body Dressing: Moderate Assistance  Toileting: Moderate Assistance  Toilet Transfer:  Moderate Assistance  Cognition: " Within Defined Limits  Orientation: Person, Place, Time, Situation  Discharge Recommendations: Home Independently  [address] with[de-identified] Home Occupational Therapy, Family Support       Pt making G progress towards achieving OT goals  Pt continues to be limited by dec standing bryan/balance, dec act bryan, dec endurance, dec RLE strength  Pt continues to require CGA/Sudeep pending levels of fatigue/pain for all txfers and ADL tasks  Pt will continue to benefit from skilled OT services following POC  ELOS of 2-3 weeks to achieve Shavon goals  Speech Therapy:           No notes on file    Nursing Notes:  Appetite: Good  Diet Type: Regular/House                           Type of Wound (LDA): Wound                                         Pain Location/Orientation: Orientation: Right, Location: Leg  Pain Score: 7                       Hospital Pain Intervention(s): Medication (See MAR)          74-year-old female with a past medical history of A-fib on Coumadin, hypertension, hyperlipidemia, obesity, peripheral neuropathy, aortic stenosis, bilateral total hip arthroplasty, right total hip arthroplasty revision who stood up from kneeling and felt a crack in her right hip and then slipped and fell with resultant right leg pain who was found to have right periprosthetic hip fracture -described by radiology is acute minimally displaced periprosthetic fracture of the right proximal femoral diaphysis adjacent to the femoral stem of the right total hip arthroplasty  CT head was negative for acute findings  Patient underwent ORIF of right periprosthetic hip fracture on 5/28  Patient was started on Lovenox for VTE prophylaxis and cleared for Coumadin on 5/31  Patient has had some acute blood loss anemia, pain, severe constipation, and significant decline in ADLs and mobility  6/7/23 We will week we will encourage independence with ADLs   We will monitor lads and vital signs   We will continue to  assist the pt in repositioning to prevent skin breakdown  We will monitor for adequate pain control  She is incontinent of urine and we are maintaining purewick at HS as per protocol  We are maintaining adequate fluid and oral intake  We will increase safety awareness and keep the pt free from falls  Pt reports adequate sleep  Case Management:     Discharge Planning  Living Arrangements: Lives Alone  Support Systems: Family members  Assistance Needed: TBD  Type of Current Residence: Private residence  Current Home Care Services: No  Pt admitted s/p fall which resulted in a right hip fx  Pt is participating well with therapy and is expected to return home  Pt has supportive family and has been educated on the need for continued therapy services on dc  Is the patient actively participating in therapies? yes  List any modifications to the treatment plan:      Barriers Interventions   pain Medications provided   Lives alone Progress to safest goal to return home   r le weakness effecting standing tolerance Therapy strengthening exercises             Is the patient making expected progress toward goals? yes  List any update or changes to goals:     Medical Goals: Patient will be medically stable for discharge to [facility] restrictive envrionment upon completion of rehab program and Patient will be able to manage medical conditions and comorbid conditions with medications and follow up upon completion of rehab program    Weekly Team Goals:   Rehab Team Goals  ADL Team Goal: Patient will be independent with ADLs with least restrictive device upon completion of rehab program  Transfer Team Goal: Patient will be independent with transfers with least restrictive device upon completion of rehab program    Discussion: pt presents with the above barriers and is functioning at contact guard close supervision with transfers and ambulation  Pt has completed stairs at min a  ADL's are barely contact guard and uses equipment at baseline   Pt initially reported she would return home but now pt is stating she will go to a snf  pts goals were independent to return home  Recommendations are for contd outpt physical therapy at home  Anticipated Discharge Date:  6/16/23  [facility] Team Members Present: The following team members are supervising care for this patient and were present during this Weekly Team Conference      Physician: Dr Bernie Patton MD  : MALIKA Pena  Registered Nurse: Damion Smith RN  Physical Therapist: Zina Spaulding DPT  Occupational Therapist: Alexander Ricks MS, OTR/L  Speech Therapist:

## 2023-06-07 NOTE — PCC NURSING
Right periprosthetic femur fracture  S/p ORIF 5/28  WBAT  X-ray 6/2/23 with post-op changes  Hx PAF  Home:  Toprol 50mg qHS/Coumadin 6 25 mg (2 1/2 tabs of 2 5mg tabs) on Mondays and 5mg the rest of the days of the week  Here:  Toprol 50mg qHS/Coumadin 7 5mg MWF/5mg T/TH/S/S  She did tell Dr Raheem Castellanos that she adjusts her Coumadin on her own  [facility] does her INRs and she has 2 5mg tabs at home  EKG 5/27/23 = SR  Stable; no changes in dosing today  INR 6/15/23     HLD  Continue Pravachol as a sub for home Simvastatin     Peripheral neuropathy  Continue Lyrica     Aortic stenosis  ECHO 7/2022:  LVEF 65%/gr 2 DD, mild-mod AS, mild TR/MR, RV systolic pressure is mod-severe elevated with systolic pressure 59 mmHg  euvolemic        Discharge date:  6/16/23    This week we will encourage independence with ADLs   We will continue to monitor lads and vital signs  We will continue to assist the pt in repositioning to prevent skin breakdown  We will continue to monitor for adequate pain control  We are maintaining adequate fluid and oral intake  We will continue to increase safety awareness and keep the pt free from falls  Pt reports adequate sleep   Pt is continent of B/B

## 2023-06-07 NOTE — CASE MANAGEMENT
Met w/pt and reviewed team update and dc for 6/16  Pt questioned how we can plan for something when we dont know what she is going to be like tomorrow  Cm explained rehab process and the need to schedule dates so patients can progress through the program  Cm explained based on how well pt is doing the team is able to identify how long it will take pt to achieve goals to return home  Pt very fixated on how she didn't do well today and what if she doesn't do any better  Cm explained there can be a plan b however the goal is for her to return home  Cm discussed therapy would continue to see her at home  Pt inquired about how she would get home and cm reminded her of last weeks conversation where she reported her neighbor or her sister in law would take her  Pt now states her sister in law works and may not be available  Cm informed her that is why there is planning so she can call her sister in law today and tell her the good news about being dc'd on 6/16  Cm stated if she can't get a ride one can be secured for her  Cm encouraged pt to focus on how well she did yesterday and not worry about how she'll do tomorrow when tomorrow isn't here yet  CM encouraged her to have a positive outlook on tomorrow's therapy sessions instead of already thinking its going to go poorly

## 2023-06-07 NOTE — PROGRESS NOTES
"   06/07/23 0800   Pain Assessment   Pain Assessment Tool 0-10   Pain Score 5   Pain Location/Orientation Orientation: Right;Location: Leg   Pain Onset/Description Onset: Ongoing   Effect of Pain on Daily Activities Patient with c/o pain throughout session  Admits to not taking her pain medication prior to therapy as she felt it was \"too early\"  Ice provided end of session   Hospital Pain Intervention(s) Cold applied   Restrictions/Precautions   Precautions Fall Risk;Pain;Supervision on toilet/commode   RLE Weight Bearing Per Order WBAT   Cognition   Overall Cognitive Status WFL   Subjective   Subjective \"I can't go home yet\"   Sit to Lying   Comment Practice bed mobility flat without use of rails next session  Does state she can plan to sleep in a recliner at home   Lying to Sitting on Side of Bed   Comment Practice bed mobility flat without use of rails next session  Does state she can plan to sleep in a recliner at home   Sit to Stand   Type of Assistance Needed Adaptive equipment;Supervision   Physical Assistance Level No physical assistance   Comment CS with RW   Sit to Stand CARE Score 4   Bed-Chair Transfer   Type of Assistance Needed Supervision; Adaptive equipment   Physical Assistance Level No physical assistance   Comment CS with RW   Chair/Bed-to-Chair Transfer CARE Score 4   Transfer Bed/Chair/Wheelchair   Limitations Noted In Pain Management;LE Strength   Adaptive Equipment Roller Walker   Car Transfer   Type of Assistance Needed Physical assistance   Physical Assistance Level 51%-75%   Comment needed boost from low surface car; states her neighbor drives a SUV   Car Transfer CARE Score 2   Walk 10 Feet   Type of Assistance Needed Incidental touching; Adaptive equipment   Physical Assistance Level No physical assistance   Comment CGA/CS with RW   Walk 10 Feet CARE Score 4   Walk 150 Feet   Reason if not Attempted Safety concerns   Walk 150 Feet CARE Score 88   Walking 10 Feet on Uneven Surfaces   Type of " "Assistance Needed Incidental touching; Adaptive equipment   Physical Assistance Level No physical assistance   Comment CGA with RW   Walking 10 Feet on Uneven Surfaces CARE Score 4   Ambulation   Primary Mode of Locomotion Prior to Admission Walk   Distance Walked (feet) 10 ft  (x2 + 30')   Assist Device Roller Walker   Gait Pattern Antalgic; Step to;Decreased R stance; Slow Liberty;Decreased foot clearance   Limitations Noted In Endurance   Does the patient walk? 2  Yes   Wheel 50 Feet with Two Turns   Reason if not Attempted Activity not applicable   Wheel 50 Feet with Two Turns CARE Score 9   Wheel 150 Feet   Reason if not Attempted Activity not applicable   Wheel 776 Feet CARE Score 9   Curb or Single Stair   Style negotiated Curb   Type of Assistance Needed Physical assistance; Adaptive equipment   Physical Assistance Level No physical assistance   Comment min/CGA with RW   1 Step (Curb) CARE Score -   4 Steps   Reason if not Attempted Safety concerns   4 Steps CARE Score 88   12 Steps   Reason if not Attempted Safety concerns   12 Steps CARE Score 88   Stairs   Type Curb   # of Steps 1   Weight Bearing Precautions Fall Risk   Assist Devices Roller Walker   Picking Up Object   Comment perform again next session   Toilet Transfer   Type of Assistance Needed Physical assistance   Physical Assistance Level 51%-75%   Comment performed toileting 2x during session  First use, patient needing only CGA with use of grab bars  At the end of session on second use, patient needed bigger boost to get up as she \"sat too far back on the seat\"   Toilet Transfer CARE Score 2   Therapeutic Interventions   Strengthening RLE 3x10 knee flexion (unable to make to 90* secondary to pain); 3x10 LAQ   Flexibility RLE heel cord and HS stretch TERT 3 minutes   Assessment   Treatment Assessment Patient engaged in PT treatment session focused on safe discharge planning   At the beginning of session, therapist initiating conversation regarding " "the TEAM meeting held later today in order to prepare for safe discharge home  Patient immediately stating, \"I can't go home  I live alone\"  Tried to further delve into where patient's concerns were as she is progressing from a functional mobility standpoint  She states if she were to go home, her neighbor is her main support  She would be the one to pick her up and maybe get into the home  (she also stated on eval her sister in law is of support but didn't mention her today)  From a PT perspective, in order to discharge home, she will need to complete 2 BLAS home at a set up level (stated today she would go around back and perform these steps where she usually pulls up with a bar and storm door; this would eliminate walking up hill), needs to be (I) with ambulation using a walker, and can sleep in a recliner  She will also need to complete her ADLs, toileting, and meal management alone  Would set up HHPT vs OPPT at home services  Will discuss further with patient after team meeting  Problem List Decreased strength;Decreased endurance; Impaired balance;Orthopedic restrictions;Pain   Barriers to Discharge Inaccessible home environment;Decreased caregiver support   PT Barriers   Functional Limitation Transfers; Walking;Stair negotiation   Plan   Treatment/Interventions Functional transfer training;LE strengthening/ROM; Elevations; Therapeutic exercise; Endurance training;Patient/family training;Equipment eval/education; Bed mobility;Gait training; Compensatory technique education   Progress Progressing toward goals   Recommendation   PT Discharge Recommendation Home with home health rehabilitation  (vs OPPT at home)   Date ordered   (Will discuss order for RW as she has 2 at home but hansel are \"too wide\" and aren't hers)   PT Therapy Minutes   PT Time In 0800   PT Time Out 0900   PT Total Time (minutes) 60   PT Mode of treatment - Individual (minutes) 60   PT Mode of treatment - Concurrent (minutes) 0   PT Mode of treatment - " Group (minutes) 0   PT Mode of treatment - Co-treat (minutes) 0   PT Mode of Treatment - Total time(minutes) 60 minutes   PT Cumulative Minutes 510

## 2023-06-07 NOTE — PROGRESS NOTES
Internal Medicine Progress Note  Patient: Lonnie Tinoco  Age/sex: 77 y o  female  Medical Record #: [id number]      ASSESSMENT/PLAN: (Interval History)  Lonnie Tinoco is seen and examined and management for following issues:    Right periprosthetic femur fracture  • S/p ORIF 5/28  • WBAT  • X-ray 6/2/23 with post-op changes      Hx PAF  • Home:  Toprol 50mg qHS/Coumadin 6 25 mg (2 1/2 tabs of 2 5mg tabs) on Mondays and 5mg the rest of the days of the week  • Here:  Toprol 50mg qHS/Coumadin 7 5mg qd (inc 6/4/23)  • She did tell Dr Estefania Jacquse that she adjusts her Coumadin on her own  • [facility] does her INRs and she has 2 5mg tabs  • EKG 5/27/23 = SR  • Continue Lovenox for DVT prophylaxis until INR is 2 0 or greater  • INR on 6/6/23 was 1 39 = gave 10mg x 1 then back to 7 5mg  • Repeat INR Thursday 6/8/23     HLD  • Continue Pravachol as a sub for home Simvastatin     Peripheral neuropathy  • Continue Lyrica     Aortic stenosis  • ECHO 7/2022:  LVEF 65%/gr 2 DD, mild-mod AS, mild TR/MR, RV systolic pressure is mod-severe elevated with systolic pressure 59 mmHg  • euvolemic        Discharge date:  Team    The above assessment and plan was reviewed and updated as determined by my evaluation of the patient on 6/7/2023      Labs:   Results from last 7 days   Lab Units 06/05/23  0452   HEMATOCRIT % 32 1*   HEMOGLOBIN g/dL 10 0*   PLATELETS Thousands/uL 313   WBC Thousand/uL 8 11     Results from last 7 days   Lab Units 06/05/23  0452   BUN mg/dL 14   CALCIUM mg/dL 9 0   CHLORIDE mmol/L 107   CO2 mmol/L 29   CREATININE mg/dL 0 43*   POTASSIUM mmol/L 4 5   SODIUM mmol/L 137         Results from last 7 days   Lab Units 06/06/23  0833 06/04/23  0458   INR  1 39* 1 19           Review of Scheduled Meds:  Current Facility-Administered Medications   Medication Dose Route Frequency Provider Last Rate   • acetaminophen  975 mg Oral Q6H [address] Priyank Pollack MD     • bisacodyl  10 mg Oral Once True MD Aleksander     • bisacodyl  10 mg Rectal Daily PRN Unknown MD Radha     • docusate sodium  100 mg Oral BID Unknown MD Radha     • enoxaparin  30 mg Subcutaneous Q12H [address] Unknown MD Radha     • latanoprost  1 drop Both Eyes HS [place]     • lidocaine   Topical Q4H PRN Unknown MD Radha     • metoprolol succinate  50 mg Oral HS Unknown MD Radha     • montelukast  10 mg Oral HS Unknown MD Radha     • oxyCODONE  5 mg Oral Q4H PRN Unknown MD Radha     • oxyCODONE  2 5 mg Oral Q4H PRN Unknown MD Radha     • polyethylene glycol  17 g Oral Daily PRN Unknown MD Radha     • polyethylene glycol  17 g Oral Daily Unknown MD Radha     • pravastatin  40 mg Oral Daily With Dylon Conley MD     • pregabalin  100 mg Oral BID Unknown MD Radha     • warfarin  7 5 mg Oral Daily (warfarin) RAYMON Tidwell         Subjective/ HPI: Patient seen and examined  Patients overnight issues or events were reviewed with nursing or staff during rounds or morning huddle session  New or overnight issues include the following:     No new or overnight issues  Offers no complaints    ROS:   A 10 point ROS was performed; negative except as noted above       *Labs /Radiology studies reviewed  *Medications reviewed and reconciled as needed  *Please refer to order section for additional ordered labs studies  *Case discussed with primary attending during morning huddle case rounds    Physical Examination:  Vitals:   Vitals:    06/06/23 0507 06/06/23 1331 06/06/23 2022 06/07/23 0513   BP: 110/50 113/60 121/54 123/60   BP Location: Left arm Right arm Left arm Left arm   Pulse: 73 74 70 64   Resp: 18 18 18 18   Temp: 98 2 °F (36 8 °C) 98 4 °F (36 9 °C) 98 1 °F (36 7 °C) 98 2 °F (36 8 °C)   TempSrc: Oral Oral Oral Oral   SpO2: 97% 97% 98% 98%   Weight:    78 3 kg (172 lb 9 6 oz)   Height:           General Appearance: no distress, conversive  HEENT: DEREK conjuctiva normal; oropharynx clear; mucous membranes moist   Neck:  Supple, normal ROM  Lungs: CTA, normal respiratory effort, no retractions, expiratory effort normal  CV: regular rate and rhythm; no rubs/gallops, +murmur  PMI normal   ABD: soft; ND/NT; +BS  EXT: no edema  Skin: normal turgor, normal texture, no rashes  Psych: affect normal, mood normal  Neuro: AAO      The above physical exam was reviewed and updated as determined by my evaluation of the patient on 6/7/2023  Invasive Devices     None                    VTE Pharmacologic Prophylaxis: Enoxaparin  Code Status: Level 3 - DNAR and DNI  Current Length of Stay: 7 day(s)      Total time spent:  30 minutes with more than 50% spent counseling/coordinating care  Counseling includes discussion with patient re: progress  and discussion with patient of his/her current medical state/information  Coordination of patient's care was performed in conjunction with primary service  Time invested included review of patient's labs, vitals, and management of their comorbidities with continued monitoring  In addition, this patient was discussed with medical team including physician and advanced extenders  The care of the patient was extensively discussed and appropriate treatment plan was formulated unique for this patient  Medical decision making for the day was made by supervising physician unless otherwise noted in their attestation statement  ** Please Note:  voice to text software may have been used in the creation of this document   Although proof errors in transcription or interpretation are a potential of such software**

## 2023-06-08 LAB
ANION GAP SERPL CALCULATED.3IONS-SCNC: 2 MMOL/L (ref 4–13)
BASOPHILS # BLD AUTO: 0.06 THOUSANDS/ÂΜL (ref 0–0.1)
BASOPHILS NFR BLD AUTO: 1 % (ref 0–1)
BUN SERPL-MCNC: 12 MG/DL (ref 5–25)
CALCIUM SERPL-MCNC: 8.8 MG/DL (ref 8.3–10.1)
CHLORIDE SERPL-SCNC: 109 MMOL/L (ref 96–108)
CO2 SERPL-SCNC: 28 MMOL/L (ref 21–32)
CREAT SERPL-MCNC: 0.39 MG/DL (ref 0.6–1.3)
EOSINOPHIL # BLD AUTO: 0.49 THOUSAND/ÂΜL (ref 0–0.61)
EOSINOPHIL NFR BLD AUTO: 7 % (ref 0–6)
ERYTHROCYTE [DISTWIDTH] IN BLOOD BY AUTOMATED COUNT: 15.8 % (ref 11.6–15.1)
GFR SERPL CREATININE-BSD FRML MDRD: 100 ML/MIN/1.73SQ M
GLUCOSE P FAST SERPL-MCNC: 96 MG/DL (ref 65–99)
GLUCOSE SERPL-MCNC: 96 MG/DL (ref 65–140)
HCT VFR BLD AUTO: 30.9 % (ref 34.8–46.1)
HGB BLD-MCNC: 9.7 G/DL (ref 11.5–15.4)
IMM GRANULOCYTES # BLD AUTO: 0.03 THOUSAND/UL (ref 0–0.2)
IMM GRANULOCYTES NFR BLD AUTO: 0 % (ref 0–2)
INR PPP: 1.9 (ref 0.84–1.19)
LYMPHOCYTES # BLD AUTO: 1.43 THOUSANDS/ÂΜL (ref 0.6–4.47)
LYMPHOCYTES NFR BLD AUTO: 20 % (ref 14–44)
MCH RBC QN AUTO: 31.2 PG (ref 26.8–34.3)
MCHC RBC AUTO-ENTMCNC: 31.4 G/DL (ref 31.4–37.4)
MCV RBC AUTO: 99 FL (ref 82–98)
MONOCYTES # BLD AUTO: 1.05 THOUSAND/ÂΜL (ref 0.17–1.22)
MONOCYTES NFR BLD AUTO: 14 % (ref 4–12)
NEUTROPHILS # BLD AUTO: 4.23 THOUSANDS/ÂΜL (ref 1.85–7.62)
NEUTS SEG NFR BLD AUTO: 58 % (ref 43–75)
NRBC BLD AUTO-RTO: 0 /100 WBCS
PLATELET # BLD AUTO: 362 THOUSANDS/UL (ref 149–390)
PMV BLD AUTO: 8.8 FL (ref 8.9–12.7)
POTASSIUM SERPL-SCNC: 4.2 MMOL/L (ref 3.5–5.3)
PROTHROMBIN TIME: 22 SECONDS (ref 11.6–14.5)
RBC # BLD AUTO: 3.11 MILLION/UL (ref 3.81–5.12)
SODIUM SERPL-SCNC: 139 MMOL/L (ref 135–147)
WBC # BLD AUTO: 7.29 THOUSAND/UL (ref 4.31–10.16)

## 2023-06-08 PROCEDURE — 97110 THERAPEUTIC EXERCISES: CPT

## 2023-06-08 PROCEDURE — 99232 SBSQ HOSP IP/OBS MODERATE 35: CPT

## 2023-06-08 PROCEDURE — 97535 SELF CARE MNGMENT TRAINING: CPT

## 2023-06-08 PROCEDURE — 99232 SBSQ HOSP IP/OBS MODERATE 35: CPT | Performed by: INTERNAL MEDICINE

## 2023-06-08 PROCEDURE — 97530 THERAPEUTIC ACTIVITIES: CPT

## 2023-06-08 PROCEDURE — 80048 BASIC METABOLIC PNL TOTAL CA: CPT | Performed by: NURSE PRACTITIONER

## 2023-06-08 PROCEDURE — 85025 COMPLETE CBC W/AUTO DIFF WBC: CPT | Performed by: NURSE PRACTITIONER

## 2023-06-08 PROCEDURE — 85610 PROTHROMBIN TIME: CPT | Performed by: NURSE PRACTITIONER

## 2023-06-08 RX ORDER — WARFARIN SODIUM 5 MG/1
5 TABLET ORAL
Status: COMPLETED | OUTPATIENT
Start: 2023-06-08 | End: 2023-06-08

## 2023-06-08 RX ORDER — WARFARIN SODIUM 7.5 MG/1
7.5 TABLET ORAL
Status: DISCONTINUED | OUTPATIENT
Start: 2023-06-09 | End: 2023-06-09

## 2023-06-08 RX ADMIN — OXYCODONE HYDROCHLORIDE 5 MG: 5 TABLET ORAL at 02:17

## 2023-06-08 RX ADMIN — ENOXAPARIN SODIUM 30 MG: 30 INJECTION SUBCUTANEOUS at 08:26

## 2023-06-08 RX ADMIN — METOPROLOL SUCCINATE 50 MG: 50 TABLET, EXTENDED RELEASE ORAL at 22:32

## 2023-06-08 RX ADMIN — ACETAMINOPHEN 975 MG: 325 TABLET, FILM COATED ORAL at 11:36

## 2023-06-08 RX ADMIN — PREGABALIN 100 MG: 100 CAPSULE ORAL at 17:06

## 2023-06-08 RX ADMIN — ENOXAPARIN SODIUM 30 MG: 30 INJECTION SUBCUTANEOUS at 20:07

## 2023-06-08 RX ADMIN — ACETAMINOPHEN 975 MG: 325 TABLET, FILM COATED ORAL at 17:06

## 2023-06-08 RX ADMIN — OXYCODONE HYDROCHLORIDE 5 MG: 5 TABLET ORAL at 08:26

## 2023-06-08 RX ADMIN — WARFARIN SODIUM 5 MG: 5 TABLET ORAL at 17:06

## 2023-06-08 RX ADMIN — PREGABALIN 100 MG: 100 CAPSULE ORAL at 08:26

## 2023-06-08 RX ADMIN — LATANOPROST 1 DROP: 50 SOLUTION OPHTHALMIC at 22:33

## 2023-06-08 RX ADMIN — MONTELUKAST SODIUM 10 MG: 10 TABLET, COATED ORAL at 22:32

## 2023-06-08 RX ADMIN — Medication 2.5 MG: at 16:06

## 2023-06-08 RX ADMIN — DOCUSATE SODIUM 100 MG: 100 CAPSULE, LIQUID FILLED ORAL at 17:04

## 2023-06-08 RX ADMIN — PRAVASTATIN SODIUM 40 MG: 40 TABLET ORAL at 16:06

## 2023-06-08 RX ADMIN — ACETAMINOPHEN 975 MG: 325 TABLET, FILM COATED ORAL at 05:14

## 2023-06-08 RX ADMIN — ACETAMINOPHEN 975 MG: 325 TABLET, FILM COATED ORAL at 23:35

## 2023-06-08 NOTE — PLAN OF CARE
Problem: PAIN - ADULT  Goal: Verbalizes/displays adequate comfort level or baseline comfort level  Description: Interventions:  - Encourage patient to monitor pain and request assistance  - Assess pain using appropriate pain scale  - Administer analgesics based on type and severity of pain and evaluate response  - Implement non-pharmacological measures as appropriate and evaluate response  - Consider cultural and social influences on pain and pain management  - Notify physician/advanced practitioner if interventions unsuccessful or patient reports new pain  Outcome: Progressing     Problem: INFECTION - ADULT  Goal: Absence or prevention of progression during hospitalization  Description: INTERVENTIONS:  - Assess and monitor for signs and symptoms of infection  - Monitor lab/diagnostic results  - Monitor all insertion sites, i e  indwelling lines, tubes, and drains  - Monitor endotracheal if appropriate and nasal secretions for changes in amount and color  - Worth appropriate cooling/warming therapies per order  - Administer medications as ordered  - Instruct and encourage patient and family to use good hand hygiene technique  - Identify and instruct in appropriate isolation precautions for identified infection/condition  Outcome: Progressing     Problem: SAFETY ADULT  Goal: Patient will remain free of falls  Description: INTERVENTIONS:  - Educate patient/family on patient safety including physical limitations  - Instruct patient to call for assistance with activity   - Consult OT/PT to assist with strengthening/mobility   - Keep Call bell within reach  - Keep bed low and locked with side rails adjusted as appropriate  - Keep care items and personal belongings within reach  - Initiate and maintain comfort rounds  - Make Fall Risk Sign visible to staff  - Offer Toileting every  Hours, in advance of need  - Initiate/Maintain alarm  - Obtain necessary fall risk management equipment:   - Apply yellow socks and bracelet for high fall risk patients  - Consider moving patient to room near nurses station  Outcome: Progressing  Goal: Maintain or return to baseline ADL function  Description: INTERVENTIONS:  -  Assess patient's ability to carry out ADLs; assess patient's baseline for ADL function and identify physical deficits which impact ability to perform ADLs (bathing, care of mouth/teeth, toileting, grooming, dressing, etc )  - Assess/evaluate cause of self-care deficits   - Assess range of motion  - Assess patient's mobility; develop plan if impaired  - Assess patient's need for assistive devices and provide as appropriate  - Encourage maximum independence but intervene and supervise when necessary  - Involve family in performance of ADLs  - Assess for home care needs following discharge   - Consider OT consult to assist with ADL evaluation and planning for discharge  - Provide patient education as appropriate  Outcome: Progressing  Goal: Maintains/Returns to pre admission functional level  Description: INTERVENTIONS:  - Perform BMAT or MOVE assessment daily    - Set and communicate daily mobility goal to care team and patient/family/caregiver  - Collaborate with rehabilitation services on mobility goals if consulted  - Perform Range of Motion  times a day  - Reposition patient every  hours    - Dangle patient  times a day  - Stand patient times a day  - Ambulate patient  times a day  - Out of bed to chair  times a day   - Out of bed for meals  times a day  - Out of bed for toileting  - Record patient progress and toleration of activity level   Outcome: Progressing     Problem: DISCHARGE PLANNING  Goal: Discharge to home or other facility with appropriate resources  Description: INTERVENTIONS:  - Identify barriers to discharge w/patient and caregiver  - Arrange for needed discharge resources and transportation as appropriate  - Identify discharge learning needs (meds, wound care, etc )  - Arrange for interpretive services to assist at discharge as needed  - Refer to Case Management Department for coordinating discharge planning if the patient needs post-hospital services based on physician/advanced practitioner order or complex needs related to functional status, cognitive ability, or social support system  Outcome: Progressing     Problem: Prexisting or High Potential for Compromised Skin Integrity  Goal: Skin integrity is maintained or improved  Description: INTERVENTIONS:  - Identify patients at risk for skin breakdown  - Assess and monitor skin integrity  - Assess and monitor nutrition and hydration status  - Monitor labs   - Assess for incontinence   - Turn and reposition patient  - Assist with mobility/ambulation  - Relieve pressure over bony prominences  - Avoid friction and shearing  - Provide appropriate hygiene as needed including keeping skin clean and dry  - Evaluate need for skin moisturizer/barrier cream  - Collaborate with interdisciplinary team   - Patient/family teaching  - Consider wound care consult   Outcome: Progressing     Problem: MOBILITY - ADULT  Goal: Maintain or return to baseline ADL function  Description: INTERVENTIONS:  -  Assess patient's ability to carry out ADLs; assess patient's baseline for ADL function and identify physical deficits which impact ability to perform ADLs (bathing, care of mouth/teeth, toileting, grooming, dressing, etc )  - Assess/evaluate cause of self-care deficits   - Assess range of motion  - Assess patient's mobility; develop plan if impaired  - Assess patient's need for assistive devices and provide as appropriate  - Encourage maximum independence but intervene and supervise when necessary  - Involve family in performance of ADLs  - Assess for home care needs following discharge   - Consider OT consult to assist with ADL evaluation and planning for discharge  - Provide patient education as appropriate  Outcome: Progressing  Goal: Maintains/Returns to pre admission functional level  Description: INTERVENTIONS:  - Perform BMAT or MOVE assessment daily    - Set and communicate daily mobility goal to care team and patient/family/caregiver  - Collaborate with rehabilitation services on mobility goals if consulted  - Perform Range of Motion  times a day  - Reposition patient every  hours    - Dangle patient  times a day  - Stand patient  times a day  - Ambulate patient  times a day  - Out of bed to chair  times a day   - Out of bed for meals times a day  - Out of bed for toileting  - Record patient progress and toleration of activity level   Outcome: Progressing

## 2023-06-08 NOTE — PROGRESS NOTES
06/08/23 1000   Pain Assessment   Pain Assessment Tool 0-10   Pain Score 4   Pain Location/Orientation Orientation: Right;Location: Hip   Pain Onset/Description Onset: Ongoing;Frequency: Intermittent   Patient's Stated Pain Goal No pain   Hospital Pain Intervention(s) Repositioned;Relaxation technique   Restrictions/Precautions   Precautions Fall Risk;Pain;Supervision on toilet/commode   RLE Weight Bearing Per Order WBAT   ROM Restrictions No   Cognition   Overall Cognitive Status WFL   Arousal/Participation Alert; Cooperative   Attention Within functional limits   Orientation Level Oriented X4   Memory Decreased short term memory   Following Commands Follows multistep commands with increased time or repetition   Sit to Lying   Type of Assistance Needed Physical assistance;Verbal cues   Physical Assistance Level 25% or less   Comment only A to RLE, HOB flat no rails   Sit to Lying CARE Score 3   Lying to Sitting on Side of Bed   Type of Assistance Needed Supervision;Verbal cues   Comment S with increased time, HOB flat with no bed rails   Lying to Sitting on Side of Bed CARE Score 4   Sit to Stand   Type of Assistance Needed Supervision; Adaptive equipment   Comment with RW and increased time   Sit to Stand CARE Score 4   Bed-Chair Transfer   Type of Assistance Needed Supervision; Adaptive equipment   Comment CS with RW   Chair/Bed-to-Chair Transfer CARE Score 4   Transfer Bed/Chair/Wheelchair   Adaptive Equipment Roller Walker   Walk 10 Feet   Type of Assistance Needed Supervision; Adaptive equipment   Physical Assistance Level No physical assistance   Comment CS with RW   Walk 10 Feet CARE Score 4   Walk 150 Feet   Reason if not Attempted Safety concerns   Walk 150 Feet CARE Score 88   Ambulation   Primary Mode of Locomotion Prior to Admission Walk   Distance Walked (feet) 15 ft  (x3)   Assist Device Roller Walker   Gait Pattern Antalgic; Inconsistant [place];Decreased foot clearance; Improper weight shift "  Limitations Noted In Endurance;Speed   Provided Assistance with: Balance;Direction   Walk Assist Level Close Supervision   Does the patient walk? 2  Yes   Wheel 50 Feet with Two Turns   Reason if not Attempted Activity not applicable   Wheel 50 Feet with Two Turns CARE Score 9   Wheel 150 Feet   Reason if not Attempted Activity not applicable   Wheel 295 Feet CARE Score 9   Wheelchair mobility   Does the patient use a wheelchair? 0  No   Curb or Single Stair   Style negotiated Curb   Type of Assistance Needed Incidental touching; Adaptive equipment;Physical assistance;Verbal cues   Physical Assistance Level 25% or less   Comment CGA/LAKSHMI with RW   1 Step (Curb) CARE Score 3   4 Steps   Reason if not Attempted Safety concerns   4 Steps CARE Score 88   12 Steps   Reason if not Attempted Safety concerns   12 Steps CARE Score 88   Stairs   Type Curb   # of Steps 1   Weight Bearing Precautions Fall Risk   Assist Devices Roller Walker   Therapeutic Interventions   Strengthening Supine SAQ, hip ABD AAROM to RLE, heel slides AAROM to RLE, ankle DF/PT and glute sets 3 x10 reps BLE with 2# to LLE  Assessment   Treatment Assessment Pt participated in skilled PT session with increased focus on HH dist amb, increased stair negotiation and LE strengthening/ROM  Pt cont to be limited by increased pain and decreased RLE ROM  Pt educated on cont to perform LE TE's as able in room to decrease R hip \"stiffness\"  Pt will cont to work on increased I with functional transfers, gait and bed mobility  Problem List Decreased strength;Decreased endurance; Impaired balance;Orthopedic restrictions;Pain;Decreased range of motion;Decreased mobility   Barriers to Discharge Inaccessible home environment;Decreased caregiver support   PT Barriers   Functional Limitation Transfers; Walking;Stair negotiation   Plan   Treatment/Interventions Functional transfer training;LE strengthening/ROM; Therapeutic exercise; Endurance training;Gait training;Bed " mobility   Progress Progressing toward goals   Recommendation   PT Discharge Recommendation Home with home health rehabilitation   Equipment Recommended Walker   PT Therapy Minutes   PT Time In 1000   PT Time Out 1130   PT Total Time (minutes) 90   PT Mode of treatment - Individual (minutes) 90   PT Mode of treatment - Concurrent (minutes) 0   PT Mode of treatment - Group (minutes) 0   PT Mode of treatment - Co-treat (minutes) 0   PT Mode of Treatment - Total time(minutes) 90 minutes   PT Cumulative Minutes 600   Therapy Time missed   Time missed?  No

## 2023-06-08 NOTE — PROGRESS NOTES
"   06/08/23 0700   Pain Assessment   Pain Assessment Tool 0-10   Pain Score 3   Pain Location/Orientation Orientation: Right;Location: Hip;Location: Leg   Hospital Pain Intervention(s) Repositioned; Shower/Bath   Restrictions/Precautions   Precautions Fall Risk;Pain;Supervision on toilet/commode   RLE Weight Bearing Per Order WBAT   ROM Restrictions No   Lifestyle   Autonomy \"I think I just need to change my expectations  \"   Eating   Type of Assistance Needed Independent   Physical Assistance Level No physical assistance   Eating CARE Score 6   Oral Hygiene   Type of Assistance Needed Set-up / clean-up   Physical Assistance Level No physical assistance   Comment seated due to fatigue post shower and inc pain  Oral Hygiene CARE Score 5   Shower/Bathe Self   Type of Assistance Needed Physical assistance   Physical Assistance Level 25% or less   Comment Pt continues to require guarding while in stance for rear/siri hygiene and A for distal LE's, educated on use of LH sponge  Pt receptive  Completes majority of bathing while seated on shower chair  Shower/Bathe Self CARE Score 3   Tub/Shower Transfer   Findings Simulated home method of SPT into shower stall with use of GB and shower seat at an overall CGA level  Upper Body Dressing   Type of Assistance Needed Set-up / clean-up   Physical Assistance Level No physical assistance   Upper Body Dressing CARE Score 5   Lower Body Dressing   Type of Assistance Needed Physical assistance   Physical Assistance Level 25% or less   Comment Pt able to thread BLEs into shorts with inc time and use of Cloteal Cuff, continues to require Sudeep for CM up over hips while in stance in order to maintain balance  Lower Body Dressing CARE Score 3   Putting On/Taking Off Footwear   Type of Assistance Needed Physical assistance   Physical Assistance Level 25% or less   Comment Pt able to slip on shoes and don socks with use of LHAE as is her baseline   Pt states typically not tying shoes, " "however, educated on safety risks of same  Pt states having elastic laces at home, again asked pt to speak with sister in law to bring in when she brings in other items from home during a visit  Putting On/Taking Off Footwear CARE Score 3   Lying to Sitting on Side of Bed   Type of Assistance Needed Adaptive equipment;Verbal cues; Physical assistance   Physical Assistance Level 25% or less   Comment Without use of bed functions pt continues to require Sudeep for trunk, pt states could sleep in recliner at home if needed  Lying to Sitting on Side of Bed CARE Score 3   Sit to Stand   Type of Assistance Needed Supervision   Physical Assistance Level No physical assistance   Comment Inc time  Sit to Stand CARE Score 4   Bed-Chair Transfer   Type of Assistance Needed Supervision   Physical Assistance Level No physical assistance   Comment CS w RW   Chair/Bed-to-Chair Transfer CARE Score 4   Cognition   Overall Cognitive Status WFL   Arousal/Participation Alert; Cooperative   Attention Within functional limits   Orientation Level Oriented X4   Memory Decreased short term memory   Following Commands Follows multistep commands with increased time or repetition   Activity Tolerance   Activity Tolerance Patient limited by pain   Assessment   Treatment Assessment Pt participated in skilled OT services with focus on ADL retraining  Extensive conversation in regards to realistic goal setting as pt self admits to having \"lofty goals\"  Reminded pt that this is all temporary and pt can still be independent with all the basic I/ADL tasks in a modified way  Gave pt \"homework\" of identifying all remaining areas of concern where she does not feel confident so we can continue to focus on those areas in prep for d/c 6/16   Encouraged pt to speak with support systems- neighbor, sister in law- in regards to A for higher level tasks such as taking garbage out, grocery shopping, driving, etc  Pt reports feeling better about d/c planning " post conversation and will continue to benefit from ongoing f/u in regards to same  Pt continues to be limited by pain, dec standing balance/tolerance, dec act bryan, dec endurance, dec strength  Pt will continue to benefit from skilled OT services following POC with focus on above mentioned deficits- IADL retraining  Prognosis Good   Problem List Decreased strength;Decreased endurance; Impaired balance;Orthopedic restrictions;Pain;Decreased range of motion;Decreased mobility   Plan   Treatment/Interventions ADL retraining;Functional transfer training; Therapeutic exercise; Endurance training;Patient/family training;Equipment eval/education; Bed mobility; Compensatory technique education   Progress Progressing toward goals   Recommendation   OT Discharge Recommendation   (outpt PT at home, no OT needs)   OT Therapy Minutes   OT Time In 0700   OT Time Out 0830   OT Total Time (minutes) 90   OT Mode of treatment - Individual (minutes) 90   OT Mode of treatment - Concurrent (minutes) 0   OT Mode of treatment - Group (minutes) 0   OT Mode of treatment - Co-treat (minutes) 0   OT Mode of Treatment - Total time(minutes) 90 minutes   OT Cumulative Minutes 675   Therapy Time missed   Time missed?  No

## 2023-06-08 NOTE — PROGRESS NOTES
Internal Medicine Progress Note  Patient: Edson Lagos  Age/sex: 77 y o  female  Medical Record #: [id number]      ASSESSMENT/PLAN: (Interval History)  Edson Lagos is seen and examined and management for following issues:    Right periprosthetic femur fracture  • S/p ORIF 5/28  • WBAT  • X-ray 6/2/23 with post-op changes      Hx PAF  • Home:  Toprol 50mg qHS/Coumadin 6 25 mg (2 1/2 tabs of 2 5mg tabs) on Mondays and 5mg the rest of the days of the week  • Here:  Toprol 50mg qHS/Coumadin 7 5mg qd   • She did tell Dr Joey Arciniega that she adjusts her Coumadin on her own  • Gunnar Ariraga does her INRs and she has 2 5mg tabs  • EKG 5/27/23 = SR  • Continue Lovenox for DVT prophylaxis until INR is 2 0 or greater  • INR on 6/6/23 was 1 39 = gave 10mg x 1 then back to 7 5mg  • Will give Coumadin 5mg tonight since big jump  • Repeat INR Friday 6/9/23     HLD  • Continue Pravachol as a sub for home Simvastatin     Peripheral neuropathy  • Continue Lyrica     Aortic stenosis  • ECHO 7/2022:  LVEF 65%/gr 2 DD, mild-mod AS, mild TR/MR, RV systolic pressure is mod-severe elevated with systolic pressure 59 mmHg  • euvolemic        Discharge date:  Team    The above assessment and plan was reviewed and updated as determined by my evaluation of the patient on 6/8/2023      Labs:   Results from last 7 days   Lab Units 06/08/23  0459 06/05/23  0452   HEMATOCRIT % 30 9* 32 1*   HEMOGLOBIN g/dL 9 7* 10 0*   PLATELETS Thousands/uL 362 313   WBC Thousand/uL 7 29 8 11     Results from last 7 days   Lab Units 06/08/23  0459 06/05/23  0452   BUN mg/dL 12 14   CALCIUM mg/dL 8 8 9 0   CHLORIDE mmol/L 109* 107   CO2 mmol/L 28 29   CREATININE mg/dL 0 39* 0 43*   POTASSIUM mmol/L 4 2 4 5   SODIUM mmol/L 139 137         Results from last 7 days   Lab Units 06/08/23  0449 06/06/23  0833   INR  1 90* 1 39*           Review of Scheduled Meds:  Current Facility-Administered Medications   Medication Dose Route Frequency Provider Last Rate   • acetaminophen  975 mg Oral Q6H [address] Juventino Bowen MD     • bisacodyl  10 mg Oral Once Moris Bermudez MD     • bisacodyl  10 mg Rectal Daily PRN Juventino Bowen MD     • docusate sodium  100 mg Oral BID Juventino Bowen MD     • enoxaparin  30 mg Subcutaneous Q12H [address] Juventino Bowen MD     • latanoprost  1 drop Both Eyes HS Daryle Pillow [place], CRNP     • lidocaine   Topical Q4H PRN Juventino Bowen MD     • metoprolol succinate  50 mg Oral HS Juventino Bowen MD     • montelukast  10 mg Oral HS Juventino Bowen MD     • oxyCODONE  5 mg Oral Q4H PRN Juventino Bowen MD     • oxyCODONE  2 5 mg Oral Q4H PRN Juventino Bowen MD     • polyethylene glycol  17 g Oral Daily PRN Juventino Bowen MD     • polyethylene glycol  17 g Oral Daily Juventino Bowen MD     • pravastatin  40 mg Oral Daily With Shea Suresh MD     • pregabalin  100 mg Oral BID Juventino Bowen MD     • warfarin  7 5 mg Oral Daily (warfarin) RAYMON Banegas         Subjective/ HPI: Patient seen and examined  Patients overnight issues or events were reviewed with nursing or staff during rounds or morning huddle session  New or overnight issues include the following:     No new or overnight issues  Offers no complaints    ROS:   A 10 point ROS was performed; negative except as noted above       *Labs /Radiology studies reviewed  *Medications reviewed and reconciled as needed  *Please refer to order section for additional ordered labs studies  *Case discussed with primary attending during morning huddle case rounds    Physical Examination:  Vitals:   Vitals:    06/07/23 0513 06/07/23 1338 06/07/23 2053 06/08/23 0432   BP: 123/60 127/59 132/58 146/65   BP Location: Left arm Left arm  Right arm   Pulse: 64 72 70 69   Resp: 18 18 18 17   Temp: 98 2 °F (36 8 °C) 98 1 °F (36 7 °C) 98 1 °F (36 7 °C) 97 9 °F (36 6 °C)   TempSrc: Oral Oral Oral Oral   SpO2: 98% 96% 96% 92%   Weight: 78 3 kg (172 lb 9 6 oz)      Height:           General Appearance: no distress, conversive  HEENT:  External ear normal   Nose normal w/o drainage  Mucous membranes are moist  Oropharynx is clear  Conjunctiva clear w/o icterus or redness  Neck:  Supple, normal ROM  Lungs: BBS without crackles/wheeze/rhonchi; respirations unlabored with normal inspiratory/expiratory effort  No retractions noted  On RA  CV: regular rate and rhythm; no rubs/murmurs/gallops, PMI normal   ABD: Abdomen is soft  Bowel sounds all quadrants  Nontender with no distention  EXT: mild RLE edema  Skin: normal turgor, normal texture, no rashes  Psych: affect normal, mood normal  Neuro: AAO     The above physical exam was reviewed and updated as determined by my evaluation of the patient on 6/8/2023  Invasive Devices     None                    VTE Pharmacologic Prophylaxis: Enoxaparin  Code Status: Level 3 - DNAR and DNI  Current Length of Stay: 8 day(s)      Total time spent:  30 minutes with more than 50% spent counseling/coordinating care  Counseling includes discussion with patient re: progress  and discussion with patient of his/her current medical state/information  Coordination of patient's care was performed in conjunction with primary service  Time invested included review of patient's labs, vitals, and management of their comorbidities with continued monitoring  In addition, this patient was discussed with medical team including physician and advanced extenders  The care of the patient was extensively discussed and appropriate treatment plan was formulated unique for this patient  Medical decision making for the day was made by supervising physician unless otherwise noted in their attestation statement  ** Please Note:  voice to text software may have been used in the creation of this document   Although proof errors in transcription or interpretation are a potential of such software**

## 2023-06-09 LAB
DME PARACHUTE DELIVERY DATE REQUESTED: NORMAL
DME PARACHUTE ITEM DESCRIPTION: NORMAL
DME PARACHUTE ORDER STATUS: NORMAL
DME PARACHUTE SUPPLIER NAME: NORMAL
DME PARACHUTE SUPPLIER PHONE: NORMAL
INR PPP: 2.15 (ref 0.84–1.19)
PROTHROMBIN TIME: 24.3 SECONDS (ref 11.6–14.5)

## 2023-06-09 PROCEDURE — 85610 PROTHROMBIN TIME: CPT | Performed by: NURSE PRACTITIONER

## 2023-06-09 PROCEDURE — 99232 SBSQ HOSP IP/OBS MODERATE 35: CPT

## 2023-06-09 PROCEDURE — 97530 THERAPEUTIC ACTIVITIES: CPT

## 2023-06-09 PROCEDURE — 97535 SELF CARE MNGMENT TRAINING: CPT

## 2023-06-09 PROCEDURE — 99232 SBSQ HOSP IP/OBS MODERATE 35: CPT | Performed by: NURSE PRACTITIONER

## 2023-06-09 PROCEDURE — 97110 THERAPEUTIC EXERCISES: CPT

## 2023-06-09 RX ORDER — WARFARIN SODIUM 5 MG/1
5 TABLET ORAL
Status: DISCONTINUED | OUTPATIENT
Start: 2023-06-10 | End: 2023-06-16 | Stop reason: HOSPADM

## 2023-06-09 RX ORDER — WARFARIN SODIUM 7.5 MG/1
7.5 TABLET ORAL 3 TIMES WEEKLY
Status: DISCONTINUED | OUTPATIENT
Start: 2023-06-09 | End: 2023-06-09

## 2023-06-09 RX ORDER — WARFARIN SODIUM 7.5 MG/1
7.5 TABLET ORAL 3 TIMES WEEKLY
Status: DISCONTINUED | OUTPATIENT
Start: 2023-06-09 | End: 2023-06-16 | Stop reason: HOSPADM

## 2023-06-09 RX ORDER — WARFARIN SODIUM 5 MG/1
5 TABLET ORAL
Status: DISCONTINUED | OUTPATIENT
Start: 2023-06-10 | End: 2023-06-09

## 2023-06-09 RX ADMIN — PRAVASTATIN SODIUM 40 MG: 40 TABLET ORAL at 16:29

## 2023-06-09 RX ADMIN — Medication 2.5 MG: at 02:42

## 2023-06-09 RX ADMIN — ACETAMINOPHEN 975 MG: 325 TABLET, FILM COATED ORAL at 06:25

## 2023-06-09 RX ADMIN — ACETAMINOPHEN 975 MG: 325 TABLET, FILM COATED ORAL at 17:10

## 2023-06-09 RX ADMIN — OXYCODONE HYDROCHLORIDE 5 MG: 5 TABLET ORAL at 16:32

## 2023-06-09 RX ADMIN — ACETAMINOPHEN 975 MG: 325 TABLET, FILM COATED ORAL at 11:55

## 2023-06-09 RX ADMIN — PREGABALIN 100 MG: 100 CAPSULE ORAL at 08:18

## 2023-06-09 RX ADMIN — LATANOPROST 1 DROP: 50 SOLUTION OPHTHALMIC at 21:13

## 2023-06-09 RX ADMIN — ENOXAPARIN SODIUM 30 MG: 30 INJECTION SUBCUTANEOUS at 08:18

## 2023-06-09 RX ADMIN — Medication 2.5 MG: at 08:32

## 2023-06-09 RX ADMIN — MONTELUKAST SODIUM 10 MG: 10 TABLET, COATED ORAL at 21:12

## 2023-06-09 RX ADMIN — PREGABALIN 100 MG: 100 CAPSULE ORAL at 17:10

## 2023-06-09 RX ADMIN — POLYETHYLENE GLYCOL 3350 17 G: 17 POWDER, FOR SOLUTION ORAL at 17:26

## 2023-06-09 RX ADMIN — WARFARIN SODIUM 7.5 MG: 7.5 TABLET ORAL at 17:10

## 2023-06-09 RX ADMIN — ACETAMINOPHEN 975 MG: 325 TABLET, FILM COATED ORAL at 23:51

## 2023-06-09 NOTE — CASE MANAGEMENT
In preparation for dc cm received an order from therapy for a fransico roller walker  Order placed through [employer] via [employer] for item to be delivered to pts room prior to dc  Referral made via [employer] for outpt physical therapy in the home to [facility]   Awaiting determination

## 2023-06-09 NOTE — PROGRESS NOTES
"   06/09/23 1230   Pain Assessment   Pain Assessment Tool 0-10   Pain Score 8   Pain Location/Orientation Orientation: Right;Location: Hip   Pain Onset/Description Onset: Ongoing; Descriptor: Sore   Effect of Pain on Daily Activities Inc with activity   Patient's Stated Pain Goal No pain   Hospital Pain Intervention(s) Repositioned;Elevated; Emotional support   Restrictions/Precautions   Precautions Fall Risk;Pain;Supervision on toilet/commode   RLE Weight Bearing Per Order WBAT   ROM Restrictions No   Lifestyle   Autonomy \"I am proud of myself for standing so long today\"   Sit to Stand   Type of Assistance Needed Supervision; Adaptive equipment   Physical Assistance Level No physical assistance   Comment increased time, RW   Sit to Stand CARE Score 4   Bed-Chair Transfer   Type of Assistance Needed Supervision; Adaptive equipment   Physical Assistance Level No physical assistance   Comment increased time due to pain, RW  verbal cues required at times to assist in proper linining up with chair behind and location of arm rests  Chair/Bed-to-Chair Transfer CARE Score 4   Meal Prep   Meal Prep Level Walker   Meal Prep Level of Assistance Contact guard   Meal Preparation Pt complete hot meal prep activity  Discussed typical meals made on a weekly basis at home  Pt stating that she loves to make homemade soup from scratch, hot meals, or make sandwiches  Pt tasked with cooking eggs on the stove and making toast  Pt able to appropriately locate all items needed for task with increased time due to unfamiliarity with kitchen  Pt RW equipped with tray for ease of use during activity, however, pt tend to transport items via sliding on counter  Pt able to utilize kitchen items to make desired meal and remain in stance with support from RW during activity  Discussed placing walker on side and using kitchen counter for support instead of reaching over RW for safety  Pt stand for entirety of tasks, approx 30 minutes     Kitchen " Mobility   Kitchen-Mobility Level Walker   Kitchen Activity Retrieve items;Transport items   Kitchen Mobility Comments Pt RW equipped with RW tray, however, throughout activity, Pt tend to transport items via sliding on counter  In discussion regarding kitchen set up, Pt stating that home kitchen is U shaped with stove located in the center  Pt is able to slide items as demonstrated in todays session  Light Housekeeping   Light Housekeeping Level Walker   Light Housekeeping Level of Assistance Contact guard   Light Housekeeping Pt complete light housekeeping tasks following meal prep activity  Pt in stance at sink to complete dish washing tasks with support from kitchen counter, approximately 10 minutes  Discussed laundry setup in home environment being in spare room with top loading washing machine and front loading dryer  Pt stating that laundry basket is equipped with rope and she drags and pulls behind PTA  Pt also stating that for taking out the garbage she has a similar setup the can is on wheels and can be pulled with a rope  Education provided that dragging behind may be more difficult with walker use, advised that patients break up laundry/trash into smaller lighter loads to reduce difficulty  Pt may benefit from practice of laundry/garbage tasks in future session to simulate home environment  Functional Standing Tolerance   Time 40 minutes total   Comments see kitchen mob and meal prep   Therapeutic Excerise-Strength   UE Strength Yes   Right Upper Extremity- Strength   RUE Strength Comment Pt perform UE strengthening to increase functional performance of transfers and daily activities  Pt complete all exercises with 2# dumbbell and perform 3x10 repititions  Pt complete bicep curls, chest press, shoulder flexion, and tricep extension  Pt complete all exercises seated with rest beaks to combat fatigue     Left Upper Extremity-Strength   LUE Strength Comment see above   Cognition   Overall Cognitive Status WFL   Arousal/Participation Alert; Cooperative   Attention Within functional limits   Orientation Level Oriented X4   Memory Decreased short term memory   Following Commands Follows multistep commands with increased time or repetition   Activity Tolerance   Activity Tolerance Patient tolerated treatment well   Assessment   Treatment Assessment Pt participated in 90 minute OT session with focus on iADL retraining, functional mobility, GMS, and functional transfers  Pt tolerated treatment well, however, was limited by pain towards end of session  Pt would continue to benefit from continued OT treatment focused on ADL retraining, iADL retraining, functional mobility, functional transfers, energy conservation, and activity tolerance  Anticipate DC on 6/16/23  At end of session, Pt seated in recliner chair, call bell in reach, and all needs met  Prognosis Good   Problem List Decreased strength;Decreased endurance;Decreased range of motion; Impaired balance;Decreased mobility;Pain   Plan   Treatment/Interventions ADL retraining;Functional transfer training; Endurance training; Therapeutic exercise;Equipment eval/education; Compensatory technique education;Patient/family training   Progress Progressing toward goals   OT Therapy Minutes   OT Time In 1230   OT Time Out 1400   OT Total Time (minutes) 90   OT Mode of treatment - Individual (minutes) 90   OT Mode of treatment - Concurrent (minutes) 0   OT Mode of treatment - Group (minutes) 0   OT Mode of treatment - Co-treat (minutes) 0   OT Mode of Treatment - Total time(minutes) 90 minutes   OT Cumulative Minutes 765   Therapy Time missed   Time missed?  No

## 2023-06-09 NOTE — PROGRESS NOTES
Pastoral Care Progress Note    2023  Patient: Michael Parry : 1945  Admission Date & Time: 2023 1446  MRN: [id number] CSN: [id number]          Made brief visit of encouragement and catch up with Willie Khan as she was finishing an OT session  She had a good day and feels she is making progress   will keep checking in on her

## 2023-06-09 NOTE — PROGRESS NOTES
Internal Medicine Progress Note  Patient: Duane Garcia  Age/sex: 77 y o  female  Medical Record #: [id number]      ASSESSMENT/PLAN: (Interval History)  Duane Garcia is seen and examined and management for following issues:    Right periprosthetic femur fracture  • S/p ORIF 5/28  • WBAT  • X-ray 6/2/23 with post-op changes      Hx PAF  • Home:  Toprol 50mg qHS/Coumadin 6 25 mg (2 1/2 tabs of 2 5mg tabs) on Mondays and 5mg the rest of the days of the week  • Here:  Toprol 50mg qHS/Coumadin as below  • She did tell Dr Yocasta Peoples that she adjusts her Coumadin on her own  • [facility] does her INRs and she has 2 5mg tabs at home  • EKG 5/27/23 = SR  • Gave Coumadin 5mg 6/8 instead of 7 5 since her INR took a big jump  • Stop Lovenox since INR is 2 15 today and change Coumadin dosing to 7 5mg MWF and 5mg T/Th/S/S  • Repeat INR 6/12/23     HLD  • Continue Pravachol as a sub for home Simvastatin     Peripheral neuropathy  • Continue Lyrica     Aortic stenosis  • ECHO 7/2022:  LVEF 65%/gr 2 DD, mild-mod AS, mild TR/MR, RV systolic pressure is mod-severe elevated with systolic pressure 59 mmHg  • euvolemic        Discharge date:  Team    The above assessment and plan was reviewed and updated as determined by my evaluation of the patient on 6/9/2023      Labs:   Results from last 7 days   Lab Units 06/08/23  0459 06/05/23  0452   HEMATOCRIT % 30 9* 32 1*   HEMOGLOBIN g/dL 9 7* 10 0*   PLATELETS Thousands/uL 362 313   WBC Thousand/uL 7 29 8 11     Results from last 7 days   Lab Units 06/08/23  0459 06/05/23  0452   BUN mg/dL 12 14   CALCIUM mg/dL 8 8 9 0   CHLORIDE mmol/L 109* 107   CO2 mmol/L 28 29   CREATININE mg/dL 0 39* 0 43*   POTASSIUM mmol/L 4 2 4 5   SODIUM mmol/L 139 137         Results from last 7 days   Lab Units 06/09/23  0621 06/08/23  0449   INR  2 15* 1 90*           Review of Scheduled Meds:  Current Facility-Administered Medications   Medication Dose Route Frequency Provider Last Rate   • acetaminophen  974 mg Oral Q6H [facility] Marcela Barone MD     • bisacodyl  10 mg Oral Once Gabriel Pedraza MD     • bisacodyl  10 mg Rectal Daily PRN Marcelajavier Barone MD     • docusate sodium  100 mg Oral BID Marcela Barone MD     • enoxaparin  30 mg Subcutaneous Q12H [facility] Marcela Barone MD     • latanoprost  1 drop Both Eyes HS RAYMON Valentin     • lidocaine   Topical Q4H PRN Marcela Bound, MD     • metoprolol succinate  50 mg Oral HS Marcela Bound, MD     • montelukast  10 mg Oral HS Marcela Bound, MD     • oxyCODONE  5 mg Oral Q4H PRN Marcela Bound, MD     • oxyCODONE  2 5 mg Oral Q4H PRN Marcela Bound, MD     • polyethylene glycol  17 g Oral Daily PRN Marcela Bound, MD     • polyethylene glycol  17 g Oral Daily Marcela Bound, MD     • pravastatin  40 mg Oral Daily With Jailene Jung MD     • pregabalin  100 mg Oral BID Marcela Bound, MD     • warfarin  7 5 mg Oral Daily (warfarin) Baylee SprayRAYMON         Subjective/ HPI: Patient seen and examined  Patients overnight issues or events were reviewed with nursing or staff during rounds or morning huddle session  New or overnight issues include the following:     No new or overnight issues  Offers no complaints    ROS:   A 10 point ROS was performed; negative except as noted above       *Labs /Radiology studies reviewed  *Medications reviewed and reconciled as needed  *Please refer to order section for additional ordered labs studies  *Case discussed with primary attending during morning huddle case rounds    Physical Examination:  Vitals:   Vitals:    06/08/23 0432 06/08/23 1338 06/08/23 2032 06/09/23 0439   BP: 146/65 122/56 143/63 109/54   BP Location: Right arm Right arm Left arm Right arm   Pulse: 69 73 69 60   Resp: 17 18 18 19   Temp: 97 9 °F (36 6 °C) 98 1 °F (36 7 °C) 98 °F (36 7 °C) 98 2 °F (36 8 °C)   TempSrc: Oral Oral Oral Oral   SpO2: 92% 96% 98% 96%   Weight: Height:           General Appearance: no distress, conversive  HEENT: PERRLA, conjuctiva normal; oropharynx clear; mucous membranes moist   Neck:  Supple, normal ROM  Lungs: CTA, normal respiratory effort, no retractions, expiratory effort normal  CV: regular rate and rhythm; no rubs/gallops, +murmur  PMI normal   ABD: soft; ND/NT; +BS  EXT: no edema  Skin: normal turgor, normal texture, no rashes  Psych: affect normal, mood normal  Neuro: AAO      The above physical exam was reviewed and updated as determined by my evaluation of the patient on 6/9/2023  Invasive Devices     None                    VTE Pharmacologic Prophylaxis: Warfarin (Coumadin)  Code Status: Level 3 - DNAR and DNI  Current Length of Stay: 9 day(s)      Total time spent:  30 minutes with more than 50% spent counseling/coordinating care  Counseling includes discussion with patient re: progress  and discussion with patient of his/her current medical state/information  Coordination of patient's care was performed in conjunction with primary service  Time invested included review of patient's labs, vitals, and management of their comorbidities with continued monitoring  In addition, this patient was discussed with medical team including physician and advanced extenders  The care of the patient was extensively discussed and appropriate treatment plan was formulated unique for this patient  Medical decision making for the day was made by supervising physician unless otherwise noted in their attestation statement  ** Please Note:  voice to text software may have been used in the creation of this document   Although proof errors in transcription or interpretation are a potential of such software**

## 2023-06-09 NOTE — PLAN OF CARE
Reviewed    Problem: PAIN - ADULT  Goal: Verbalizes/displays adequate comfort level or baseline comfort level  Description: Interventions:  - Encourage patient to monitor pain and request assistance  - Assess pain using appropriate pain scale  - Administer analgesics based on type and severity of pain and evaluate response  - Implement non-pharmacological measures as appropriate and evaluate response  - Consider cultural and social influences on pain and pain management  - Notify physician/advanced practitioner if interventions unsuccessful or patient reports new pain  Outcome: Progressing     Problem: INFECTION - ADULT  Goal: Absence or prevention of progression during hospitalization  Description: INTERVENTIONS:  - Assess and monitor for signs and symptoms of infection  - Monitor lab/diagnostic results  - Monitor all insertion sites, i e  indwelling lines, tubes, and drains  - Monitor endotracheal if appropriate and nasal secretions for changes in amount and color  - [place] appropriate cooling/warming therapies per order  - Administer medications as ordered  - Instruct and encourage patient and family to use good hand hygiene technique  - Identify and instruct in appropriate isolation precautions for identified infection/condition  Outcome: Progressing     Problem: SAFETY ADULT  Goal: Patient will remain free of falls  Description: INTERVENTIONS:  - Educate patient/family on patient safety including physical limitations  - Instruct patient to call for assistance with activity   - Consult OT/PT to assist with strengthening/mobility   - Keep Call bell within reach  - Keep bed low and locked with side rails adjusted as appropriate  - Keep care items and personal belongings within reach  - Initiate and maintain comfort rounds  - Make Fall Risk Sign visible to staff  - Offer Toileting every 4 Hours, in advance of need  - Apply yellow socks and bracelet for high fall risk patients  - Consider moving patient to room near nurses station  Outcome: Progressing  Goal: Maintain or return to baseline ADL function  Description: INTERVENTIONS:  -  Assess patient's ability to carry out ADLs; assess patient's baseline for ADL function and identify physical deficits which impact ability to perform ADLs (bathing, care of mouth/teeth, toileting, grooming, dressing, etc )  - Assess/evaluate cause of self-care deficits   - Assess range of motion  - Assess patient's mobility; develop plan if impaired  - Assess patient's need for assistive devices and provide as appropriate  - Encourage maximum independence but intervene and supervise when necessary  - Involve family in performance of ADLs  - Assess for home care needs following discharge   - Consider OT consult to assist with ADL evaluation and planning for discharge  - Provide patient education as appropriate  Outcome: Progressing  Goal: Maintains/Returns to pre admission functional level  Description: INTERVENTIONS:  - Set and communicate daily mobility goal to care team and patient/family/caregiver     - Collaborate with rehabilitation services on mobility goals if consulted  - Out of bed for toileting  - Record patient progress and toleration of activity level   Outcome: Progressing     Problem: DISCHARGE PLANNING  Goal: Discharge to home or other facility with appropriate resources  Description: INTERVENTIONS:  - Identify barriers to discharge w/patient and caregiver  - Arrange for needed discharge resources and transportation as appropriate  - Identify discharge learning needs (meds, wound care, etc )  - Arrange for interpretive services to assist at discharge as needed  - Refer to Case Management Department for coordinating discharge planning if the patient needs post-hospital services based on physician/advanced practitioner order or complex needs related to functional status, cognitive ability, or social support system  Outcome: Progressing     Problem: Prexisting or High Potential for Compromised Skin Integrity  Goal: Skin integrity is maintained or improved  Description: INTERVENTIONS:  - Identify patients at risk for skin breakdown  - Assess and monitor skin integrity  - Assess and monitor nutrition and hydration status  - Monitor labs   - Assess for incontinence   - Turn and reposition patient  - Assist with mobility/ambulation  - Relieve pressure over bony prominences  - Avoid friction and shearing  - Provide appropriate hygiene as needed including keeping skin clean and dry  - Evaluate need for skin moisturizer/barrier cream  - Collaborate with interdisciplinary team   - Patient/family teaching  - Consider wound care consult   Outcome: Progressing     Problem: MOBILITY - ADULT  Goal: Maintain or return to baseline ADL function  Description: INTERVENTIONS:  -  Assess patient's ability to carry out ADLs; assess patient's baseline for ADL function and identify physical deficits which impact ability to perform ADLs (bathing, care of mouth/teeth, toileting, grooming, dressing, etc )  - Assess/evaluate cause of self-care deficits   - Assess range of motion  - Assess patient's mobility; develop plan if impaired  - Assess patient's need for assistive devices and provide as appropriate  - Encourage maximum independence but intervene and supervise when necessary  - Involve family in performance of ADLs  - Assess for home care needs following discharge   - Consider OT consult to assist with ADL evaluation and planning for discharge  - Provide patient education as appropriate  Outcome: Progressing  Goal: Maintains/Returns to pre admission functional level  Description: INTERVENTIONS:  - Set and communicate daily mobility goal to care team and patient/family/caregiver     - Collaborate with rehabilitation services on mobility goals if consulted  - Out of bed for toileting  - Record patient progress and toleration of activity level   Outcome: Progressing Family

## 2023-06-09 NOTE — PROGRESS NOTES
06/09/23 0830   Pain Assessment   Pain Assessment Tool 0-10   Pain Score 8   Pain Location/Orientation Orientation: Right;Location: Hip   Pain Onset/Description Onset: Ongoing; Onset: Gradual   Effect of Pain on Daily Activities increased pain with act   Hospital Pain Intervention(s) Repositioned;Rest;Relaxation technique   Restrictions/Precautions   Precautions Fall Risk;Pain;Supervision on toilet/commode   RLE Weight Bearing Per Order WBAT   ROM Restrictions No   Cognition   Overall Cognitive Status WFL   Arousal/Participation Alert; Cooperative   Attention Within functional limits   Orientation Level Oriented X4   Memory Decreased short term memory   Following Commands Follows multistep commands with increased time or repetition   Sit to Stand   Type of Assistance Needed Supervision; Adaptive equipment   Comment increased time, RW   Sit to Stand CARE Score 4   Bed-Chair Transfer   Type of Assistance Needed Supervision; Adaptive equipment   Comment increased time, RW   Chair/Bed-to-Chair Transfer CARE Score 4   Transfer Bed/Chair/Wheelchair   Adaptive Equipment Roller Walker   Car Transfer   Type of Assistance Needed Physical assistance;Verbal cues; Adaptive equipment   Physical Assistance Level 26%-50%   Comment A to get RLE into car only and LAKSHMI to stand from seat  Car Transfer CARE Score 3   Walk 10 Feet   Type of Assistance Needed Supervision; Adaptive equipment   Comment with RW   Walk 10 Feet CARE Score 4   Walk 50 Feet with Two Turns   Type of Assistance Needed Supervision; Adaptive equipment   Comment CS with RW   Walk 50 Feet with Two Turns CARE Score 4   Walk 150 Feet   Reason if not Attempted Safety concerns   Walk 150 Feet CARE Score 88   Ambulation   Primary Mode of Locomotion Prior to Admission Walk   Distance Walked (feet) 55 ft  (50', 45')   Assist Device Roller Walker   Gait Pattern Inconsistant [place]; Antalgic; Slow [place];Decreased foot clearance; Forward Flexion;Narrow AMBIKA;Step to;Decreased R "stance; Improper weight shift   Limitations Noted In Endurance; Heel Strike;Posture;Speed;Strength;Swing   Provided Assistance with: Direction   Does the patient walk? 2  Yes   Wheel 50 Feet with Two Turns   Reason if not Attempted Activity not applicable   Wheel 50 Feet with Two Turns CARE Score 9   Wheel 150 Feet   Reason if not Attempted Activity not applicable   Wheel 936 Feet CARE Score 9   Wheelchair mobility   Does the patient use a wheelchair? 0  No   Curb or Single Stair   Style negotiated Curb   Type of Assistance Needed Incidental touching;Physical assistance;Verbal cues; Adaptive equipment   Physical Assistance Level 25% or less   Comment CGA/LAKSHMI with RW over 6\" curb step  1 Step (Curb) CARE Score 3   4 Steps   Reason if not Attempted Safety concerns   4 Steps CARE Score 88   12 Steps   Reason if not Attempted Safety concerns   12 Steps CARE Score 88   Stairs   Type Curb   # of Steps 1   Weight Bearing Precautions Fall Risk   Assist Devices Roller Walker   Toilet Transfer   Type of Assistance Needed Supervision; Adaptive equipment   Comment CS with RW   Toilet Transfer CARE Score 4   Toilet Transfer   Findings LAKSHMI with clothing management up, no A down  Therapeutic Interventions   Strengthening HEP: seated LAQ, hip flex, ankle DF/PF, hip ADD vs ball, standing TE's to RLE only, LAQ, hip flex, B heel raises and R hip ext with RW for support  Educated on use of kitchen counter when home  Equipment Use   NuStep pt fearful to try but might be beneficial for ROM  Assessment   Treatment Assessment Pt participated in skilled PT session with increased focus on HH dist ambulation and HEP  Pt noted improved gait and act tolerance this session  Pt cont to be limited by increased pain to R knee and hip but remained S to CS with gait and CGA/LAKSHMI with curb step this session with RW and increased time  Pt anticipates discharge home I 6/16/23 with HHPT services and HEP   Please finalize HEP with pt and leave in " room so she can perform throughout day  Problem List Decreased strength;Decreased endurance; Impaired balance;Orthopedic restrictions;Pain;Decreased range of motion;Decreased mobility   Barriers to Discharge Inaccessible home environment;Decreased caregiver support   PT Barriers   Functional Limitation Transfers; Walking;Stair negotiation   Plan   Treatment/Interventions Functional transfer training;LE strengthening/ROM; Therapeutic exercise; Endurance training;Gait training;Bed mobility   Progress Progressing toward goals   Recommendation   PT Discharge Recommendation Home with home health rehabilitation   Equipment Recommended Walker   PT Therapy Minutes   PT Time In 0830   PT Time Out 1000   PT Total Time (minutes) 90   PT Mode of treatment - Individual (minutes) 90   PT Mode of treatment - Concurrent (minutes) 0   PT Mode of treatment - Group (minutes) 0   PT Mode of treatment - Co-treat (minutes) 0   PT Mode of Treatment - Total time(minutes) 90 minutes   PT Cumulative Minutes 690   Therapy Time missed   Time missed?  No

## 2023-06-09 NOTE — PLAN OF CARE
Problem: PAIN - ADULT  Goal: Verbalizes/displays adequate comfort level or baseline comfort level  Description: Interventions:  - Encourage patient to monitor pain and request assistance  - Assess pain using appropriate pain scale  - Administer analgesics based on type and severity of pain and evaluate response  - Implement non-pharmacological measures as appropriate and evaluate response  - Consider cultural and social influences on pain and pain management  - Notify physician/advanced practitioner if interventions unsuccessful or patient reports new pain  Outcome: Progressing     Problem: INFECTION - ADULT  Goal: Absence or prevention of progression during hospitalization  Description: INTERVENTIONS:  - Assess and monitor for signs and symptoms of infection  - Monitor lab/diagnostic results  - Monitor all insertion sites, i e  indwelling lines, tubes, and drains  - Monitor endotracheal if appropriate and nasal secretions for changes in amount and color  - [place] appropriate cooling/warming therapies per order  - Administer medications as ordered  - Instruct and encourage patient and family to use good hand hygiene technique  - Identify and instruct in appropriate isolation precautions for identified infection/condition  Outcome: Progressing     Problem: SAFETY ADULT  Goal: Patient will remain free of falls  Description: INTERVENTIONS:  - Educate patient/family on patient safety including physical limitations  - Instruct patient to call for assistance with activity   - Consult OT/PT to assist with strengthening/mobility   - Keep Call bell within reach  - Keep bed low and locked with side rails adjusted as appropriate  - Keep care items and personal belongings within reach  - Initiate and maintain comfort rounds  - Make Fall Risk Sign visible to staff  - Offer Toileting every 2 Hours, in advance of need  - Initiate/Maintain bed/chair alarm  - Obtain necessary fall risk management equipment: non skid footwear  - Apply yellow socks and bracelet for high fall risk patients  - Consider moving patient to room near nurses station  Outcome: Progressing  Goal: Maintain or return to baseline ADL function  Description: INTERVENTIONS:  -  Assess patient's ability to carry out ADLs; assess patient's baseline for ADL function and identify physical deficits which impact ability to perform ADLs (bathing, care of mouth/teeth, toileting, grooming, dressing, etc )  - Assess/evaluate cause of self-care deficits   - Assess range of motion  - Assess patient's mobility; develop plan if impaired  - Assess patient's need for assistive devices and provide as appropriate  - Encourage maximum independence but intervene and supervise when necessary  - Involve family in performance of ADLs  - Assess for home care needs following discharge   - Consider OT consult to assist with ADL evaluation and planning for discharge  - Provide patient education as appropriate  Outcome: Progressing  Goal: Maintains/Returns to pre admission functional level  Description: INTERVENTIONS:  - Perform BMAT or MOVE assessment daily    - Set and communicate daily mobility goal to care team and patient/family/caregiver  - Collaborate with rehabilitation services on mobility goals if consulted  - Perform Range of Motion 3 times a day  - Reposition patient every 2 hours    - Dangle patient 3 times a day  - Stand patient 3 times a day  - Ambulate patient 3 times a day  - Out of bed to chair 3 times a day   - Out of bed for meals 3 times a day  - Out of bed for toileting  - Record patient progress and toleration of activity level   Outcome: Progressing     Problem: DISCHARGE PLANNING  Goal: Discharge to home or other facility with appropriate resources  Description: INTERVENTIONS:  - Identify barriers to discharge w/patient and caregiver  - Arrange for needed discharge resources and transportation as appropriate  - Identify discharge learning needs (meds, wound care, etc )  - Arrange for interpretive services to assist at discharge as needed  - Refer to Case Management Department for coordinating discharge planning if the patient needs post-hospital services based on physician/advanced practitioner order or complex needs related to functional status, cognitive ability, or social support system  Outcome: Progressing     Problem: Prexisting or High Potential for Compromised Skin Integrity  Goal: Skin integrity is maintained or improved  Description: INTERVENTIONS:  - Identify patients at risk for skin breakdown  - Assess and monitor skin integrity  - Assess and monitor nutrition and hydration status  - Monitor labs   - Assess for incontinence   - Turn and reposition patient  - Assist with mobility/ambulation  - Relieve pressure over bony prominences  - Avoid friction and shearing  - Provide appropriate hygiene as needed including keeping skin clean and dry  - Evaluate need for skin moisturizer/barrier cream  - Collaborate with interdisciplinary team   - Patient/family teaching  - Consider wound care consult   Outcome: Progressing     Problem: MOBILITY - ADULT  Goal: Maintain or return to baseline ADL function  Description: INTERVENTIONS:  -  Assess patient's ability to carry out ADLs; assess patient's baseline for ADL function and identify physical deficits which impact ability to perform ADLs (bathing, care of mouth/teeth, toileting, grooming, dressing, etc )  - Assess/evaluate cause of self-care deficits   - Assess range of motion  - Assess patient's mobility; develop plan if impaired  - Assess patient's need for assistive devices and provide as appropriate  - Encourage maximum independence but intervene and supervise when necessary  - Involve family in performance of ADLs  - Assess for home care needs following discharge   - Consider OT consult to assist with ADL evaluation and planning for discharge  - Provide patient education as appropriate  Outcome: Progressing  Goal: Maintains/Returns to pre admission functional level  Description: INTERVENTIONS:  - Perform BMAT or MOVE assessment daily    - Set and communicate daily mobility goal to care team and patient/family/caregiver  - Collaborate with rehabilitation services on mobility goals if consulted  - Perform Range of Motion 3 times a day  - Reposition patient every 2 hours    - Dangle patient 3 times a day  - Stand patient 3 times a day  - Ambulate patient 3 times a day  - Out of bed to chair 3 times a day   - Out of bed for meals 3 times a day  - Out of bed for toileting  - Record patient progress and toleration of activity level   Outcome: Progressing

## 2023-06-10 PROCEDURE — 99232 SBSQ HOSP IP/OBS MODERATE 35: CPT | Performed by: INTERNAL MEDICINE

## 2023-06-10 RX ORDER — BISACODYL 10 MG
10 SUPPOSITORY, RECTAL RECTAL ONCE
Status: DISCONTINUED | OUTPATIENT
Start: 2023-06-11 | End: 2023-06-13

## 2023-06-10 RX ORDER — SENNOSIDES 8.6 MG
1 TABLET ORAL 2 TIMES DAILY
Status: DISCONTINUED | OUTPATIENT
Start: 2023-06-10 | End: 2023-06-16 | Stop reason: HOSPADM

## 2023-06-10 RX ADMIN — SENNOSIDES 8.6 MG: 8.6 TABLET, FILM COATED ORAL at 08:54

## 2023-06-10 RX ADMIN — DOCUSATE SODIUM 100 MG: 100 CAPSULE, LIQUID FILLED ORAL at 08:54

## 2023-06-10 RX ADMIN — LATANOPROST 1 DROP: 50 SOLUTION OPHTHALMIC at 21:11

## 2023-06-10 RX ADMIN — POLYETHYLENE GLYCOL 3350 17 G: 17 POWDER, FOR SOLUTION ORAL at 08:54

## 2023-06-10 RX ADMIN — SENNOSIDES 8.6 MG: 8.6 TABLET, FILM COATED ORAL at 18:18

## 2023-06-10 RX ADMIN — ACETAMINOPHEN 975 MG: 325 TABLET, FILM COATED ORAL at 06:26

## 2023-06-10 RX ADMIN — ACETAMINOPHEN 975 MG: 325 TABLET, FILM COATED ORAL at 23:54

## 2023-06-10 RX ADMIN — Medication 10 MG: at 08:00

## 2023-06-10 RX ADMIN — DOCUSATE SODIUM 100 MG: 100 CAPSULE, LIQUID FILLED ORAL at 18:18

## 2023-06-10 RX ADMIN — OXYCODONE HYDROCHLORIDE 5 MG: 5 TABLET ORAL at 08:54

## 2023-06-10 RX ADMIN — Medication 2.5 MG: at 15:36

## 2023-06-10 RX ADMIN — WARFARIN SODIUM 5 MG: 5 TABLET ORAL at 18:18

## 2023-06-10 RX ADMIN — ACETAMINOPHEN 975 MG: 325 TABLET, FILM COATED ORAL at 18:18

## 2023-06-10 RX ADMIN — PRAVASTATIN SODIUM 40 MG: 40 TABLET ORAL at 15:36

## 2023-06-10 RX ADMIN — PREGABALIN 100 MG: 100 CAPSULE ORAL at 18:18

## 2023-06-10 RX ADMIN — PREGABALIN 100 MG: 100 CAPSULE ORAL at 08:54

## 2023-06-10 RX ADMIN — MONTELUKAST SODIUM 10 MG: 10 TABLET, COATED ORAL at 21:10

## 2023-06-10 NOTE — PLAN OF CARE
Problem: PAIN - ADULT  Goal: Verbalizes/displays adequate comfort level or baseline comfort level  Description: Interventions:  - Encourage patient to monitor pain and request assistance  - Assess pain using appropriate pain scale  - Administer analgesics based on type and severity of pain and evaluate response  - Implement non-pharmacological measures as appropriate and evaluate response  - Consider cultural and social influences on pain and pain management  - Notify physician/advanced practitioner if interventions unsuccessful or patient reports new pain  Outcome: Progressing     Problem: INFECTION - ADULT  Goal: Absence or prevention of progression during hospitalization  Description: INTERVENTIONS:  - Assess and monitor for signs and symptoms of infection  - Monitor lab/diagnostic results  - Monitor all insertion sites, i e  indwelling lines, tubes, and drains  - Monitor endotracheal if appropriate and nasal secretions for changes in amount and color  - [place] appropriate cooling/warming therapies per order  - Administer medications as ordered  - Instruct and encourage patient and family to use good hand hygiene technique  - Identify and instruct in appropriate isolation precautions for identified infection/condition  Outcome: Progressing     Problem: SAFETY ADULT  Goal: Patient will remain free of falls  Description: INTERVENTIONS:  - Educate patient/family on patient safety including physical limitations  - Instruct patient to call for assistance with activity   - Consult OT/PT to assist with strengthening/mobility   - Keep Call bell within reach  - Keep bed low and locked with side rails adjusted as appropriate  - Keep care items and personal belongings within reach  - Initiate and maintain comfort rounds  - Make Fall Risk Sign visible to staff  - Offer Toileting every Hours, in advance of need  - Initiate/Maintain alarm  - Obtain necessary fall risk management equipment:   - Apply yellow socks and bracelet for high fall risk patients  - Consider moving patient to room near nurses station  Outcome: Progressing  Goal: Maintain or return to baseline ADL function  Description: INTERVENTIONS:  -  Assess patient's ability to carry out ADLs; assess patient's baseline for ADL function and identify physical deficits which impact ability to perform ADLs (bathing, care of mouth/teeth, toileting, grooming, dressing, etc )  - Assess/evaluate cause of self-care deficits   - Assess range of motion  - Assess patient's mobility; develop plan if impaired  - Assess patient's need for assistive devices and provide as appropriate  - Encourage maximum independence but intervene and supervise when necessary  - Involve family in performance of ADLs  - Assess for home care needs following discharge   - Consider OT consult to assist with ADL evaluation and planning for discharge  - Provide patient education as appropriate  Outcome: Progressing  Goal: Maintains/Returns to pre admission functional level  Description: INTERVENTIONS:  - Perform BMAT or MOVE assessment daily    - Set and communicate daily mobility goal to care team and patient/family/caregiver  - Collaborate with rehabilitation services on mobility goals if consulted  - Perform Range of Motion  times a day  - Reposition patient every hours    - Dangle patient  times a day  - Stand patient  times a day  - Ambulate patient times a day  - Out of bed to chair times a day   - Out of bed for meals  times a day  - Out of bed for toileting  - Record patient progress and toleration of activity level   Outcome: Progressing     Problem: DISCHARGE PLANNING  Goal: Discharge to home or other facility with appropriate resources  Description: INTERVENTIONS:  - Identify barriers to discharge w/patient and caregiver  - Arrange for needed discharge resources and transportation as appropriate  - Identify discharge learning needs (meds, wound care, etc )  - Arrange for interpretive services to assist at discharge as needed  - Refer to Case Management Department for coordinating discharge planning if the patient needs post-hospital services based on physician/advanced practitioner order or complex needs related to functional status, cognitive ability, or social support system  Outcome: Progressing     Problem: Prexisting or High Potential for Compromised Skin Integrity  Goal: Skin integrity is maintained or improved  Description: INTERVENTIONS:  - Identify patients at risk for skin breakdown  - Assess and monitor skin integrity  - Assess and monitor nutrition and hydration status  - Monitor labs   - Assess for incontinence   - Turn and reposition patient  - Assist with mobility/ambulation  - Relieve pressure over bony prominences  - Avoid friction and shearing  - Provide appropriate hygiene as needed including keeping skin clean and dry  - Evaluate need for skin moisturizer/barrier cream  - Collaborate with interdisciplinary team   - Patient/family teaching  - Consider wound care consult   Outcome: Progressing     Problem: MOBILITY - ADULT  Goal: Maintain or return to baseline ADL function  Description: INTERVENTIONS:  -  Assess patient's ability to carry out ADLs; assess patient's baseline for ADL function and identify physical deficits which impact ability to perform ADLs (bathing, care of mouth/teeth, toileting, grooming, dressing, etc )  - Assess/evaluate cause of self-care deficits   - Assess range of motion  - Assess patient's mobility; develop plan if impaired  - Assess patient's need for assistive devices and provide as appropriate  - Encourage maximum independence but intervene and supervise when necessary  - Involve family in performance of ADLs  - Assess for home care needs following discharge   - Consider OT consult to assist with ADL evaluation and planning for discharge  - Provide patient education as appropriate  Outcome: Progressing  Goal: Maintains/Returns to pre admission functional level  Description: INTERVENTIONS:  - Perform BMAT or MOVE assessment daily    - Set and communicate daily mobility goal to care team and patient/family/caregiver  - Collaborate with rehabilitation services on mobility goals if consulted  - Perform Range of Motion times a day  - Reposition patient every  hours  - Dangle patient  times a day  - Stand patient  times a day  - Ambulate patient  times a day  - Out of bed to chair  times a day   - Out of bed for meals  times a day  - Out of bed for toileting  - Record patient progress and toleration of activity level   Outcome: Progressing     Problem: Nutrition/Hydration-ADULT  Goal: Nutrient/Hydration intake appropriate for improving, restoring or maintaining nutritional needs  Description: Monitor and assess patient's nutrition/hydration status for malnutrition  Collaborate with interdisciplinary team and initiate plan and interventions as ordered  Monitor patient's weight and dietary intake as ordered or per policy  Utilize nutrition screening tool and intervene as necessary  Determine patient's food preferences and provide high-protein, high-caloric foods as appropriate       INTERVENTIONS:  - Monitor oral intake, urinary output, labs, and treatment plans  - Assess nutrition and hydration status and recommend course of action  - Evaluate amount of meals eaten  - Assist patient with eating if necessary   - Allow adequate time for meals  - Recommend/ encourage appropriate diets, oral nutritional supplements, and vitamin/mineral supplements  - Order, calculate, and assess calorie counts as needed  - Recommend, monitor, and adjust tube feedings and TPN/PPN based on assessed needs  - Assess need for intravenous fluids  - Provide specific nutrition/hydration education as appropriate  - Include patient/family/caregiver in decisions related to nutrition  Outcome: Progressing

## 2023-06-10 NOTE — PROGRESS NOTES
Physical Medicine and Rehabilitation Progress Note  Patito Garcia 77 y o  female MRN: [id number]  Unit/Bed#: [facility] 961-01 Encounter: [id number]      Assessment & Plan:     Decline in ADLs and mobility: Functional assessment - improving         FIM  Care Score  Admit Score Recent Score    Total assist  1-100% or 2p    Tot Other ADLs     Max assist 2-51-75%    Sub     Mod assist 3- 26-50%  Par     Min assist 3- 25% or < Par UBD Bathing, LBD   CG assist 4  TA     Sup/Setup 4-5 Sup   upper body dressing   Mod-I/Indep 6 MI      Transfers  Mod-Significant assist Close supervision     Ambulation   Total assist  50 ft CS    Stairs   Total assist/NT      Goal: Mod indep for most ADLs and  for mobility  Major barriers:  Pain, decreased ROM, deconditioning, lack of assistance at home  Dispo: Home with [facility] Friday 6/16     * 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 ([address] )  Assessment & Plan  - Goal d/c Friday 6/16 with OP PT that start at home   - Pain improving, function improving   - Patient reported significant pain after transfer earlier in course; right femur x-ray without acute concerns 6/2    Status post surgical repair via ORIF by Dr Yuriy Villegas  on 5/28/23  • Weight-bearing as tolerated on affected limb   • Monitor incision/area for infection or dehiscence/impaired healing - improved drainage, no signs of infection   • POD#14 is Fernando Shaw 6/11 > ortho can Follow-up Sun or Monday with repeat imaging  • Monitor for signs/symptoms of VTE, atelectasis, acute blood loss anemia, or other infection   • Continue acute comprehensive interdisciplinary inpatient rehabilitation to include intensive skilled therapies (PT, OT) as outlined with oversight and management by rehabilitation physician as well as inpatient rehab level nursing, case management and weekly interdisciplinary team meetings     • Follow-up with Ortho Sx after d/c and ortho if needed during ARC course       At risk for venous thromboembolism (VTE)  Assessment & Plan  SCDs, ambulation, and warfarin   (hold lovenox)    PAF (paroxysmal atrial fibrillation) (HCC)  Assessment & Plan  IM consulted and with overall management at their discretion during ARC course  Rate control: Toprol XL 50mg HS  Antithrombotic: Warfarin per IM  (patient is frustrated with checking her INR and was not always checking it and adjusting med dose on own at time; she did voice she would be open to consider NOAC - spoke to IM who reviewed chart and in past NOACs were cost prohibitive   - can send in [employer] for cost re-assessment prior to d/c)      Obesity  Assessment & Plan   on appropriate nutrition and activity  Adjustments accordingly during skilled therapy and with rehab nursing  Monitor skin closely for breakdown, wounds, rashes; keep clean and dry, turning Q2H   Follow-up with PCP after d/c      Encounter for skin care  Assessment & Plan  - Increased risk of skin wounds/breakdown/rashes due to recent immobility and co-morbidities   - 2 nurse/staff full skin check for abnormal findings on admission - obtain photos PRN  - Turn patient Q2H in bed (use pillows or wedges), encourage wt shifts every 10-15 minutes in chair  - Patient and if available caregiver education   - Hydragaurd to buttocks and sacrum BID & PRN  - Optimal bowel/bladder hygiene; keep skin clean and dry   - EHOB waffle cushion to chair/WC when OOB  - Nursing to document in chart and Notify MD if buttock, sacrum, heel, or other skin site develops erythema, skin breakdown, or rashes as soon as possible  If patient is soiling themselves with urine or stool notify MD   If you are unable to maintain skin integrity and prevent erythema due to frequency of soiling notify MD as soon as possible as well         Peripheral neuropathy  Assessment & Plan  Monitor skin for increased infection/breakdown/wounds which patient is at higher risk for  Skilled PT/OT   Fall precautions      Abnormal finding on radiology "exam  Assessment & Plan  Per discharging provider to ARC   \"Findings and Follow up required:                              1) Scattered less than 3 mm nodular densities in the posterior right upper lobe of the lung as well as a calcified granuloma in the right lower lobe of the lung posteriorly were noted incidentally on your trauma imaging  A malignancy (cancer) cannot be completely excluded based on trauma imaging alone  Recommend short-term outpatient follow-up with primary care provider to review the finding and for further surveillance as indicated                               2)  A subcentimeter cystic hypodensity posterior right lobe of the liver was incidentally identified on your trauma imaging and is unchanged compared to prior imaging  A malignancy (cancer) cannot be completely excluded based on trauma imaging alone  Recommend short-term outpatient follow-up with primary care provider to review the finding and for further surveillance as indicated                               3) A left adrenal gland 1 4 x 0 9 cm nodule was incidentally identified under trauma imaging and is unchanged compared to prior imaging  A malignancy (cancer) cannot be completely excluded based on trauma imaging alone  Recommend short-term outpatient follow-up with primary care provider to review the finding and for further surveillance as indicated                               4) Subcentimeter hypodensities in your kidneys were incidentally identified under trauma imaging and are suggestive of benign cysts  A malignancy (cancer) cannot be completely excluded based on trauma imaging alone    Recommend short-term outpatient follow-up with primary care provider to review the finding and for further surveillance as indicated                               5) A new small umbilical hernia containing nonobstructed bowel loops and fat was incidentally identified under trauma imaging as well as a previously seen right para-midline " "ventral hernia defect containing adherent bowel loops in a nonobstructive pattern  No further work-up or intervention necessary for these findings at this time  Recommend short-term outpatient follow-up with primary care provider to review the finding and for further surveillance as indicated                   Follow up should be done within 2 week(s)     The above noted incidental findings were discussed with the patient  The patient demonstrated understanding of the findings and the follow-up recommendations      Please notify the following clinician to assist with the follow up:              Dr Kelly Pelayo"    Anemia  Assessment & Plan  Hb stable at 9 7 on 6/8  Consult(ed) IM and comanagement with their service during ARC course   Monitor H/H, vitals, signs/symptoms of acute bleeding      Pain  Assessment & Plan  Improving some  ICE prn   APAP 975mg TID   Oxycodone 2 5-5mg Q4H PRN  Lyrica 100mg BID (chronic for neuropathy)  Monitor for oversedation, AMS/delirium, and respiratory depression   If being administered - hold opiates, muscle relaxants, benzodiazepines, and gabapentin for oversedation, AMS, or RR<12    Counseled on and continue to encourage deep breathing/relaxation/behavioral pain management techniques      Constipation  Assessment & Plan  Remains improved  Significant on admission to [facility] with last Bm near a week ago; no current signs of symptoms obstruction  - Continue to monitor for S/S of obstruction; hold stimulant laxatives and obtain imaging if concern develops  - Colace BID, miralax daily (consider BID if needed)  (Declines senna)   - PRN bowel regimen  - Limiting constipating medications if possible  - Ensure adequate hydration       Pure hypercholesterolemia  Assessment & Plan  Statin     Non-rheumatic aortic stenosis  Assessment & Plan  EF 65%, G2DD, Mild-mod AS, mod-severe elevated RVSP  IM consulted and with overall management at their discretion during ARC course  Monitor vitals, fluid " status  OP Cards follow-up      Other Medical Issues:  • Monitor for     Follow-up providers and other issues to be followed up after discharge  Cards  PCP  Ortho    CODE: Level 3: DNAR and DNI    Restrictions include: Fall precautions    Objective: Allergies per EMR  Diagnostic Studies: Reviewed, no new imaging  XR femur 2 vw right   Final Result by Sahra Cottrell MD (06/03 8541)      No acute osseous abnormality  Postoperative changes              Workstation performed: [id number]           See above as well    Laboratory: Labs reviewed  Results from last 7 days   Lab Units 06/08/23  0459 06/05/23  0452   HEMATOCRIT % 30 9* 32 1*   HEMOGLOBIN g/dL 9 7* 10 0*   WBC Thousand/uL 7 29 8 11     Results from last 7 days   Lab Units 06/08/23  0459 06/05/23  0452   BUN mg/dL 12 14   CHLORIDE mmol/L 109* 107   CREATININE mg/dL 0 39* 0 43*   POTASSIUM mmol/L 4 2 4 5     Results from last 7 days   Lab Units 06/09/23  0621 06/08/23  0449 06/06/23  0833   INR  2 15* 1 90* 1 39*   PROTIME seconds 24 3* 22 0* 17 3*          Drug regimen reviewed, all potential adverse effects identified and addressed:    Current Facility-Administered Medications   Medication Dose Route Frequency Provider Last Rate   • acetaminophen  975 mg Oral Q6H [facility] Junior Marie MD     • bisacodyl  10 mg Oral Once Shiela Hatchet, MD     • bisacodyl  10 mg Rectal Daily PRN Junior Marie MD     • docusate sodium  100 mg Oral BID Junior Marie MD     • latanoprost  1 drop Both Eyes HS RAYMON Hernandez     • lidocaine   Topical Q4H PRN Junior Marie MD     • metoprolol succinate  50 mg Oral HS Junior Marie MD     • montelukast  10 mg Oral HS Junior Marie MD     • oxyCODONE  5 mg Oral Q4H PRN Junior Marie MD     • oxyCODONE  2 5 mg Oral Q4H PRN Junior Marie MD     • polyethylene glycol  17 g Oral Daily PRN Junior Marie MD     • polyethylene glycol  17 g Oral Daily Christian Hannah Juaquin Perez MD     • pravastatin  40 mg Oral Daily With Arnol Beth MD     • pregabalin  100 mg Oral BID Sedrick Bender MD     • warfarin  5 mg Oral Once per day on Sun Tue Thu Sat Jose Juan [facility], RAYMON     • warfarin  7 5 mg Oral Once per day on Mon Wed Fri RAYMON Theodore         •  bisacodyl  •  lidocaine  •  oxyCODONE  •  oxyCODONE  •  polyethylene glycol      Chief Complaints: Rehab follow-up     Subjective: On eval, patient reports slight lightheadedness earlier in day but not since  She took miralax today to prevent constipation  She denies increased hip pain, calf pain, SOB, or other new complaints  ROS: A 10 point ROS was performed; negative except as noted above         Physical Exam:  06/09/23 0439 98 2 °F (36 8 °C) 60 19 109/54 99 96 % None (Room air)      Vitals above reviewed on date of encounter    GEN:  Sitting in NAD   HEENT/NECK: Normocephalic, atraumatic, moist mucous membranes   CARDIAC: Regular rate rhythm, no murmers, no rubs, no gallops  LUNGS:  clear to auscultation, no wheezes, rales, or rhonchi  ABDOMEN: Soft, non-tender, non-distended, normal active bowel sounds  EXTREMITIES/SKIN:  Slight RLE>LLE edema without calf TTP: R hip incision with improved drainage again  NEURO:   MENTAL STATUS: awake, oriented to person, place, time, and situation and MENTAL STATUS:  Appropriate wakefulness and interaction   PSYCH:  Affect:  Euthymic     HPI:  50-year-old female with a past medical history of A-fib on Coumadin, hypertension, hyperlipidemia, obesity, peripheral neuropathy, aortic stenosis, bilateral total hip arthroplasty, right total hip arthroplasty revision who stood up from kneeling and felt a crack in her right hip and then slipped and fell with resultant right leg pain who was found to have right periprosthetic hip fracture -described by radiology is acute minimally displaced periprosthetic fracture of the right proximal femoral diaphysis adjacent to the femoral stem of the right total hip arthroplasty  CT head was negative for acute findings  Patient underwent ORIF of right periprosthetic hip fracture on 5/28  Patient was started on Lovenox for VTE prophylaxis and cleared for Coumadin on 5/31  Patient has had some acute blood loss anemia, pain, severe constipation, and significant decline in ADLs and mobility        ** Please Note: Fluency Direct voice to text software may have been used in the creation of this document   **    I personally performed the required components and examined the patient myself in person on 6/9/23

## 2023-06-10 NOTE — PROGRESS NOTES
Internal Medicine Progress Note  Patient: Lonnie Tinoco  Age/sex: 77 y o  female  Medical Record #: [id number]      ASSESSMENT/PLAN: (Interval History)  Lonnie Tinoco is seen and examined and management for following issues:    Right periprosthetic femur fracture  • S/p ORIF 5/28  • WBAT  • X-ray 6/2/23 with post-op changes      Hx PAF  • Home:  Toprol 50mg qHS/Coumadin 6 25 mg (2 1/2 tabs of 2 5mg tabs) on Mondays and 5mg the rest of the days of the week  • Here:  Toprol 50mg qHS/Coumadin as below  • She did tell Dr Estefania Jacques that she adjusts her Coumadin on her own  • [facility] does her INRs and she has 2 5mg tabs at home  • EKG 5/27/23 = SR  • lovenox dc'd  • Cont coumadin 7 5mg m-w-f and 5mg all other days  • INR 6/12/23     HLD  • Continue Pravachol as a sub for home Simvastatin     Peripheral neuropathy  • Continue Lyrica     Aortic stenosis  • ECHO 7/2022:  LVEF 65%/gr 2 DD, mild-mod AS, mild TR/MR, RV systolic pressure is mod-severe elevated with systolic pressure 59 mmHg  • euvolemic        Discharge date:  Team    The above assessment and plan was reviewed and updated as determined by my evaluation of the patient on 6/10/2023      Labs:   Results from last 7 days   Lab Units 06/08/23  0459 06/05/23  0452   HEMATOCRIT % 30 9* 32 1*   HEMOGLOBIN g/dL 9 7* 10 0*   PLATELETS Thousands/uL 362 313   WBC Thousand/uL 7 29 8 11     Results from last 7 days   Lab Units 06/08/23  0459 06/05/23  0452   BUN mg/dL 12 14   CALCIUM mg/dL 8 8 9 0   CHLORIDE mmol/L 109* 107   CO2 mmol/L 28 29   CREATININE mg/dL 0 39* 0 43*   POTASSIUM mmol/L 4 2 4 5   SODIUM mmol/L 139 137         Results from last 7 days   Lab Units 06/09/23  0621 06/08/23  0449   INR  2 15* 1 90*           Review of Scheduled Meds:  Current Facility-Administered Medications   Medication Dose Route Frequency Provider Last Rate   • acetaminophen  975 mg Oral Q6H [address] Priyank [facility], MD     • bisacodyl  10 mg Rectal Daily PRN Priyank Pollack MD • [START ON 6/11/2023] bisacodyl  10 mg Rectal Once Tree Garvey MD     • docusate sodium  100 mg Oral BID Tree Garvey MD     • latanoprost  1 drop Both Eyes HS Maryellen Finger RAYMON Quintanilla     • lidocaine   Topical Q4H PRN Tree Garvey MD     • metoprolol succinate  50 mg Oral HS Tree Garvey MD     • montelukast  10 mg Oral HS Tree Garvey MD     • oxyCODONE  5 mg Oral Q4H PRN Tree Garvey MD     • oxyCODONE  2 5 mg Oral Q4H PRN Tree Garvey MD     • polyethylene glycol  17 g Oral Daily PRN Tree Garvey MD     • polyethylene glycol  17 g Oral Daily Tree Garvey MD     • pravastatin  40 mg Oral Daily With Alfonso Calles MD     • pregabalin  100 mg Oral BID Tree Garvey MD     • senna  1 tablet Oral BID Tree Garvey MD     • warfarin  5 mg Oral Once per day on Sun Tue Thu Sat RAYMON Guallpa     • warfarin  7 5 mg Oral Once per day on Mon Wed Fri Maryellen Finger RAYMON David         Subjective/ HPI: Patient seen and examined  Patients overnight issues or events were reviewed with nursing or staff during rounds or morning huddle session  New or overnight issues include the following:     Pt seen in her room washing up  She c/o sacral pain, otherwise her right leg has ongoing pain as well  ROS:   A 10 point ROS was performed; negative except as noted above       *Labs /Radiology studies reviewed  *Medications reviewed and reconciled as needed  *Please refer to order section for additional ordered labs studies  *Case discussed with primary attending during morning huddle case rounds    Physical Examination:  Vitals:   Vitals:    06/09/23 0439 06/09/23 1319 06/09/23 2028 06/10/23 0458   BP: 109/54 119/64 102/53 139/65   BP Location: Right arm Left arm Right arm Right arm   Pulse: 60 71 73 77   Resp: 19 16 20 19   Temp: 98 2 °F (36 8 °C) 98 5 °F (36 9 °C) 98 2 °F (36 8 °C) 98 °F (36 7 °C)   TempSrc: Oral Oral Oral Oral   SpO2: 96% 98% 96% 96%   Weight:       Height:           GEN: NAD; pleasant  NEURO: Alert and oriented x4; appropriate  HEENT: Pupils are equal/reactive; normocephalic, face is symmetrical , hearing is normal  CV: S1 S2 regular, no murmur/rub/gallops, 1/4 pedal pulses, 1/4 b/l LE edema present  RESP: Lungs are clear bilaterally, no wheezes rales or rhonchi, on room air, no distress, respirations are easy and non labored  GI: Abdomen is obese, soft, non tender, +BS x4; non distended  : Voiding without issues  MUSC: Moves all extremities except RLE  SKIN: pink, warm, normal turgor, no rashes or lesions noted        The above physical exam was reviewed and updated as determined by my evaluation of the patient on 6/10/2023  Invasive Devices     None                    VTE Pharmacologic Prophylaxis: Warfarin (Coumadin)  Code Status: Level 3 - DNAR and DNI  Current Length of Stay: 10 day(s)      Total time spent:  30 minutes with more than 50% spent counseling/coordinating care  Counseling includes discussion with patient re: progress  and discussion with patient of his/her current medical state/information  Coordination of patient's care was performed in conjunction with primary service  Time invested included review of patient's labs, vitals, and management of their comorbidities with continued monitoring  In addition, this patient was discussed with medical team including physician and advanced extenders  The care of the patient was extensively discussed and appropriate treatment plan was formulated unique for this patient  Medical decision making for the day was made by supervising physician unless otherwise noted in their attestation statement  ** Please Note:  voice to text software may have been used in the creation of this document   Although proof errors in transcription or interpretation are a potential of such software**

## 2023-06-10 NOTE — PROGRESS NOTES
Physical Medicine and Rehabilitation Progress Note  Patito Garcia 77 y o  female MRN: [id number]  Unit/Bed#: [facility] 961-01 Encounter: [id number]      Assessment & Plan:     Decline in ADLs and mobility: Functional assessment - improving mobility         FIM  Care Score  Admit Score Recent Score    Total assist  1-100% or 2p    Tot Other ADLs   toileting hygiene   Max assist 2-51-75%    Sub   bathing, lower body dressing   Mod assist 3- 26-50%  Par     Min assist 3- 25% or < Par UBD    CG assist 4  TA     Sup/Setup 4-5 Sup   upper body dressing   Mod-I/Indep 6 MI      Transfers  Mod-Significant assist  Gen-CS; Toilet - significant     Ambulation   Total assist  50 ft min assist     Stairs   Total assist/NT      Goal: Mod indep for most ADLs and  for mobility  Major barriers:  Pain, decreased ROM, deconditioning, lack of assistance at home  Dispo: Home with [facility] Friday 6/16     * 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 (HCC)  Assessment & Plan  - Pain stable; function improving  - Patient reported significant pain after transfer earlier in course; right femur x-ray without acute concerns 6/2    Status post surgical repair via ORIF by Dr Yuriy Villegas  on 5/28/23  • Weight-bearing as tolerated on affected limb   • Monitor incision/area for infection or dehiscence/impaired healing - no signs of infection   • POD#14 is Fernando Shaw 6/11  • Monitor for signs/symptoms of VTE, atelectasis, acute blood loss anemia, or other infection   • Continue acute comprehensive interdisciplinary inpatient rehabilitation to include intensive skilled therapies (PT, OT) as outlined with oversight and management by rehabilitation physician as well as inpatient rehab level nursing, case management and weekly interdisciplinary team meetings     • Follow-up with Ortho Sx after d/c and ortho if needed during ARC course       At risk for venous thromboembolism (VTE)  Assessment & Plan  SCDs, ambulation, and lovenox 30mg BID (until "warfarin tx)   INR still subTx      Obesity  Assessment & Plan   on appropriate nutrition and activity  Adjustments accordingly during skilled therapy and with rehab nursing  Monitor skin closely for breakdown, wounds, rashes; keep clean and dry, turning Q2H   Follow-up with PCP after d/c      Encounter for skin care  Assessment & Plan  - Increased risk of skin wounds/breakdown/rashes due to recent immobility and co-morbidities   - 2 nurse/staff full skin check for abnormal findings on admission - obtain photos PRN  - Turn patient Q2H in bed (use pillows or wedges), encourage wt shifts every 10-15 minutes in chair  - Patient and if available caregiver education   - Hydragaurd to buttocks and sacrum BID & PRN  - Optimal bowel/bladder hygiene; keep skin clean and dry   - EHOB waffle cushion to chair/WC when OOB  - Nursing to document in chart and Notify MD if buttock, sacrum, heel, or other skin site develops erythema, skin breakdown, or rashes as soon as possible  If patient is soiling themselves with urine or stool notify MD   If you are unable to maintain skin integrity and prevent erythema due to frequency of soiling notify MD as soon as possible as well  Peripheral neuropathy  Assessment & Plan  Monitor skin for increased infection/breakdown/wounds which patient is at higher risk for  Skilled PT/OT   Fall precautions      Abnormal finding on radiology exam  Assessment & Plan  Per discharging provider to [facility]   \"Findings and Follow up required:                              1) Scattered less than 3 mm nodular densities in the posterior right upper lobe of the lung as well as a calcified granuloma in the right lower lobe of the lung posteriorly were noted incidentally on your trauma imaging  A malignancy (cancer) cannot be completely excluded based on trauma imaging alone    Recommend short-term outpatient follow-up with primary care provider to review the finding and for further surveillance as " indicated                               2)  A subcentimeter cystic hypodensity posterior right lobe of the liver was incidentally identified on your trauma imaging and is unchanged compared to prior imaging  A malignancy (cancer) cannot be completely excluded based on trauma imaging alone  Recommend short-term outpatient follow-up with primary care provider to review the finding and for further surveillance as indicated                               3) A left adrenal gland 1 4 x 0 9 cm nodule was incidentally identified under trauma imaging and is unchanged compared to prior imaging  A malignancy (cancer) cannot be completely excluded based on trauma imaging alone  Recommend short-term outpatient follow-up with primary care provider to review the finding and for further surveillance as indicated                               4) Subcentimeter hypodensities in your kidneys were incidentally identified under trauma imaging and are suggestive of benign cysts  A malignancy (cancer) cannot be completely excluded based on trauma imaging alone  Recommend short-term outpatient follow-up with primary care provider to review the finding and for further surveillance as indicated                               5) A new small umbilical hernia containing nonobstructed bowel loops and fat was incidentally identified under trauma imaging as well as a previously seen right para-midline ventral hernia defect containing adherent bowel loops in a nonobstructive pattern  No further work-up or intervention necessary for these findings at this time  Recommend short-term outpatient follow-up with primary care provider to review the finding and for further surveillance as indicated                   Follow up should be done within 2 week(s)     The above noted incidental findings were discussed with the patient    The patient demonstrated understanding of the findings and the follow-up recommendations      Please notify the following "clinician to assist with the follow up:              Dr Isidoro Rogers"    Anemia  Assessment & Plan  Hemoglobin stable 6/5 > Repeat CBC tomorrow   Consult(ed) IM and comanagement with their service during ARC course   Monitor H/H, vitals, signs/symptoms of acute bleeding      Pain  Assessment & Plan  Stable   ICE prn   APAP 975mg TID   Oxycodone 2 5-5mg Q4H PRN  Lyrica 100mg BID (chronic for neuropathy)  Monitor for oversedation, AMS/delirium, and respiratory depression   If being administered - hold opiates, muscle relaxants, benzodiazepines, and gabapentin for oversedation, AMS, or RR<12    Counseled on and continue to encourage deep breathing/relaxation/behavioral pain management techniques      Constipation  Assessment & Plan  Remains improved  Significant on admission to [facility] with last Bm near a week ago; no current signs of symptoms obstruction  - Continue to monitor for S/S of obstruction; hold stimulant laxatives and obtain imaging if concern develops  - Colace BID, miralax daily (consider BID if needed)  (Declines senna)   - PRN bowel regimen  - Limiting constipating medications if possible  - Ensure adequate hydration       Pure hypercholesterolemia  Assessment & Plan  Statin     PAF (paroxysmal atrial fibrillation) (HCC)  Assessment & Plan  IM consulted and with overall management at their discretion during ARC course  Rate control: Toprol XL 50mg HS  Antithrombotic: Warfarin cleared to resume > mgmt per IM (Hold lovenox when INR tx)  (patient is frustrated with checking her INR and was not always checking it and adjusting med dose on own at time; she did voice she would be open to consider NOAC - consider discussing with cards in IP setting otherwise OP follow-up)      Non-rheumatic aortic stenosis  Assessment & Plan  EF 65%, G2DD, Mild-mod AS, mod-severe elevated RVSP  IM consulted and with overall management at their discretion during ARC course  Monitor vitals, fluid status  OP Cards " follow-up      Other Medical Issues:  • Monitor for     Follow-up providers and other issues to be followed up after discharge  Cards  PCP  Ortho    CODE: Level 3: DNAR and DNI    Restrictions include: Fall precautions    Objective: Allergies per EMR  Diagnostic Studies: Reviewed, no new imaging  XR femur 2 vw right   Final Result by Alpa Hua MD (06/03 0810)      No acute osseous abnormality  Postoperative changes              Workstation performed: [id number]           See above as well    Laboratory: Labs reviewed  Results from last 7 days   Lab Units 06/05/23  0452   HEMATOCRIT % 32 1*   HEMOGLOBIN g/dL 10 0*   WBC Thousand/uL 8 11     Results from last 7 days   Lab Units 06/05/23  0452   BUN mg/dL 14   CHLORIDE mmol/L 107   CREATININE mg/dL 0 43*   POTASSIUM mmol/L 4 5   SODIUM mmol/L 137     Results from last 7 days   Lab Units 06/09/23  0621 06/08/23  0449 06/06/23  0833   INR  2 15* 1 90* 1 39*   PROTIME seconds 24 3* 22 0* 17 3*        Drug regimen reviewed, all potential adverse effects identified and addressed:    Current Facility-Administered Medications   Medication Dose Route Frequency Provider Last Rate   • acetaminophen  975 mg Oral Q6H [facility] Bryn Koehler MD     • bisacodyl  10 mg Oral Once Aruna Robertson MD     • bisacodyl  10 mg Rectal Daily PRN Bryn Koehler MD     • docusate sodium  100 mg Oral BID Bryn Koehler MD     • enoxaparin  30 mg Subcutaneous Q12H [facility] Bryn Koehler MD     • latanoprost  1 drop Both Eyes HS RAYMON Herrera     • lidocaine   Topical Q4H PRN Bryn Koehler MD     • metoprolol succinate  50 mg Oral HS Bryn Koehler MD     • montelukast  10 mg Oral HS Bryn Koehler MD     • oxyCODONE  5 mg Oral Q4H PRN Bryn Koehler MD     • oxyCODONE  2 5 mg Oral Q4H PRN Bryn Koehler MD     • polyethylene glycol  17 g Oral Daily PRN Bryn Koehler MD     • polyethylene glycol  17 g Oral Daily April Barlow Funmilayo Henderson MD     • pravastatin  40 mg Oral Daily With Lay Ureña MD     • pregabalin  100 mg Oral BID Leydi Kwan MD     • warfarin  7 5 mg Oral Daily (warfarin) RAYMON Wilson         Chief Complaints: Rehab follow-up     Subjective: On eval, patient reports therapy went a little bit better  She denies lightheadedness, increased hip pain, constipation, calf pain, or other new complaints  ROS: A 10 point ROS was performed; negative except as noted above  Physical Exam:    06/07/23 0513   BP: 123/60   BP Location: Left arm   Pulse: 64   Resp: 18   Temp: 98 2 °F (36 8 °C)   TempSrc: Oral   SpO2: 98%         Vitals above reviewed on date of encounter    GEN:  Lying in bed in NAD   HEENT/NECK: MMM  CARDIAC: Regular rate rhythm, no murmers, no rubs, no gallops  LUNGS:  clear to auscultation, no wheezes, rales, or rhonchi  ABDOMEN: Soft, non-tender, non-distended, normal active bowel sounds  EXTREMITIES/SKIN:  Stable slight R>LLE edema without calf TTP  NEURO:   MENTAL STATUS:  Appropriate wakefulness and interaction   PSYCH:  Affect:  Euthymic    HPI:  65-year-old female with a past medical history of A-fib on Coumadin, hypertension, hyperlipidemia, obesity, peripheral neuropathy, aortic stenosis, bilateral total hip arthroplasty, right total hip arthroplasty revision who stood up from kneeling and felt a crack in her right hip and then slipped and fell with resultant right leg pain who was found to have right periprosthetic hip fracture -described by radiology is acute minimally displaced periprosthetic fracture of the right proximal femoral diaphysis adjacent to the femoral stem of the right total hip arthroplasty  CT head was negative for acute findings  Patient underwent ORIF of right periprosthetic hip fracture on 5/28  Patient was started on Lovenox for VTE prophylaxis and cleared for Coumadin on 5/31    Patient has had some acute blood loss anemia, pain, severe constipation, and significant decline in ADLs and mobility        ** Please Note: Fluency Direct voice to text software may have been used in the creation of this document  **    50 minutes or greater spent for this encounter which included a combination of face-to-face time with Stefani Medina and non-face-to-face time which in part specifically includes management of Hip fx  Face-to-face time included extended discussion with patient regarding current condition, medical history, mood, medical/rehabilitation management, and disposition  Non face-to-face time included coordination of care with patient's co-managing AP and/or physician(s) thru communication and review of their recent documentation as well as reviewing vitals, bowel/bladder function, recent labs, and notes from therapy, CM, and nursing  I personally performed the required components and examined the patient myself in person on 6/7/23

## 2023-06-10 NOTE — PROGRESS NOTES
Physical Medicine and Rehabilitation Progress Note  Surekha Mauricio 77 y o  female MRN: [id number]  Unit/Bed#: -01 Encounter: [id number]      Assessment & Plan:     Decline in ADLs and mobility: Functional assessment - improving overall        FIM  Care Score  Admit Score Recent Score    Total assist  1-100% or 2p    Tot Other ADLs     Max assist 2-51-75%    Sub     Mod assist 3- 26-50%  Par     Min assist 3- 25% or < Par UBD Bathing, LBD   CG assist 4  TA     Sup/Setup 4-5 Sup   upper body dressing   Mod-I/Indep 6 MI      Transfers  Mod-Significant assist  Gen-CS; Toilet - significant     Ambulation   Total assist  10 ft CS, 50 ft min assist     Stairs   Total assist/NT      Goal: Mod indep for most ADLs and  for mobility  Major barriers:  Pain, decreased ROM, deconditioning, lack of assistance at home  Dispo: Home with [facility] Friday 6/16     * 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 [facility])  Assessment & Plan  - Goal d/c Friday 6/16 with OP PT that start at home   - Pain improving, function improving   - Patient reported significant pain after transfer earlier in course; right femur x-ray without acute concerns 6/2    Status post surgical repair via ORIF by Dr Ward Ganser  on 5/28/23  • Weight-bearing as tolerated on affected limb   • Monitor incision/area for infection or dehiscence/impaired healing - no signs of infection   • POD#14 is Beth Benz 6/11  • Monitor for signs/symptoms of VTE, atelectasis, acute blood loss anemia, or other infection   • Continue acute comprehensive interdisciplinary inpatient rehabilitation to include intensive skilled therapies (PT, OT) as outlined with oversight and management by rehabilitation physician as well as inpatient rehab level nursing, case management and weekly interdisciplinary team meetings     • Follow-up with Ortho Sx after d/c and ortho if needed during ARC course       At risk for venous thromboembolism (VTE)  Assessment & Plan  SCDs, ambulation, "and lovenox 30mg BID (until warfarin tx)   INR still subTx but getting closer      Obesity  Assessment & Plan   on appropriate nutrition and activity  Adjustments accordingly during skilled therapy and with rehab nursing  Monitor skin closely for breakdown, wounds, rashes; keep clean and dry, turning Q2H   Follow-up with PCP after d/c      Encounter for skin care  Assessment & Plan  - Increased risk of skin wounds/breakdown/rashes due to recent immobility and co-morbidities   - 2 nurse/staff full skin check for abnormal findings on admission - obtain photos PRN  - Turn patient Q2H in bed (use pillows or wedges), encourage wt shifts every 10-15 minutes in chair  - Patient and if available caregiver education   - Hydragaurd to buttocks and sacrum BID & PRN  - Optimal bowel/bladder hygiene; keep skin clean and dry   - EHOB waffle cushion to chair/WC when OOB  - Nursing to document in chart and Notify MD if buttock, sacrum, heel, or other skin site develops erythema, skin breakdown, or rashes as soon as possible  If patient is soiling themselves with urine or stool notify MD   If you are unable to maintain skin integrity and prevent erythema due to frequency of soiling notify MD as soon as possible as well  Peripheral neuropathy  Assessment & Plan  Monitor skin for increased infection/breakdown/wounds which patient is at higher risk for  Skilled PT/OT   Fall precautions      Abnormal finding on radiology exam  Assessment & Plan  Per discharging provider to [facility]   \"Findings and Follow up required:                              1) Scattered less than 3 mm nodular densities in the posterior right upper lobe of the lung as well as a calcified granuloma in the right lower lobe of the lung posteriorly were noted incidentally on your trauma imaging  A malignancy (cancer) cannot be completely excluded based on trauma imaging alone    Recommend short-term outpatient follow-up with primary care provider to review the " finding and for further surveillance as indicated                               2)  A subcentimeter cystic hypodensity posterior right lobe of the liver was incidentally identified on your trauma imaging and is unchanged compared to prior imaging  A malignancy (cancer) cannot be completely excluded based on trauma imaging alone  Recommend short-term outpatient follow-up with primary care provider to review the finding and for further surveillance as indicated                               3) A left adrenal gland 1 4 x 0 9 cm nodule was incidentally identified under trauma imaging and is unchanged compared to prior imaging  A malignancy (cancer) cannot be completely excluded based on trauma imaging alone  Recommend short-term outpatient follow-up with primary care provider to review the finding and for further surveillance as indicated                               4) Subcentimeter hypodensities in your kidneys were incidentally identified under trauma imaging and are suggestive of benign cysts  A malignancy (cancer) cannot be completely excluded based on trauma imaging alone  Recommend short-term outpatient follow-up with primary care provider to review the finding and for further surveillance as indicated                               5) A new small umbilical hernia containing nonobstructed bowel loops and fat was incidentally identified under trauma imaging as well as a previously seen right para-midline ventral hernia defect containing adherent bowel loops in a nonobstructive pattern  No further work-up or intervention necessary for these findings at this time  Recommend short-term outpatient follow-up with primary care provider to review the finding and for further surveillance as indicated                   Follow up should be done within 2 week(s)     The above noted incidental findings were discussed with the patient    The patient demonstrated understanding of the findings and the follow-up "recommendations      Please notify the following clinician to assist with the follow up:              Dr Stephen Steel"    Anemia  Assessment & Plan  Hb stable at 9 7 on 6/8  Consult(ed) IM and comanagement with their service during ARC course   Monitor H/H, vitals, signs/symptoms of acute bleeding      Pain  Assessment & Plan  Improving some  ICE prn   APAP 975mg TID   Oxycodone 2 5-5mg Q4H PRN  Lyrica 100mg BID (chronic for neuropathy)  Monitor for oversedation, AMS/delirium, and respiratory depression   If being administered - hold opiates, muscle relaxants, benzodiazepines, and gabapentin for oversedation, AMS, or RR<12    Counseled on and continue to encourage deep breathing/relaxation/behavioral pain management techniques      Constipation  Assessment & Plan  Remains improved  Significant on admission to [facility] with last Bm near a week ago; no current signs of symptoms obstruction  - Continue to monitor for S/S of obstruction; hold stimulant laxatives and obtain imaging if concern develops  - Colace BID, miralax daily (consider BID if needed)  (Declines senna)   - PRN bowel regimen  - Limiting constipating medications if possible  - Ensure adequate hydration       Pure hypercholesterolemia  Assessment & Plan  Statin     PAF (paroxysmal atrial fibrillation) (HCC)  Assessment & Plan  IM consulted and with overall management at their discretion during ARC course  Rate control: Toprol XL 50mg HS  Antithrombotic: Warfarin cleared to resume > mgmt per IM (Hold lovenox when INR tx)  (patient is frustrated with checking her INR and was not always checking it and adjusting med dose on own at time; she did voice she would be open to consider NOAC - consider discussing with cards in IP setting otherwise OP follow-up)      Non-rheumatic aortic stenosis  Assessment & Plan  EF 65%, G2DD, Mild-mod AS, mod-severe elevated RVSP  IM consulted and with overall management at their discretion during ARC course  Monitor vitals, fluid " status  OP Cards follow-up      Other Medical Issues:  • Monitor for     Follow-up providers and other issues to be followed up after discharge  Cards  PCP  Ortho    CODE: Level 3: DNAR and DNI    Restrictions include: Fall precautions    Objective: Allergies per EMR  Diagnostic Studies: Reviewed, no new imaging  XR femur 2 vw right   Final Result by King Fortino MD (06/03 6377)      No acute osseous abnormality  Postoperative changes              Workstation performed: [id number]           See above as well    Laboratory: Labs reviewed  Results from last 7 days   Lab Units 06/08/23  0459 06/05/23  0452   HEMATOCRIT % 30 9* 32 1*   HEMOGLOBIN g/dL 9 7* 10 0*   WBC Thousand/uL 7 29 8 11     Results from last 7 days   Lab Units 06/08/23  0459 06/05/23  0452   BUN mg/dL 12 14   CHLORIDE mmol/L 109* 107   CREATININE mg/dL 0 39* 0 43*   POTASSIUM mmol/L 4 2 4 5     Results from last 7 days   Lab Units 06/09/23  0621 06/08/23  0449 06/06/23  0833   INR  2 15* 1 90* 1 39*   PROTIME seconds 24 3* 22 0* 17 3*          Drug regimen reviewed, all potential adverse effects identified and addressed:    Current Facility-Administered Medications   Medication Dose Route Frequency Provider Last Rate   • acetaminophen  975 mg Oral Q6H [address] Nash Guillermo MD     • bisacodyl  10 mg Oral Once Linda Monroy MD     • bisacodyl  10 mg Rectal Daily PRN Nash Guillermo MD     • docusate sodium  100 mg Oral BID Nash Guillermo MD     • enoxaparin  30 mg Subcutaneous Q12H [address] Nash Guillermo MD     • latanoprost  1 drop Both Eyes HS RAYMON Richey     • lidocaine   Topical Q4H PRN Nash Guillermo MD     • metoprolol succinate  50 mg Oral HS Nash Guillermo MD     • montelukast  10 mg Oral HS Nash Guillermo MD     • oxyCODONE  5 mg Oral Q4H PRN Nash Guillermo MD     • oxyCODONE  2 5 mg Oral Q4H PRN Nash Guillermo MD     • polyethylene glycol  17 g Oral Daily PRN Indy Sanford Renetta Tomlinson MD     • polyethylene glycol  17 g Oral Daily Akanksha Gardner MD     • pravastatin  40 mg Oral Daily With Little aClix MD     • pregabalin  100 mg Oral BID Akanksha Gardner MD     • warfarin  7 5 mg Oral Daily (warfarin) RAYMON Jones        Chief Complaints: Rehab follow-up     Subjective: On eval, patient some pain but improving in hip  She denies SOB, calf pain, lightheadedness, abdominal pain, or other complaints  ROS: A 10 point ROS was performed; negative except as noted above  Physical Exam:  06/08/23 1338 98 1 °F (36 7 °C) 73 18 122/56 -- 96 % None (Room air)     Vitals above reviewed on date of encounter    GEN:  Sitting in NAD   HEENT/NECK: Normocephalic, atraumatic, moist mucous membranes   CARDIAC: Regular rate rhythm, no murmers, no rubs, no gallops  LUNGS:  clear to auscultation, no wheezes, rales, or rhonchi  ABDOMEN: Soft, non-tender, non-distended, normal active bowel sounds  EXTREMITIES/SKIN:  Slight R>LLE edema stable without calf TTP  NEURO:   MENTAL STATUS: awake, oriented to person, place, time, and situation and MENTAL STATUS:  Appropriate wakefulness and interaction   PSYCH:  Affect:  Euthymic     HPI:  34-year-old female with a past medical history of A-fib on Coumadin, hypertension, hyperlipidemia, obesity, peripheral neuropathy, aortic stenosis, bilateral total hip arthroplasty, right total hip arthroplasty revision who stood up from kneeling and felt a crack in her right hip and then slipped and fell with resultant right leg pain who was found to have right periprosthetic hip fracture -described by radiology is acute minimally displaced periprosthetic fracture of the right proximal femoral diaphysis adjacent to the femoral stem of the right total hip arthroplasty  CT head was negative for acute findings  Patient underwent ORIF of right periprosthetic hip fracture on 5/28    Patient was started on Lovenox for VTE prophylaxis and cleared for Coumadin on 5/31  Patient has had some acute blood loss anemia, pain, severe constipation, and significant decline in ADLs and mobility        ** Please Note: Fluency Direct voice to text software may have been used in the creation of this document  **    I personally performed the required components and examined the patient myself in person on 6/8/23

## 2023-06-10 NOTE — PROGRESS NOTES
Physical Medicine and Rehabilitation Progress Note  Brit Henson 77 y o  female MRN: [id number]  Unit/Bed#: [facility] 961-01 Encounter: [id number]      Assessment & Plan:     Decline in ADLs and mobility: Functional assessment - improving mobility         FIM  Care Score  Admit Score Recent Score    Total assist  1-100% or 2p    Tot Other ADLs   toileting hygiene   Max assist 2-51-75%    Sub   bathing, lower body dressing   Mod assist 3- 26-50%  Par     Min assist 3- 25% or < Par UBD    CG assist 4  TA     Sup/Setup 4-5 Sup   upper body dressing   Mod-I/Indep 6 MI      Transfers  Mod-Significant assist  contact-guard to min assist      Ambulation   Total assist  50 ft min assist     Stairs   Total assist/NT      Goal: Mod indep for most ADLs and  for mobility  Major barriers:  Pain, decreased ROM, deconditioning, lack of assistance at home  Dispo: Home with [facility] 16 days from admission      * 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 (HCC)  Assessment & Plan  - Pain stable; decreasing drainage   - Patient reported significant pain after transfer earlier in course; right femur x-ray without acute concerns 6/2    Status post surgical repair via ORIF by Dr Paul Loyd  on 5/28/23  • Weight-bearing as tolerated on affected limb   • Monitor incision/area for infection or dehiscence/impaired healing   • POD#14 is Enoch Mackey 6/11  • Monitor for signs/symptoms of VTE, atelectasis, acute blood loss anemia, or other infection   • Continue acute comprehensive interdisciplinary inpatient rehabilitation to include intensive skilled therapies (PT, OT) as outlined with oversight and management by rehabilitation physician as well as inpatient rehab level nursing, case management and weekly interdisciplinary team meetings     • Follow-up with Ortho Sx after d/c and ortho if needed during ARC course       At risk for venous thromboembolism (VTE)  Assessment & Plan  SCDs, ambulation, and lovenox 30mg BID (until warfarin tx) "  INR still subTx      Obesity  Assessment & Plan   on appropriate nutrition and activity  Adjustments accordingly during skilled therapy and with rehab nursing  Monitor skin closely for breakdown, wounds, rashes; keep clean and dry, turning Q2H   Follow-up with PCP after d/c      Encounter for skin care  Assessment & Plan  - Increased risk of skin wounds/breakdown/rashes due to recent immobility and co-morbidities   - 2 nurse/staff full skin check for abnormal findings on admission - obtain photos PRN  - Turn patient Q2H in bed (use pillows or wedges), encourage wt shifts every 10-15 minutes in chair  - Patient and if available caregiver education   - Hydragaurd to buttocks and sacrum BID & PRN  - Optimal bowel/bladder hygiene; keep skin clean and dry   - EHOB waffle cushion to chair/WC when OOB  - Nursing to document in chart and Notify MD if buttock, sacrum, heel, or other skin site develops erythema, skin breakdown, or rashes as soon as possible  If patient is soiling themselves with urine or stool notify MD   If you are unable to maintain skin integrity and prevent erythema due to frequency of soiling notify MD as soon as possible as well  Peripheral neuropathy  Assessment & Plan  Monitor skin for increased infection/breakdown/wounds which patient is at higher risk for  Skilled PT/OT   Fall precautions      Abnormal finding on radiology exam  Assessment & Plan  Per discharging provider to [facility]   \"Findings and Follow up required:                              1) Scattered less than 3 mm nodular densities in the posterior right upper lobe of the lung as well as a calcified granuloma in the right lower lobe of the lung posteriorly were noted incidentally on your trauma imaging  A malignancy (cancer) cannot be completely excluded based on trauma imaging alone    Recommend short-term outpatient follow-up with primary care provider to review the finding and for further surveillance as indicated             " 2)  A subcentimeter cystic hypodensity posterior right lobe of the liver was incidentally identified on your trauma imaging and is unchanged compared to prior imaging  A malignancy (cancer) cannot be completely excluded based on trauma imaging alone  Recommend short-term outpatient follow-up with primary care provider to review the finding and for further surveillance as indicated                               3) A left adrenal gland 1 4 x 0 9 cm nodule was incidentally identified under trauma imaging and is unchanged compared to prior imaging  A malignancy (cancer) cannot be completely excluded based on trauma imaging alone  Recommend short-term outpatient follow-up with primary care provider to review the finding and for further surveillance as indicated                               4) Subcentimeter hypodensities in your kidneys were incidentally identified under trauma imaging and are suggestive of benign cysts  A malignancy (cancer) cannot be completely excluded based on trauma imaging alone  Recommend short-term outpatient follow-up with primary care provider to review the finding and for further surveillance as indicated                               5) A new small umbilical hernia containing nonobstructed bowel loops and fat was incidentally identified under trauma imaging as well as a previously seen right para-midline ventral hernia defect containing adherent bowel loops in a nonobstructive pattern  No further work-up or intervention necessary for these findings at this time  Recommend short-term outpatient follow-up with primary care provider to review the finding and for further surveillance as indicated                   Follow up should be done within 2 week(s)     The above noted incidental findings were discussed with the patient    The patient demonstrated understanding of the findings and the follow-up recommendations      Please notify the following clinician to assist with "the follow up:              Dr Ariana Valdivia"    Anemia  Assessment & Plan  Hemoglobin stable 6/5  Consult(ed) IM and comanagement with their service during ARC course   Monitor H/H, vitals, signs/symptoms of acute bleeding      Pain  Assessment & Plan  Stable   ICE prn   APAP 975mg TID   Oxycodone 2 5-5mg Q4H PRN  Lyrica 100mg BID (chronic for neuropathy)  Monitor for oversedation, AMS/delirium, and respiratory depression   If being administered - hold opiates, muscle relaxants, benzodiazepines, and gabapentin for oversedation, AMS, or RR<12    Counseled on and continue to encourage deep breathing/relaxation/behavioral pain management techniques      Constipation  Assessment & Plan  Remains improved  Significant on admission to [facility] with last Bm near a week ago; no current signs of symptoms obstruction  - Continue to monitor for S/S of obstruction; hold stimulant laxatives and obtain imaging if concern develops  - Colace BID, miralax daily (consider BID if needed)  (Declines senna)   - PRN bowel regimen  - Limiting constipating medications if possible  - Ensure adequate hydration       Pure hypercholesterolemia  Assessment & Plan  Statin     PAF (paroxysmal atrial fibrillation) (HCC)  Assessment & Plan  IM consulted and with overall management at their discretion during ARC course  Rate control: Toprol XL 50mg HS  Antithrombotic: Warfarin cleared to resume > mgmt per IM (Hold lovenox when INR tx)  (patient is frustrated with checking her INR and was not always checking it and adjusting med dose on own at time; she did voice she would be open to consider NOAC - consider discussing with cards in IP setting otherwise OP follow-up)      Non-rheumatic aortic stenosis  Assessment & Plan  EF 65%, G2DD, Mild-mod AS, mod-severe elevated RVSP  IM consulted and with overall management at their discretion during ARC course  Monitor vitals, fluid status  OP Cards follow-up      Other Medical Issues:  • Monitor for     Follow-up " providers and other issues to be followed up after discharge  Cards  PCP  Ortho    CODE: Level 3: DNAR and DNI    Restrictions include: Fall precautions    Objective: Allergies per EMR  Diagnostic Studies: Reviewed, no new imaging  XR femur 2 vw right   Final Result by Klarissa Navas MD (06/03 5752)      No acute osseous abnormality  Postoperative changes              Workstation performed: [id number]           See above as well    Laboratory: Labs reviewed  Results from last 7 days   Lab Units 06/05/23  0452   HEMATOCRIT % 32 1*   HEMOGLOBIN g/dL 10 0*   WBC Thousand/uL 8 11     Results from last 7 days   Lab Units 06/05/23  0452   BUN mg/dL 14   CHLORIDE mmol/L 107   CREATININE mg/dL 0 43*   POTASSIUM mmol/L 4 5   SODIUM mmol/L 137     Results from last 7 days   Lab Units 06/09/23  0621 06/08/23  0449 06/06/23  0833   INR  2 15* 1 90* 1 39*   PROTIME seconds 24 3* 22 0* 17 3*        Drug regimen reviewed, all potential adverse effects identified and addressed:    Medication Dose Route Frequency Provider Last Rate   • acetaminophen  975 mg Oral Q6H [address] Kaylee Bose MD     • bisacodyl  10 mg Oral Once Benson Loyd MD     • bisacodyl  10 mg Rectal Daily PRN Kaylee Bose MD     • docusate sodium  100 mg Oral BID Kaylee Bose MD     • enoxaparin  30 mg Subcutaneous Q12H [address] Kaylee Bose MD     • latanoprost  1 drop Both Eyes HS Elfrieda Poplin, CRNP     • lidocaine   Topical Q4H PRN Kaylee Bose MD     • metoprolol succinate  50 mg Oral HS Kaylee Bose MD     • montelukast  10 mg Oral HS Kaylee Bose MD     • oxyCODONE  5 mg Oral Q4H PRN Kaylee Bose MD     • oxyCODONE  2 5 mg Oral Q4H PRN Kaylee Bose MD     • polyethylene glycol  17 g Oral Daily PRN Kaylee Bose MD     • polyethylene glycol  17 g Oral Daily Kaylee Bose MD     • pravastatin  40 mg Oral Daily With Mariela Maldonado MD     • pregabalin 100 mg Oral BID Lev Morfin MD     • warfarin  7 5 mg Oral Daily (warfarin) Keith Torres PA-C         Chief Complaints:  Hip pain     Subjective: On eval, patient reports stable hip pain  She denies lightheadedness, SOB, calf pain, constipation, or other new complaints  ROS: A 10 point ROS was performed; negative except as noted above  Physical Exam:  Vitals:     06/06/23 0507   BP: 110/50   BP Location: Left arm   Pulse: 73   Resp: 18   Temp: 98 2 °F (36 8 °C)   TempSrc: Oral   SpO2: 97%   Vitals above reviewed on date of encounter    GEN:  Lying in bed in NAD   HEENT/NECK: MMM  CARDIAC: Regular rate rhythm, no murmers, no rubs, no gallops  LUNGS:  clear to auscultation, no wheezes, rales, or rhonchi  ABDOMEN: Soft, non-tender, non-distended, normal active bowel sounds  EXTREMITIES/SKIN:  Right hip/thigh incision with decreasing serosanguineous drainage without signs of infection or significant dehiscence; mild right lower extremity edema without calf tenderness to palpation - stable  NEURO:   MENTAL STATUS:  Appropriate wakefulness and interaction   PSYCH:  Affect:  Euthymic    HPI:  62-year-old female with a past medical history of A-fib on Coumadin, hypertension, hyperlipidemia, obesity, peripheral neuropathy, aortic stenosis, bilateral total hip arthroplasty, right total hip arthroplasty revision who stood up from kneeling and felt a crack in her right hip and then slipped and fell with resultant right leg pain who was found to have right periprosthetic hip fracture -described by radiology is acute minimally displaced periprosthetic fracture of the right proximal femoral diaphysis adjacent to the femoral stem of the right total hip arthroplasty  CT head was negative for acute findings  Patient underwent ORIF of right periprosthetic hip fracture on 5/28  Patient was started on Lovenox for VTE prophylaxis and cleared for Coumadin on 5/31    Patient has had some acute blood loss anemia, pain, severe constipation, and significant decline in ADLs and mobility        ** Please Note: Fluency Direct voice to text software may have been used in the creation of this document  **    I personally performed the required components and examined the patient myself in person on 6/6/23

## 2023-06-11 ENCOUNTER — APPOINTMENT (OUTPATIENT)
Dept: RADIOLOGY | Facility: HOSPITAL | Age: 78
DRG: 561 | End: 2023-06-11
Payer: MEDICARE

## 2023-06-11 PROCEDURE — NC001 PR NO CHARGE: Performed by: ORTHOPAEDIC SURGERY

## 2023-06-11 PROCEDURE — 97530 THERAPEUTIC ACTIVITIES: CPT

## 2023-06-11 PROCEDURE — 73552 X-RAY EXAM OF FEMUR 2/>: CPT

## 2023-06-11 PROCEDURE — 99232 SBSQ HOSP IP/OBS MODERATE 35: CPT | Performed by: INTERNAL MEDICINE

## 2023-06-11 PROCEDURE — 97535 SELF CARE MNGMENT TRAINING: CPT

## 2023-06-11 RX ORDER — ACETAMINOPHEN 325 MG/1
975 TABLET ORAL EVERY 8 HOURS SCHEDULED
Status: DISCONTINUED | OUTPATIENT
Start: 2023-06-11 | End: 2023-06-16 | Stop reason: HOSPADM

## 2023-06-11 RX ADMIN — PREGABALIN 100 MG: 100 CAPSULE ORAL at 08:45

## 2023-06-11 RX ADMIN — PREGABALIN 100 MG: 100 CAPSULE ORAL at 17:21

## 2023-06-11 RX ADMIN — METOPROLOL SUCCINATE 50 MG: 50 TABLET, EXTENDED RELEASE ORAL at 21:14

## 2023-06-11 RX ADMIN — MONTELUKAST SODIUM 10 MG: 10 TABLET, COATED ORAL at 21:15

## 2023-06-11 RX ADMIN — WARFARIN SODIUM 5 MG: 5 TABLET ORAL at 17:21

## 2023-06-11 RX ADMIN — ACETAMINOPHEN 975 MG: 325 TABLET, FILM COATED ORAL at 22:16

## 2023-06-11 RX ADMIN — ACETAMINOPHEN 975 MG: 325 TABLET, FILM COATED ORAL at 05:00

## 2023-06-11 RX ADMIN — PRAVASTATIN SODIUM 40 MG: 40 TABLET ORAL at 16:18

## 2023-06-11 RX ADMIN — Medication 2.5 MG: at 13:54

## 2023-06-11 RX ADMIN — LATANOPROST 1 DROP: 50 SOLUTION OPHTHALMIC at 21:13

## 2023-06-11 RX ADMIN — ACETAMINOPHEN 975 MG: 325 TABLET, FILM COATED ORAL at 17:21

## 2023-06-11 RX ADMIN — Medication 2.5 MG: at 08:47

## 2023-06-11 NOTE — PLAN OF CARE
Problem: PAIN - ADULT  Goal: Verbalizes/displays adequate comfort level or baseline comfort level  Description: Interventions:  - Encourage patient to monitor pain and request assistance  - Assess pain using appropriate pain scale  - Administer analgesics based on type and severity of pain and evaluate response  - Implement non-pharmacological measures as appropriate and evaluate response  - Consider cultural and social influences on pain and pain management  - Notify physician/advanced practitioner if interventions unsuccessful or patient reports new pain  Outcome: Progressing     Problem: INFECTION - ADULT  Goal: Absence or prevention of progression during hospitalization  Description: INTERVENTIONS:  - Assess and monitor for signs and symptoms of infection  - Monitor lab/diagnostic results  - Monitor all insertion sites, i e  indwelling lines, tubes, and drains  - Monitor endotracheal if appropriate and nasal secretions for changes in amount and color  - Isabella appropriate cooling/warming therapies per order  - Administer medications as ordered  - Instruct and encourage patient and family to use good hand hygiene technique  - Identify and instruct in appropriate isolation precautions for identified infection/condition  Outcome: Progressing     Problem: SAFETY ADULT  Goal: Patient will remain free of falls  Description: INTERVENTIONS:  - Educate patient/family on patient safety including physical limitations  - Instruct patient to call for assistance with activity   - Consult OT/PT to assist with strengthening/mobility   - Keep Call bell within reach  - Keep bed low and locked with side rails adjusted as appropriate  - Keep care items and personal belongings within reach  - Initiate and maintain comfort rounds  - Make Fall Risk Sign visible to staff  - Offer Toileting every Hours, in advance of need  - Initiate/Maintain alarm  - Obtain necessary fall risk management equipment:   - Apply yellow socks and bracelet for high fall risk patients  - Consider moving patient to room near nurses station  Outcome: Progressing  Goal: Maintain or return to baseline ADL function  Description: INTERVENTIONS:  -  Assess patient's ability to carry out ADLs; assess patient's baseline for ADL function and identify physical deficits which impact ability to perform ADLs (bathing, care of mouth/teeth, toileting, grooming, dressing, etc )  - Assess/evaluate cause of self-care deficits   - Assess range of motion  - Assess patient's mobility; develop plan if impaired  - Assess patient's need for assistive devices and provide as appropriate  - Encourage maximum independence but intervene and supervise when necessary  - Involve family in performance of ADLs  - Assess for home care needs following discharge   - Consider OT consult to assist with ADL evaluation and planning for discharge  - Provide patient education as appropriate  Outcome: Progressing  Goal: Maintains/Returns to pre admission functional level  Description: INTERVENTIONS:  - Perform BMAT or MOVE assessment daily    - Set and communicate daily mobility goal to care team and patient/family/caregiver  - Collaborate with rehabilitation services on mobility goals if consulted  - Perform Range of Motion  times a day  - Reposition patient every  hours    - Dangle patient  times a day  - Stand patient times a day  - Ambulate patient  times a day  - Out of bed to chair  times a day   - Out of bed for meals  times a day  - Out of bed for toileting  - Record patient progress and toleration of activity level   Outcome: Progressing     Problem: DISCHARGE PLANNING  Goal: Discharge to home or other facility with appropriate resources  Description: INTERVENTIONS:  - Identify barriers to discharge w/patient and caregiver  - Arrange for needed discharge resources and transportation as appropriate  - Identify discharge learning needs (meds, wound care, etc )  - Arrange for interpretive services to assist at discharge as needed  - Refer to Case Management Department for coordinating discharge planning if the patient needs post-hospital services based on physician/advanced practitioner order or complex needs related to functional status, cognitive ability, or social support system  Outcome: Progressing     Problem: Prexisting or High Potential for Compromised Skin Integrity  Goal: Skin integrity is maintained or improved  Description: INTERVENTIONS:  - Identify patients at risk for skin breakdown  - Assess and monitor skin integrity  - Assess and monitor nutrition and hydration status  - Monitor labs   - Assess for incontinence   - Turn and reposition patient  - Assist with mobility/ambulation  - Relieve pressure over bony prominences  - Avoid friction and shearing  - Provide appropriate hygiene as needed including keeping skin clean and dry  - Evaluate need for skin moisturizer/barrier cream  - Collaborate with interdisciplinary team   - Patient/family teaching  - Consider wound care consult   Outcome: Progressing     Problem: MOBILITY - ADULT  Goal: Maintain or return to baseline ADL function  Description: INTERVENTIONS:  -  Assess patient's ability to carry out ADLs; assess patient's baseline for ADL function and identify physical deficits which impact ability to perform ADLs (bathing, care of mouth/teeth, toileting, grooming, dressing, etc )  - Assess/evaluate cause of self-care deficits   - Assess range of motion  - Assess patient's mobility; develop plan if impaired  - Assess patient's need for assistive devices and provide as appropriate  - Encourage maximum independence but intervene and supervise when necessary  - Involve family in performance of ADLs  - Assess for home care needs following discharge   - Consider OT consult to assist with ADL evaluation and planning for discharge  - Provide patient education as appropriate  Outcome: Progressing  Goal: Maintains/Returns to pre admission functional level  Description: INTERVENTIONS:  - Perform BMAT or MOVE assessment daily    - Set and communicate daily mobility goal to care team and patient/family/caregiver  - Collaborate with rehabilitation services on mobility goals if consulted  - Perform Range of Motion  times a day  - Reposition patient every  hours  - Dangle patient  times a day  - Stand patient  times a day  - Ambulate patient  times a day  - Out of bed to chair  times a day   - Out of bed for meals  times a day  - Out of bed for toileting  - Record patient progress and toleration of activity level   Outcome: Progressing     Problem: Nutrition/Hydration-ADULT  Goal: Nutrient/Hydration intake appropriate for improving, restoring or maintaining nutritional needs  Description: Monitor and assess patient's nutrition/hydration status for malnutrition  Collaborate with interdisciplinary team and initiate plan and interventions as ordered  Monitor patient's weight and dietary intake as ordered or per policy  Utilize nutrition screening tool and intervene as necessary  Determine patient's food preferences and provide high-protein, high-caloric foods as appropriate       INTERVENTIONS:  - Monitor oral intake, urinary output, labs, and treatment plans  - Assess nutrition and hydration status and recommend course of action  - Evaluate amount of meals eaten  - Assist patient with eating if necessary   - Allow adequate time for meals  - Recommend/ encourage appropriate diets, oral nutritional supplements, and vitamin/mineral supplements  - Order, calculate, and assess calorie counts as needed  - Recommend, monitor, and adjust tube feedings and TPN/PPN based on assessed needs  - Assess need for intravenous fluids  - Provide specific nutrition/hydration education as appropriate  - Include patient/family/caregiver in decisions related to nutrition  Outcome: Progressing

## 2023-06-11 NOTE — PROGRESS NOTES
Internal Medicine Progress Note  Patient: Amanda Casper  Age/sex: 77 y o  female  Medical Record #: [id number]      ASSESSMENT/PLAN: (Interval History)  Amanda Casper is seen and examined and management for following issues:    Right periprosthetic femur fracture  • S/p ORIF 5/28  • WBAT  • X-ray 6/2/23 with post-op changes      Hx PAF  • Home:  Toprol 50mg qHS/Coumadin 6 25 mg (2 1/2 tabs of 2 5mg tabs) on Mondays and 5mg the rest of the days of the week  • Here:  Toprol 50mg qHS/Coumadin as below  • She did tell Dr Raza Yu that she adjusts her Coumadin on her own  • [facility] does her INRs and she has 2 5mg tabs at home  • EKG 5/27/23 = SR  • lovenox dc'd  • Cont coumadin 7 5mg m-w-f and 5mg all other days  • INR 6/12/23     HLD  • Continue Pravachol as a sub for home Simvastatin     Peripheral neuropathy  • Continue Lyrica     Aortic stenosis  • ECHO 7/2022:  LVEF 65%/gr 2 DD, mild-mod AS, mild TR/MR, RV systolic pressure is mod-severe elevated with systolic pressure 59 mmHg  • euvolemic        Discharge date:  Team    The above assessment and plan was reviewed and updated as determined by my evaluation of the patient on 6/11/2023      Labs:   Results from last 7 days   Lab Units 06/08/23  0459 06/05/23  0452   HEMATOCRIT % 30 9* 32 1*   HEMOGLOBIN g/dL 9 7* 10 0*   PLATELETS Thousands/uL 362 313   WBC Thousand/uL 7 29 8 11     Results from last 7 days   Lab Units 06/08/23  0459 06/05/23  0452   BUN mg/dL 12 14   CALCIUM mg/dL 8 8 9 0   CHLORIDE mmol/L 109* 107   CO2 mmol/L 28 29   CREATININE mg/dL 0 39* 0 43*   POTASSIUM mmol/L 4 2 4 5   SODIUM mmol/L 139 137         Results from last 7 days   Lab Units 06/09/23  0621 06/08/23  0449   INR  2 15* 1 90*           Review of Scheduled Meds:  Current Facility-Administered Medications   Medication Dose Route Frequency Provider Last Rate   • acetaminophen  975 mg Oral Q6H [facility] Daniel Badillo MD     • bisacodyl  10 mg Rectal Daily PRN Daniel Badillo MD • bisacodyl  10 mg Rectal Once Dagoberto Antunez MD     • docusate sodium  100 mg Oral BID Dagoberto Antunez MD     • latanoprost  1 drop Both Eyes HS RAYMON Garcia     • lidocaine   Topical Q4H PRN Dagoberto Antunez MD     • metoprolol succinate  50 mg Oral HS Dagoberto Antunez MD     • montelukast  10 mg Oral HS Dagoberto Antunez MD     • oxyCODONE  5 mg Oral Q4H PRN Dagoberto Antunez MD     • oxyCODONE  2 5 mg Oral Q4H PRN Dagoberto Antunez MD     • polyethylene glycol  17 g Oral Daily PRN Dagoberto Antunez MD     • polyethylene glycol  17 g Oral Daily Dagoberto Antunez MD     • pravastatin  40 mg Oral Daily With Melvin Gay MD     • pregabalin  100 mg Oral BID Dagoberto Antunez MD     • senna  1 tablet Oral BID Dagoberto Antunez MD     • warfarin  5 mg Oral Once per day on Sun Tue Thu Sat RAYMON Garcia     • warfarin  7 5 mg Oral Once per day on Mon Wed Fri RAYMON Galvez         Subjective/ HPI: Patient seen and examined  Patients overnight issues or events were reviewed with nursing or staff during rounds or morning huddle session  New or overnight issues include the following:     Pt seen in her room, better night sleep and improvement in her sacral pain     ROS:   A 10 point ROS was performed; negative except as noted above       *Labs /Radiology studies reviewed  *Medications reviewed and reconciled as needed  *Please refer to order section for additional ordered labs studies  *Case discussed with primary attending during morning huddle case rounds    Physical Examination:  Vitals:   Vitals:    06/10/23 0458 06/10/23 1500 06/10/23 2023 06/11/23 0500   BP: 139/65 (!) 111/42 (!) 109/43 135/62   BP Location: Right arm Right arm Left arm Left arm   Pulse: 77 81 76 68   Resp: 19 16 18 16   Temp: 98 °F (36 7 °C) 98 °F (36 7 °C) 98 °F (36 7 °C) 97 8 °F (36 6 °C)   TempSrc: Oral Oral Oral Oral   SpO2: 96% 96% 96% 97% Weight:       Height:           GEN: NAD; pleasant  NEURO: Alert and oriented x4; appropriate  HEENT: Pupils are equal/reactive; normocephalic, face is symmetrical , hearing is normal  CV: S1 S2 regular, no murmur/rub/gallops, 1/4 pedal pulses, 1/4 b/l LE edema present  RESP: Lungs are clear bilaterally, no wheezes rales or rhonchi, on room air, no distress, respirations are easy and non labored  GI: Abdomen is obese, soft, non tender, +BS x4; non distended  : Voiding without issues  MUSC: Moves all extremities except RLE  SKIN: pink, warm, normal turgor, sacral pain refer to media        The above physical exam was reviewed and updated as determined by my evaluation of the patient on 6/11/2023  Invasive Devices     None                    VTE Pharmacologic Prophylaxis: Warfarin (Coumadin)  Code Status: Level 3 - DNAR and DNI  Current Length of Stay: 11 day(s)      Total time spent:  30 minutes with more than 50% spent counseling/coordinating care  Counseling includes discussion with patient re: progress  and discussion with patient of his/her current medical state/information  Coordination of patient's care was performed in conjunction with primary service  Time invested included review of patient's labs, vitals, and management of their comorbidities with continued monitoring  In addition, this patient was discussed with medical team including physician and advanced extenders  The care of the patient was extensively discussed and appropriate treatment plan was formulated unique for this patient  Medical decision making for the day was made by supervising physician unless otherwise noted in their attestation statement  ** Please Note:  voice to text software may have been used in the creation of this document   Although proof errors in transcription or interpretation are a potential of such software**

## 2023-06-11 NOTE — CONSULTS
Progress Note - Orthopedics   Lisset Woodruff 77 y o  female MRN: [id number]  Unit/Bed#: X ray      Subjective:    77 y  o female s/p right siri-prosthetic femur fracture ORIF here for 2 week follow up  No acute events, no new complaints  Patient doing well  Pain well controlled  Denies fevers, chills, CP, SOB, N/V, numbness or tingling  Patient reports no issues with urination or bowel movements  Patient states she is doing well      Labs:  0   Lab Value Date/Time    CRP <3 0 10/11/2018 1435    ESR 18 10/11/2018 1435    HCT 30 9 (L) 06/08/2023 0459    HCT 32 1 (L) 06/05/2023 0452    HCT 30 9 (L) 05/31/2023 0538    HGB 9 7 (L) 06/08/2023 0459    HGB 10 0 (L) 06/05/2023 0452    HGB 9 8 (L) 05/31/2023 0538    INR 2 15 (H) 06/09/2023 0621    WBC 7 29 06/08/2023 0459    WBC 8 11 06/05/2023 0452    WBC 8 70 05/31/2023 0538       Meds:    Current Facility-Administered Medications:   •  acetaminophen (TYLENOL) tablet 975 mg, 975 mg, Oral, Q8H [address], Angeles Prather MD  •  bisacodyl (DULCOLAX) rectal suppository 10 mg, 10 mg, Rectal, Daily PRN, Angeles Prather MD, 10 mg at 06/10/23 0800  •  bisacodyl (DULCOLAX) rectal suppository 10 mg, 10 mg, Rectal, Once, Angeles Prather MD  •  docusate sodium (COLACE) capsule 100 mg, 100 mg, Oral, BID, Angeles Prather MD, 100 mg at 06/10/23 1818  •  latanoprost (XALATAN) 0 005 % ophthalmic solution 1 drop, 1 drop, Both Eyes, HS, RAYMON Rosenbaum, 1 drop at 06/10/23 2111  •  lidocaine (URO-JET) 2 % urethral/mucosal gel, , Topical, Q4H PRN, Angeles Prather MD  •  metoprolol succinate (TOPROL-XL) 24 hr tablet 50 mg, 50 mg, Oral, HS, Angeles Prather MD, 50 mg at 06/08/23 2232  •  montelukast (SINGULAIR) tablet 10 mg, 10 mg, Oral, HS, Angeles Prather MD, 10 mg at 06/10/23 2110  •  oxyCODONE (ROXICODONE) IR tablet 5 mg, 5 mg, Oral, Q4H PRN, Angeles Prather MD, 5 mg at 06/10/23 0854  •  oxyCODONE (ROXICODONE) split tablet 2 5 mg, 2 5 mg, Oral, Q4H "PRN, Ye Martinez MD, 2 5 mg at 06/11/23 1354  •  polyethylene glycol (MIRALAX) packet 17 g, 17 g, Oral, Daily PRN, Ye Martinez MD, 17 g at 06/09/23 1726  •  polyethylene glycol (MIRALAX) packet 17 g, 17 g, Oral, Daily, Ye Martinez MD, 17 g at 06/10/23 0854  •  pravastatin (PRAVACHOL) tablet 40 mg, 40 mg, Oral, Daily With Renetta Gallegos MD, 40 mg at 06/11/23 1618  •  pregabalin (LYRICA) capsule 100 mg, 100 mg, Oral, BID, Ye Martinez MD, 100 mg at 06/11/23 1721  •  senna (SENOKOT) tablet 8 6 mg, 1 tablet, Oral, BID, Ye Martinez MD, 8 6 mg at 06/10/23 1818  •  warfarin (COUMADIN) tablet 5 mg, 5 mg, Oral, Once per day on Sun Tue Thu Sat, RAYMON Vaughan, 5 mg at 06/11/23 1721  •  warfarin (COUMADIN) tablet 7 5 mg, 7 5 mg, Oral, Once per day on Mon Wed Fri, RAYMON Vaughan, 7 5 mg at 06/09/23 1710    Blood Culture:   No results found for: \"BLOODCX\"    Wound Culture:   No results found for: \"WOUNDCULT\"    Ins and Outs:  I/O last 24 hours: In: 660 [P O :660]  Out: -           Physical:  Vitals:    06/11/23 1357   BP: 146/63   Pulse: 78   Resp: 18   Temp: 97 9 °F (36 6 °C)   SpO2: 100%     Musculoskeletal: right Lower Extremity    · Skin warm, dry   No erythema or ecchymosis  · Dressing removed bedside along with staples  · TTP siri-incisionally  · Sensation intact to saphenous, sural, tibial, superficial peroneal nerve, and deep peroneal  · Motor intact to +FHL/EHL, +ankle dorsi/plantar flexion  · 2+ DP pulse, symmetric bilaterally  · Digits warm and well perfused  · Capillary refill < 2 seconds    Assessment:    77 y  o female s/p right siri-prosthetic femur fracture ORIF here for 2 week follow up    Patient doing well       Plan:  · WBAT RLE  · Will monitor for ABLA and administer IVF/prbc as indicated for Greater than 2 gram drop or Hgb < 7  · PT/OT  · Pain control  · DVT ppx warfarin  · Dispo: Ok for discharge from ortho " perspective    Carlos Gonsalez MD

## 2023-06-11 NOTE — PROGRESS NOTES
"   06/11/23 1045   Pain Assessment   Pain Assessment Tool 0-10   Pain Score 3   Pain Location/Orientation Orientation: Right;Location: Hip;Location: Leg   Hospital Pain Intervention(s) Repositioned   Restrictions/Precautions   Precautions Fall Risk;Pain;Supervision on toilet/commode;Bed/chair alarms   RLE Weight Bearing Per Order WBAT   ROM Restrictions No   Putting On/Taking Off Footwear   Type of Assistance Needed Physical assistance   Physical Assistance Level 25% or less   Comment Pt able to slip on shoes and don socks with use of LHAE as is her baseline  Pt states typically not tying shoes, however, educated on safety risks of same  Pt states having elastic laces at home, again asked pt to speak with sister in law to bring in when she brings in other items from home during a visit  States LISA has not yet visited  Putting On/Taking Off Footwear CARE Score 3   Sit to Stand   Type of Assistance Needed Supervision; Adaptive equipment   Physical Assistance Level No physical assistance   Comment Inc time  Sit to Stand CARE Score 4   Bed-Chair Transfer   Type of Assistance Needed Supervision; Adaptive equipment   Physical Assistance Level No physical assistance   Comment Pt requires inc time and encouragement but is able to complete  Chair/Bed-to-Chair Transfer CARE Score 4   Toileting Hygiene   Type of Assistance Needed Supervision;Verbal cues   Physical Assistance Level No physical assistance   Comment Pt hesitant to initiate rear hygiene stating she \"can't reach\" encourage pt to attempt to the best of her ability  Pt was successfully able to perform with inc time and encouragement and complete CM up/down over hips  Checked for thoroughness of hygiene to be sure but was not needed  Toileting Hygiene CARE Score 4   Toilet Transfer   Type of Assistance Needed Supervision; Adaptive equipment   Physical Assistance Level No physical assistance   Comment SAKSHI saba RW   Toilet Transfer CARE Score 4   Functional Standing " Tolerance   Time 9 minutes   Activity Pt engaged in jig saw puzzle while in stance at tabletop to promote standing balance and tolerance for inc independence with I/ADL tasks, pt overall CS while in stance, limited by fatigue with prolonged stance, resolves with rest break  Cognition   Overall Cognitive Status WFL   Arousal/Participation Alert; Cooperative   Attention Within functional limits   Orientation Level Oriented X4   Memory Decreased short term memory   Following Commands Follows multistep commands with increased time or repetition   Activity Tolerance   Activity Tolerance Patient tolerated treatment well   Assessment   Treatment Assessment Pt participated in skilled OT services with focus on toileting, functional txfers, standing tolerance  Pt continues to be limited by pain, RLE weakness, dec standing tolerance/balance, and mild self limiting behaviors at times  Pt responds well to encouragement and motivation  Pt will continue to benefit from skilled OT services with focus on above mentioned deficits to inc safety and independence with I/ADL task- perform laundry tasks next session  Prognosis Good   Problem List Decreased strength;Decreased endurance;Decreased range of motion; Impaired balance;Decreased mobility;Pain   Plan   Treatment/Interventions ADL retraining;Functional transfer training; Therapeutic exercise; Endurance training;Patient/family training;Equipment eval/education; Bed mobility; Compensatory technique education   Progress Progressing toward goals   Recommendation   OT Discharge Recommendation   (outpt PT at home, no OT needs )   OT Therapy Minutes   OT Time In 1045   OT Time Out 1130   OT Total Time (minutes) 45   OT Mode of treatment - Individual (minutes) 45   OT Mode of treatment - Concurrent (minutes) 0   OT Mode of treatment - Group (minutes) 0   OT Mode of treatment - Co-treat (minutes) 0   OT Mode of Treatment - Total time(minutes) 45 minutes   OT Cumulative Minutes 810   Therapy Time missed   Time missed?  No

## 2023-06-12 LAB
ANION GAP SERPL CALCULATED.3IONS-SCNC: 1 MMOL/L (ref 4–13)
BASOPHILS # BLD AUTO: 0.08 THOUSANDS/ÂΜL (ref 0–0.1)
BASOPHILS NFR BLD AUTO: 1 % (ref 0–1)
BUN SERPL-MCNC: 9 MG/DL (ref 5–25)
CALCIUM SERPL-MCNC: 9.4 MG/DL (ref 8.3–10.1)
CHLORIDE SERPL-SCNC: 110 MMOL/L (ref 96–108)
CO2 SERPL-SCNC: 30 MMOL/L (ref 21–32)
CREAT SERPL-MCNC: 0.46 MG/DL (ref 0.6–1.3)
EOSINOPHIL # BLD AUTO: 0.36 THOUSAND/ÂΜL (ref 0–0.61)
EOSINOPHIL NFR BLD AUTO: 5 % (ref 0–6)
ERYTHROCYTE [DISTWIDTH] IN BLOOD BY AUTOMATED COUNT: 16 % (ref 11.6–15.1)
GFR SERPL CREATININE-BSD FRML MDRD: 94 ML/MIN/1.73SQ M
GLUCOSE P FAST SERPL-MCNC: 82 MG/DL (ref 65–99)
GLUCOSE SERPL-MCNC: 82 MG/DL (ref 65–140)
HCT VFR BLD AUTO: 35.7 % (ref 34.8–46.1)
HGB BLD-MCNC: 11 G/DL (ref 11.5–15.4)
IMM GRANULOCYTES # BLD AUTO: 0.02 THOUSAND/UL (ref 0–0.2)
IMM GRANULOCYTES NFR BLD AUTO: 0 % (ref 0–2)
INR PPP: 2.25 (ref 0.84–1.19)
LYMPHOCYTES # BLD AUTO: 1.54 THOUSANDS/ÂΜL (ref 0.6–4.47)
LYMPHOCYTES NFR BLD AUTO: 21 % (ref 14–44)
MCH RBC QN AUTO: 31.3 PG (ref 26.8–34.3)
MCHC RBC AUTO-ENTMCNC: 30.8 G/DL (ref 31.4–37.4)
MCV RBC AUTO: 101 FL (ref 82–98)
MONOCYTES # BLD AUTO: 0.71 THOUSAND/ÂΜL (ref 0.17–1.22)
MONOCYTES NFR BLD AUTO: 10 % (ref 4–12)
NEUTROPHILS # BLD AUTO: 4.49 THOUSANDS/ÂΜL (ref 1.85–7.62)
NEUTS SEG NFR BLD AUTO: 63 % (ref 43–75)
NRBC BLD AUTO-RTO: 0 /100 WBCS
PLATELET # BLD AUTO: 443 THOUSANDS/UL (ref 149–390)
PMV BLD AUTO: 8.4 FL (ref 8.9–12.7)
POTASSIUM SERPL-SCNC: 4.9 MMOL/L (ref 3.5–5.3)
PROTHROMBIN TIME: 25.1 SECONDS (ref 11.6–14.5)
RBC # BLD AUTO: 3.52 MILLION/UL (ref 3.81–5.12)
SODIUM SERPL-SCNC: 141 MMOL/L (ref 135–147)
WBC # BLD AUTO: 7.2 THOUSAND/UL (ref 4.31–10.16)

## 2023-06-12 PROCEDURE — 97116 GAIT TRAINING THERAPY: CPT

## 2023-06-12 PROCEDURE — 80048 BASIC METABOLIC PNL TOTAL CA: CPT | Performed by: NURSE PRACTITIONER

## 2023-06-12 PROCEDURE — 99232 SBSQ HOSP IP/OBS MODERATE 35: CPT

## 2023-06-12 PROCEDURE — 85025 COMPLETE CBC W/AUTO DIFF WBC: CPT | Performed by: NURSE PRACTITIONER

## 2023-06-12 PROCEDURE — 97110 THERAPEUTIC EXERCISES: CPT

## 2023-06-12 PROCEDURE — 99232 SBSQ HOSP IP/OBS MODERATE 35: CPT | Performed by: INTERNAL MEDICINE

## 2023-06-12 PROCEDURE — 97535 SELF CARE MNGMENT TRAINING: CPT

## 2023-06-12 PROCEDURE — 85610 PROTHROMBIN TIME: CPT | Performed by: NURSE PRACTITIONER

## 2023-06-12 PROCEDURE — 97530 THERAPEUTIC ACTIVITIES: CPT

## 2023-06-12 RX ADMIN — METOPROLOL SUCCINATE 50 MG: 50 TABLET, EXTENDED RELEASE ORAL at 22:27

## 2023-06-12 RX ADMIN — ACETAMINOPHEN 975 MG: 325 TABLET, FILM COATED ORAL at 13:43

## 2023-06-12 RX ADMIN — PREGABALIN 100 MG: 100 CAPSULE ORAL at 08:21

## 2023-06-12 RX ADMIN — ACETAMINOPHEN 975 MG: 325 TABLET, FILM COATED ORAL at 22:26

## 2023-06-12 RX ADMIN — PRAVASTATIN SODIUM 40 MG: 40 TABLET ORAL at 18:20

## 2023-06-12 RX ADMIN — WARFARIN SODIUM 7.5 MG: 7.5 TABLET ORAL at 18:20

## 2023-06-12 RX ADMIN — MONTELUKAST SODIUM 10 MG: 10 TABLET, COATED ORAL at 22:26

## 2023-06-12 RX ADMIN — ACETAMINOPHEN 975 MG: 325 TABLET, FILM COATED ORAL at 06:30

## 2023-06-12 RX ADMIN — Medication 2.5 MG: at 08:28

## 2023-06-12 RX ADMIN — POLYETHYLENE GLYCOL 3350 17 G: 17 POWDER, FOR SOLUTION ORAL at 08:21

## 2023-06-12 RX ADMIN — LATANOPROST 1 DROP: 50 SOLUTION OPHTHALMIC at 22:29

## 2023-06-12 RX ADMIN — PREGABALIN 100 MG: 100 CAPSULE ORAL at 18:20

## 2023-06-12 RX ADMIN — Medication 2.5 MG: at 13:44

## 2023-06-12 NOTE — PLAN OF CARE
Problem: PAIN - ADULT  Goal: Verbalizes/displays adequate comfort level or baseline comfort level  Description: Interventions:  - Encourage patient to monitor pain and request assistance  - Assess pain using appropriate pain scale  - Administer analgesics based on type and severity of pain and evaluate response  - Implement non-pharmacological measures as appropriate and evaluate response  - Consider cultural and social influences on pain and pain management  - Notify physician/advanced practitioner if interventions unsuccessful or patient reports new pain  Outcome: Progressing     Problem: INFECTION - ADULT  Goal: Absence or prevention of progression during hospitalization  Description: INTERVENTIONS:  - Assess and monitor for signs and symptoms of infection  - Monitor lab/diagnostic results  - Monitor all insertion sites, i e  indwelling lines, tubes, and drains  - Monitor endotracheal if appropriate and nasal secretions for changes in amount and color  - [place] appropriate cooling/warming therapies per order  - Administer medications as ordered  - Instruct and encourage patient and family to use good hand hygiene technique  - Identify and instruct in appropriate isolation precautions for identified infection/condition  Outcome: Progressing     Problem: SAFETY ADULT  Goal: Patient will remain free of falls  Description: INTERVENTIONS:  - Educate patient/family on patient safety including physical limitations  - Instruct patient to call for assistance with activity   - Consult OT/PT to assist with strengthening/mobility   - Keep Call bell within reach  - Keep bed low and locked with side rails adjusted as appropriate  - Keep care items and personal belongings within reach  - Initiate and maintain comfort rounds  - Make Fall Risk Sign visible to staff  - Offer Toileting every 2 Hours, in advance of need  - Initiate/Maintain bed/chair alarm  - Obtain necessary fall risk management equipment: non skid footwear  - Apply yellow socks and bracelet for high fall risk patients  - Consider moving patient to room near nurses station  Outcome: Progressing  Goal: Maintain or return to baseline ADL function  Description: INTERVENTIONS:  -  Assess patient's ability to carry out ADLs; assess patient's baseline for ADL function and identify physical deficits which impact ability to perform ADLs (bathing, care of mouth/teeth, toileting, grooming, dressing, etc )  - Assess/evaluate cause of self-care deficits   - Assess range of motion  - Assess patient's mobility; develop plan if impaired  - Assess patient's need for assistive devices and provide as appropriate  - Encourage maximum independence but intervene and supervise when necessary  - Involve family in performance of ADLs  - Assess for home care needs following discharge   - Consider OT consult to assist with ADL evaluation and planning for discharge  - Provide patient education as appropriate  Outcome: Progressing  Goal: Maintains/Returns to pre admission functional level  Description: INTERVENTIONS:  - Perform BMAT or MOVE assessment daily    - Set and communicate daily mobility goal to care team and patient/family/caregiver  - Collaborate with rehabilitation services on mobility goals if consulted  - Perform Range of Motion 3 times a day  - Reposition patient every 2 hours    - Dangle patient 3 times a day  - Stand patient 3 times a day  - Ambulate patient 3 times a day  - Out of bed to chair 3 times a day   - Out of bed for meals 3 times a day  - Out of bed for toileting  - Record patient progress and toleration of activity level   Outcome: Progressing     Problem: DISCHARGE PLANNING  Goal: Discharge to home or other facility with appropriate resources  Description: INTERVENTIONS:  - Identify barriers to discharge w/patient and caregiver  - Arrange for needed discharge resources and transportation as appropriate  - Identify discharge learning needs (meds, wound care, etc )  - Arrange for interpretive services to assist at discharge as needed  - Refer to Case Management Department for coordinating discharge planning if the patient needs post-hospital services based on physician/advanced practitioner order or complex needs related to functional status, cognitive ability, or social support system  Outcome: Progressing     Problem: Prexisting or High Potential for Compromised Skin Integrity  Goal: Skin integrity is maintained or improved  Description: INTERVENTIONS:  - Identify patients at risk for skin breakdown  - Assess and monitor skin integrity  - Assess and monitor nutrition and hydration status  - Monitor labs   - Assess for incontinence   - Turn and reposition patient  - Assist with mobility/ambulation  - Relieve pressure over bony prominences  - Avoid friction and shearing  - Provide appropriate hygiene as needed including keeping skin clean and dry  - Evaluate need for skin moisturizer/barrier cream  - Collaborate with interdisciplinary team   - Patient/family teaching  - Consider wound care consult   Outcome: Progressing     Problem: MOBILITY - ADULT  Goal: Maintain or return to baseline ADL function  Description: INTERVENTIONS:  -  Assess patient's ability to carry out ADLs; assess patient's baseline for ADL function and identify physical deficits which impact ability to perform ADLs (bathing, care of mouth/teeth, toileting, grooming, dressing, etc )  - Assess/evaluate cause of self-care deficits   - Assess range of motion  - Assess patient's mobility; develop plan if impaired  - Assess patient's need for assistive devices and provide as appropriate  - Encourage maximum independence but intervene and supervise when necessary  - Involve family in performance of ADLs  - Assess for home care needs following discharge   - Consider OT consult to assist with ADL evaluation and planning for discharge  - Provide patient education as appropriate  Outcome: Progressing  Goal: Maintains/Returns to pre admission functional level  Description: INTERVENTIONS:  - Perform BMAT or MOVE assessment daily    - Set and communicate daily mobility goal to care team and patient/family/caregiver  - Collaborate with rehabilitation services on mobility goals if consulted  - Perform Range of Motion 3 times a day  - Reposition patient every 2 hours  - Dangle patient 3 times a day  - Stand patient 3 times a day  - Ambulate patient 3 times a day  - Out of bed to chair 3 times a day   - Out of bed for meals 3 times a day  - Out of bed for toileting  - Record patient progress and toleration of activity level   Outcome: Progressing     Problem: Nutrition/Hydration-ADULT  Goal: Nutrient/Hydration intake appropriate for improving, restoring or maintaining nutritional needs  Description: Monitor and assess patient's nutrition/hydration status for malnutrition  Collaborate with interdisciplinary team and initiate plan and interventions as ordered  Monitor patient's weight and dietary intake as ordered or per policy  Utilize nutrition screening tool and intervene as necessary  Determine patient's food preferences and provide high-protein, high-caloric foods as appropriate       INTERVENTIONS:  - Monitor oral intake, urinary output, labs, and treatment plans  - Assess nutrition and hydration status and recommend course of action  - Evaluate amount of meals eaten  - Assist patient with eating if necessary   - Allow adequate time for meals  - Recommend/ encourage appropriate diets, oral nutritional supplements, and vitamin/mineral supplements  - Order, calculate, and assess calorie counts as needed  - Recommend, monitor, and adjust tube feedings and TPN/PPN based on assessed needs  - Assess need for intravenous fluids  - Provide specific nutrition/hydration education as appropriate  - Include patient/family/caregiver in decisions related to nutrition  Outcome: Progressing

## 2023-06-12 NOTE — PROGRESS NOTES
Internal Medicine Progress Note  Patient: Michael Parry  Age/sex: 77 y o  female  Medical Record #: [id number]      ASSESSMENT/PLAN: (Interval History)  Michael Parry is seen and examined and management for following issues:    Right periprosthetic femur fracture  • S/p ORIF 5/28  • WBAT  • X-ray 6/2/23 with post-op changes      Hx PAF  • Home:  Toprol 50mg qHS/Coumadin 6 25 mg (2 1/2 tabs of 2 5mg tabs) on Mondays and 5mg the rest of the days of the week  • Here:  Toprol 50mg qHS/Coumadin 7 5mg MWF/5mg T/TH/S/S  • She did tell Dr Siddhartha Meng that she adjusts her Coumadin on her own  • [facility]note 3[facility] does her INRs and she has 2 5mg tabs at home  • EKG 5/27/23 = SR  • Stable; no changes in dosing today  • INR 6/15/23     HLD  • Continue Pravachol as a sub for home Simvastatin     Peripheral neuropathy  • Continue Lyrica     Aortic stenosis  • ECHO 7/2022:  LVEF 65%/gr 2 DD, mild-mod AS, mild TR/MR, RV systolic pressure is mod-severe elevated with systolic pressure 59 mmHg  • euvolemic        Discharge date:  6/16/23    The above assessment and plan was reviewed and updated as determined by my evaluation of the patient on 6/12/2023      Labs:   Results from last 7 days   Lab Units 06/12/23  0636 06/08/23  0459   HEMATOCRIT % 35 7 30 9*   HEMOGLOBIN g/dL 11 0* 9 7*   PLATELETS Thousands/uL 443* 362   WBC Thousand/uL 7 20 7 29     Results from last 7 days   Lab Units 06/12/23  0636 06/08/23  0459   BUN mg/dL 9 12   CALCIUM mg/dL 9 4 8 8   CHLORIDE mmol/L 110* 109*   CO2 mmol/L 30 28   CREATININE mg/dL 0 46* 0 39*   POTASSIUM mmol/L 4 9 4 2   SODIUM mmol/L 141 139         Results from last 7 days   Lab Units 06/12/23  0636 06/09/23  0621   INR  2 25* 2 15*           Review of Scheduled Meds:  Current Facility-Administered Medications   Medication Dose Route Frequency Provider Last Rate   • acetaminophen  975 mg Oral [facility], MD     • bisacodyl  10 mg Rectal Daily PRN Benjamín Stroud MD     • bisacodyl  10 mg Rectal Once Bryn Koehler MD     • docusate sodium  100 mg Oral BID Bryn Koehler MD     • latanoprost  1 drop Both Eyes HS RAYMON Flores     • lidocaine   Topical Q4H PRN Bryn Koehler MD     • metoprolol succinate  50 mg Oral HS Bryn Koehler MD     • montelukast  10 mg Oral HS Bryn Koehler MD     • oxyCODONE  5 mg Oral Q4H PRN Bryn Koehler MD     • oxyCODONE  2 5 mg Oral Q4H PRN Bryn Koehler MD     • polyethylene glycol  17 g Oral Daily PRN Bryn Koehler MD     • polyethylene glycol  17 g Oral Daily Bryn Koehler MD     • pravastatin  40 mg Oral Daily With Brittany Kwon MD     • pregabalin  100 mg Oral BID Bryn Koehler MD     • senna  1 tablet Oral BID Bryn Koehler MD     • warfarin  5 mg Oral Once per day on Sun Tue Thu Sat RAYMON Flores     • warfarin  7 5 mg Oral Once per day on Mon Wed Fri RAYMON Small         Subjective/ HPI: Patient seen and examined  Patients overnight issues or events were reviewed with nursing or staff during rounds or morning huddle session  New or overnight issues include the following:     No new or overnight issues  Offers no complaints    ROS:   A 10 point ROS was performed; negative except as noted above       *Labs /Radiology studies reviewed  *Medications reviewed and reconciled as needed  *Please refer to order section for additional ordered labs studies  *Case discussed with primary attending during morning huddle case rounds    Physical Examination:  Vitals:   Vitals:    06/11/23 0500 06/11/23 1357 06/11/23 2011 06/12/23 0628   BP: 135/62 146/63 119/56 138/62   BP Location: Left arm Right arm Right arm Right arm   Pulse: 68 78 78 64   Resp: 16 18 18 18   Temp: 97 8 °F (36 6 °C) 97 9 °F (36 6 °C) 98 2 °F (36 8 °C) 97 8 °F (36 6 °C)   TempSrc: Oral Oral Oral Oral   SpO2: 97% 100% 97% 98%   Weight:       Height: General Appearance: no distress, conversive  HEENT:  External ear normal   Nose normal w/o drainage  Mucous membranes are moist  Oropharynx is clear  Conjunctiva clear w/o icterus or redness  Neck:  Supple, normal ROM  Lungs: BBS without crackles/wheeze/rhonchi; respirations unlabored with normal inspiratory/expiratory effort  No retractions noted  On RA  CV: regular rate and rhythm; no rubs/gallops, +murmur  PMI normal   ABD: Abdomen is soft  Bowel sounds all quadrants  Nontender with no distention  EXT: no edema  Skin: normal turgor, normal texture, no rashes  Psych: affect normal, mood normal  Neuro: AAO      The above physical exam was reviewed and updated as determined by my evaluation of the patient on 6/12/2023  Invasive Devices     None                    VTE Pharmacologic Prophylaxis: Warfarin (Coumadin)  Code Status: Level 3 - DNAR and DNI  Current Length of Stay: 12 day(s)      Total time spent:  30 minutes with more than 50% spent counseling/coordinating care  Counseling includes discussion with patient re: progress  and discussion with patient of his/her current medical state/information  Coordination of patient's care was performed in conjunction with primary service  Time invested included review of patient's labs, vitals, and management of their comorbidities with continued monitoring  In addition, this patient was discussed with medical team including physician and advanced extenders  The care of the patient was extensively discussed and appropriate treatment plan was formulated unique for this patient  Medical decision making for the day was made by supervising physician unless otherwise noted in their attestation statement  ** Please Note:  voice to text software may have been used in the creation of this document   Although proof errors in transcription or interpretation are a potential of such software**

## 2023-06-12 NOTE — PROGRESS NOTES
06/12/23 0820   Pain Assessment   Pain Assessment Tool 0-10   Pain Score 3   Pain Location/Orientation Orientation: Right;Location: Hip;Location: Leg   Hospital Pain Intervention(s) Repositioned; Emotional support   Restrictions/Precautions   Precautions Fall Risk;Pain;Supervision on toilet/commode   RLE Weight Bearing Per Order WBAT   ROM Restrictions No   Oral Hygiene   Type of Assistance Needed Supervision   Physical Assistance Level No physical assistance   Comment In stance at sink, therefore requires supervision to assure balance  Oral Hygiene CARE Score 4   Shower/Bathe Self   Type of Assistance Needed Supervision; Adaptive equipment   Physical Assistance Level No physical assistance   Comment Simulated use of LH sponge for distal LE bathing which pt will have at home  Pt able to complete rear hygiene with inc time for dynamic reach around waist while in stance  Shower/Bathe Self CARE Score 4   Tub/Shower Transfer   Reason Not Assessed Patient refusal;Sponge Bath   Findings Offered shower, pt refused this session  Upper Body Dressing   Type of Assistance Needed Set-up / clean-up   Physical Assistance Level No physical assistance   Upper Body Dressing CARE Score 5   Lower Body Dressing   Type of Assistance Needed Supervision; Adaptive equipment   Physical Assistance Level No physical assistance   Comment Pt utilize LHAE at baseline  Able to thread BLEs and complete CM up/down over hips while in stance at RW  Lower Body Dressing CARE Score 4   Putting On/Taking Off Footwear   Type of Assistance Needed Physical assistance; Adaptive equipment   Physical Assistance Level 25% or less   Comment Pt's sister to bring in elastic laces during today's visit  Please thread them next session and assess for inc independence with footwear mgmt using LHAE  Pt continues to require Sudeep for tying laces RLE heel mgmt, may benefit from further practice with shoe horn     Putting On/Taking Off Footwear CARE Score 3   Sit to Stand   Type of Assistance Needed Supervision   Physical Assistance Level No physical assistance   Sit to Stand CARE Score 4   Bed-Chair Transfer   Type of Assistance Needed Supervision; Adaptive equipment   Physical Assistance Level No physical assistance   Chair/Bed-to-Chair Transfer CARE Score 4   Toileting Hygiene   Type of Assistance Needed Supervision; Adaptive equipment   Physical Assistance Level No physical assistance   Toileting Hygiene CARE Score 4   Toilet Transfer   Type of Assistance Needed Supervision; Adaptive equipment   Physical Assistance Level No physical assistance   Comment RW to standard toilet  Toilet Transfer CARE Score 4   Exercise Tools   Other Exercise Tool 1 UB strengthening with 2# free weight including bicep curls, tricep extension, chest press 2 x 10 each to inc UB strength and endurance for inc independence with ADL tasks  Cognition   Overall Cognitive Status WFL   Arousal/Participation Alert; Cooperative   Attention Within functional limits   Orientation Level Oriented X4   Memory Decreased short term memory   Following Commands Follows multistep commands with increased time or repetition   Activity Tolerance   Activity Tolerance Patient tolerated treatment well   Assessment   Treatment Assessment Pt participated in skilled OT services with focus on ADL retraining, functional txfers, toileting, and strengthening  Pt making G progress  Anticipate progression to IRP with RW in next day or two  Pt continues to be limited by dec higher level standing balance/tolerance, dec strength, dec act bryan and pain  Pt will continue to benefit from skilled OT services with focus on laundry mgmt, toileting, assessment for IRP as appropriate, and footwear mgmt  Prognosis Good   Problem List Decreased strength;Decreased endurance;Decreased range of motion; Impaired balance;Decreased mobility;Pain   Plan   Treatment/Interventions ADL retraining;Functional transfer training; Therapeutic exercise; Endurance training;Patient/family training;Equipment eval/education; Bed mobility; Compensatory technique education   Progress Progressing toward goals   Recommendation   OT Discharge Recommendation   (outpt PT at home, no OT needs )   OT Therapy Minutes   OT Time In 0830   OT Time Out 1000   OT Total Time (minutes) 90   OT Mode of treatment - Individual (minutes) 90   OT Mode of treatment - Concurrent (minutes) 0   OT Mode of treatment - Group (minutes) 0   OT Mode of treatment - Co-treat (minutes) 0   OT Mode of Treatment - Total time(minutes) 90 minutes   OT Cumulative Minutes 900   Therapy Time missed   Time missed?  No

## 2023-06-12 NOTE — CASE MANAGEMENT
[facility] therapy at home accepted through [facility] location  Info placed on dc instructions

## 2023-06-12 NOTE — PLAN OF CARE
Problem: PAIN - ADULT  Goal: Verbalizes/displays adequate comfort level or baseline comfort level  Description: Interventions:  - Encourage patient to monitor pain and request assistance  - Assess pain using appropriate pain scale  - Administer analgesics based on type and severity of pain and evaluate response  - Implement non-pharmacological measures as appropriate and evaluate response  - Consider cultural and social influences on pain and pain management  - Notify physician/advanced practitioner if interventions unsuccessful or patient reports new pain  Outcome: Progressing     Problem: INFECTION - ADULT  Goal: Absence or prevention of progression during hospitalization  Description: INTERVENTIONS:  - Assess and monitor for signs and symptoms of infection  - Monitor lab/diagnostic results  - Monitor all insertion sites, i e  indwelling lines, tubes, and drains  - Monitor endotracheal if appropriate and nasal secretions for changes in amount and color  - [place] appropriate cooling/warming therapies per order  - Administer medications as ordered  - Instruct and encourage patient and family to use good hand hygiene technique  - Identify and instruct in appropriate isolation precautions for identified infection/condition  Outcome: Progressing     Problem: SAFETY ADULT  Goal: Patient will remain free of falls  Description: INTERVENTIONS:  - Educate patient/family on patient safety including physical limitations  - Instruct patient to call for assistance with activity   - Consult OT/PT to assist with strengthening/mobility   - Keep Call bell within reach  - Keep bed low and locked with side rails adjusted as appropriate  - Keep care items and personal belongings within reach  - Initiate and maintain comfort rounds  - Make Fall Risk Sign visible to staff  - Offer Toileting every 2 Hours, in advance of need  - Initiate/Maintain bed alarm  - Obtain necessary fall risk management equipment: bed alarm  - Apply yellow socks and bracelet for high fall risk patients  - Consider moving patient to room near nurses station  Outcome: Progressing  Goal: Maintain or return to baseline ADL function  Description: INTERVENTIONS:  -  Assess patient's ability to carry out ADLs; assess patient's baseline for ADL function and identify physical deficits which impact ability to perform ADLs (bathing, care of mouth/teeth, toileting, grooming, dressing, etc )  - Assess/evaluate cause of self-care deficits   - Assess range of motion  - Assess patient's mobility; develop plan if impaired  - Assess patient's need for assistive devices and provide as appropriate  - Encourage maximum independence but intervene and supervise when necessary  - Involve family in performance of ADLs  - Assess for home care needs following discharge   - Consider OT consult to assist with ADL evaluation and planning for discharge  - Provide patient education as appropriate  Outcome: Progressing  Goal: Maintains/Returns to pre admission functional level  Description: INTERVENTIONS:  - Perform BMAT or MOVE assessment daily    - Set and communicate daily mobility goal to care team and patient/family/caregiver  - Collaborate with rehabilitation services on mobility goals if consulted  - Perform Range of Motion 3 times a day  - Reposition patient every 2 hours    - Dangle patient 3 times a day  - Stand patient 3 times a day  - Ambulate patient 3 times a day  - Out of bed to chair 3 times a day   - Out of bed for meals 3 times a day  - Out of bed for toileting  - Record patient progress and toleration of activity level   Outcome: Progressing     Problem: DISCHARGE PLANNING  Goal: Discharge to home or other facility with appropriate resources  Description: INTERVENTIONS:  - Identify barriers to discharge w/patient and caregiver  - Arrange for needed discharge resources and transportation as appropriate  - Identify discharge learning needs (meds, wound care, etc )  - Arrange for interpretive services to assist at discharge as needed  - Refer to Case Management Department for coordinating discharge planning if the patient needs post-hospital services based on physician/advanced practitioner order or complex needs related to functional status, cognitive ability, or social support system  Outcome: Progressing     Problem: Prexisting or High Potential for Compromised Skin Integrity  Goal: Skin integrity is maintained or improved  Description: INTERVENTIONS:  - Identify patients at risk for skin breakdown  - Assess and monitor skin integrity  - Assess and monitor nutrition and hydration status  - Monitor labs   - Assess for incontinence   - Turn and reposition patient  - Assist with mobility/ambulation  - Relieve pressure over bony prominences  - Avoid friction and shearing  - Provide appropriate hygiene as needed including keeping skin clean and dry  - Evaluate need for skin moisturizer/barrier cream  - Collaborate with interdisciplinary team   - Patient/family teaching  - Consider wound care consult   Outcome: Progressing     Problem: MOBILITY - ADULT  Goal: Maintain or return to baseline ADL function  Description: INTERVENTIONS:  -  Assess patient's ability to carry out ADLs; assess patient's baseline for ADL function and identify physical deficits which impact ability to perform ADLs (bathing, care of mouth/teeth, toileting, grooming, dressing, etc )  - Assess/evaluate cause of self-care deficits   - Assess range of motion  - Assess patient's mobility; develop plan if impaired  - Assess patient's need for assistive devices and provide as appropriate  - Encourage maximum independence but intervene and supervise when necessary  - Involve family in performance of ADLs  - Assess for home care needs following discharge   - Consider OT consult to assist with ADL evaluation and planning for discharge  - Provide patient education as appropriate  Outcome: Progressing  Goal: Maintains/Returns to pre admission functional level  Description: INTERVENTIONS:  - Perform BMAT or MOVE assessment daily    - Set and communicate daily mobility goal to care team and patient/family/caregiver  - Collaborate with rehabilitation services on mobility goals if consulted  - Perform Range of Motion 3 times a day  - Reposition patient every 2 hours  - Dangle patient 3 times a day  - Stand patient 3 times a day  - Ambulate patient 3 times a day  - Out of bed to chair 3 times a day   - Out of bed for meals 3 times a day  - Out of bed for toileting  - Record patient progress and toleration of activity level   Outcome: Progressing     Problem: Nutrition/Hydration-ADULT  Goal: Nutrient/Hydration intake appropriate for improving, restoring or maintaining nutritional needs  Description: Monitor and assess patient's nutrition/hydration status for malnutrition  Collaborate with interdisciplinary team and initiate plan and interventions as ordered  Monitor patient's weight and dietary intake as ordered or per policy  Utilize nutrition screening tool and intervene as necessary  Determine patient's food preferences and provide high-protein, high-caloric foods as appropriate       INTERVENTIONS:  - Monitor oral intake, urinary output, labs, and treatment plans  - Assess nutrition and hydration status and recommend course of action  - Evaluate amount of meals eaten  - Assist patient with eating if necessary   - Allow adequate time for meals  - Recommend/ encourage appropriate diets, oral nutritional supplements, and vitamin/mineral supplements  - Order, calculate, and assess calorie counts as needed  - Recommend, monitor, and adjust tube feedings and TPN/PPN based on assessed needs  - Assess need for intravenous fluids  - Provide specific nutrition/hydration education as appropriate  - Include patient/family/caregiver in decisions related to nutrition  Outcome: Progressing

## 2023-06-12 NOTE — CASE MANAGEMENT
Adolph was asked to conduct a price verification of eliquis that was phoned into her pharmacy, [facility]  Cm was made aware it is $78/73  Dr Jyothi Watson made aware

## 2023-06-12 NOTE — DISCHARGE INSTR - AVS FIRST PAGE
Discharge Instructions - Orthopedics  Sho Dunn 77 y o  female MRN: [id number]  Unit/Bed#: X ray    Weight Bearing Status:                                           Weight bearing as tolerated right lower extremity    DVT prophylaxis  Continue warfarin as directed    Pain:  Continue analgesics as directed    Dressing Instructions:   Please keep clean, dry and intact until follow up     Appt Instructions: If you do not have your appointment, please call the clinic at [phone] t  Otherwise followup as scheduled     Contact the office sooner if you experience any increased numbness/tingling in the extremities  Miscellaneous:  Please follow up with Dr Steph Andrew in 4 weeks    DISCHARGE INSTRUCTIONS: Adan Turner 64 22    Bring these instructions with you to your Outpatient Physician appointments so they can order and follow-up any additional lab work or imaging recommended at time of discharge  Resume follow-up with all your prior providers that you have established care prior to this hospitalization  Discuss with primary care physician (PCP) if you have additional questions  It  is you or your caregivers responsibility to obtain follow-up MEDICATION REFILLS  As indicated through your Primary Care Physician (PCP) and other outpatient specialty provider(s) after discharge  Please follow-up with your PCP as soon as possible after discharge to set-up follow-up management and when appropriate refills  You remain a fall and injury risk which could be severe  - Your risk of fall has decreased however since admission to acute rehab      - Continue skilled therapy as discussed after discharge to further decrease this risk    If you (or your health care proxy) have any questions or concerns regarding your acute rehabilitation stay including issues with medications, rehabilitation, and follow-up plan, please call:          [facility] in "[place] at [phone] or [phone]  Should you develop fevers, chills, new weakness, changes in sensation, difficulty speaking, facial weakness, confusion, shortness of breath, chest pain, or other concerning symptoms please call 911 and/or obtain transportation to nearest ER immediately  Should you develop worsening pain, swelling, or drainage notify your surgeon right away or obtain transportation to nearest ER for evaluation  Should you develop feelings of significant hopelessness, severe depression, severe anxiety, or suicide you should call 911 or obtain transportation to nearest ER  24-7 Nationwide suicide and crisis lifeline call \"040\"  National Suicide Prevention Lifeline is 7-558.976.2475 and is available 24 hrs/7 days a week  [facility] [phone] is available 24 hrs/7 days for mental health  [facility] AT [place] [phone] is available 24 hrs/7 days for mental health   [address] [phone]    PHYSICIANS to see:  Please see your doctors listed in the follow up providers section of your discharge paperwork, and take the discharge paperwork with you to your appointments  Home health has been ordered for you: Your home health agency should reach out to you or your family soon to arrange follow-up  (If [facility] health is your provider their phone number is [phone])    If you are unable to get in touch with home health you may contact your  at [phone]  [place]      LAB WORK recommended after discharge: Follow-up lab work at discretion of your outpatient physicians to be determined at time of your future appointments  Also, obtain the following lab orders and follow-up management through primary care physician and outpatient specialists       CBC to monitor recent anemia within 1-2 weeks    IMAGING, ADDITIONAL FINDINGS and ISSUES to follow-up:  Check under the \"DISCHARGE PROVIDER\" section of " "these DISCHARGE INSTRUCTIONS for provider contact information and specific issues as well  \"Findings and Follow up required:                              1) Scattered less than 3 mm nodular densities in the posterior right upper lobe of the lung as well as a calcified granuloma in the right lower lobe of the lung posteriorly were noted incidentally on your trauma imaging  A malignancy (cancer) cannot be completely excluded based on trauma imaging alone  Recommend short-term outpatient follow-up with primary care provider to review the finding and for further surveillance as indicated  2)  A subcentimeter cystic hypodensity posterior right lobe of the liver was incidentally identified on your trauma imaging and is unchanged compared to prior imaging  A malignancy (cancer) cannot be completely excluded based on trauma imaging alone  Recommend short-term outpatient follow-up with primary care provider to review the finding and for further surveillance as indicated  3) A left adrenal gland 1 4 x 0 9 cm nodule was incidentally identified under trauma imaging and is unchanged compared to prior imaging  A malignancy (cancer) cannot be completely excluded based on trauma imaging alone  Recommend short-term outpatient follow-up with primary care provider to review the finding and for further surveillance as indicated  4) Subcentimeter hypodensities in your kidneys were incidentally identified under trauma imaging and are suggestive of benign cysts  A malignancy (cancer) cannot be completely excluded based on trauma imaging alone  Recommend short-term outpatient follow-up with primary care provider to review the finding and for further surveillance as indicated                                5) A  was incidentally identified under trauma imaging as well as a prevnew small umbilical hernia containing nonobstructed bowel loops and " "fatiously seen right para-midline ventral hernia defect containing adherent bowel loops in a nonobstructive pattern  No further work-up or intervention necessary for these findings at this time  Recommend short-term outpatient follow-up with primary care provider to review the finding and for further surveillance as indicated  Follow up should be done within 2 week(s)     The above noted incidental findings were discussed with the patient  The patient demonstrated understanding of the findings and the follow-up recommendations  Please notify the following clinician to assist with the follow up:              Dr Matilde Flores"    Excessive delay or lack of appropriate follow-up could potentially increase your risk of complications which could be severe and even life-threatening  It is you or your caregiver's responsibility to ensure these tests are ordered by your outpatient providers and followed up with accordingly  SKIN CARE INSTRUCTIONS to follow:  Monitor incision(s) for increased redness, swelling, pain/tenderness, discharge/pus and promptly notify your surgeon should these develop  - Should you develop significant pain, swelling, or drainage obtain transportation to nearest emergency room for immediate evaluation if unable to reach your surgeon promptly   - Should you develop uncontrolled pain, fever, chills, sweats, changes in strength, sensation, or color of this area obtain transportation to nearest emergency room for immediate evaluation  Monitor skin for increased redness or breadown and promptly notify your physician should these develop    If instructed while in [facility] - be sure you stand +/- walk every 1-2 hours and if advised use appropriate supervision/assistance to optimally offload your buttock/sacral region  While seated or lying in bed shift positions and from side to side often  Use barrier type cream such as Hydragaurd 2 times per day and as needed      Turn " patient (yourself) fully every 2 hours while in bed  Buttocks/Sacrum  Turn as full as possible off sacrum/buttocks every 2 hours  Use Cushion while in chair or wheelchair  Weight shift every 10-15 minutes while in chair  Keep skin clean and dry as possible  Remove wet or soiled clothing/linens promptly  Use barrier cream or similar 2 times per day   Monitor skin for increased breakdown which you are at risk of and notify nursing, PCP, or other physician providers should this occur right away      WEIGHTBEARING/ACTIVITY PRECAUTIONS to follow:    Weightbearing as tolerated  Driving restrictions: You are recommended against driving until cleared by an outpatient physician  **You should NOT operate a motor vehicle while under the influence of an opiate medication, muscle relaxant, or other sedative  Doing so may lead to an accident resulting in serious injury or death to yourself or others  You have agreed to avoid driving when under the influence of this medication  Work restrictions: You should NOT return to work (if working) until cleared by an outpatient physician  You should not operate heavy machinery (if applicable) until cleared by an outpatient physician  Alcohol restrictions: You are recommended to not drink alcohol at this time unless cleared by an outpatient physician  Drinking alcohol in your current functional condition can increase your risk of injury which could be severe  Drinking alcohol given your current health problems can lead to increased medical complications which could be severe  Combining alcohol with your current medications can increase your risk of injury which could be severe  Smoking restrictions: You are recommended to not smoke nicotine  Smoking increases your risk of heart attack, stroke, emphysema/COPD, and lung cancer          MEDICATIONS:  Please see a full list of your medications outlined in the After Visit Summary that is attached to these Discharge Instructions  Please note changes may have been made to your medications please refer to your discharge paperwork for your current medications and take this list with you to all your doctors appointments for your doctors to review  Please do not resume a home medication unless the medication reconciliation sheet indicates to do so, please do not assume that a medication that you were given a prescription for is the same as a medication you have at home based on both medications having the same name as dosages and frequency may have changed  Unless specifically noted in your medication list provided to you in your discharge paper work do not resume prior vitamins, minerals, or supplements you may have been taking prior to your hospitalization unless instructed by an outpatient physician in the future  Discuss with your primary care at next visit if applicable  Blood thinners prescribed to optimize long and short term health and decrease risk of complications but with risks related to bleeding and other complications  Coumadin/Warfarin instructions: You have been prescribed warfarin (coumadin)  This medication is used to prevent clots which can cause severe disability and even death  Follow-up with cardiology after discharge from the hospital within next business day to ensure appropriate follow-up management of this medication  Discharge prescription ordered by rehab attending physician based on recommendations by ***internal medicine team who has been managing patient's anticoagulation (warfarin) throughout entire acute rehab course is:    Warfarin        Warfarin is a strong anticoagulant (blood thinner)  It is being recommended for use by you (or your family) to decrease the risk of clotting which can be severely disabling and even life-threatening  Even when provided as recommended it can cause severely disabling and even life-threatening bleeding    Too much or too little warfarin further increases your risk of this medication causing serious and even life-threatening complications such as severe bleeding, clots, strokes and death  With that said, at this time based on available evidence and consensus agreement, your physicians recommend you take warfarin medication based on your overall risks and benefits in your specific medical situation  Warfarin levels are measured by lab work called PT/INR  Too high number or too low number further increases your risks  Your current PT/INR lab goal is 2 0-3 0  You will need to have your PT/INR lab drawn frequently at first and followed closely by your outpatient doctor  Check with your outpatient warfarin provider to ensure your warfarin dose does not need to be adjusted  It is not uncommon to need changes in warfarin dosing particularly early on  If you (or your family/caregiver) notice black stools, bloody stools, vomit blood, develop new weakness, slurred speech, confusion or have any other concerning symptoms call 911 or obtain transportation to nearest emergency room immediately  Sedating Medications with increased risk of complications: Your goal will be to wean off of opiate medications and then onto acetaminophen only and then off of acetaminophen as well if possible  There are risks associated with opioid medications, including dependence, addiction and tolerance  The patient understands and agrees to use these medications only as prescribed  Potential side effects of the medications include, but are not limited to, constipation, drowsiness, addiction, impaired judgment and risk of fatal overdose if not taken as prescribed  You should not drive after taking this medication  The patient was warned against driving while taking sedation medications  Sharing medications is a felony  At this point in time, the patient is showing no signs of addiction, abuse, diversion or suicidal ideation      Oxycodone (opiate) pain medication has been used to help your acute pain  You tolerated this medication adequately during your recent hospital stay  You will be given a limited supply of opiate pain medications on discharge; should you require additional refills/medications, your PCP and/or surgeon may prescribe at his/her discretion  This can be quite helpful particularly for severe acute pain  There are risks associated with opioid medications  They can increase your risk of falling, injury, and breathing difficulties which can be severe and even life-threatening  Note it is advisable to limit use of opiate pain medications as they can become habit forming and lead to addiction  Other potential side effects of the medication include, but are not limited to, constipation, drowsiness, impaired judgment and risk of fatal overdose if not taken as prescribed  You should not drive while taking this medication  The patient was warned against driving while taking sedation medications  Sharing medications is a felony  At this point in time, the patient is showing no signs of addiction, abuse, diversion or suicidal ideation  The patient (you/or relevant caregiver) understands and agrees to use this medication only as prescribed  Lyrica has been used to help chronic pain  You tolerated this medication adequately during your recent hospital stay  - Do not take with alcohol (or marijuana/cannabis) while on this medication as this can cause increased confusion, breathing problems, falls, and severe injury  - This medication can increase risk of confusion, fall, and injury although you did tolerate adequately during rehab course  MEDICAL MANAGEMENT AT HOME specific to you:    Anemia management:  Follow-up with primary care physician at next visit  Continue to monitor blood counts intermittently    Follow-up management at discretion of PCP   >If you develop increased lightheadedness, shortness of breath, chest pain, confusion, difficulty staying awake, or other concerning signs or symptoms obtain transport to nearest emergency room as soon as possible  Please note a summary of your hospital stay with relevant information for your doctors will try to be sent to them  Please confirm with your doctors at your follow up visits that they have received this summary and have them contact [address] [place] [address] if they have not received them along with any other medical records they may require       Domingo Egan Phone Number:  [phone]

## 2023-06-12 NOTE — PROGRESS NOTES
Pastoral Care Progress Note    2023  Patient: Sophia Chaney : 1945  Admission Date & Time: 2023 1446  MRN: [id number] CSN: [id number]          Tenisha Santamaria during a PT session  Encouraged her, celebrated progress with her, prayed for continued strength and healing   remains available

## 2023-06-12 NOTE — PROGRESS NOTES
06/12/23 1230   Pain Assessment   Pain Assessment Tool 0-10   Pain Score 8   Pain Location/Orientation Orientation: Right;Location: Hip   Pain Onset/Description Onset: Gradual;Onset: Ongoing;Frequency: Constant/Continuous; Descriptor: Throbbing   Patient's Stated Pain Goal No pain   Hospital Pain Intervention(s) Repositioned;Rest;Relaxation technique   Restrictions/Precautions   Precautions Fall Risk;Pain;Supervision on toilet/commode   RLE Weight Bearing Per Order WBAT   ROM Restrictions No   Cognition   Overall Cognitive Status WFL   Arousal/Participation Alert; Cooperative   Attention Within functional limits   Orientation Level Oriented X4   Memory Decreased short term memory   Following Commands Follows multistep commands with increased time or repetition   Sit to Lying   Type of Assistance Needed Physical assistance;Verbal cues   Physical Assistance Level 25% or less   Comment A to RLE   Sit to Lying CARE Score 3   Lying to Sitting on Side of Bed   Type of Assistance Needed Supervision   Comment with increased time   Lying to Sitting on Side of Bed CARE Score 4   Sit to Stand   Type of Assistance Needed Supervision; Adaptive equipment   Comment with RW   Sit to Stand CARE Score 4   Bed-Chair Transfer   Type of Assistance Needed Supervision; Adaptive equipment   Comment with RW   Chair/Bed-to-Chair Transfer CARE Score 4   Transfer Bed/Chair/Wheelchair   Adaptive Equipment Roller Walker   Car Transfer   Type of Assistance Needed Supervision; Adaptive equipment;Verbal cues   Comment with increased time and VC's for body positioning   Car Transfer CARE Score 4   Walk 10 Feet   Type of Assistance Needed Supervision; Adaptive equipment   Comment with RW   Walk 10 Feet CARE Score 4   Walk 50 Feet with Two Turns   Type of Assistance Needed Supervision; Adaptive equipment   Comment CS with RW   Walk 50 Feet with Two Turns CARE Score 4   Walk 150 Feet   Type of Assistance Needed Supervision; Adaptive equipment   Comment CS with RW   Walk 150 Feet CARE Score 4   Ambulation   Primary Mode of Locomotion Prior to Admission Walk   Distance Walked (feet) 150 ft  (30' x2)   Assist Device Roller Walker   Gait Pattern Inconsistant Christen; Slow Christen;Decreased foot clearance; Forward Flexion;Narrow AMBIKA;Step to;Decreased R stance; Improper weight shift   Limitations Noted In Endurance; Heel Strike; Sequencing;Speed;Swing   Provided Assistance with: Direction   Walk Assist Level Distant Supervision;Supervision   Does the patient walk? 2  Yes   Wheel 50 Feet with Two Turns   Reason if not Attempted Activity not applicable   Wheel 50 Feet with Two Turns CARE Score 9   Wheel 150 Feet   Reason if not Attempted Activity not applicable   Wheel 557 Feet CARE Score 9   Wheelchair mobility   Does the patient use a wheelchair? 0  No   Curb or Single Stair   Style negotiated Curb   Type of Assistance Needed Incidental touching;Supervision; Adaptive equipment   Comment initial trial pt remained S, on second trail she had mild LOB and required CGA  1 Step (Curb) CARE Score 4   4 Steps   Reason if not Attempted Safety concerns   4 Steps CARE Score 88   12 Steps   Reason if not Attempted Safety concerns   12 Steps CARE Score 88   Stairs   Type Curb   # of Steps 2   Weight Bearing Precautions Fall Risk   Assist Devices Roller Walker   Therapeutic Interventions   Strengthening finalizing HEP: Supine SAQ, ankle DF/PF, glute sets, quad sets, heel slides with AAROM to RLE, LAQ with AAROM to RLE and clamshells to R hip only in sidelying  Flexibility BLE HS/HC stretch TERT 5min  Other increased time end of session to arrange room the way pt request    Assessment   Treatment Assessment Pt participated in skilled PT session with increased focus on gait, increased step negotiation, improved LE strengthening and increased ROM to R hip  Pt cont to be limited by pain and decreased christen  T stated 4/10 pain at start of session and up 8/10 close to end of session   Nurse Narda Cabrera was called and provided pain medication to pt during session  Pt will cont to work towards [address] [place] [address] One with RW in anticipated discharge Friday, 6/16/23  Problem List Decreased strength;Decreased endurance;Decreased range of motion; Impaired balance;Decreased mobility;Pain   Barriers to Discharge Inaccessible home environment;Decreased caregiver support   PT Barriers   Functional Limitation Walking;Stair negotiation   Plan   Treatment/Interventions LE strengthening/ROM; Endurance training;Gait training   Progress Progressing toward goals   Recommendation   PT Discharge Recommendation Home with home health rehabilitation   Equipment Recommended Walker   PT Therapy Minutes   PT Time In 1230   PT Time Out 1430   PT Total Time (minutes) 120   PT Mode of treatment - Individual (minutes) 120   PT Mode of treatment - Concurrent (minutes) 0   PT Mode of treatment - Group (minutes) 0   PT Mode of treatment - Co-treat (minutes) 0   PT Mode of Treatment - Total time(minutes) 120 minutes   PT Cumulative Minutes 810   Therapy Time missed   Time missed?  No

## 2023-06-13 ENCOUNTER — HOME HEALTH ADMISSION (OUTPATIENT)
Dept: HOME HEALTH SERVICES | Facility: HOME HEALTHCARE | Age: 78
End: 2023-06-13
Payer: MEDICARE

## 2023-06-13 LAB
DME PARACHUTE DELIVERY DATE REQUESTED: NORMAL
DME PARACHUTE ITEM DESCRIPTION: NORMAL
DME PARACHUTE ORDER STATUS: NORMAL
DME PARACHUTE SUPPLIER NAME: NORMAL
DME PARACHUTE SUPPLIER PHONE: NORMAL

## 2023-06-13 PROCEDURE — 97535 SELF CARE MNGMENT TRAINING: CPT

## 2023-06-13 PROCEDURE — 99232 SBSQ HOSP IP/OBS MODERATE 35: CPT | Performed by: INTERNAL MEDICINE

## 2023-06-13 PROCEDURE — 97530 THERAPEUTIC ACTIVITIES: CPT

## 2023-06-13 PROCEDURE — 99232 SBSQ HOSP IP/OBS MODERATE 35: CPT

## 2023-06-13 PROCEDURE — 97110 THERAPEUTIC EXERCISES: CPT

## 2023-06-13 RX ADMIN — WARFARIN SODIUM 5 MG: 5 TABLET ORAL at 17:21

## 2023-06-13 RX ADMIN — ACETAMINOPHEN 975 MG: 325 TABLET, FILM COATED ORAL at 05:11

## 2023-06-13 RX ADMIN — PREGABALIN 100 MG: 100 CAPSULE ORAL at 17:21

## 2023-06-13 RX ADMIN — ACETAMINOPHEN 975 MG: 325 TABLET, FILM COATED ORAL at 13:43

## 2023-06-13 RX ADMIN — POLYETHYLENE GLYCOL 3350 17 G: 17 POWDER, FOR SOLUTION ORAL at 08:02

## 2023-06-13 RX ADMIN — ACETAMINOPHEN 975 MG: 325 TABLET, FILM COATED ORAL at 21:31

## 2023-06-13 RX ADMIN — PREGABALIN 100 MG: 100 CAPSULE ORAL at 08:02

## 2023-06-13 RX ADMIN — MONTELUKAST SODIUM 10 MG: 10 TABLET, COATED ORAL at 21:32

## 2023-06-13 RX ADMIN — PRAVASTATIN SODIUM 40 MG: 40 TABLET ORAL at 17:21

## 2023-06-13 RX ADMIN — Medication 2.5 MG: at 11:57

## 2023-06-13 RX ADMIN — METOPROLOL SUCCINATE 50 MG: 50 TABLET, EXTENDED RELEASE ORAL at 21:32

## 2023-06-13 RX ADMIN — LATANOPROST 1 DROP: 50 SOLUTION OPHTHALMIC at 21:33

## 2023-06-13 NOTE — PROGRESS NOTES
06/13/23 1230   Pain Assessment   Pain Assessment Tool 0-10   Pain Score 5   Pain Location/Orientation Orientation: Right;Location: Leg   Pain Onset/Description Onset: Ongoing;Frequency: Constant/Continuous   Hospital Pain Intervention(s) Repositioned;Cold applied  (nsg present to provide pt with pain med, joseph provided 2 ice packs to pt at end of OT session)   Restrictions/Precautions   Precautions Fall Risk;Pain   Weight Bearing Restrictions Yes   RLE Weight Bearing Per Order WBAT   ROM Restrictions No   Putting On/Taking Off Footwear   Type of Assistance Needed Physical assistance;Verbal cues; Adaptive equipment   Physical Assistance Level 25% or less   Comment Significant time spent during today's session on trialing ways to increase pt's indep with footwear mgmt prior to upcoming D/C as pt lives alone  Pt reported that her sister brought in elastic shoelaces, so joseph removed regular laces from pt's sneakers and threaded the elastic laces, and with trial and error able to find a good fit that allowed pt to slip on sneakers and still be tight enough when walking in room  Pt no longer requires A to tie laces, but cont to require A today for R heel mgmt when donning shoe even with using [place] [address], would benefit from continued practice with shoe horn to increase pt indep before D/C later this week  Putting On/Taking Off Footwear CARE Score 3   Sit to Stand   Type of Assistance Needed Independent; Adaptive equipment   Physical Assistance Level No physical assistance   Comment RW   Sit to Stand CARE Score 6   Bed-Chair Transfer   Type of Assistance Needed Independent; Adaptive equipment   Physical Assistance Level No physical assistance   Comment RW   Chair/Bed-to-Chair Transfer CARE Score 6   Toileting Hygiene   Type of Assistance Needed Independent; Adaptive equipment   Physical Assistance Level No physical assistance   Comment pt progressed to IRPs today w/RW, including night time IRPs w/RW and BSC   JOSEPH simulated darker nighttime environment by closing all window shades and curtains/doors to create dim environment in pt room, [facility] and back, completing SPT w/RW, no LOB  Pt receptive to suggestion to still ring for assist if she feels that she needs it overnight for safety  Pt stated she would trial nighttime IRPs tonight, as she reports having to get up to go to the bathroom several times per night; follow up with pt tomorrow regarding success of nighttime IRPs  Toileting Hygiene CARE Score 6   Toileting   Able to Pull Clothing down yes, up yes  Able to Manage Clothing Closures Yes   Manage Hygiene Bladder   Limitations Noted In Balance;UE Strength;LE Strength;ROM   Toilet Transfer   Type of Assistance Needed Independent; Adaptive equipment   Physical Assistance Level No physical assistance   Comment SPT w/RW EOB<>BSC, pt progressed to IRPs w/RW   Toilet Transfer CARE Score 6   Toilet Transfer   Surface Assessed Standard Commode   Transfer Technique Stand Pivot   Limitations Noted In Balance; Endurance; Safety;UE Strength;LE Strength   Adaptive Equipment Walker   Positioning Concerns LE Support   Cognition   Overall Cognitive Status WFL   Arousal/Participation Alert; Cooperative   Attention Within functional limits   Orientation Level Oriented X4   Memory Decreased short term memory   Following Commands Follows multistep commands with increased time or repetition   Activity Tolerance   Activity Tolerance Patient tolerated treatment well   Assessment   Treatment Assessment Pt seen for 90min skilled OT session focused on progression to IRPs w/RW (including nighttime IRPs w/RW and BSC placed at foot of bed), footwear mgmt, donning elastic shoe laces, and repetitive fxl transfers, for increased independence w/ADLs/IADLs and decreased caregiver burden  See detailed descriptions of fxl performance above   Pt tolerated session well, juan HERNANDEZ safety awareness during [facility] transfers and during in-room fxl mobility w/RW, and deemed appropriate for IRP progression  HAMLIN notified nsg, updated pt's whiteboard, and updated nsg whiteboard to reflect this  Pt cont to make good progress, but cont to be limited by decreased higher-level standing balance/tolerance, strength, LE pain, and act bryan  Pt would benefit from continued skilled OT focused on laundry mgmt, toileting, and footwear mgmt  Prognosis Good   Problem List Decreased strength;Decreased range of motion;Decreased endurance; Impaired balance;Decreased mobility;Orthopedic restrictions;Pain   Barriers to Discharge Inaccessible home environment;Decreased caregiver support   Plan   Treatment/Interventions ADL retraining;Functional transfer training; Therapeutic exercise; Endurance training;Patient/family training;Equipment eval/education; Compensatory technique education   Progress Progressing toward goals   Recommendation   OT Discharge Recommendation   (outpatient PT at home, no OT needs)   OT Therapy Minutes   OT Time In 1230   OT Time Out 1400   OT Total Time (minutes) 90   OT Mode of treatment - Individual (minutes) 90   OT Mode of treatment - Concurrent (minutes) 0   OT Mode of treatment - Group (minutes) 0   OT Mode of treatment - Co-treat (minutes) 0   OT Mode of Treatment - Total time(minutes) 90 minutes   OT Cumulative Minutes 990   Therapy Time missed   Time missed?  No

## 2023-06-13 NOTE — PROGRESS NOTES
"   06/13/23 0930   Pain Assessment   Pain Assessment Tool FLACC   Pain Rating: FLACC (Rest) - Face 0   Pain Rating: FLACC (Rest) - Legs 0   Pain Rating: FLACC (Rest) - Activity 0   Pain Rating: FLACC (Rest) - Cry 0   Pain Rating: FLACC (Rest) - Consolability 0   Score: FLACC (Rest) 0   Pain Rating: FLACC (Activity) - Face 1   Pain Rating: FLACC (Activity) - Legs 0   Pain Rating: FLACC (Activity) - Activity 0   Pain Rating: FLACC (Activity) - Cry 0   Pain Rating: FLACC (Activity) - Consolability 0   Score: FLACC (Activity) 1   Restrictions/Precautions   Precautions Fall Risk;Pain   RLE Weight Bearing Per Order WBAT   Cognition   Overall Cognitive Status WFL   Sit to Stand   Type of Assistance Needed Independent; Adaptive equipment   Physical Assistance Level No physical assistance   Sit to Stand CARE Score 6   Bed-Chair Transfer   Type of Assistance Needed Independent; Adaptive equipment   Physical Assistance Level No physical assistance   Chair/Bed-to-Chair Transfer CARE Score 6   Transfer Bed/Chair/Wheelchair   Limitations Noted In Pain Management;LE Strength   Adaptive Equipment Roller Walker   Walk 10 Feet   Type of Assistance Needed Independent; Adaptive equipment   Physical Assistance Level No physical assistance   Walk 10 Feet CARE Score 6   Ambulation   Primary Mode of Locomotion Prior to Admission Walk   Distance Walked (feet) 20 ft  (x2 + 10')   Assist Device Roller Walker   Does the patient walk? 2  Yes   Curb or Single Stair   Style negotiated Curb   Type of Assistance Needed Supervision;Verbal cues; Adaptive equipment   Physical Assistance Level No physical assistance   Comment with RW on 6\" curb step performed 2x during session   1 Step (Curb) CARE Score 4   Toilet Transfer   Type of Assistance Needed Adaptive equipment; Independent   Physical Assistance Level No physical assistance   Comment required use 2x during session   Toilet Transfer CARE Score 6   Therapeutic Interventions   Strengthening Standing " with RW 2x20 standing heel raises; 2x10 RLE hip ABD   Assessment   Treatment Assessment Patient limited by need to have BM 2x during 30 minute session  She was however able to carryover the safety precautions reviewed in earlier session for IRP  PT again cleared patient post PM for thoroughness which she was able to complete well  Additional time spent on stairs and LE strengthening  D/C home Friday with OPPT services at home     Problem List Decreased strength;Decreased range of motion;Orthopedic restrictions;Pain   PT Barriers   Functional Limitation Stair negotiation   Recommendation   PT Discharge Recommendation Home with outpatient rehabilitation   PT Therapy Minutes   PT Time In 0930   PT Time Out 1000   PT Total Time (minutes) 30   PT Mode of treatment - Individual (minutes) 30   PT Mode of treatment - Concurrent (minutes) 0   PT Mode of treatment - Group (minutes) 0   PT Mode of treatment - Co-treat (minutes) 0   PT Mode of Treatment - Total time(minutes) 30 minutes   PT Cumulative Minutes 900

## 2023-06-13 NOTE — PROGRESS NOTES
Physical Medicine and Rehabilitation Progress Note  Shirin Farmer 77 y o  female MRN: [id number]  Unit/Bed#: -01 Encounter: [id number]      Assessment & Plan:     Decline in ADLs and mobility: Functional assessment - improving         FIM  Care Score  Admit Score Recent Score    Total assist  1-100% or 2p    Tot Other ADLs     Max assist 2-51-75%    Sub     Mod assist 3- 26-50%  Par     Min assist 3- 25% or < Par UBD    CG assist 4  TA     Sup/Setup 4-5 Sup  ADLs   Mod-I/Indep 6 MI      Transfers  Mod-Significant assist Close supervision     Ambulation   Total assist  150 ft CS    Stairs   Total assist/NT 1 step CGA     Goal: Mod indep for most ADLs and  for mobility  Major barriers:  Pain, decreased ROM, deconditioning, lack of assistance at home  Dispo: Home with [facility] Friday 6/16     * 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 (HCC)  Assessment & Plan  - Function improving; pain improving, incision healing well  - Goal d/c Friday 6/16 with OP PT that start at home     Status post surgical repair via ORIF by Dr Ryan Collins  on 5/28/23  • Weight-bearing as tolerated on affected limb   • Monitor incision/area for infection or dehiscence/impaired healing - improved drainage, no signs of infection   • POD#14 is [facility] 6/11 > ortho c/s - no concerns  • 6/11 - XR Right hip arthroplasty with cerclage wires for a periprosthetic fracture '  • Patient reported significant pain after transfer earlier in course; right femur x-ray without acute concerns 6/2  • Monitor for signs/symptoms of VTE, atelectasis, acute blood loss anemia, or other infection   • Continue acute comprehensive interdisciplinary inpatient rehabilitation to include intensive skilled therapies (PT, OT) as outlined with oversight and management by rehabilitation physician as well as inpatient rehab level nursing, case management and weekly interdisciplinary team meetings     • Follow-up with Ortho Sx after d/c and ortho if needed during ARC course       At risk for venous thromboembolism (VTE)  Assessment & Plan  SCDs, ambulation, and warfarin       PAF (paroxysmal atrial fibrillation) ([facility])  Assessment & Plan  IM consulted and with overall management at their discretion during ARC course  Rate control: Toprol XL 50mg HS  Antithrombotic: Warfarin per IM  (patient is frustrated with checking her INR and was not always checking it and adjusting med dose on own at time; she did voice she would be open to consider NOAC - spoke to IM who reviewed chart and in past NOACs were cost prohibitive   - New price check about $87/mo for Eliquis - pt declines and will continue warfarin      Obesity  Assessment & Plan   on appropriate nutrition and activity  Adjustments accordingly during skilled therapy and with rehab nursing  Monitor skin closely for breakdown, wounds, rashes; keep clean and dry, turning Q2H   Follow-up with PCP after d/c      Encounter for skin care  Assessment & Plan  - Increased risk of skin wounds/breakdown/rashes due to recent immobility and co-morbidities   - 2 nurse/staff full skin check for abnormal findings on admission - obtain photos PRN  - Turn patient Q2H in bed (use pillows or wedges), encourage wt shifts every 10-15 minutes in chair  - Patient and if available caregiver education   - Hydragaurd to buttocks and sacrum BID & PRN  - Optimal bowel/bladder hygiene; keep skin clean and dry   - EHOB waffle cushion to chair/WC when OOB  - Nursing to document in chart and Notify MD if buttock, sacrum, heel, or other skin site develops erythema, skin breakdown, or rashes as soon as possible  If patient is soiling themselves with urine or stool notify MD   If you are unable to maintain skin integrity and prevent erythema due to frequency of soiling notify MD as soon as possible as well         Peripheral neuropathy  Assessment & Plan  Monitor skin for increased infection/breakdown/wounds which patient is at higher risk "for  Skilled PT/OT   Fall precautions      Abnormal finding on radiology exam  Assessment & Plan  Per discharging provider to [facility]   \"Findings and Follow up required:                              1) Scattered less than 3 mm nodular densities in the posterior right upper lobe of the lung as well as a calcified granuloma in the right lower lobe of the lung posteriorly were noted incidentally on your trauma imaging  A malignancy (cancer) cannot be completely excluded based on trauma imaging alone  Recommend short-term outpatient follow-up with primary care provider to review the finding and for further surveillance as indicated                               2)  A subcentimeter cystic hypodensity posterior right lobe of the liver was incidentally identified on your trauma imaging and is unchanged compared to prior imaging  A malignancy (cancer) cannot be completely excluded based on trauma imaging alone  Recommend short-term outpatient follow-up with primary care provider to review the finding and for further surveillance as indicated                               3) A left adrenal gland 1 4 x 0 9 cm nodule was incidentally identified under trauma imaging and is unchanged compared to prior imaging  A malignancy (cancer) cannot be completely excluded based on trauma imaging alone  Recommend short-term outpatient follow-up with primary care provider to review the finding and for further surveillance as indicated                               4) Subcentimeter hypodensities in your kidneys were incidentally identified under trauma imaging and are suggestive of benign cysts  A malignancy (cancer) cannot be completely excluded based on trauma imaging alone    Recommend short-term outpatient follow-up with primary care provider to review the finding and for further surveillance as indicated                               5) A new small umbilical hernia containing nonobstructed bowel loops and fat was incidentally identified " "under trauma imaging as well as a previously seen right para-midline ventral hernia defect containing adherent bowel loops in a nonobstructive pattern  No further work-up or intervention necessary for these findings at this time  Recommend short-term outpatient follow-up with primary care provider to review the finding and for further surveillance as indicated                   Follow up should be done within 2 week(s)     The above noted incidental findings were discussed with the patient  The patient demonstrated understanding of the findings and the follow-up recommendations      Please notify the following clinician to assist with the follow up:              Dr Gwen Walls"    Anemia  Assessment & Plan  Hb stable at 9 7 on 6/8 > improved to 11 on 6/12  Consult(ed) IM and comanagement with their service during ARC course   Monitor H/H, vitals, signs/symptoms of acute bleeding      Pain  Assessment & Plan  Improving   ICE prn   APAP 975mg TID   Oxycodone 2 5-5mg Q4H PRN  Lyrica 100mg BID (chronic for neuropathy)  Monitor for oversedation, AMS/delirium, and respiratory depression   If being administered - hold opiates, muscle relaxants, benzodiazepines, and gabapentin for oversedation, AMS, or RR<12    Counseled on and continue to encourage deep breathing/relaxation/behavioral pain management techniques      Constipation  Assessment & Plan  Remains improved  Significant on admission to [facility] with last Bm near a week ago; no current signs of symptoms obstruction  - Continue to monitor for S/S of obstruction; hold stimulant laxatives and obtain imaging if concern develops  - Colace BID, miralax daily (consider BID if needed)  (Declines senna)   - PRN bowel regimen  - Limiting constipating medications if possible  - Ensure adequate hydration       Pure hypercholesterolemia  Assessment & Plan  Statin     Non-rheumatic aortic stenosis  Assessment & Plan  EF 65%, G2DD, Mild-mod AS, mod-severe elevated RVSP  IM " consulted and with overall management at their discretion during ARC course  Monitor vitals, fluid status  OP Cards follow-up      Other Medical Issues:  • Monitor for     Follow-up providers and other issues to be followed up after discharge  Cards  PCP  Ortho    CODE: Level 3: DNAR and DNI    Restrictions include: Fall precautions    Objective: Allergies per EMR  Diagnostic Studies: Reviewed, no new imaging  XR femur 2 vw right   Final Result by Alpa Hua MD (06/12 1232)      Right hip arthroplasty with cerclage wires for a periprosthetic fracture  Workstation performed: [id number]         XR femur 2 vw right   Final Result by Alpa Hua MD (06/03 8057)      No acute osseous abnormality  Postoperative changes              Workstation performed: [id number]           See above as well    Laboratory: Labs reviewed  Results from last 7 days   Lab Units 06/12/23  0636 06/08/23  0459   HEMATOCRIT % 35 7 30 9*   HEMOGLOBIN g/dL 11 0* 9 7*   WBC Thousand/uL 7 20 7 29     Results from last 7 days   Lab Units 06/12/23  0636 06/08/23  0459   BUN mg/dL 9 12   CHLORIDE mmol/L 110* 109*   CREATININE mg/dL 0 46* 0 39*   POTASSIUM mmol/L 4 9 4 2     Results from last 7 days   Lab Units 06/12/23  0636 06/09/23  0621 06/08/23  0449   INR  2 25* 2 15* 1 90*   PROTIME seconds 25 1* 24 3* 22 0*          Drug regimen reviewed, all potential adverse effects identified and addressed:    Current Facility-Administered Medications   Medication Dose Route Frequency Provider Last Rate   • acetaminophen  975 mg Oral [facility] Bryn Koehler MD     • bisacodyl  10 mg Rectal Daily PRN Bryn Koehler MD     • bisacodyl  10 mg Rectal Once Bryn Koehler MD     • docusate sodium  100 mg Oral BID Bryn Koehler MD     • latanoprost  1 drop Both Eyes HS RAYMON Herrera     • lidocaine   Topical Q4H PRN Bryn Koehler MD     • metoprolol succinate  50 mg Oral HS Bryn Koehler MD     • montelukast  10 mg Oral HS Anita Rodriguez MD     • oxyCODONE  5 mg Oral Q4H PRN Anita Rodriguez MD     • oxyCODONE  2 5 mg Oral Q4H PRN Anita Rodriguez MD     • polyethylene glycol  17 g Oral Daily PRN Anita Rodriguez MD     • polyethylene glycol  17 g Oral Daily Anita Rodriguez MD     • pravastatin  40 mg Oral Daily With Anshul Washington MD     • pregabalin  100 mg Oral BID Anita Rodriguez MD     • senna  1 tablet Oral BID Anita Rodriguez MD     • warfarin  5 mg Oral Once per day on Sun Tue Thu Sat [employer] [facility], CRNP     • warfarin  7 5 mg Oral Once per day on Mon Wed Fri RAYMON Lassiter         •  bisacodyl  •  lidocaine  •  oxyCODONE  •  oxyCODONE  •  polyethylene glycol      Chief Complaints: Rehab follow-up     Subjective: On eval, patient reports feeling better overall  She reports that  She significant calf pain, lightheadedness, cough, shortness of breath, or other new complaints  ROS: A 10 point ROS was performed; negative except as noted above         Physical Exam:  06/12/23 0628 97 8 °F (36 6 °C) 64 18 138/62 -- 98 % None (Room air)     Vitals above reviewed on date of encounter    GEN:  Sitting in NAD   HEENT/NECK: MMM  CARDIAC: Regular rate rhythm, no murmers, no rubs, no gallops  LUNGS:  clear to auscultation, no wheezes, rales, or rhonchi  ABDOMEN: Soft, non-tender, non-distended, normal active bowel sounds  EXTREMITIES/SKIN:  Slight RLE>LLE edema without calf TTP: R hip incision stable  NEURO:   MENTAL STATUS:  Appropriate wakefulness and interaction   PSYCH:  Affect:  Euthymic     HPI:  65-year-old female with a past medical history of A-fib on Coumadin, hypertension, hyperlipidemia, obesity, peripheral neuropathy, aortic stenosis, bilateral total hip arthroplasty, right total hip arthroplasty revision who stood up from kneeling and felt a crack in her right hip and then slipped and fell with resultant right leg pain who was found to have right periprosthetic hip fracture -described by radiology is acute minimally displaced periprosthetic fracture of the right proximal femoral diaphysis adjacent to the femoral stem of the right total hip arthroplasty  CT head was negative for acute findings  Patient underwent ORIF of right periprosthetic hip fracture on 5/28  Patient was started on Lovenox for VTE prophylaxis and cleared for Coumadin on 5/31  Patient has had some acute blood loss anemia, pain, severe constipation, and significant decline in ADLs and mobility        ** Please Note: Fluency Direct voice to text software may have been used in the creation of this document   **    I personally performed the required components and examined the patient myself in person on 6/12/23

## 2023-06-13 NOTE — PROGRESS NOTES
Internal Medicine Progress Note  Patient: Misa Ren  Age/sex: 77 y o  female  Medical Record #: [id number]      ASSESSMENT/PLAN: (Interval History)  Misa Ren is seen and examined and management for following issues:    Right periprosthetic femur fracture  • S/p ORIF 5/28  • WBAT  • X-ray 6/2/23 with post-op changes      Hx PAF  • Home:  Toprol 50mg qHS/Coumadin 6 25 mg (2 1/2 tabs of 2 5mg tabs) on Mondays and 5mg the rest of the days of the week  • Here:  Toprol 50mg qHS/Coumadin 7 5mg MWF/5mg T/TH/S/S  • She did tell Dr Catherine Granger that she adjusts her Coumadin on her own  • [facility] does her INRs and she has 2 5mg tabs at home  • EKG 5/27/23 = SR  • Stable; no changes in dosing today  • INR 6/15/23     HLD  • Continue Pravachol as a sub for home Simvastatin     Peripheral neuropathy  • Continue Lyrica     Aortic stenosis  • ECHO 7/2022:  LVEF 65%/gr 2 DD, mild-mod AS, mild TR/MR, RV systolic pressure is mod-severe elevated with systolic pressure 59 mmHg  • euvolemic        Discharge date:  6/16/23    The above assessment and plan was reviewed and updated as determined by my evaluation of the patient on 6/13/2023      Labs:   Results from last 7 days   Lab Units 06/12/23  0636 06/08/23  0459   HEMATOCRIT % 35 7 30 9*   HEMOGLOBIN g/dL 11 0* 9 7*   PLATELETS Thousands/uL 443* 362   WBC Thousand/uL 7 20 7 29     Results from last 7 days   Lab Units 06/12/23  0636 06/08/23  0459   BUN mg/dL 9 12   CALCIUM mg/dL 9 4 8 8   CHLORIDE mmol/L 110* 109*   CO2 mmol/L 30 28   CREATININE mg/dL 0 46* 0 39*   POTASSIUM mmol/L 4 9 4 2   SODIUM mmol/L 141 139         Results from last 7 days   Lab Units 06/12/23  0636 06/09/23  0621   INR  2 25* 2 15*           Review of Scheduled Meds:  Current Facility-Administered Medications   Medication Dose Route Frequency Provider Last Rate   • acetaminophen  975 mg Oral [facility] Katty Abreu MD     • bisacodyl  10 mg Rectal Daily PRN Katty Abreu MD     • bisacodyl  10 mg Rectal Once Junior Marie MD     • docusate sodium  100 mg Oral BID Junior Marie MD     • latanoprost  1 drop Both Eyes HS RAYMON Kuhn     • lidocaine   Topical Q4H PRN Junior Marie MD     • metoprolol succinate  50 mg Oral HS Junior Marie MD     • montelukast  10 mg Oral HS Junior Marie MD     • oxyCODONE  5 mg Oral Q4H PRN Junior Marie MD     • oxyCODONE  2 5 mg Oral Q4H PRN Junior Marie MD     • polyethylene glycol  17 g Oral Daily PRN Junior Marie MD     • polyethylene glycol  17 g Oral Daily Junior Marie MD     • pravastatin  40 mg Oral Daily With Radha Gomez MD     • pregabalin  100 mg Oral BID Junior Marie MD     • senna  1 tablet Oral BID Junior Marie MD     • warfarin  5 mg Oral Once per day on Sun Tue Thu Sat RAYMON Kuhn     • warfarin  7 5 mg Oral Once per day on Mon Wed Fri RAYMON Cruz         Subjective/ HPI: Patient seen and examined  Patients overnight issues or events were reviewed with nursing or staff during rounds or morning huddle session  New or overnight issues include the following:     No new or overnight issues  Offers no complaints    ROS:   A 10 point ROS was performed; negative except as noted above  Imaging:     XR femur 2 vw right   Final Result by Sahra Cottrell MD (06/12 2372)      Right hip arthroplasty with cerclage wires for a periprosthetic fracture  Workstation performed: [id number]         XR femur 2 vw right   Final Result by Sahra Cottrell MD (06/03 0093)      No acute osseous abnormality  Postoperative changes              Workstation performed: [id number]             *Labs /Radiology studies reviewed  *Medications reviewed and reconciled as needed  *Please refer to order section for additional ordered labs studies  *Case discussed with primary attending during morning huddle case rounds    Physical Examination:  Vitals:   Vitals:    06/12/23 0628 06/12/23 1355 06/12/23 2047 06/13/23 0438   BP: 138/62 118/58 143/64 129/58   BP Location: Right arm Left arm Right arm Left arm   Pulse: 64 74 77 77   Resp: 18 20 15 18   Temp: 97 8 °F (36 6 °C) 98 5 °F (36 9 °C) 98 1 °F (36 7 °C) 98 °F (36 7 °C)   TempSrc: Oral Oral Oral Oral   SpO2: 98% 96% 97% 97%   Weight:       Height:           General Appearance: no distress, conversive  HEENT: PERRLA, conjuctiva normal; oropharynx clear; mucous membranes moist   Neck:  Supple, normal ROM  Lungs: CTA, normal respiratory effort, no retractions, expiratory effort normal  CV: regular rate and rhythm; no rubs/gallops, +murmur  PMI normal   ABD: soft; ND/NT; +BS  EXT: no edema  Skin: normal turgor, normal texture, no rashes  Psych: affect normal, mood normal  Neuro: AAO      The above physical exam was reviewed and updated as determined by my evaluation of the patient on 6/13/2023  Invasive Devices     None                    VTE Pharmacologic Prophylaxis: Warfarin (Coumadin)  Code Status: Level 3 - DNAR and DNI  Current Length of Stay: 13 day(s)      Total time spent:  30 minutes with more than 50% spent counseling/coordinating care  Counseling includes discussion with patient re: progress  and discussion with patient of his/her current medical state/information  Coordination of patient's care was performed in conjunction with primary service  Time invested included review of patient's labs, vitals, and management of their comorbidities with continued monitoring  In addition, this patient was discussed with medical team including physician and advanced extenders  The care of the patient was extensively discussed and appropriate treatment plan was formulated unique for this patient  Medical decision making for the day was made by supervising physician unless otherwise noted in their attestation statement      ** Please Note:  voice to text software may have been used in the creation of this document   Although proof errors in transcription or interpretation are a potential of such software**

## 2023-06-13 NOTE — PLAN OF CARE
Reviewed    Problem: PAIN - ADULT  Goal: Verbalizes/displays adequate comfort level or baseline comfort level  Description: Interventions:  - Encourage patient to monitor pain and request assistance  - Assess pain using appropriate pain scale  - Administer analgesics based on type and severity of pain and evaluate response  - Implement non-pharmacological measures as appropriate and evaluate response  - Consider cultural and social influences on pain and pain management  - Notify physician/advanced practitioner if interventions unsuccessful or patient reports new pain  Outcome: Progressing     Problem: INFECTION - ADULT  Goal: Absence or prevention of progression during hospitalization  Description: INTERVENTIONS:  - Assess and monitor for signs and symptoms of infection  - Monitor lab/diagnostic results  - Monitor all insertion sites, i e  indwelling lines, tubes, and drains  - Monitor endotracheal if appropriate and nasal secretions for changes in amount and color  - Stanley appropriate cooling/warming therapies per order  - Administer medications as ordered  - Instruct and encourage patient and family to use good hand hygiene technique  - Identify and instruct in appropriate isolation precautions for identified infection/condition  Outcome: Progressing     Problem: SAFETY ADULT  Goal: Patient will remain free of falls  Description: INTERVENTIONS:  - Educate patient/family on patient safety including physical limitations  - Instruct patient to call for assistance with activity   - Consult OT/PT to assist with strengthening/mobility   - Keep Call bell within reach  - Keep bed low and locked with side rails adjusted as appropriate  - Keep care items and personal belongings within reach  - Initiate and maintain comfort rounds  - Make Fall Risk Sign visible to staff  - Offer Toileting every 4 Hours, in advance of need  - Apply yellow socks and bracelet for high fall risk patients  - Consider moving patient to room near nurses station  Outcome: Progressing  Goal: Maintain or return to baseline ADL function  Description: INTERVENTIONS:  -  Assess patient's ability to carry out ADLs; assess patient's baseline for ADL function and identify physical deficits which impact ability to perform ADLs (bathing, care of mouth/teeth, toileting, grooming, dressing, etc )  - Assess/evaluate cause of self-care deficits   - Assess range of motion  - Assess patient's mobility; develop plan if impaired  - Assess patient's need for assistive devices and provide as appropriate  - Encourage maximum independence but intervene and supervise when necessary  - Involve family in performance of ADLs  - Assess for home care needs following discharge   - Consider OT consult to assist with ADL evaluation and planning for discharge  - Provide patient education as appropriate  Outcome: Progressing  Goal: Maintains/Returns to pre admission functional level  Description: INTERVENTIONS:  - Set and communicate daily mobility goal to care team and patient/family/caregiver     - Collaborate with rehabilitation services on mobility goals if consulted  - Out of bed for toileting  - Record patient progress and toleration of activity level   Outcome: Progressing     Problem: DISCHARGE PLANNING  Goal: Discharge to home or other facility with appropriate resources  Description: INTERVENTIONS:  - Identify barriers to discharge w/patient and caregiver  - Arrange for needed discharge resources and transportation as appropriate  - Identify discharge learning needs (meds, wound care, etc )  - Arrange for interpretive services to assist at discharge as needed  - Refer to Case Management Department for coordinating discharge planning if the patient needs post-hospital services based on physician/advanced practitioner order or complex needs related to functional status, cognitive ability, or social support system  Outcome: Progressing     Problem: Prexisting or High Potential for Compromised Skin Integrity  Goal: Skin integrity is maintained or improved  Description: INTERVENTIONS:  - Identify patients at risk for skin breakdown  - Assess and monitor skin integrity  - Assess and monitor nutrition and hydration status  - Monitor labs   - Assess for incontinence   - Turn and reposition patient  - Assist with mobility/ambulation  - Relieve pressure over bony prominences  - Avoid friction and shearing  - Provide appropriate hygiene as needed including keeping skin clean and dry  - Evaluate need for skin moisturizer/barrier cream  - Collaborate with interdisciplinary team   - Patient/family teaching  - Consider wound care consult   Outcome: Progressing     Problem: MOBILITY - ADULT  Goal: Maintain or return to baseline ADL function  Description: INTERVENTIONS:  -  Assess patient's ability to carry out ADLs; assess patient's baseline for ADL function and identify physical deficits which impact ability to perform ADLs (bathing, care of mouth/teeth, toileting, grooming, dressing, etc )  - Assess/evaluate cause of self-care deficits   - Assess range of motion  - Assess patient's mobility; develop plan if impaired  - Assess patient's need for assistive devices and provide as appropriate  - Encourage maximum independence but intervene and supervise when necessary  - Involve family in performance of ADLs  - Assess for home care needs following discharge   - Consider OT consult to assist with ADL evaluation and planning for discharge  - Provide patient education as appropriate  Outcome: Progressing  Goal: Maintains/Returns to pre admission functional level  Description: INTERVENTIONS:  - Set and communicate daily mobility goal to care team and patient/family/caregiver     - Collaborate with rehabilitation services on mobility goals if consulted  - Out of bed for toileting  - Record patient progress and toleration of activity level   Outcome: Progressing     Problem: Nutrition/Hydration-ADULT  Goal: Nutrient/Hydration intake appropriate for improving, restoring or maintaining nutritional needs  Description: Monitor and assess patient's nutrition/hydration status for malnutrition  Collaborate with interdisciplinary team and initiate plan and interventions as ordered  Monitor patient's weight and dietary intake as ordered or per policy  Utilize nutrition screening tool and intervene as necessary  Determine patient's food preferences and provide high-protein, high-caloric foods as appropriate       INTERVENTIONS:  - Monitor oral intake, urinary output, labs, and treatment plans  - Assess nutrition and hydration status and recommend course of action  - Evaluate amount of meals eaten  - Assist patient with eating if necessary   - Allow adequate time for meals  - Recommend/ encourage appropriate diets, oral nutritional supplements, and vitamin/mineral supplements  - Order, calculate, and assess calorie counts as needed  - Recommend, monitor, and adjust tube feedings and TPN/PPN based on assessed needs  - Assess need for intravenous fluids  - Provide specific nutrition/hydration education as appropriate  - Include patient/family/caregiver in decisions related to nutrition  Outcome: Progressing

## 2023-06-13 NOTE — PROGRESS NOTES
06/13/23 0800   Pain Assessment   Pain Assessment Tool FLACC   Pain Rating: FLACC (Rest) - Face 0   Pain Rating: FLACC (Rest) - Legs 0   Pain Rating: FLACC (Rest) - Activity 0   Pain Rating: FLACC (Rest) - Cry 0   Pain Rating: FLACC (Rest) - Consolability 0   Score: FLACC (Rest) 0   Pain Rating: FLACC (Activity) - Face 1   Pain Rating: FLACC (Activity) - Legs 0   Pain Rating: FLACC (Activity) - Activity 0   Pain Rating: FLACC (Activity) - Cry 0   Pain Rating: FLACC (Activity) - Consolability 0   Score: FLACC (Activity) 1   Restrictions/Precautions   Precautions Fall Risk;Pain   RLE Weight Bearing Per Order WBAT   General   Change In Medical/Functional Status (S)  PROGRESSED TO DAY TIME IRP WITH RQW  MAY RING FOR ASSIST WITH MANAGING OF CHAIR AND TABLE WHEN GETTING INTO BED  OT TO ASSESS PATIENT'S SAFETY WITH BED<->BSC FOR NIGHT TIME IRP LATER THIS PM   Cognition   Overall Cognitive Status WFL   Roll Left and Right   Type of Assistance Needed Independent   Physical Assistance Level No physical assistance   Roll Left and Right CARE Score 6   Sit to Lying   Type of Assistance Needed Independent   Physical Assistance Level No physical assistance   Comment provided with leg  which she has at home to use in room; used partly to get into bed this session   Sit to Lying CARE Score 6   Lying to Sitting on Side of Bed   Type of Assistance Needed Independent   Physical Assistance Level No physical assistance   Lying to Sitting on Side of Bed CARE Score 6   Sit to Stand   Type of Assistance Needed Independent; Adaptive equipment   Physical Assistance Level No physical assistance   Sit to Stand CARE Score 6   Bed-Chair Transfer   Type of Assistance Needed Independent; Adaptive equipment   Physical Assistance Level No physical assistance   Chair/Bed-to-Chair Transfer CARE Score 6   Transfer Bed/Chair/Wheelchair   Limitations Noted In Pain Management;LE Strength   Adaptive Equipment Roller Walker   Walk 10 Feet   Type of Assistance Needed Independent; Adaptive equipment   Physical Assistance Level No physical assistance   Walk 10 Feet CARE Score 6   Walk 50 Feet with Two Turns   Type of Assistance Needed Independent; Adaptive equipment   Physical Assistance Level No physical assistance   Walk 50 Feet with Two Turns CARE Score 6   Ambulation   Primary Mode of Locomotion Prior to Admission Walk   Distance Walked (feet) 20 ft  (+ 15'x3)   Assist Device Roller Walker   Gait Pattern Decreased foot clearance; Antalgic   Limitations Noted In Endurance; Heel Strike   Does the patient walk? 2  Yes   Toilet Transfer   Type of Assistance Needed Independent; Adaptive equipment   Physical Assistance Level No physical assistance   Comment performed 2x during session; able to manage clothing; assisted with a thoroughness post BM but was clean   Toilet Transfer CARE Score 6   Therapeutic Interventions   Strengthening standing with RW: 2x10 alternating marches   Other Comments   Comments Repeated practice of patient managing the table and walker <-> w/c and recliner; needs assist with mnaging foot rest and table when in bed   Assessment   Treatment Assessment Patient engaged in PT treatment session focused on progression of IRP with use of RW  Patient demonstrated good safety awareness and handling of equipment  She is working towards goals of full (I) for home  Patient to discharge home Friday with OPPT services at home (may need to switch to [address] services for [facility] as patient with open wound on buttocks)  She was provided with HEP for continued strengthening at home  RW ordered and will be delivered prior to discharge  Problem List Decreased strength;Decreased range of motion;Orthopedic restrictions;Pain   PT Barriers   Functional Limitation Stair negotiation   Plan   Treatment/Interventions Functional transfer training;LE strengthening/ROM; Elevations; Therapeutic exercise; Endurance training;Patient/family training;Equipment eval/education; Bed mobility; Compensatory technique education;Gait training   Progress Progressing toward goals   Recommendation   PT Discharge Recommendation Home with outpatient rehabilitation  ([facility] at home)   Equipment Recommended Jimi Smith   PT Therapy Minutes   PT Time In 0800   PT Time Out 0900   PT Total Time (minutes) 60   PT Mode of treatment - Individual (minutes) 60   PT Mode of treatment - Concurrent (minutes) 0   PT Mode of treatment - Group (minutes) 0   PT Mode of treatment - Co-treat (minutes) 0   PT Mode of Treatment - Total time(minutes) 60 minutes   PT Cumulative Minutes 906

## 2023-06-13 NOTE — PROGRESS NOTES
Physical Medicine and Rehabilitation Progress Note  Alicia Hilliard 77 y o  female MRN: [id number]  Unit/Bed#: -01 Encounter: [id number]      Assessment & Plan:     Decline in ADLs and mobility: Functional assessment - improving         FIM  Care Score  Admit Score Recent Score    Total assist  1-100% or 2p    Tot Other ADLs     Max assist 2-51-75%    Sub     Mod assist 3- 26-50%  Par     Min assist 3- 25% or < Par UBD    CG assist 4  TA     Sup/Setup 4-5 Sup  ADLs   Mod-I/Indep 6 MI      Transfers  Mod-Significant assist Mod indep     Ambulation   Total assist  50 ft mod indep 150 ft CS    Stairs   Total assist/NT 1 step supervision     Goal: Mod indep for most ADLs and  for mobility  Major barriers:  Pain, decreased ROM, deconditioning, lack of assistance at home  Dispo: Home with [facility] Friday 6/16 with  PT, OT, RN      * 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 ([address] )  Assessment & Plan  - Function improving; pain improving, incision healing well  - Goal d/c Friday 6/16 with OP PT that start at home     Status post surgical repair via ORIF by Dr Ingrid Severino  on 5/28/23  • Weight-bearing as tolerated on affected limb   • Monitor incision/area for infection or dehiscence/impaired healing - improved drainage, no signs of infection   • POD#14 is Dorotha Jewel 6/11 > ortho c/s - no concerns  • 6/11 - XR Right hip arthroplasty with cerclage wires for a periprosthetic fracture '  • Patient reported significant pain after transfer earlier in course; right femur x-ray without acute concerns 6/2  • Monitor for signs/symptoms of VTE, atelectasis, acute blood loss anemia, or other infection   • Continue acute comprehensive interdisciplinary inpatient rehabilitation to include intensive skilled therapies (PT, OT) as outlined with oversight and management by rehabilitation physician as well as inpatient rehab level nursing, case management and weekly interdisciplinary team meetings     • Follow-up with Ortho Sx after d/c and ortho if needed during ARC course       At risk for venous thromboembolism (VTE)  Assessment & Plan  SCDs, ambulation, and warfarin       PAF (paroxysmal atrial fibrillation) (HCC)  Assessment & Plan  IM consulted and with overall management at their discretion during ARC course  Rate control: Toprol XL 50mg HS  Antithrombotic: Warfarin per IM  (patient is frustrated with checking her INR and was not always checking it and adjusting med dose on own at time; she did voice she would be open to consider NOAC - spoke to IM who reviewed chart and in past NOACs were cost prohibitive   - New price check about $87/mo for Eliquis - pt declines and will continue warfarin      Obesity  Assessment & Plan   on appropriate nutrition and activity  Adjustments accordingly during skilled therapy and with rehab nursing  Monitor skin closely for breakdown, wounds, rashes; keep clean and dry, turning Q2H   Follow-up with PCP after d/c      Encounter for skin care  Assessment & Plan  - Increased risk of skin wounds/breakdown/rashes due to recent immobility and co-morbidities   - 2 nurse/staff full skin check for abnormal findings on admission - obtain photos PRN  - Turn patient Q2H in bed (use pillows or wedges), encourage wt shifts every 10-15 minutes in chair  - Patient and if available caregiver education   - Hydragaurd to buttocks and sacrum BID & PRN  - Optimal bowel/bladder hygiene; keep skin clean and dry   - EHOB waffle cushion to chair/WC when OOB  - Nursing to document in chart and Notify MD if buttock, sacrum, heel, or other skin site develops erythema, skin breakdown, or rashes as soon as possible  If patient is soiling themselves with urine or stool notify MD   If you are unable to maintain skin integrity and prevent erythema due to frequency of soiling notify MD as soon as possible as well         Peripheral neuropathy  Assessment & Plan  Monitor skin for increased infection/breakdown/wounds which "patient is at higher risk for  Skilled PT/OT   Fall precautions      Abnormal finding on radiology exam  Assessment & Plan  Per discharging provider to [facility]   \"Findings and Follow up required:                              1) Scattered less than 3 mm nodular densities in the posterior right upper lobe of the lung as well as a calcified granuloma in the right lower lobe of the lung posteriorly were noted incidentally on your trauma imaging  A malignancy (cancer) cannot be completely excluded based on trauma imaging alone  Recommend short-term outpatient follow-up with primary care provider to review the finding and for further surveillance as indicated                               2)  A subcentimeter cystic hypodensity posterior right lobe of the liver was incidentally identified on your trauma imaging and is unchanged compared to prior imaging  A malignancy (cancer) cannot be completely excluded based on trauma imaging alone  Recommend short-term outpatient follow-up with primary care provider to review the finding and for further surveillance as indicated                               3) A left adrenal gland 1 4 x 0 9 cm nodule was incidentally identified under trauma imaging and is unchanged compared to prior imaging  A malignancy (cancer) cannot be completely excluded based on trauma imaging alone  Recommend short-term outpatient follow-up with primary care provider to review the finding and for further surveillance as indicated                               4) Subcentimeter hypodensities in your kidneys were incidentally identified under trauma imaging and are suggestive of benign cysts  A malignancy (cancer) cannot be completely excluded based on trauma imaging alone    Recommend short-term outpatient follow-up with primary care provider to review the finding and for further surveillance as indicated                               5) A new small umbilical hernia containing nonobstructed bowel loops and fat was " "incidentally identified under trauma imaging as well as a previously seen right para-midline ventral hernia defect containing adherent bowel loops in a nonobstructive pattern  No further work-up or intervention necessary for these findings at this time  Recommend short-term outpatient follow-up with primary care provider to review the finding and for further surveillance as indicated                   Follow up should be done within 2 week(s)     The above noted incidental findings were discussed with the patient  The patient demonstrated understanding of the findings and the follow-up recommendations      Please notify the following clinician to assist with the follow up:              Dr Lowell Rojas"    Anemia  Assessment & Plan  Hb stable at 9 7 on 6/8 > improved to 11 on 6/12  Consult(ed) IM and comanagement with their service during ARC course   Monitor H/H, vitals, signs/symptoms of acute bleeding      Pain  Assessment & Plan  Improving   ICE prn   APAP 975mg TID   Oxycodone 2 5-5mg Q4H PRN  Lyrica 100mg BID (chronic for neuropathy)  Monitor for oversedation, AMS/delirium, and respiratory depression   If being administered - hold opiates, muscle relaxants, benzodiazepines, and gabapentin for oversedation, AMS, or RR<12    Counseled on and continue to encourage deep breathing/relaxation/behavioral pain management techniques      Constipation  Assessment & Plan  Remains improved  Significant on admission to [facility] with last Bm near a week ago; no current signs of symptoms obstruction  - Continue to monitor for S/S of obstruction; hold stimulant laxatives and obtain imaging if concern develops  - Colace BID, miralax daily (consider BID if needed)  (Declines senna)   - PRN bowel regimen  - Limiting constipating medications if possible  - Ensure adequate hydration       Pure hypercholesterolemia  Assessment & Plan  Statin     Non-rheumatic aortic stenosis  Assessment & Plan  EF 65%, G2DD, Mild-mod AS, mod-severe " elevated RVSP  IM consulted and with overall management at their discretion during ARC course  Monitor vitals, fluid status  OP Cards follow-up      Other Medical Issues:  • Monitor for     Follow-up providers and other issues to be followed up after discharge  Cards  PCP  Ortho    CODE: Level 3: DNAR and DNI    Restrictions include: Fall precautions    Objective: Allergies per EMR  Diagnostic Studies: Reviewed, no new imaging  XR femur 2 vw right   Final Result by Peng Morse MD (06/12 1232)      Right hip arthroplasty with cerclage wires for a periprosthetic fracture  Workstation performed: [id number]         XR femur 2 vw right   Final Result by Peng Morse MD (06/03 4875)      No acute osseous abnormality  Postoperative changes              Workstation performed: [id number]           See above as well    Laboratory: Labs reviewed  Results from last 7 days   Lab Units 06/12/23  0636 06/08/23  0459   HEMATOCRIT % 35 7 30 9*   HEMOGLOBIN g/dL 11 0* 9 7*   WBC Thousand/uL 7 20 7 29     Results from last 7 days   Lab Units 06/12/23  0636 06/08/23  0459   BUN mg/dL 9 12   CHLORIDE mmol/L 110* 109*   CREATININE mg/dL 0 46* 0 39*   POTASSIUM mmol/L 4 9 4 2     Results from last 7 days   Lab Units 06/12/23  0636 06/09/23  0621 06/08/23  0449   INR  2 25* 2 15* 1 90*   PROTIME seconds 25 1* 24 3* 22 0*          Drug regimen reviewed, all potential adverse effects identified and addressed:    Current Facility-Administered Medications   Medication Dose Route Frequency Provider Last Rate   • acetaminophen  975 mg Oral [facility] Frandy Jeff MD     • bisacodyl  10 mg Rectal Daily PRN Frandy Jeff MD     • bisacodyl  10 mg Rectal Once Frandy Jeff MD     • docusate sodium  100 mg Oral BID Frandy Jeff MD     • latanoprost  1 drop Both Eyes HS Kamlesh Imus, CRNP     • lidocaine   Topical Q4H PRN Frandy Jeff MD     • metoprolol succinate  50 mg Oral HS Anita Rodriguez MD     • montelukast  10 mg Oral HS Anita Rodriguez MD     • oxyCODONE  5 mg Oral Q4H PRN Anita Rodriguez MD     • oxyCODONE  2 5 mg Oral Q4H PRN Anita Rodriguez MD     • polyethylene glycol  17 g Oral Daily PRN Anita Rodrgiuez MD     • polyethylene glycol  17 g Oral Daily Anita Rodriguez MD     • pravastatin  40 mg Oral Daily With Anshul Washington MD     • pregabalin  100 mg Oral BID Anita Rodrgiuez MD     • senna  1 tablet Oral BID Anita Rodriguez MD     • warfarin  5 mg Oral Once per day on Sun Tue Thu Sat [employer] [facility], CRNP     • warfarin  7 5 mg Oral Once per day on Mon Wed Fri RAYMON Lassiter         •  bisacodyl  •  lidocaine  •  oxyCODONE  •  oxyCODONE  •  polyethylene glycol      Chief Complaints: Rehab follow-up     Subjective:     Patient denies increased Pain, other pain, lightheadedness, shortness of breath, calf pain, constipation, or other new complaints  ROS: A 10 point ROS was performed; negative except as noted above  Physical Exam:  Vitals:    06/13/23 0438   BP: 129/58   Pulse: 77   Resp: 18   Temp: 98 °F (36 7 °C)   SpO2: 97%   Vitals above reviewed on date of encounter      GEN:  Sitting in NAD   HEENT/NECK: MMM  CARDIAC: Regular rate rhythm, no murmers, no rubs, no gallops  LUNGS:  clear to auscultation, no wheezes, rales, or rhonchi  ABDOMEN: Soft, non-tender, non-distended, normal active bowel sounds and NA  EXTREMITIES/SKIN:  Slight RLE>LLE edema without calf TTP: R hip incision stable;  Incision healing adequately; buttock/sacrum with some nonblanchable erythema and small area of superficial breakdown without drainage  NEURO:   MENTAL STATUS:  Appropriate wakefulness and interaction   PSYCH:  Affect:  Euthymic     HPI:  65-year-old female with a past medical history of A-fib on Coumadin, hypertension, hyperlipidemia, obesity, peripheral neuropathy, aortic stenosis, bilateral total hip arthroplasty, right total hip arthroplasty revision who stood up from kneeling and felt a crack in her right hip and then slipped and fell with resultant right leg pain who was found to have right periprosthetic hip fracture -described by radiology is acute minimally displaced periprosthetic fracture of the right proximal femoral diaphysis adjacent to the femoral stem of the right total hip arthroplasty  CT head was negative for acute findings  Patient underwent ORIF of right periprosthetic hip fracture on 5/28  Patient was started on Lovenox for VTE prophylaxis and cleared for Coumadin on 5/31  Patient has had some acute blood loss anemia, pain, severe constipation, and significant decline in ADLs and mobility        ** Please Note: Fluency Direct voice to text software may have been used in the creation of this document  **    I personally performed the required components and examined the patient myself in person on 6/13/23

## 2023-06-14 PROBLEM — S31.000A SACRAL WOUND: Status: ACTIVE | Noted: 2023-06-01

## 2023-06-14 PROCEDURE — 99232 SBSQ HOSP IP/OBS MODERATE 35: CPT | Performed by: INTERNAL MEDICINE

## 2023-06-14 PROCEDURE — 97535 SELF CARE MNGMENT TRAINING: CPT

## 2023-06-14 PROCEDURE — 97110 THERAPEUTIC EXERCISES: CPT

## 2023-06-14 PROCEDURE — 99233 SBSQ HOSP IP/OBS HIGH 50: CPT

## 2023-06-14 PROCEDURE — 97530 THERAPEUTIC ACTIVITIES: CPT

## 2023-06-14 RX ORDER — ACETAMINOPHEN 325 MG/1
650 TABLET ORAL 3 TIMES DAILY PRN
Refills: 0
Start: 2023-06-14

## 2023-06-14 RX ORDER — METOPROLOL SUCCINATE 50 MG/1
50 TABLET, EXTENDED RELEASE ORAL
Qty: 30 TABLET | Refills: 0
Start: 2023-06-14

## 2023-06-14 RX ORDER — OXYCODONE HYDROCHLORIDE 5 MG/1
TABLET ORAL
Qty: 7 TABLET | Refills: 0 | Status: SHIPPED | OUTPATIENT
Start: 2023-06-14

## 2023-06-14 RX ADMIN — METOPROLOL SUCCINATE 50 MG: 50 TABLET, EXTENDED RELEASE ORAL at 21:32

## 2023-06-14 RX ADMIN — PREGABALIN 100 MG: 100 CAPSULE ORAL at 09:07

## 2023-06-14 RX ADMIN — LATANOPROST 1 DROP: 50 SOLUTION OPHTHALMIC at 21:34

## 2023-06-14 RX ADMIN — PRAVASTATIN SODIUM 40 MG: 40 TABLET ORAL at 17:28

## 2023-06-14 RX ADMIN — ACETAMINOPHEN 975 MG: 325 TABLET, FILM COATED ORAL at 14:02

## 2023-06-14 RX ADMIN — ACETAMINOPHEN 975 MG: 325 TABLET, FILM COATED ORAL at 21:32

## 2023-06-14 RX ADMIN — OXYCODONE HYDROCHLORIDE 5 MG: 5 TABLET ORAL at 09:07

## 2023-06-14 RX ADMIN — WARFARIN SODIUM 7.5 MG: 7.5 TABLET ORAL at 17:28

## 2023-06-14 RX ADMIN — ACETAMINOPHEN 975 MG: 325 TABLET, FILM COATED ORAL at 05:07

## 2023-06-14 RX ADMIN — MONTELUKAST SODIUM 10 MG: 10 TABLET, COATED ORAL at 21:32

## 2023-06-14 RX ADMIN — PREGABALIN 100 MG: 100 CAPSULE ORAL at 17:28

## 2023-06-14 NOTE — PLAN OF CARE
Problem: PAIN - ADULT  Goal: Verbalizes/displays adequate comfort level or baseline comfort level  Description: Interventions:  - Encourage patient to monitor pain and request assistance  - Assess pain using appropriate pain scale  - Administer analgesics based on type and severity of pain and evaluate response  - Implement non-pharmacological measures as appropriate and evaluate response  - Consider cultural and social influences on pain and pain management  - Notify physician/advanced practitioner if interventions unsuccessful or patient reports new pain  Outcome: Progressing     Problem: INFECTION - ADULT  Goal: Absence or prevention of progression during hospitalization  Description: INTERVENTIONS:  - Assess and monitor for signs and symptoms of infection  - Monitor lab/diagnostic results  - Monitor all insertion sites, i e  indwelling lines, tubes, and drains  - Monitor endotracheal if appropriate and nasal secretions for changes in amount and color  - [place]enton appropriate cooling/warming therapies per order  - Administer medications as ordered  - Instruct and encourage patient and family to use good hand hygiene technique  - Identify and instruct in appropriate isolation precautions for identified infection/condition  Outcome: Progressing     Problem: SAFETY ADULT  Goal: Patient will remain free of falls  Description: INTERVENTIONS:  - Educate patient/family on patient safety including physical limitations  - Instruct patient to call for assistance with activity   - Consult OT/PT to assist with strengthening/mobility   - Keep Call bell within reach  - Keep bed low and locked with side rails adjusted as appropriate  - Keep care items and personal belongings within reach  - Initiate and maintain comfort rounds  - Make Fall Risk Sign visible to staff  - Apply yellow socks and bracelet for high fall risk patients  - Consider moving patient to room near nurses station  Outcome: Progressing  Goal: Maintain or return to baseline ADL function  Description: INTERVENTIONS:  -  Assess patient's ability to carry out ADLs; assess patient's baseline for ADL function and identify physical deficits which impact ability to perform ADLs (bathing, care of mouth/teeth, toileting, grooming, dressing, etc )  - Assess/evaluate cause of self-care deficits   - Assess range of motion  - Assess patient's mobility; develop plan if impaired  - Assess patient's need for assistive devices and provide as appropriate  - Encourage maximum independence but intervene and supervise when necessary  - Involve family in performance of ADLs  - Assess for home care needs following discharge   - Consider OT consult to assist with ADL evaluation and planning for discharge  - Provide patient education as appropriate  Outcome: Progressing  Goal: Maintains/Returns to pre admission functional level  Description: INTERVENTIONS:  - Perform BMAT or MOVE assessment daily    - Set and communicate daily mobility goal to care team and patient/family/caregiver     - Collaborate with rehabilitation services on mobility goals if consulted  - Out of bed for toileting  - Record patient progress and toleration of activity level   Outcome: Progressing     Problem: DISCHARGE PLANNING  Goal: Discharge to home or other facility with appropriate resources  Description: INTERVENTIONS:  - Identify barriers to discharge w/patient and caregiver  - Arrange for needed discharge resources and transportation as appropriate  - Identify discharge learning needs (meds, wound care, etc )  - Arrange for interpretive services to assist at discharge as needed  - Refer to Case Management Department for coordinating discharge planning if the patient needs post-hospital services based on physician/advanced practitioner order or complex needs related to functional status, cognitive ability, or social support system  Outcome: Progressing     Problem: Prexisting or High Potential for Compromised Skin Integrity  Goal: Skin integrity is maintained or improved  Description: INTERVENTIONS:  - Identify patients at risk for skin breakdown  - Assess and monitor skin integrity  - Assess and monitor nutrition and hydration status  - Monitor labs   - Assess for incontinence   - Turn and reposition patient  - Assist with mobility/ambulation  - Relieve pressure over bony prominences  - Avoid friction and shearing  - Provide appropriate hygiene as needed including keeping skin clean and dry  - Evaluate need for skin moisturizer/barrier cream  - Collaborate with interdisciplinary team   - Patient/family teaching  - Consider wound care consult   Outcome: Progressing     Problem: MOBILITY - ADULT  Goal: Maintain or return to baseline ADL function  Description: INTERVENTIONS:  -  Assess patient's ability to carry out ADLs; assess patient's baseline for ADL function and identify physical deficits which impact ability to perform ADLs (bathing, care of mouth/teeth, toileting, grooming, dressing, etc )  - Assess/evaluate cause of self-care deficits   - Assess range of motion  - Assess patient's mobility; develop plan if impaired  - Assess patient's need for assistive devices and provide as appropriate  - Encourage maximum independence but intervene and supervise when necessary  - Involve family in performance of ADLs  - Assess for home care needs following discharge   - Consider OT consult to assist with ADL evaluation and planning for discharge  - Provide patient education as appropriate  Outcome: Progressing  Goal: Maintains/Returns to pre admission functional level  Description: INTERVENTIONS:  - Perform BMAT or MOVE assessment daily    - Set and communicate daily mobility goal to care team and patient/family/caregiver     - Collaborate with rehabilitation services on mobility goals if consulted  - Out of bed for toileting  - Record patient progress and toleration of activity level   Outcome: Progressing     Problem: Nutrition/Hydration-ADULT  Goal: Nutrient/Hydration intake appropriate for improving, restoring or maintaining nutritional needs  Description: Monitor and assess patient's nutrition/hydration status for malnutrition  Collaborate with interdisciplinary team and initiate plan and interventions as ordered  Monitor patient's weight and dietary intake as ordered or per policy  Utilize nutrition screening tool and intervene as necessary  Determine patient's food preferences and provide high-protein, high-caloric foods as appropriate       INTERVENTIONS:  - Monitor oral intake, urinary output, labs, and treatment plans  - Assess nutrition and hydration status and recommend course of action  - Evaluate amount of meals eaten  - Assist patient with eating if necessary   - Allow adequate time for meals  - Recommend/ encourage appropriate diets, oral nutritional supplements, and vitamin/mineral supplements  - Order, calculate, and assess calorie counts as needed  - Recommend, monitor, and adjust tube feedings and TPN/PPN based on assessed needs  - Assess need for intravenous fluids  - Provide specific nutrition/hydration education as appropriate  - Include patient/family/caregiver in decisions related to nutrition  Outcome: Progressing

## 2023-06-14 NOTE — PROGRESS NOTES
06/14/23 0800   Pain Assessment   Pain Assessment Tool FLACC   Pain Location/Orientation Orientation: Right;Location: Leg   Pain Onset/Description Onset: Ongoing   Pain Rating: FLACC (Rest) - Face 0   Pain Rating: FLACC (Rest) - Legs 0   Pain Rating: FLACC (Rest) - Activity 0   Pain Rating: FLACC (Rest) - Cry 0   Pain Rating: FLACC (Rest) - Consolability 0   Score: FLACC (Rest) 0   Pain Rating: FLACC (Activity) - Face 1   Pain Rating: FLACC (Activity) - Legs 0   Pain Rating: FLACC (Activity) - Activity 0   Pain Rating: FLACC (Activity) - Cry 1   Pain Rating: FLACC (Activity) - Consolability 0   Score: FLACC (Activity) 2   Restrictions/Precautions   Precautions Fall Risk;Pain   Weight Bearing Restrictions Yes   RLE Weight Bearing Per Order WBAT   Cognition   Overall Cognitive Status WFL   Subjective   Subjective Patient ready to participate in PT session   Roll Left and Right   Type of Assistance Needed Independent   Physical Assistance Level No physical assistance   Roll Left and Right CARE Score 6   Sit to Lying   Type of Assistance Needed Independent   Physical Assistance Level No physical assistance   Sit to Lying CARE Score 6   Lying to Sitting on Side of Bed   Type of Assistance Needed Independent   Physical Assistance Level No physical assistance   Lying to Sitting on Side of Bed CARE Score 6   Sit to Stand   Type of Assistance Needed Independent; Adaptive equipment   Physical Assistance Level No physical assistance   Sit to Stand CARE Score 6   Bed-Chair Transfer   Type of Assistance Needed Independent; Adaptive equipment   Physical Assistance Level No physical assistance   Chair/Bed-to-Chair Transfer CARE Score 6   Transfer Bed/Chair/Wheelchair   Limitations Noted In LE Strength   Adaptive Equipment Roller Walker   Walk 10 Feet   Type of Assistance Needed Independent; Adaptive equipment   Physical Assistance Level No physical assistance   Walk 10 Feet CARE Score 6   Walk 50 Feet with Two Turns   Type of "Assistance Needed Independent; Adaptive equipment   Physical Assistance Level No physical assistance   Walk 50 Feet with Two Turns CARE Score 6   Walking 10 Feet on Uneven Surfaces   Type of Assistance Needed Set-up / clean-up; Adaptive equipment   Physical Assistance Level No physical assistance   Comment outside on sidewalks + ramp outside with RW   Walking 10 Feet on Uneven Surfaces CARE Score 5   Ambulation   Primary Mode of Locomotion Prior to Admission Walk   Distance Walked (feet) 70 ft  (x2)   Assist Device Roller Walker   Gait Pattern Antalgic   Limitations Noted In Endurance   Does the patient walk? 2  Yes   Wheel 50 Feet with Two Turns   Reason if not Attempted Activity not applicable   Wheel 50 Feet with Two Turns CARE Score 9   Wheel 150 Feet   Reason if not Attempted Activity not applicable   Wheel 206 Feet CARE Score 9   Curb or Single Stair   Style negotiated Curb   Type of Assistance Needed Set-up / clean-up; Adaptive equipment   Physical Assistance Level No physical assistance   Comment RW on outside curb step   1 Step (Curb) CARE Score 5   4 Steps   Type of Assistance Needed Set-up / clean-up   Physical Assistance Level No physical assistance   Comment with RW placed on top/bottom of 6\"  steps then using rail to ascend and descend   4 Steps CARE Score 5   Picking Up Object   Type of Assistance Needed Independent; Adaptive equipment   Physical Assistance Level No physical assistance   Comment reacher in stance with RW   Picking Up Object CARE Score 6   Toilet Transfer   Type of Assistance Needed Independent; Adaptive equipment   Physical Assistance Level No physical assistance   Toilet Transfer CARE Score 6   Therapeutic Interventions   Strengthening BLE 3x10 LAQ and hip flexion   Flexibility BLE heel cord and HS gentle stretch TER 2 minutes   Assessment   Treatment Assessment Patient making good progress with skilled PT intervention thusfar   She continues to demonstrate good safety awareness and " "reports no issues with IRP using a RW  We discussed other concerns for discharge which she reported the ramp and stairs  Completed today at set up level using RW  Will benefit from practice again tomorrow prior to Friday discharge  HHPT services set up for home, RW ordered, and HEP provided  Recommendation   PT Discharge Recommendation Home with home health rehabilitation   [address]  Recommended Wheeled walker   Change/add to [place]?  Yes, Change Size   Walker Size [place] (Ht <5'1\")   PT Therapy Minutes   PT Time In 0800   PT Time Out 0930   PT Total Time (minutes) 90   PT Mode of treatment - Individual (minutes) 90   PT Mode of treatment - Concurrent (minutes) 0   PT Mode of treatment - Group (minutes) 0   PT Mode of treatment - Co-treat (minutes) 0   PT Mode of Treatment - Total time(minutes) 90 minutes   PT Cumulative Minutes 990     "

## 2023-06-14 NOTE — PROGRESS NOTES
Internal Medicine Progress Note  Patient: Lolita Goodson  Age/sex: 77 y o  female  Medical Record #: [id number]      ASSESSMENT/PLAN: (Interval History)  Lolita Goodson is seen and examined and management for following issues:    Right periprosthetic femur fracture  • S/p ORIF 5/28  • WBAT  • X-ray 6/2/23 with post-op changes      Hx PAF  • Home:  Toprol 50mg qHS/Coumadin 6 25 mg (2 1/2 tabs of 2 5mg tabs) on Mondays and 5mg the rest of the days of the week  • Here:  Toprol 50mg qHS/Coumadin 7 5mg MWF/5mg T/TH/S/S  • She did tell Dr Lizbeth Malone that she adjusts her Coumadin on her own  • [facility] does her INRs and she has 2 5mg tabs at home  • EKG 5/27/23 = SR  • Stable  • INR 6/15/23     HLD  • Continue Pravachol as a sub for home Simvastatin     Peripheral neuropathy  • Continue Lyrica     Aortic stenosis  • ECHO 7/2022:  LVEF 65%/gr 2 DD, mild-mod AS, mild TR/MR, RV systolic pressure is mod-severe elevated with systolic pressure 59 mmHg  • euvolemic        Discharge date:  6/16/23    The above assessment and plan was reviewed and updated as determined by my evaluation of the patient on 6/14/2023      Labs:   Results from last 7 days   Lab Units 06/12/23  0636 06/08/23  0459   HEMATOCRIT % 35 7 30 9*   HEMOGLOBIN g/dL 11 0* 9 7*   PLATELETS Thousands/uL 443* 362   WBC Thousand/uL 7 20 7 29     Results from last 7 days   Lab Units 06/12/23  0636 06/08/23  0459   BUN mg/dL 9 12   CALCIUM mg/dL 9 4 8 8   CHLORIDE mmol/L 110* 109*   CO2 mmol/L 30 28   CREATININE mg/dL 0 46* 0 39*   POTASSIUM mmol/L 4 9 4 2   SODIUM mmol/L 141 139         Results from last 7 days   Lab Units 06/12/23  0636 06/09/23  0621   INR  2 25* 2 15*           Review of Scheduled Meds:  Current Facility-Administered Medications   Medication Dose Route Frequency Provider Last Rate   • acetaminophen  975 mg Oral [facility] Meaghan Hills MD     • bisacodyl  10 mg Rectal Daily PRN Meaghan Hills MD     • docusate sodium  100 mg Oral BID Caitlyn Jackson MD     • latanoprost  1 drop Both Eyes HS RAYMON Iniguez     • lidocaine   Topical Q4H PRN Caitlyn Jackson MD     • metoprolol succinate  50 mg Oral HS Caitlyn Jackson MD     • montelukast  10 mg Oral HS Caitlyn Jackson MD     • oxyCODONE  5 mg Oral Q4H PRN Caitlyn Jackson MD     • oxyCODONE  2 5 mg Oral Q4H PRN Caitlyn Jackson MD     • polyethylene glycol  17 g Oral Daily PRN Caitlyn Jackson MD     • polyethylene glycol  17 g Oral Daily Caitlyn Jackson MD     • pravastatin  40 mg Oral Daily With Ruthy Holstein, MD     • pregabalin  100 mg Oral BID Caitlyn Jackson MD     • senna  1 tablet Oral BID Caitlyn Jackson MD     • warfarin  5 mg Oral Once per day on Sun Tue Thu Sat RAYMON Iniguez     • warfarin  7 5 mg Oral Once per day on Mon Wed Fri RAYMON Corbin         Subjective/ HPI: Patient seen and examined  Patients overnight issues or events were reviewed with nursing or staff during rounds or morning huddle session  New or overnight issues include the following:     No new or overnight issues  Offers no complaints    ROS:   A 10 point ROS was performed; negative except as noted above       *Labs /Radiology studies reviewed  *Medications reviewed and reconciled as needed  *Please refer to order section for additional ordered labs studies  *Case discussed with primary attending during morning huddle case rounds    Physical Examination:  Vitals:   Vitals:    06/13/23 0438 06/13/23 1500 06/13/23 2010 06/14/23 0507   BP: 129/58 117/56 136/61 113/79   BP Location: Left arm Right arm Right arm Left arm   Pulse: 77 73 68 62   Resp: 18 16 18 18   Temp: 98 °F (36 7 °C) 98 1 °F (36 7 °C) 98 2 °F (36 8 °C) 97 6 °F (36 4 °C)   TempSrc: Oral Oral Oral Oral   SpO2: 97% 99% 98% 99%   Weight:    73 1 kg (161 lb 2 5 oz)   Height:           General Appearance: no distress, conversive  HEENT:  External ear normal  Nose normal w/o drainage  Mucous membranes are moist  Oropharynx is clear  Conjunctiva clear w/o icterus or redness  Neck:  Supple, normal ROM  Lungs: BBS without crackles/wheeze/rhonchi; respirations unlabored with normal inspiratory/expiratory effort  No retractions noted  On RA  CV: regular rate and rhythm; no rubs/gallops, +murmur  PMI normal   ABD: Abdomen is soft  Bowel sounds all quadrants  Nontender with no distention  EXT: no edema  Skin: normal turgor, normal texture, no rashes  Psych: affect normal, mood normal  Neuro: AAO    The above physical exam was reviewed and updated as determined by my evaluation of the patient on 6/14/2023  Invasive Devices     None                    VTE Pharmacologic Prophylaxis: Warfarin (Coumadin)  Code Status: Level 3 - DNAR and DNI  Current Length of Stay: 14 day(s)      Total time spent:  30 minutes with more than 50% spent counseling/coordinating care  Counseling includes discussion with patient re: progress  and discussion with patient of his/her current medical state/information  Coordination of patient's care was performed in conjunction with primary service  Time invested included review of patient's labs, vitals, and management of their comorbidities with continued monitoring  In addition, this patient was discussed with medical team including physician and advanced extenders  The care of the patient was extensively discussed and appropriate treatment plan was formulated unique for this patient  Medical decision making for the day was made by supervising physician unless otherwise noted in their attestation statement  ** Please Note:  voice to text software may have been used in the creation of this document   Although proof errors in transcription or interpretation are a potential of such software**

## 2023-06-14 NOTE — PROGRESS NOTES
"OT Daily Note   06/14/23 7717   Pain Assessment   Pain Assessment Tool 0-10   Pain Score 2   Pain Location/Orientation Orientation: Right;Location: Hip;Location: Leg   Restrictions/Precautions   Precautions Fall Risk;Pain   Weight Bearing Restrictions Yes   RLE Weight Bearing Per Order WBAT   Lifestyle   Autonomy \"I can't wait to get out of here  \"   Putting On/Taking Off Footwear   Type of Assistance Needed Independent; Adaptive equipment   Physical Assistance Level No physical assistance   Comment pt  participated in activity to assess don/doff socks/shoes  Pt  has had increased difficulty with tieing shoes  Pt  got elastic shoe laces which has made it easier for her to don/doff shoes  Pt  had no limitations using sock aid and has used this adaptive equipment for many years  Pt  was shown [facility] but did not need it since she has recieved the elastic shoe laces  Pt  was asked how she would reviece equipment if she needed to purchase more or replace old equipment she stated that her sister would help since she did not have a phone  Putting On/Taking Off Footwear CARE Score 6   Sit to Stand   Type of Assistance Needed Independent   Physical Assistance Level No physical assistance   Comment pt  uses RW w/ no assistance  Sit to Stand CARE Score 6   Bed-Chair Transfer   Type of Assistance Needed Independent   Physical Assistance Level No physical assistance   Comment pt  uses RW  Pt  uses safe mechanics to teach back upon sitting to reach for arm rests  Chair/Bed-to-Chair Transfer CARE Score 6   Toileting Hygiene   Type of Assistance Needed Independent   Physical Assistance Level No physical assistance   Comment Pt  uses grab bars to lean to perform siri/buttocks region hygiene  pt  can properly manage clothing     Toileting Hygiene CARE Score 6   Toilet Transfer   Type of Assistance Needed Independent   Physical Assistance Level No physical assistance   Comment Pt  ambulated from chair using RW to standard toilet in " bathroom  Toilet Transfer CARE Score 6   Light Housekeeping   Light Housekeeping Level Walker   Light Housekeeping Level of Assistance Independent   Light Housekeeping Pt  performed simulated laundry that was set up similar to how pt does it at home  along with folding laundry and hanging clothes on clothes line pt  was working on standing tolerance with RW  Pt  was able to stand for the entire activity  Pt  has clothing line at home that she uses to dry her clothing  Pt  explained she has a basket w/ rope that she pulls outside to hang everything  Pt  stated she will not be doing laundry for a while  Additional Activities   Additional Activities Comments Pt  recieved HEP for UB strengthening with theraband to carry over on discharge  Pt  stated she uses blue theraband at home that she did from previous injury  pt  went through 5 different exercises; bicep curl, ext/internal roation, shoulder flex/abduction  All exercises were completed 1x10  Pt  was advised to complete HEP 2xday  Assessment   Treatment Assessment Pt  participated in a 90 minute OT session that focused on functional transfers, IADLs, standing tolerance, and UB strengthening  Pt  was given IRP but still lacks confidence in her ability to walk to the toilet in the bathroom at night  pt  practiced going from bed to bathroom in a dark environment multiple times and needed no assistance  pt  stated last night she used the [facility] twice  At this time to ensure safety pt will continue to use BSC due to increased anxiety of falling  would reccomend continue functional transfer to increase confidence  Pt  then performed laundry task fold/hanging  after laundry task pt  was given HEP  look above for more details on additional tasks  Pt  continues to have difficulties with standing tolerance  would reccomend performing activities that involve standing and to increase confidence with transfers to the bathroom   At this time pt  would benefit from continued skilled OT services   Prognosis Good   Problem List Decreased strength;Decreased range of motion;Decreased endurance; Impaired balance;Decreased mobility;Orthopedic restrictions;Pain   Plan   Treatment/Interventions ADL retraining;Functional transfer training; Therapeutic exercise;OT   Progress Progressing toward goals   OT Therapy Minutes   OT Time In 1230   OT Time Out 1400   OT Total Time (minutes) 90   OT Mode of treatment - Individual (minutes) 90   OT Mode of treatment - Concurrent (minutes) 0   OT Mode of treatment - Group (minutes) 0   OT Mode of treatment - Co-treat (minutes) 0   OT Mode of Treatment - Total time(minutes) 90 minutes   OT Cumulative Minutes 1080

## 2023-06-14 NOTE — WOUND OSTOMY CARE
Consult Note - Wound   Wendall Simple 77 y o  female MRN: [id number]  Unit/Bed#: -01 Encounter: [id number]        History and Present Illness:  Patient is 78 yo female admitted to B with fracture around right hip joint  Patient has history of a-fib, anemia, and HLD  Patient is continent of bowel and bladder  Patient is mod assist with turning from side to side for assessment  Patient is independent with meals  Assessment Findings:   B/L heels intact and blanching, preventative skin care ordered  Sacral/buttocks intact and hyperpigmented and blanching  Orders listed below and wound care will sign off, call or tiger text with questions  Bedside nurse updated of findings and orders  Flowsheets below with pictures and measurements  Skin care plans:  1-Hydraguard to bilateral sacrum, buttock and heels BID and PRN  2-Elevate heels to offload pressure  3-Ehob cushion in chair when out of bed  4-Moisturize skin daily with skin nourishing cream   5-Turn/reposition q2h or when medically stable for pressure re-distribution on skin  Wounds:  Wound 06/13/23 Buttocks (Active)   Wound Image   06/14/23 0951   Wound Description Epithelialization 06/14/23 0951   [place]-wound Assessment Hyperpigmented 06/14/23 0951   Wound Length (cm) 0 cm 06/14/23 0951   Wound Width (cm) 0 cm 06/14/23 0951   Wound Depth (cm) 0 cm 06/14/23 0951   Wound Surface Area (cm^2) 0 cm^2 06/14/23 0951   Wound Volume (cm^3) 0 cm^3 06/14/23 0951   Calculated Wound Volume (cm^3) 0 cm^3 06/14/23 0951   Tunneling 0 cm 06/14/23 0951   Tunneling in depth located at 0 06/14/23 0951   Undermining 0 06/14/23 0951   Undermining is depth extending from 0 06/14/23 0951   Wound Site Closure ANTOINE 06/14/23 0951   Drainage Amount None 06/14/23 0951   Non-staged Wound Description Not applicable 00/48/86 1041   Treatments Cleansed 06/14/23 0951   Dressing Moisture barrier 06/14/23 0951   Wound packed?  No 06/14/23 0951   Packing- # removed 0 06/14/23 0951   Packing- # inserted 0 06/14/23 0951   Dressing Changed New 06/14/23 0951   Patient Tolerance Tolerated well 06/14/23 0951   Dressing Status Clean;Dry; Intact 06/14/23 1740 [facility] 1400 BSN, RN, [employer]

## 2023-06-14 NOTE — TEAM CONFERENCE
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 ([facility] [address] )  Principal Problem: 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 [facility])    Patient Active Problem List    Diagnosis Date Noted   • Peripheral neuropathy 06/01/2023   • Sacral wound 06/01/2023   • Obesity 06/01/2023   • Constipation 05/31/2023   • Pain 05/31/2023   • At risk for venous thromboembolism (VTE) 05/31/2023   • Anemia 05/31/2023   • Abnormal finding on radiology exam 05/31/2023   • 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 ([facility] [address] ) 05/28/2023   • Fall 05/27/2023   • Non-rheumatic aortic stenosis 03/02/2023   • PAF (paroxysmal atrial fibrillation) ([facility] [address] ) 03/02/2023   • Postoperative visit 08/15/2022   • Incisional hernia, without obstruction or gangrene 07/05/2022   • Intestinal adhesions 11/27/2021   • Mixed hyperlipidemia 06/07/2020   • Other chest pain 06/07/2020   • Pure hypercholesterolemia 07/18/2017   • Spinal stenosis of lumbar region 06/30/2017       Physical Therapy:    Weight Bearing Status: Weight Bearing as Tolerated  Transfers: Independent  Bed Mobility: Independent  Amulation Distance (ft): 20 feet  Ambulation: Independent (inc distances at [employer]  [address] [place] [address] for Ambulation: Roller Walker  Number of Stairs: 1  Assistive Device for Stairs: Bilateral [employer]  Stair Assistance: Valentina 328 for Ramp: [address]  Discharge Recommendations: [facility]:  [address] [facility] with[de-identified] Outpatient Physical Therapy, Home Physical Therapy    Patient is working towards goals of full (I) for home  She was progressed 6/13/23 to IRP with use of RW and demosntrated good safety awareness during review of equipment management and decreasing fall risk factors  Patient to discharge home Friday with OPPT services at home (may need to switch to [address] services for VNA as patient with open wound on buttocks)  She was provided with HEP for continued strengthening at home  RW ordered and will be delivered prior to discharge  Occupational Therapy:  Eating: Independent  Grooming: Supervision  Bathing: Supervision  Bathing: Supervision  Upper Body Dressing: Supervision  Lower Body Dressing: Supervision (Sudeep for footwear mgmt)  Toileting: Independent  Tub/Shower Transfer: Incidental Touching  Toilet Transfer: Independent  Cognition: Within Defined Limits  Orientation: Person, Place, Time, Situation  Discharge Recommendations: Home Independently  [address] Mistry with[de-identified] Family Support ((outpatient PT at home, no OT needs))       6/13/23:  Pt cont to be seen for skilled OT sessions focused on progression to IRPs w/RW (including nighttime IRPs w/RW and BSC placed at foot of bed), footwear mgmt, donning elastic shoe laces, and repetitive fxl transfers, for increased independence w/ADLs/IADLs and decreased caregiver burden  Pt cont to tolerate sessions well, demo G safety awareness during [facility] transfers and during in-room fxl mobility w/RW, and deemed appropriate for IRP progression  Pt cont to make good progress, but cont to be limited by decreased higher-level standing balance/tolerance, strength, LE pain, and act bryan  Pt would benefit from continued skilled OT focused on laundry mgmt, toileting, and footwear mgmt  6/7/23:  Pt making G progress towards achieving OT goals  Pt continues to be limited by dec standing bryan/balance, dec act bryan, dec endurance, dec RLE strength   Pt continues to require CGA/Sudeep pending levels of fatigue/pain for all txfers and ADL tasks  Pt will continue to benefit from skilled OT services following POC  ELOS of 2-3 weeks to achieve Shavon goals  Speech Therapy:           No notes on file    Nursing Notes:  Appetite: Good  Diet Type: Regular/House                      Diet Patient/Family Education Complete: No    Type of Wound (LDA): Wound                    Type of Wound Patient/Family Education: Yes        Bladder Patient/Family Education: Yes  Bowel: Continent     Bowel Patient/Family Education: Yes  Pain Location/Orientation: Orientation: Right, Location: Hip  Pain Score: 6                       Hospital Pain Intervention(s): Medication (See MAR)  Pain Patient/Family Education: Yes  Medication Management/Safety  Safe Administration: Yes (by staff)  Medication Patient/Family Education Complete: No (ongoing)    Right periprosthetic femur fracture  • S/p ORIF 5/28  • WBAT  • X-ray 6/2/23 with post-op changes  Hx PAF  • Home:  Toprol 50mg qHS/Coumadin 6 25 mg (2 1/2 tabs of 2 5mg tabs) on Mondays and 5mg the rest of the days of the week  • Here:  Toprol 50mg qHS/Coumadin 7 5mg MWF/5mg T/TH/S/S  • She did tell Dr Juaquin Perez that she adjusts her Coumadin on her own  • [facility] does her INRs and she has 2 5mg tabs at home  • EKG 5/27/23 = SR  • Stable; no changes in dosing today  • INR 6/15/23     HLD  • Continue Pravachol as a sub for home Simvastatin     Peripheral neuropathy  • Continue Lyrica     Aortic stenosis  • ECHO 7/2022:  LVEF 65%/gr 2 DD, mild-mod AS, mild TR/MR, RV systolic pressure is mod-severe elevated with systolic pressure 59 mmHg  • euvolemic        Discharge date:  6/16/23    This week we will encourage independence with ADLs   We will continue to monitor lads and vital signs  We will continue to assist the pt in repositioning to prevent skin breakdown  We will continue to monitor for adequate pain control  We are maintaining adequate fluid and oral intake   We will continue to increase safety awareness and keep the pt free from falls  Pt reports adequate sleep  Pt is continent of B/B  Case Management:     Discharge Planning  Living Arrangements: Lives Alone  Support Systems: Family members  Assistance Needed: TBD  Type of Current Residence: Private residence  [address]: No  Pt is making good progress and is scheduled to return home later this week  Pt will be receiving [facility] services for contd rn and pt  A youth roller walker has been ordered for pt  Following to assist w/additional dc needs  Is the patient actively participating in therapies? yes  List any modifications to the treatment plan:     Barriers Interventions   Sacral wound  Wound care assess making recommendations                     Is the patient making expected progress toward goals? yes  List any update or changes to goals:     Medical Goals: Patient will be medically stable for discharge to [facility] upon completion of rehab program and Patient will be able to manage medical conditions and comorbid conditions with medications and follow up upon completion of rehab program    Weekly Team Goals:   Rehab Team Goals  ADL Team Goal: Patient will be independent with ADLs with least restrictive device upon completion of rehab program  Transfer Team Goal: Patient will be independent with transfers with least restrictive device upon completion of rehab program    Discussion: pt has made good progress and is achieving goals of independence on dc  Pt has a new sacral wound which is being monitored  Nursing education to occur on wound care  Pt will be independent on dc  Pt will be receiving services through [facility] for rn and pt  Anticipated Discharge Date: 6/16/23   [facility] Team Members Present: The following team members are supervising care for this patient and were present during this Weekly Team Conference      Physician: Dr Bailey Rowan MD  : MALIKA Keenan  Registered Nurse: Dawood Arguello RN  Physical Therapist: Indiana Macedo DPT  Occupational Therapist: Niurka Tong MS, OTR/L  Speech Therapist:

## 2023-06-14 NOTE — CASE MANAGEMENT
Met w/pt and confirmed dc arrangements for Friday  Pt now states her friend is not able to pick  Her up on Friday and she will phone her sister in law  Cm inquired with pt if she is interested in alternative means for transportation to doctor appmts  Adolph explained Mahsa Peck and their service  Pt is not interested and will s/w friends about rides before she explores other options

## 2023-06-15 LAB
ANION GAP SERPL CALCULATED.3IONS-SCNC: 3 MMOL/L (ref 4–13)
BASOPHILS # BLD AUTO: 0.08 THOUSANDS/ÂΜL (ref 0–0.1)
BASOPHILS NFR BLD AUTO: 1 % (ref 0–1)
BUN SERPL-MCNC: 13 MG/DL (ref 5–25)
CALCIUM SERPL-MCNC: 9.6 MG/DL (ref 8.3–10.1)
CHLORIDE SERPL-SCNC: 107 MMOL/L (ref 96–108)
CO2 SERPL-SCNC: 30 MMOL/L (ref 21–32)
CREAT SERPL-MCNC: 0.47 MG/DL (ref 0.6–1.3)
EOSINOPHIL # BLD AUTO: 0.38 THOUSAND/ÂΜL (ref 0–0.61)
EOSINOPHIL NFR BLD AUTO: 6 % (ref 0–6)
ERYTHROCYTE [DISTWIDTH] IN BLOOD BY AUTOMATED COUNT: 15.8 % (ref 11.6–15.1)
GFR SERPL CREATININE-BSD FRML MDRD: 94 ML/MIN/1.73SQ M
GLUCOSE P FAST SERPL-MCNC: 90 MG/DL (ref 65–99)
GLUCOSE SERPL-MCNC: 90 MG/DL (ref 65–140)
HCT VFR BLD AUTO: 36.7 % (ref 34.8–46.1)
HGB BLD-MCNC: 11.5 G/DL (ref 11.5–15.4)
IMM GRANULOCYTES # BLD AUTO: 0.02 THOUSAND/UL (ref 0–0.2)
IMM GRANULOCYTES NFR BLD AUTO: 0 % (ref 0–2)
INR PPP: 2.42 (ref 0.84–1.19)
LYMPHOCYTES # BLD AUTO: 1.84 THOUSANDS/ÂΜL (ref 0.6–4.47)
LYMPHOCYTES NFR BLD AUTO: 30 % (ref 14–44)
MCH RBC QN AUTO: 31.4 PG (ref 26.8–34.3)
MCHC RBC AUTO-ENTMCNC: 31.3 G/DL (ref 31.4–37.4)
MCV RBC AUTO: 100 FL (ref 82–98)
MONOCYTES # BLD AUTO: 0.83 THOUSAND/ÂΜL (ref 0.17–1.22)
MONOCYTES NFR BLD AUTO: 14 % (ref 4–12)
NEUTROPHILS # BLD AUTO: 2.97 THOUSANDS/ÂΜL (ref 1.85–7.62)
NEUTS SEG NFR BLD AUTO: 49 % (ref 43–75)
NRBC BLD AUTO-RTO: 0 /100 WBCS
PLATELET # BLD AUTO: 485 THOUSANDS/UL (ref 149–390)
PMV BLD AUTO: 8.5 FL (ref 8.9–12.7)
POTASSIUM SERPL-SCNC: 4.5 MMOL/L (ref 3.5–5.3)
PROTHROMBIN TIME: 26.6 SECONDS (ref 11.6–14.5)
RBC # BLD AUTO: 3.66 MILLION/UL (ref 3.81–5.12)
SODIUM SERPL-SCNC: 140 MMOL/L (ref 135–147)
WBC # BLD AUTO: 6.12 THOUSAND/UL (ref 4.31–10.16)

## 2023-06-15 PROCEDURE — 99232 SBSQ HOSP IP/OBS MODERATE 35: CPT | Performed by: STUDENT IN AN ORGANIZED HEALTH CARE EDUCATION/TRAINING PROGRAM

## 2023-06-15 PROCEDURE — 85610 PROTHROMBIN TIME: CPT | Performed by: NURSE PRACTITIONER

## 2023-06-15 PROCEDURE — 99232 SBSQ HOSP IP/OBS MODERATE 35: CPT | Performed by: INTERNAL MEDICINE

## 2023-06-15 PROCEDURE — 97530 THERAPEUTIC ACTIVITIES: CPT

## 2023-06-15 PROCEDURE — 97110 THERAPEUTIC EXERCISES: CPT

## 2023-06-15 PROCEDURE — 97535 SELF CARE MNGMENT TRAINING: CPT

## 2023-06-15 PROCEDURE — 85025 COMPLETE CBC W/AUTO DIFF WBC: CPT | Performed by: NURSE PRACTITIONER

## 2023-06-15 PROCEDURE — 80048 BASIC METABOLIC PNL TOTAL CA: CPT | Performed by: NURSE PRACTITIONER

## 2023-06-15 RX ADMIN — SENNOSIDES 8.6 MG: 8.6 TABLET, FILM COATED ORAL at 08:27

## 2023-06-15 RX ADMIN — ACETAMINOPHEN 975 MG: 325 TABLET, FILM COATED ORAL at 13:36

## 2023-06-15 RX ADMIN — PREGABALIN 100 MG: 100 CAPSULE ORAL at 17:11

## 2023-06-15 RX ADMIN — ACETAMINOPHEN 975 MG: 325 TABLET, FILM COATED ORAL at 05:41

## 2023-06-15 RX ADMIN — ACETAMINOPHEN 975 MG: 325 TABLET, FILM COATED ORAL at 21:26

## 2023-06-15 RX ADMIN — Medication 2.5 MG: at 11:51

## 2023-06-15 RX ADMIN — MONTELUKAST SODIUM 10 MG: 10 TABLET, COATED ORAL at 21:27

## 2023-06-15 RX ADMIN — LATANOPROST 1 DROP: 50 SOLUTION OPHTHALMIC at 21:25

## 2023-06-15 RX ADMIN — DOCUSATE SODIUM 100 MG: 100 CAPSULE, LIQUID FILLED ORAL at 08:27

## 2023-06-15 RX ADMIN — WARFARIN SODIUM 5 MG: 5 TABLET ORAL at 17:11

## 2023-06-15 RX ADMIN — PREGABALIN 100 MG: 100 CAPSULE ORAL at 08:27

## 2023-06-15 RX ADMIN — PRAVASTATIN SODIUM 40 MG: 40 TABLET ORAL at 15:57

## 2023-06-15 RX ADMIN — METOPROLOL SUCCINATE 50 MG: 50 TABLET, EXTENDED RELEASE ORAL at 21:23

## 2023-06-15 RX ADMIN — POLYETHYLENE GLYCOL 3350 17 G: 17 POWDER, FOR SOLUTION ORAL at 08:27

## 2023-06-15 NOTE — PROGRESS NOTES
"OT Treatment Note       06/15/23 4281   Pain Assessment   Pain Assessment Tool 0-10   Pain Score 2   Pain Location/Orientation Orientation: Right;Location: Hip;Location: Knee   Pain Onset/Description Onset: Ongoing;Frequency: Constant/Continuous   Hospital Pain Intervention(s) Repositioned;Rest;Ambulation/increased activity  (pt already provided with pain meds and ice prior to session)   Restrictions/Precautions   Precautions Fall Risk;Pain   Weight Bearing Restrictions Yes   RLE Weight Bearing Per Order WBAT   ROM Restrictions No   Grooming   Findings washing hands independently standing sinkside   Sit to Stand   Type of Assistance Needed Independent; Adaptive equipment   Physical Assistance Level No physical assistance   Comment using RW   Sit to Stand CARE Score 6   Bed-Chair Transfer   Type of Assistance Needed Independent; Adaptive equipment   Physical Assistance Level No physical assistance   Comment using RW   Chair/Bed-to-Chair Transfer CARE Score 6   Toileting Hygiene   Type of Assistance Needed Independent; Adaptive equipment   Physical Assistance Level No physical assistance   Comment pt finishing toileting indepedently upon OT arrival   Zeke Rodriguez 83 Score 6   Toilet Transfer   Type of Assistance Needed Independent; Adaptive equipment   Physical Assistance Level No physical assistance   Comment using RW   Toilet Transfer CARE Score 6   Cognition   Overall Cognitive Status WFL   Arousal/Participation Alert; Cooperative   Attention Within functional limits   Orientation Level Oriented X4   Memory Decreased short term memory   Following Commands Follows multistep commands with increased time or repetition   Additional Activities   Additional Activities Comments Pt functionally mobilizing household distances with mod(I) using RW  For increased standing balance for B/IADLs and household mobility, pt tapping 4\" step with BUE support on RW with CS   Pt then participating in leisure [employer] game using " unilateral UE support while WS and stepping forward/backward each LE with each throw; completing with CS and no LOB   Activity Tolerance   Activity Tolerance Patient tolerated treatment well   Assessment   Treatment Assessment Pt seen for brief 30 min OT session focusing on functional transfers/mobility and dynamic standing balance activities  Pt tolerating session well  Pt denies any questions/concerns re: anticipated d/c home tomorrow  All necessary DME in place  Prognosis Good   Problem List Decreased strength;Decreased range of motion;Decreased endurance; Impaired balance;Decreased mobility;Pain   Recommendation   OT Discharge Recommendation   (outpatient PT, no OT needs)   OT Therapy Minutes   OT Time In 1340   OT Time Out 1410   OT Total Time (minutes) 30   OT Mode of treatment - Individual (minutes) 30   OT Mode of treatment - Concurrent (minutes) 0   OT Mode of treatment - Group (minutes) 0   OT Mode of treatment - Co-treat (minutes) 0   OT Mode of Treatment - Total time(minutes) 30 minutes   OT Cumulative Minutes 1200   Therapy Time missed   Time missed?  No

## 2023-06-15 NOTE — PLAN OF CARE
Problem: PAIN - ADULT  Goal: Verbalizes/displays adequate comfort level or baseline comfort level  Description: Interventions:  - Encourage patient to monitor pain and request assistance  - Assess pain using appropriate pain scale  - Administer analgesics based on type and severity of pain and evaluate response  - Implement non-pharmacological measures as appropriate and evaluate response  - Consider cultural and social influences on pain and pain management  - Notify physician/advanced practitioner if interventions unsuccessful or patient reports new pain  Outcome: Progressing     Problem: INFECTION - ADULT  Goal: Absence or prevention of progression during hospitalization  Description: INTERVENTIONS:  - Assess and monitor for signs and symptoms of infection  - Monitor lab/diagnostic results  - Monitor all insertion sites, i e  indwelling lines, tubes, and drains  - Monitor endotracheal if appropriate and nasal secretions for changes in amount and color  - Seymour appropriate cooling/warming therapies per order  - Administer medications as ordered  - Instruct and encourage patient and family to use good hand hygiene technique  - Identify and instruct in appropriate isolation precautions for identified infection/condition  Outcome: Progressing     Problem: SAFETY ADULT  Goal: Patient will remain free of falls  Description: INTERVENTIONS:  - Educate patient/family on patient safety including physical limitations  - Instruct patient to call for assistance with activity   - Consult OT/PT to assist with strengthening/mobility   - Keep Call bell within reach  - Keep bed low and locked with side rails adjusted as appropriate  - Keep care items and personal belongings within reach  - Initiate and maintain comfort rounds  - Make Fall Risk Sign visible to staff  - Offer Toileting s, in advance of need  - Initiate/Ma  - Obtain necessary fall risk managemen  - Apply yellow socks and bracelet for high fall risk patients  - Consider moving patient to room near nurses station  Outcome: Progressing  Goal: Maintain or return to baseline ADL function  Description: INTERVENTIONS:  -  Assess patient's ability to carry out ADLs; assess patient's baseline for ADL function and identify physical deficits which impact ability to perform ADLs (bathing, care of mouth/teeth, toileting, grooming, dressing, etc )  - Assess/evaluate cause of self-care deficits   - Assess range of motion  - Assess patient's mobility; develop plan if impaired  - Assess patient's need for assistive devices and provide as appropriate  - Encourage maximum independence but intervene and supervise when necessary  - Involve family in performance of ADLs  - Assess for home care needs following discharge   - Consider OT consult to assist with ADL evaluation and planning for discharge  - Provide patient education as appropriate  Outcome: Progressing  Goal: Maintains/Returns to pre admission functional level  Description: INTERVENTIONS:  - Perform BMAT or MOVE assessment daily    - Set and communicate daily mobility goal to care team and patient/family/caregiver  - Collaborate with rehabilitation services on mobility goals if consulted  - Perform Range o a day  - Reposition patient everyrs    - Dangle patien  - Stand   - Ambulate pa  - Out of bed for kiersten  - Out of bed for toileting  - Record patient progress and toleration of activity level   Outcome: Progressing     Problem: DISCHARGE PLANNING  Goal: Discharge to home or other facility with appropriate resources  Description: INTERVENTIONS:  - Identify barriers to discharge w/patient and caregiver  - Arrange for needed discharge resources and transportation as appropriate  - Identify discharge learning needs (meds, wound care, etc )  - Arrange for interpretive services to assist at discharge as needed  - Refer to Case Management Department for coordinating discharge planning if the patient needs post-hospital services based on physician/advanced practitioner order or complex needs related to functional status, cognitive ability, or social support system  Outcome: Progressing     Problem: Prexisting or High Potential for Compromised Skin Integrity  Goal: Skin integrity is maintained or improved  Description: INTERVENTIONS:  - Identify patients at risk for skin breakdown  - Assess and monitor skin integrity  - Assess and monitor nutrition and hydration status  - Monitor labs   - Assess for incontinence   - Turn and reposition patient  - Assist with mobility/ambulation  - Relieve pressure over bony prominences  - Avoid friction and shearing  - Provide appropriate hygiene as needed including keeping skin clean and dry  - Evaluate need for skin moisturizer/barrier cream  - Collaborate with interdisciplinary team   - Patient/family teaching  - Consider wound care consult   Outcome: Progressing     Problem: MOBILITY - ADULT  Goal: Maintain or return to baseline ADL function  Description: INTERVENTIONS:  -  Assess patient's ability to carry out ADLs; assess patient's baseline for ADL function and identify physical deficits which impact ability to perform ADLs (bathing, care of mouth/teeth, toileting, grooming, dressing, etc )  - Assess/evaluate cause of self-care deficits   - Assess range of motion  - Assess patient's mobility; develop plan if impaired  - Assess patient's need for assistive devices and provide as appropriate  - Encourage maximum independence but intervene and supervise when necessary  - Involve family in performance of ADLs  - Assess for home care needs following discharge   - Consider OT consult to assist with ADL evaluation and planning for discharge  - Provide patient education as appropriate  Outcome: Progressing  Goal: Maintains/Returns to pre admission functional level  Description: INTERVENTIONS:  - Perform BMAT or MOVE assessment daily    - Set and communicate daily mobility goal to care team and patient/family/caregiver  - Collaborate with rehabilitation services on mobility goals if consulted  - Perform Range ofa day  - Reposition paties  - Ramane pa    - Out of bed for  - Out of bed for toileting  - Record patient progress and toleration of activity level   Outcome: Progressing     Problem: Nutrition/Hydration-ADULT  Goal: Nutrient/Hydration intake appropriate for improving, restoring or maintaining nutritional needs  Description: Monitor and assess patient's nutrition/hydration status for malnutrition  Collaborate with interdisciplinary team and initiate plan and interventions as ordered  Monitor patient's weight and dietary intake as ordered or per policy  Utilize nutrition screening tool and intervene as necessary  Determine patient's food preferences and provide high-protein, high-caloric foods as appropriate       INTERVENTIONS:  - Monitor oral intake, urinary output, labs, and treatment plans  - Assess nutrition and hydration status and recommend course of action  - Evaluate amount of meals eaten  - Assist patient with eating if necessary   - Allow adequate time for meals  - Recommend/ encourage appropriate diets, oral nutritional supplements, and vitamin/mineral supplements  - Order, calculate, and assess calorie counts as needed  - Recommend, monitor, and adjust tube feedings and TPN/PPN based on assessed needs  - Assess need for intravenous fluids  - Provide specific nutrition/hydration education as appropriate  - Include patient/family/caregiver in decisions related to nutrition  Outcome: Progressing

## 2023-06-15 NOTE — PROGRESS NOTES
06/15/23 0701   Pain Assessment   Pain Assessment Tool 0-10   Pain Score 3   Pain Location/Orientation Orientation: Right;Location: Hip;Location: Knee; Location: Leg   Hospital Pain Intervention(s) Repositioned; Ambulation/increased activity; Shower/Bath   Restrictions/Precautions   Precautions Fall Risk;Pain   Weight Bearing Restrictions Yes   RLE Weight Bearing Per Order WBAT   ROM Restrictions No   Eating   Type of Assistance Needed Independent   Physical Assistance Level No physical assistance   Eating CARE Score 6   Oral Hygiene   Type of Assistance Needed Independent   Physical Assistance Level No physical assistance   Comment In stance at sink   Oral Hygiene CARE Score 6   Shower/Bathe Self   Type of Assistance Needed Independent   Physical Assistance Level No physical assistance   Comment Seated on shower seat with no back in shower stall to simulate home context  Shower/Bathe Self CARE Score 6   Tub/Shower Transfer   Adaptive Equipment Seat with out Back   Findings SPT into shower stall at an overall Shavon level  Upper Body Dressing   Type of Assistance Needed Independent   Physical Assistance Level No physical assistance   Upper Body Dressing CARE Score 6   Lower Body Dressing   Type of Assistance Needed Independent   Physical Assistance Level No physical assistance   Comment Conchita العلي as is baseline  Has all items at home  Lower Body Dressing CARE Score 6   Putting On/Taking Off Footwear   Type of Assistance Needed Independent; Adaptive equipment   Physical Assistance Level No physical assistance   Comment Use of LHAE as is baseline     Putting On/Taking Off Footwear CARE Score 6   Sit to Lying   Type of Assistance Needed Independent   Physical Assistance Level No physical assistance   Sit to Lying CARE Score 6   Lying to Sitting on Side of Bed   Type of Assistance Needed Independent   Physical Assistance Level No physical assistance   Lying to Sitting on Side of Bed CARE Score 6   Sit to Stand   Type of "Assistance Needed Independent   Physical Assistance Level No physical assistance   Sit to Stand CARE Score 6   Bed-Chair Transfer   Type of Assistance Needed Independent; Adaptive equipment   Physical Assistance Level No physical assistance   Comment RW   Chair/Bed-to-Chair Transfer CARE Score 6   Toileting Hygiene   Type of Assistance Needed Independent   Physical Assistance Level No physical assistance   Toileting Hygiene CARE Score 6   Toilet Transfer   Type of Assistance Needed Independent   Physical Assistance Level No physical assistance   Comment Use of RW to standard toilet   Toilet Transfer CARE Score 6   Light Housekeeping   Light Housekeeping Pt completed laundry folding at tabletop at an overall indpeendent level  Exercise Tools   Other Exercise Tool 1 Reviewed previously issued HEP with use of red theraband including chest pull, diagonal pulls, elbow flexion/ext, and \"bear hugs\" completing 1 x 10 each side to inc UB strength and endurance for inc independence with ADL tasks and functional txfers  F carryover of program    Cognition   Overall Cognitive Status [facility]   Arousal/Participation Alert; Cooperative   Attention Within functional limits   Orientation Level Oriented X4   Memory Decreased short term memory   Following Commands Follows multistep commands with increased time or repetition   Activity Tolerance   Activity Tolerance Patient tolerated treatment well   Assessment   Treatment Assessment Pt participated in skilled OT services with focus on ADL retraining, HEP, and d/c planning  Pt continues to be limited by dec higher level standing balance/tolerance, pain, and RLE strength  Pt overall has made G progress towards achieving Shavon goals  Pt has G baseline knowledge of [employer] training and carries it over well  Pt overall Shavon with RW with inc time and rest breaks as needed to manage fatigue  Pt insightful into remaining deficits and has G safety awareness   Pt to d/c home with no OT services and " outpt at home PT services  Pt with all necessary DME needs including RW, shower chair, LHAE and BSC  No additional needs  Prognosis Good   Problem List Decreased strength;Decreased range of motion;Decreased endurance; Impaired balance;Decreased mobility;Orthopedic restrictions;Pain   Recommendation   OT Discharge Recommendation   (outpt PT at home, no OT needs)   OT Therapy Minutes   OT Time In 0700   OT Time Out 0830   OT Total Time (minutes) 90   OT Mode of treatment - Individual (minutes) 90   OT Mode of treatment - Concurrent (minutes) 0   OT Mode of treatment - Group (minutes) 0   OT Mode of treatment - Co-treat (minutes) 0   OT Mode of Treatment - Total time(minutes) 90 minutes   OT Cumulative Minutes 1170   Therapy Time missed   Time missed?  No

## 2023-06-15 NOTE — PROGRESS NOTES
Physical Medicine and Rehabilitation Progress Note  Lonnie Tinoco 77 y o  female MRN: [id number]  Unit/Bed#: [facility] 961-01 Encounter: [id number]      Assessment & Plan:     Decline in ADLs and mobility: Functional assessment - improving         FIM  Care Score  Admit Score Recent Score    Total assist  1-100% or 2p    Tot Other ADLs     Max assist 2-51-75%    Sub     Mod assist 3- 26-50%  Par     Min assist 3- 25% or < Par UBD    CG assist 4  TA     Sup/Setup 4-5 Sup     Mod-I/Indep 6 MI  ADLs    Transfers  Mod-Significant assist Mod indep     Ambulation   Total assist  50 ft mod indep 150 ft CS    Stairs   Total assist/NT 1 step supervision     Goal: Mod indep for most ADLs and  for mobility  Major barriers:  Pain, decreased ROM, deconditioning, lack of assistance at home  Dispo: Home Friday 6/16 with  PT, OT, RN      * 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 ([address] )  Assessment & Plan  - Function improving  - Plan for d/c Friday now with [facility] RN and PT     Status post surgical repair via ORIF by Dr Charissa Kaur  on 5/28/23  • Weight-bearing as tolerated on affected limb   • Monitor incision/area for infection or dehiscence/impaired healing - improved drainage, no signs of infection   • POD#14 is Magi Garcia 6/11 > ortho c/s - no concerns  • 6/11 - XR Right hip arthroplasty with cerclage wires for a periprosthetic fracture '  • Patient reported significant pain after transfer earlier in course; right femur x-ray without acute concerns 6/2  • Monitor for signs/symptoms of VTE, atelectasis, acute blood loss anemia, or other infection   • Continue acute comprehensive interdisciplinary inpatient rehabilitation to include intensive skilled therapies (PT, OT) as outlined with oversight and management by rehabilitation physician as well as inpatient rehab level nursing, case management and weekly interdisciplinary team meetings     • Follow-up with Ortho Sx after d/c and ortho if needed during ARC course Encounter for skin care  Assessment & Plan  Buttock/sacrum -   - Wound care c/s 6/14  B/L heels intact and blanching, preventative skin care ordered  Sacral/buttocks intact and hyperpigmented and blanching   - They felt no wound at this time  - Recommend  RN      Skin care plans:  1-Hydraguard to bilateral sacrum, buttock and heels BID and PRN  2-Elevate heels to offload pressure  3-Ehob cushion in chair when out of bed  4-Moisturize skin daily with skin nourishing cream   - Skin mobilization program with frequent standing and wt shift   - Increased risk of skin wounds/breakdown/rashes due to recent immobility and co-morbidities   - 2 nurse/staff full skin check for abnormal findings on admission - obtain photos PRN  - Turn patient Q2H in bed (use pillows or wedges), encourage wt shifts every 10-15 minutes in chair  - Patient and if available caregiver education   - Hydragaurd to buttocks and sacrum BID & PRN  - Optimal bowel/bladder hygiene; keep skin clean and dry   - ROHO cushion to chair/WC when OOB  - Nursing to document in chart and Notify MD if buttock, sacrum, heel, or other skin site develops erythema, skin breakdown, or rashes as soon as possible  If patient is soiling themselves with urine or stool notify MD   If you are unable to maintain skin integrity and prevent erythema due to frequency of soiling notify MD as soon as possible as well         At risk for venous thromboembolism (VTE)  Assessment & Plan  SCDs, ambulation, and warfarin       PAF (paroxysmal atrial fibrillation) (HCC)  Assessment & Plan  IM consulted and with overall management at their discretion during ARC course  Rate control: Toprol XL 50mg HS  Antithrombotic: Warfarin per IM   - Start with [facility] RN labs; CM assistance with ensure adequate transport resources   (patient is frustrated with checking her INR and was not always checking it and adjusting med dose on own at time; she did voice she would be open to consider NOAC - "spoke to IM who reviewed chart and in past NOACs were cost prohibitive   - New price check about $87/mo for Eliquis - pt declines and will continue warfarin      Obesity  Assessment & Plan   on appropriate nutrition and activity  Adjustments accordingly during skilled therapy and with rehab nursing  Monitor skin closely for breakdown, wounds, rashes; keep clean and dry, turning Q2H   Follow-up with PCP after d/c      Peripheral neuropathy  Assessment & Plan  Monitor skin for increased infection/breakdown/wounds which patient is at higher risk for  Skilled PT/OT   Fall precautions      Abnormal finding on radiology exam  Assessment & Plan  Per discharging provider to ARC   \"Findings and Follow up required:                              1) Scattered less than 3 mm nodular densities in the posterior right upper lobe of the lung as well as a calcified granuloma in the right lower lobe of the lung posteriorly were noted incidentally on your trauma imaging  A malignancy (cancer) cannot be completely excluded based on trauma imaging alone  Recommend short-term outpatient follow-up with primary care provider to review the finding and for further surveillance as indicated                               2)  A subcentimeter cystic hypodensity posterior right lobe of the liver was incidentally identified on your trauma imaging and is unchanged compared to prior imaging  A malignancy (cancer) cannot be completely excluded based on trauma imaging alone  Recommend short-term outpatient follow-up with primary care provider to review the finding and for further surveillance as indicated                               3) A left adrenal gland 1 4 x 0 9 cm nodule was incidentally identified under trauma imaging and is unchanged compared to prior imaging  A malignancy (cancer) cannot be completely excluded based on trauma imaging alone    Recommend short-term outpatient follow-up with primary care provider to review the finding and " "for further surveillance as indicated                               4) Subcentimeter hypodensities in your kidneys were incidentally identified under trauma imaging and are suggestive of benign cysts  A malignancy (cancer) cannot be completely excluded based on trauma imaging alone  Recommend short-term outpatient follow-up with primary care provider to review the finding and for further surveillance as indicated                               5) A  was incidentally identified under trauma imaging as well as a prevnew small umbilical hernia containing nonobstructed bowel loops and fatiously seen right para-midline ventral hernia defect containing adherent bowel loops in a nonobstructive pattern  No further work-up or intervention necessary for these findings at this time  Recommend short-term outpatient follow-up with primary care provider to review the finding and for further surveillance as indicated                   Follow up should be done within 2 week(s)     The above noted incidental findings were discussed with the patient  The patient demonstrated understanding of the findings and the follow-up recommendations      Please notify the following clinician to assist with the follow up:              Dr Ashwin Lopez"    Anemia  Assessment & Plan  Hb stable at 9 7 on 6/8 > improved to 11 on 6/12  Consult(ed) IM and comanagement with their service during ARC course   Monitor H/H, vitals, signs/symptoms of acute bleeding      Pain  Assessment & Plan  Improving   ICE prn   APAP 975mg TID   Oxycodone 2 5-5mg Q4H PRN (1-2 per day)   Lyrica 100mg BID (chronic for neuropathy)  Monitor for oversedation, AMS/delirium, and respiratory depression   If being administered - hold opiates, muscle relaxants, benzodiazepines, and gabapentin for oversedation, AMS, or RR<12    Counseled on and continue to encourage deep breathing/relaxation/behavioral pain management techniques      Constipation  Assessment & Plan  Remains " improved  Significant on admission to [facility] with last Bm near a week ago; no current signs of symptoms obstruction  - Continue to monitor for S/S of obstruction; hold stimulant laxatives and obtain imaging if concern develops  - Colace BID, miralax daily (consider BID if needed)  (Declines senna)   - PRN bowel regimen  - Limiting constipating medications if possible  - Ensure adequate hydration       Pure hypercholesterolemia  Assessment & Plan  Statin     Non-rheumatic aortic stenosis  Assessment & Plan  EF 65%, G2DD, Mild-mod AS, mod-severe elevated RVSP  IM consulted and with overall management at their discretion during ARC course  Monitor vitals, fluid status  OP Cards follow-up      Other Medical Issues:  • Monitor for     Follow-up providers and other issues to be followed up after discharge  Cards  PCP  Ortho    CODE: Level 3: DNAR and DNI    Restrictions include: Fall precautions    Objective: Allergies per EMR  Diagnostic Studies: Reviewed, no new imaging  XR femur 2 vw right   Final Result by Unknown MD Ya (06/12 1232)      Right hip arthroplasty with cerclage wires for a periprosthetic fracture  Workstation performed: [id number]         XR femur 2 vw right   Final Result by Rebel Pandya MD (06/03 2291)      No acute osseous abnormality  Postoperative changes              Workstation performed: [id number]           See above as well    Laboratory: Labs reviewed  Results from last 7 days   Lab Units 06/12/23  0636 06/08/23  0459   HEMATOCRIT % 35 7 30 9*   HEMOGLOBIN g/dL 11 0* 9 7*   WBC Thousand/uL 7 20 7 29     Results from last 7 days   Lab Units 06/12/23  0636 06/08/23  0459   BUN mg/dL 9 12   CHLORIDE mmol/L 110* 109*   CREATININE mg/dL 0 46* 0 39*   POTASSIUM mmol/L 4 9 4 2     Results from last 7 days   Lab Units 06/12/23  0636 06/09/23  0621 06/08/23  0449   INR  2 25* 2 15* 1 90*   PROTIME seconds 25 1* 24 3* 22 0*          Drug regimen reviewed, all potential adverse effects identified and addressed:    Current Facility-Administered Medications   Medication Dose Route Frequency Provider Last Rate   • acetaminophen  975 mg Oral [facility] Katty Abreu MD     • bisacodyl  10 mg Rectal Daily PRN Katty Abreu MD     • docusate sodium  100 mg Oral BID Katty Abreu MD     • latanoprost  1 drop Both Eyes HS RAYMON Marcelino     • lidocaine   Topical Q4H PRN Katty Abreu MD     • metoprolol succinate  50 mg Oral HS Katty Abreu MD     • montelukast  10 mg Oral HS Katty Abreu MD     • oxyCODONE  5 mg Oral Q4H PRN Katty Abreu MD     • oxyCODONE  2 5 mg Oral Q4H PRN Katty Abreu MD     • polyethylene glycol  17 g Oral Daily PRN Katty Abreu MD     • polyethylene glycol  17 g Oral Daily Katty Abreu MD     • pravastatin  40 mg Oral Daily With Curtis Green MD     • pregabalin  100 mg Oral BID Katty Abreu MD     • senna  1 tablet Oral BID Katty Abreu MD     • warfarin  5 mg Oral Once per day on Sun Tue Thu [facility], CRNP     • warfarin  7 5 mg Oral Once per day on Mon Wed Fri RAYMON Marcelino         •  bisacodyl  •  lidocaine  •  oxyCODONE  •  oxyCODONE  •  polyethylene glycol      Chief Complaints: Rehab follow-up     Subjective: On eval, patient denies headache, nausea, worsening hip pain, SOB, lightheadedness, calf pain, or other new complaints  ROS: A 10 point ROS was performed; negative except as noted above         Physical Exam:  Vitals:    06/14/23 2004   BP: 122/60   Pulse: 68   Resp: 20   Temp: 98 3 °F (36 8 °C)   SpO2: 96%   Vitals above reviewed on date of encounter      GEN:  Sitting in NAD   HEENT/NECK: MMM  CARDIAC: Regular rate rhythm, no murmers, no rubs, no gallops  LUNGS:  clear to auscultation, no wheezes, rales, or rhonchi  ABDOMEN: Soft, non-tender, non-distended, normal active bowel sounds and NA  EXTREMITIES/SKIN:  Slight RLE>LLE edema without calf TTP:  NEURO:   MENTAL STATUS:  Appropriate wakefulness and interaction   PSYCH:  Affect:  Euthymic     HPI:  76-year-old female with a past medical history of A-fib on Coumadin, hypertension, hyperlipidemia, obesity, peripheral neuropathy, aortic stenosis, bilateral total hip arthroplasty, right total hip arthroplasty revision who stood up from kneeling and felt a crack in her right hip and then slipped and fell with resultant right leg pain who was found to have right periprosthetic hip fracture -described by radiology is acute minimally displaced periprosthetic fracture of the right proximal femoral diaphysis adjacent to the femoral stem of the right total hip arthroplasty  CT head was negative for acute findings  Patient underwent ORIF of right periprosthetic hip fracture on 5/28  Patient was started on Lovenox for VTE prophylaxis and cleared for Coumadin on 5/31  Patient has had some acute blood loss anemia, pain, severe constipation, and significant decline in ADLs and mobility        ** Please Note: Flu[employer] voice to text software may have been used in the creation of this document  **    I personally performed the required components and examined the patient myself in person on 6/14/23     50 minutes or greater spent for this encounter which included a combination of face-to-face time with the patient and non-face-to-face time which in part specifically includes management of Hip fracture and reviewing incidental findings and d/c plan  Face-to-face time included extended discussion with patient regarding current condition, medical history, mood, medical/rehabilitation management, and disposition    Non face-to-face time included coordination of care with patient's co-managing AP and/or physician(s) thru communication and review of their recent documentation as well as reviewing vitals, bowel/bladder function, recent labs, and notes from therapy, CM, and nursing  In addition, this patient was discussed by the interdisciplinary team in weekly case conference today  The care of the patient was extensively discussed with all care providers and an appropriate rehabilitation plan was formulated unique for this patient  Barriers were identified preventing progression of therapy and appropriate interventions were discussed with each discipline   Please see the team note for input from all disciplines regarding barriers, intervention, and discharge planning

## 2023-06-15 NOTE — PROGRESS NOTES
"   06/15/23 1045   Pain Assessment   Pain Assessment Tool 0-10   Pain Score 3   Pain Location/Orientation Orientation: Right;Location: Hip   Pain Onset/Description Onset: Ongoing;Frequency: Constant/Continuous   Restrictions/Precautions   Precautions Fall Risk;Pain   Cognition   Overall Cognitive Status WFL   Subjective   Subjective \"i've been exercising all morning\"   Roll Left and Right   Type of Assistance Needed Independent   Roll Left and Right CARE Score 6   Sit to Lying   Type of Assistance Needed Independent   Sit to Lying CARE Score 6   Lying to Sitting on Side of Bed   Type of Assistance Needed Independent   Lying to Sitting on Side of Bed CARE Score 6   Sit to Stand   Type of Assistance Needed Independent   Sit to Stand CARE Score 6   Bed-Chair Transfer   Type of Assistance Needed Independent; Adaptive equipment   Comment with RW   Chair/Bed-to-Chair Transfer CARE Score 6   Car Transfer   Type of Assistance Needed Set-up / clean-up   Car Transfer CARE Score 5   Walk 10 Feet   Type of Assistance Needed Independent; Adaptive equipment   Comment with RW   Walk 10 Feet CARE Score 6   Walk 50 Feet with Two Turns   Type of Assistance Needed Independent; Adaptive equipment   Comment with RW   Walk 50 Feet with Two Turns CARE Score 6   Walk 150 Feet   Type of Assistance Needed Supervision; Adaptive equipment   Comment with RW   Walk 150 Feet CARE Score 4   Walking 10 Feet on Uneven Surfaces   Type of Assistance Needed Set-up / clean-up; Adaptive equipment   Comment with RW   Walking 10 Feet on Uneven Surfaces CARE Score 5   Ambulation   Distance Walked (feet) 100 ft  (x2; 150'x1)   Assist Device Roller Walker   Gait Pattern Antalgic; Inconsistant Liberty; Slow Liberty; Improper weight shift   Limitations Noted In Endurance   Findings decrease R stride length   Curb or Single Stair   Style negotiated Curb   Type of Assistance Needed Set-up / Leanora Spies; Adaptive equipment   Comment with RW   1 Step (Curb) CARE Score 5   4 " "Steps   Type of Assistance Needed Set-up / Orestes Bernard; Adaptive equipment   Comment placing RW on top of 6\" step to mimic home setup   4 Steps CARE Score 5   Stairs   # of Steps 4   Findings see above for details   Picking Up Object   Type of Assistance Needed Independent; Adaptive equipment   Comment with reacher   Picking Up Object CARE Score 6   Therapeutic Interventions   Strengthening seated LAQ 2# x20 B/L   standing hip flex and abd B/L 2# x20 each   Flexibility B heel cord and HS x5mins   Assessment   Treatment Assessment pt has met all d/c goals at this time  pt still remains fall risk due to impaired balance and safety without the use of RW   pt to cont with skilled therapy upon d/c to improve safety and ind in own environment   Problem List Decreased strength;Decreased range of motion;Decreased endurance; Impaired balance;Decreased mobility;Orthopedic restrictions;Pain   Barriers to Discharge Inaccessible home environment;Decreased caregiver support   PT Barriers   Functional Limitation Stair negotiation   Plan   Progress Progressing toward goals   Recommendation   PT Discharge Recommendation Home with home health rehabilitation   Equipment Recommended Walker   PT Therapy Minutes   PT Time In 1045   PT Time Out 1145   PT Total Time (minutes) 60   PT Mode of treatment - Individual (minutes) 60   PT Mode of treatment - Concurrent (minutes) 0   PT Mode of treatment - Group (minutes) 0   PT Mode of treatment - Co-treat (minutes) 0   PT Mode of Treatment - Total time(minutes) 60 minutes   PT Cumulative Minutes 1050   Therapy Time missed   Time missed?  No     "

## 2023-06-15 NOTE — PROGRESS NOTES
Internal Medicine Progress Note  Patient: Stefani Medina  Age/sex: 77 y o  female  Medical Record #: [id number]      ASSESSMENT/PLAN: (Interval History)  Stefani Medina is seen and examined and management for following issues:    Right periprosthetic femur fracture  • S/p ORIF 5/28  • WBAT  • X-ray 6/2/23 with post-op changes      Hx PAF  • Home:  Toprol 50mg qHS/Coumadin 6 25 mg (2 1/2 tabs of 2 5mg tabs) on Mondays and 5mg the rest of the days of the week  • Here:  Toprol 50mg qHS/Coumadin 7 5mg MWF/5mg T/TH/S/S  • She did tell Dr Yuliya Khalil that she adjusts her Coumadin on her own  • [facility] does her INRs and she has 2 5mg tabs at home  • EKG 5/27/23 = SR  • Stable; no dose change today  • INR as OP Monday 6/19/23     HLD  • Continue Pravachol as a sub for home Simvastatin     Peripheral neuropathy  • Continue Lyrica     Aortic stenosis  • ECHO 7/2022:  LVEF 65%/gr 2 DD, mild-mod AS, mild TR/MR, RV systolic pressure is mod-severe elevated with systolic pressure 59 mmHg  • euvolemic        Discharge date:  6/16/23    The above assessment and plan was reviewed and updated as determined by my evaluation of the patient on 6/15/2023      Labs:   Results from last 7 days   Lab Units 06/15/23  0550 06/12/23  0636   HEMATOCRIT % 36 7 35 7   HEMOGLOBIN g/dL 11 5 11 0*   PLATELETS Thousands/uL 485* 443*   WBC Thousand/uL 6 12 7 20     Results from last 7 days   Lab Units 06/15/23  0550 06/12/23  0636   BUN mg/dL 13 9   CALCIUM mg/dL 9 6 9 4   CHLORIDE mmol/L 107 110*   CO2 mmol/L 30 30   CREATININE mg/dL 0 47* 0 46*   POTASSIUM mmol/L 4 5 4 9   SODIUM mmol/L 140 141         Results from last 7 days   Lab Units 06/15/23  0550 06/12/23  0636   INR  2 42* 2 25*           Review of Scheduled Meds:  Current Facility-Administered Medications   Medication Dose Route Frequency Provider Last Rate   • acetaminophen  9[facility]     • bisacodyl  10 mg Rectal Daily PRN Ramirez Gonzalez MD     • docusate sodium  100 mg Oral BID Ramirez Gonzalez MD     • latanoprost  1 drop Both Eyes HS RAYMON Lam     • lidocaine   Topical Q4H PRN Ramirez Gonzalez MD     • metoprolol succinate  50 mg Oral HS Ramirez Gonzalez MD     • montelukast  10 mg Oral HS Ramirez Gonzalez MD     • oxyCODONE  5 mg Oral Q4H PRN Ramirez Gonzalez MD     • oxyCODONE  2 5 mg Oral Q4H PRN Ramirez Gonzalez MD     • polyethylene glycol  17 g Oral Daily PRN Ramirez Gonzalez MD     • polyethylene glycol  17 g Oral Daily Ramirez Gonzalez MD     • pravastatin  40 mg Oral Daily With Samantha Larson MD     • pregabalin  100 mg Oral BID Ramirez Gonzalez MD     • senna  1 tablet Oral BID Ramirez Gonzalez MD     • warfarin  5 mg Oral Once per day on Sun Tue Thu Sat RAYMON Lam     • warfarin  7 5 mg Oral Once per day on Mon Wed Fri RAYMON Granado         Subjective/ HPI: Patient seen and examined  Patients overnight issues or events were reviewed with nursing or staff during rounds or morning huddle session  New or overnight issues include the following:     No new or overnight issues  Offers no complaints    ROS:   A 10 point ROS was performed; negative except as noted above       *Labs /Radiology studies reviewed  *Medications reviewed and reconciled as needed  *Please refer to order section for additional ordered labs studies  *Case discussed with primary attending during morning huddle case rounds    Physical Examination:  Vitals:   Vitals:    06/14/23 0507 06/14/23 1322 06/14/23 2004 06/15/23 0538   BP: 113/79 144/74 122/60 131/97   BP Location: Left arm Right arm Right arm Right arm   Pulse: 62 73 68 75   Resp: 18 16 20 18   Temp: 97 6 °F (36 4 °C) 98 2 °F (36 8 °C) 98 3 °F (36 8 °C) 97 8 °F (36 6 °C)   TempSrc: Oral Oral Oral Oral   SpO2: 99% 95% 96% 98%   Weight: 73 1 kg (161 lb 2 5 oz)      Height:           General Appearance: no distress, conversive  HEENT: PERRLA, conjuctiva normal; oropharynx clear; mucous membranes moist   Neck:  Supple, normal ROM  Lungs: CTA, normal respiratory effort, no retractions, expiratory effort normal  CV: regular rate and rhythm; no rubs/gallops, +murmur  PMI normal   ABD: soft; ND/NT; +BS  EXT: no edema  Skin: normal turgor, normal texture, no rashes  Psych: affect normal, mood normal  Neuro: AAO      The above physical exam was reviewed and updated as determined by my evaluation of the patient on 6/15/2023  Invasive Devices     None                    VTE Pharmacologic Prophylaxis: Warfarin (Coumadin)  Code Status: Level 3 - DNAR and DNI  Current Length of Stay: 15 day(s)      Total time spent:  30 minutes with more than 50% spent counseling/coordinating care  Counseling includes discussion with patient re: progress  and discussion with patient of his/her current medical state/information  Coordination of patient's care was performed in conjunction with primary service  Time invested included review of patient's labs, vitals, and management of their comorbidities with continued monitoring  In addition, this patient was discussed with medical team including physician and advanced extenders  The care of the patient was extensively discussed and appropriate treatment plan was formulated unique for this patient  Medical decision making for the day was made by supervising physician unless otherwise noted in their attestation statement  ** Please Note:  voice to text software may have been used in the creation of this document   Although proof errors in transcription or interpretation are a potential of such software**

## 2023-06-15 NOTE — PROGRESS NOTES
PM&R PROGRESS NOTE:  Jack Boogie 77 y o  female MRN: [id number]  Unit/Bed#: -01 Encounter: [id number]       Rehab Diagnosis: Orthopedic Disorders:  08 2  Femur (Shaft) Fracture  Etiologic Diagnosis:  Right Periprosthetic Hip Fracture    HPI: 65-year-old female with a past medical history of A-fib on Coumadin, hypertension, hyperlipidemia, obesity, peripheral neuropathy, aortic stenosis, bilateral total hip arthroplasty, right total hip arthroplasty revision who stood up from kneeling and felt a crack in her right hip and then slipped and fell with resultant right leg pain who was found to have right periprosthetic hip fracture -described by radiology is acute minimally displaced periprosthetic fracture of the right proximal femoral diaphysis adjacent to the femoral stem of the right total hip arthroplasty  CT head was negative for acute findings  Patient underwent ORIF of right periprosthetic hip fracture on 5/28  Patient was started on Lovenox for VTE prophylaxis and cleared for Coumadin on 5/31  Patient has had some acute blood loss anemia, pain, severe constipation, and significant decline in ADLs and mobility        SUBJECTIVE: Patient seen face to face  No acute issues  Progressing as expected in rehabilitation  Labs reviewed with the patient today which included a BMP and CBC which are both stable  Additionally INR is stable at 2 42  Plan for discharge tomorrow and will discuss discharge plan, follow-ups as well as medications  She denies any fever, chills, nausea, vomiting, cough, shortness of breath, diarrhea or constipation  ASSESSMENT: Difficulty with ambulation, constipation      PLAN:    Rehabilitation  • Functional deficits:  Self care and mobility  • Continue current rehabilitation plan of care to maximize function      • Functional update:   o PT: Independent for mobility and transfers, ambulation 70 feet x 2 with rolling walker with set up  o OT: Independent with bathing upper body ADLs and lower body ADLs    • Estimated Discharge: 6/16      Pain  • Acetaminophen 975 mg every 8 hours, Lyrica 100 mg twice daily  • Oxycodone 2 5 mg - 5 mg every 4 hours as needed moderate to severe pain    DVT prophylaxis  • On warfarin    Bladder plan  • Continent    Bowel plan  • Continent  • Last bowel movement 6/11      * 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 [facility])  Assessment & Plan  - Function continues to improving; doing well with IRPs > continue   - Plan for d/c Friday now with [facility] RN and PT     Status post surgical repair via ORIF by Dr Karina Harrison  on 5/28/23  • Weight-bearing as tolerated on affected limb   • Monitor incision/area for infection or dehiscence/impaired healing - improved drainage, no signs of infection   • POD#14 is Estela Cuff 6/11 > ortho c/s - no concerns  • 6/11 - XR Right hip arthroplasty with cerclage wires for a periprosthetic fracture '  • Patient reported significant pain after transfer earlier in course; right femur x-ray without acute concerns 6/2  • Monitor for signs/symptoms of VTE, atelectasis, acute blood loss anemia, or other infection   • Continue acute comprehensive interdisciplinary inpatient rehabilitation to include intensive skilled therapies (PT, OT) as outlined with oversight and management by rehabilitation physician as well as inpatient rehab level nursing, case management and weekly interdisciplinary team meetings  • Follow-up with Ortho Sx after d/c and ortho if needed during ARC course       Obesity  Assessment & Plan   on appropriate nutrition and activity  Adjustments accordingly during skilled therapy and with rehab nursing  Monitor skin closely for breakdown, wounds, rashes; keep clean and dry, turning Q2H   Follow-up with PCP after d/c      Encounter for skin care  Assessment & Plan  Buttock/sacrum -   - Wound care c/s 6/14  B/L heels intact and blanching, preventative skin care ordered   Sacral/buttocks intact and "hyperpigmented and blanching   - They felt no wound at this time  - Recommend  RN      Skin care plans:  1-Hydraguard to bilateral sacrum, buttock and heels BID and PRN  2-Elevate heels to offload pressure  3-Ehob cushion in chair when out of bed  4-Moisturize skin daily with skin nourishing cream   - Skin mobilization program with frequent standing and wt shift   - Increased risk of skin wounds/breakdown/rashes due to recent immobility and co-morbidities   - 2 nurse/staff full skin check for abnormal findings on admission - obtain photos PRN  - Turn patient Q2H in bed (use pillows or wedges), encourage wt shifts every 10-15 minutes in chair  - Patient and if available caregiver education   - Hydragaurd to buttocks and sacrum BID & PRN  - Optimal bowel/bladder hygiene; keep skin clean and dry   - ROHO cushion to chair/WC when OOB  - Nursing to document in chart and Notify MD if buttock, sacrum, heel, or other skin site develops erythema, skin breakdown, or rashes as soon as possible  If patient is soiling themselves with urine or stool notify MD   If you are unable to maintain skin integrity and prevent erythema due to frequency of soiling notify MD as soon as possible as well  Peripheral neuropathy  Assessment & Plan  Monitor skin for increased infection/breakdown/wounds which patient is at higher risk for  Skilled PT/OT   Fall precautions      Abnormal finding on radiology exam  Assessment & Plan  Reviewed all incidentals and follow-up plan with patient on 6/14/23 and she will follow-up with PCP and OP providers     Per discharging provider to [facility]   \"Findings and Follow up required:                              1) Scattered less than 3 mm nodular densities in the posterior right upper lobe of the lung as well as a calcified granuloma in the right lower lobe of the lung posteriorly were noted incidentally on your trauma imaging   A malignancy (cancer) cannot be completely excluded based on trauma imaging " alone   Recommend short-term outpatient follow-up with primary care provider to review the finding and for further surveillance as indicated                               2)  A subcentimeter cystic hypodensity posterior right lobe of the liver was incidentally identified on your trauma imaging and is unchanged compared to prior imaging  A malignancy (cancer) cannot be completely excluded based on trauma imaging alone  Recommend short-term outpatient follow-up with primary care provider to review the finding and for further surveillance as indicated                               3) A left adrenal gland 1 4 x 0 9 cm nodule was incidentally identified under trauma imaging and is unchanged compared to prior imaging  A malignancy (cancer) cannot be completely excluded based on trauma imaging alone  Recommend short-term outpatient follow-up with primary care provider to review the finding and for further surveillance as indicated                               4) Subcentimeter hypodensities in your kidneys were incidentally identified under trauma imaging and are suggestive of benign cysts  A malignancy (cancer) cannot be completely excluded based on trauma imaging alone  Recommend short-term outpatient follow-up with primary care provider to review the finding and for further surveillance as indicated                               5) A  was incidentally identified under trauma imaging as well as a prevnew small umbilical hernia containing nonobstructed bowel loops and fatiously seen right para-midline ventral hernia defect containing adherent bowel loops in a nonobstructive pattern  No further work-up or intervention necessary for these findings at this time   Recommend short-term outpatient follow-up with primary care provider to review the finding and for further surveillance as indicated                   Follow up should be done within 2 week(s)     The above noted incidental findings were discussed with the "patient    The patient demonstrated understanding of the findings and the follow-up recommendations      Please notify the following clinician to assist with the follow up:              Dr Sridhar Rubio"    Anemia  Assessment & Plan  Hb stable at 9 7 on 6/8 > improved to 11 on 6/12  Consult(ed) IM and comanagement with their service during ARC course   Monitor H/H, vitals, signs/symptoms of acute bleeding      At risk for venous thromboembolism (VTE)  Assessment & Plan  SCDs, ambulation, and warfarin       Pain  Assessment & Plan  Improving   ICE prn   APAP 975mg TID   Oxycodone 2 5-5mg Q4H PRN (1-2 per day)   [place] PDMP website checked and no concerning Rx's or Rx patterns noted    - Will Rx Oxy 5mg tab disp #7 - take 0 5-1 tab BID PRN for mod-severe pain on d/c -- sent in   - Patient reviewed risks of injury/fall - and understands   Lyrica 100mg BID (chronic for neuropathy)  Monitor for oversedation, AMS/delirium, and respiratory depression - none         Constipation  Assessment & Plan  Remains improved  Significant on admission to [facility] with last Bm near a week ago; no current signs of symptoms obstruction  - Continue to monitor for S/S of obstruction; hold stimulant laxatives and obtain imaging if concern develops  - Colace BID, miralax daily (consider BID if needed)  (Declines senna)   - PRN bowel regimen  - Limiting constipating medications if possible  - Ensure adequate hydration       Pure hypercholesterolemia  Assessment & Plan  Statin     PAF (paroxysmal atrial fibrillation) ([facility] [address] )  Assessment & Plan  IM consulted and with overall management at their discretion during ARC course  Rate control: Toprol XL 50mg HS  Antithrombotic: Warfarin dosing per IM   - Start with [facility] RN labs; CM assistance with ensure adequate transport resources   (patient is frustrated with checking her INR and was not always checking it and adjusting med dose on own at time; she did voice she would be open to consider NOAC - spoke to IM who " "reviewed chart and in past NOACs were cost prohibitive   - New price check about $87/mo for Eliquis - pt declines and will continue warfarin      Non-rheumatic aortic stenosis  Assessment & Plan  EF 65%, G2DD, Mild-mod AS, mod-severe elevated RVSP  IM consulted and with overall management at their discretion during ARC course  Monitor vitals, fluid status  OP Cards follow-up        Appreciate IM consultants medical co-management  Labs, medications, and imaging personally reviewed  ROS:  A ten point review of systems was completed on 06/15/23 and pertinent positives are listed in subjective section  All other systems reviewed were negative  OBJECTIVE:   /97 (BP Location: Right arm)   Pulse 75   Temp 97 8 °F (36 6 °C) (Oral)   Resp 18   Ht 4' 11\" (1 499 m)   Wt 73 1 kg (161 lb 2 5 oz)   SpO2 98%   BMI 32 55 kg/m²     Physical Exam  Vitals reviewed  Constitutional:       General: She is not in acute distress  HENT:      Head: Normocephalic and atraumatic  Right Ear: External ear normal       Left Ear: External ear normal       Nose: Nose normal  No rhinorrhea  Mouth/Throat:      Mouth: Mucous membranes are moist       Pharynx: Oropharynx is clear  Eyes:      General: No scleral icterus  Cardiovascular:      Rate and Rhythm: Normal rate  Pulses: Normal pulses  Pulmonary:      Effort: Pulmonary effort is normal  No respiratory distress  Abdominal:      General: There is no distension  Palpations: Abdomen is soft  Musculoskeletal:      Cervical back: Normal range of motion  Right lower leg: No edema  Left lower leg: No edema  Skin:     General: Skin is warm and dry  Neurological:      Mental Status: She is alert and oriented to person, place, and time     Psychiatric:         Mood and Affect: Mood normal          Behavior: Behavior normal           Lab Results   Component Value Date    HCT 36 7 06/15/2023    HGB 11 5 06/15/2023     (H) 06/15/2023 "  (H) 06/15/2023    WBC 6 12 06/15/2023     Lab Results   Component Value Date    BUN 13 06/15/2023    CALCIUM 9 6 06/15/2023     06/15/2023    CO2 30 06/15/2023    CREATININE 0 47 (L) 06/15/2023    GLUC 90 06/15/2023    K 4 5 06/15/2023    SODIUM 140 06/15/2023     Lab Results   Component Value Date    INR 2 42 (H) 06/15/2023    INR 2 25 (H) 06/12/2023    INR 2 15 (H) 06/09/2023    PROTIME 26 6 (H) 06/15/2023    PROTIME 25 1 (H) 06/12/2023    PROTIME 24 3 (H) 06/09/2023           Current Facility-Administered Medications:   •  acetaminophen (TYLENOL) tablet 975 mg, 975 mg, Oral, Q8H [facility], Bryn Koehler MD, 975 mg at 06/15/23 0541  •  bisacodyl (DULCOLAX) rectal suppository 10 mg, 10 mg, Rectal, Daily PRN, Bryn Koehler MD, 10 mg at 06/10/23 0800  •  docusate sodium (COLACE) capsule 100 mg, 100 mg, Oral, BID, Bryn Koehler MD, 100 mg at 06/15/23 0827  •  latanoprost (XALATAN) 0 005 % ophthalmic solution 1 drop, 1 drop, Both Eyes, HS, RAYMON Small, 1 drop at 06/14/23 2134  •  lidocaine (URO-JET) 2 % urethral/mucosal gel, , Topical, Q4H PRN, Bryn Koehler MD  •  metoprolol succinate (TOPROL-XL) 24 hr tablet 50 mg, 50 mg, Oral, HS, Bryn Koehler MD, 50 mg at 06/14/23 2132  •  montelukast (SINGULAIR) tablet 10 mg, 10 mg, Oral, HS, Bryn Koehler MD, 10 mg at 06/14/23 2132  •  oxyCODONE (ROXICODONE) IR tablet 5 mg, 5 mg, Oral, Q4H PRN, Bryn Koehler MD, 5 mg at 06/14/23 2779  •  oxyCODONE (ROXICODONE) split tablet 2 5 mg, 2 5 mg, Oral, Q4H PRN, Bryn Koehler MD, 2 5 mg at 06/13/23 1157  •  polyethylene glycol (MIRALAX) packet 17 g, 17 g, Oral, Daily PRN, Bryn Koehler MD, 17 g at 06/09/23 1726  •  polyethylene glycol (MIRALAX) packet 17 g, 17 g, Oral, Daily, Bryn Koehler MD, 17 g at 06/15/23 0827  •  pravastatin (PRAVACHOL) tablet 40 mg, 40 mg, Oral, Daily With Rusty Doe MD, 40 mg at 06/14/23 1728  • pregabalin (LYRICA) capsule 100 mg, 100 mg, Oral, BID, Akanksha Gardner MD, 100 mg at 06/15/23 0827  •  senna (SENOKOT) tablet 8 6 mg, 1 tablet, Oral, BID, Akanksha Gardner MD, 8 6 mg at 06/15/23 0827  •  warfarin (COUMADIN) tablet 5 mg, 5 mg, Oral, Once per day on Sun Tue Thu Sat, RAYMON Vaughan, 5 mg at 06/13/23 1721  •  warfarin (COUMADIN) tablet 7 5 mg, 7 5 mg, Oral, Once per day on Mon Wed Fri, RAYMON Vaughan, 7 5 mg at 06/14/23 1728    Past Medical History:   Diagnosis Date   • Chronic kidney disease     Horse shoe kidney   • Colitis    • Hyperlipidemia        Patient Active Problem List    Diagnosis Date Noted   • 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 ([facility] [address] ) 05/28/2023   • Peripheral neuropathy 06/01/2023   • Encounter for skin care 06/01/2023   • Obesity 06/01/2023   • Constipation 05/31/2023   • Pain 05/31/2023   • At risk for venous thromboembolism (VTE) 05/31/2023   • Anemia 05/31/2023   • Abnormal finding on radiology exam 05/31/2023   • Fall 05/27/2023   • Non-rheumatic aortic stenosis 03/02/2023   • PAF (paroxysmal atrial fibrillation) ([facility] [address] ) 03/02/2023   • Postoperative visit 08/15/2022   • Incisional hernia, without obstruction or gangrene 07/05/2022   • Intestinal adhesions 11/27/2021   • Mixed hyperlipidemia 06/07/2020   • Other chest pain 06/07/2020   • Pure hypercholesterolemia 07/18/2017   • Spinal stenosis of lumbar region 06/30/2017          William Clemente DO  Physical Medicine and [facility]    I have spent a total time of 35 minutes on 06/15/23 in caring for this patient including Instructions for management, Patient and family education, Counseling / Coordination of care, Documenting in the medical record, Reviewing / ordering tests, medicine, procedures  , Obtaining or reviewing history  , Communicating with other healthcare professionals  and Discussion with patient about discharge medications and follow-up

## 2023-06-15 NOTE — PLAN OF CARE
Problem: PAIN - ADULT  Goal: Verbalizes/displays adequate comfort level or baseline comfort level  Description: Interventions:  - Encourage patient to monitor pain and request assistance  - Assess pain using appropriate pain scale  - Administer analgesics based on type and severity of pain and evaluate response  - Implement non-pharmacological measures as appropriate and evaluate response  - Consider cultural and social influences on pain and pain management  - Notify physician/advanced practitioner if interventions unsuccessful or patient reports new pain  Outcome: Progressing     Problem: INFECTION - ADULT  Goal: Absence or prevention of progression during hospitalization  Description: INTERVENTIONS:  - Assess and monitor for signs and symptoms of infection  - Monitor lab/diagnostic results  - Monitor all insertion sites, i e  indwelling lines, tubes, and drains  - Monitor endotracheal if appropriate and nasal secretions for changes in amount and color  - [place] appropriate cooling/warming therapies per order  - Administer medications as ordered  - Instruct and encourage patient and family to use good hand hygiene technique  - Identify and instruct in appropriate isolation precautions for identified infection/condition  Outcome: Progressing     Problem: SAFETY ADULT  Goal: Patient will remain free of falls  Description: INTERVENTIONS:  - Educate patient/family on patient safety including physical limitations  - Instruct patient to call for assistance with activity   - Consult OT/PT to assist with strengthening/mobility   - Keep Call bell within reach  - Keep bed low and locked with side rails adjusted as appropriate  - Keep care items and personal belongings within reach  - Initiate and maintain comfort rounds  - Make Fall Risk Sign visible to staff  - Apply yellow socks and bracelet for high fall risk patients  - Consider moving patient to room near nurses station  Outcome: Progressing  Goal: Maintain or return to baseline ADL function  Description: INTERVENTIONS:  -  Assess patient's ability to carry out ADLs; assess patient's baseline for ADL function and identify physical deficits which impact ability to perform ADLs (bathing, care of mouth/teeth, toileting, grooming, dressing, etc )  - Assess/evaluate cause of self-care deficits   - Assess range of motion  - Assess patient's mobility; develop plan if impaired  - Assess patient's need for assistive devices and provide as appropriate  - Encourage maximum independence but intervene and supervise when necessary  - Involve family in performance of ADLs  - Assess for home care needs following discharge   - Consider OT consult to assist with ADL evaluation and planning for discharge  - Provide patient education as appropriate  Outcome: Progressing  Goal: Maintains/Returns to pre admission functional level  Description: INTERVENTIONS:  - Perform BMAT or MOVE assessment daily    - Set and communicate daily mobility goal to care team and patient/family/caregiver     - Collaborate with rehabilitation services on mobility goals if consulted  - Out of bed for toileting  - Record patient progress and toleration of activity level   Outcome: Progressing     Problem: DISCHARGE PLANNING  Goal: Discharge to home or other facility with appropriate resources  Description: INTERVENTIONS:  - Identify barriers to discharge w/patient and caregiver  - Arrange for needed discharge resources and transportation as appropriate  - Identify discharge learning needs (meds, wound care, etc )  - Arrange for interpretive services to assist at discharge as needed  - Refer to Case Management Department for coordinating discharge planning if the patient needs post-hospital services based on physician/advanced practitioner order or complex needs related to functional status, cognitive ability, or social support system  Outcome: Progressing     Problem: Prexisting or High Potential for Compromised Skin Integrity  Goal: Skin integrity is maintained or improved  Description: INTERVENTIONS:  - Identify patients at risk for skin breakdown  - Assess and monitor skin integrity  - Assess and monitor nutrition and hydration status  - Monitor labs   - Assess for incontinence   - Turn and reposition patient  - Assist with mobility/ambulation  - Relieve pressure over bony prominences  - Avoid friction and shearing  - Provide appropriate hygiene as needed including keeping skin clean and dry  - Evaluate need for skin moisturizer/barrier cream  - Collaborate with interdisciplinary team   - Patient/family teaching  - Consider wound care consult   Outcome: Progressing     Problem: MOBILITY - ADULT  Goal: Maintain or return to baseline ADL function  Description: INTERVENTIONS:  -  Assess patient's ability to carry out ADLs; assess patient's baseline for ADL function and identify physical deficits which impact ability to perform ADLs (bathing, care of mouth/teeth, toileting, grooming, dressing, etc )  - Assess/evaluate cause of self-care deficits   - Assess range of motion  - Assess patient's mobility; develop plan if impaired  - Assess patient's need for assistive devices and provide as appropriate  - Encourage maximum independence but intervene and supervise when necessary  - Involve family in performance of ADLs  - Assess for home care needs following discharge   - Consider OT consult to assist with ADL evaluation and planning for discharge  - Provide patient education as appropriate  Outcome: Progressing  Goal: Maintains/Returns to pre admission functional level  Description: INTERVENTIONS:  - Perform BMAT or MOVE assessment daily    - Set and communicate daily mobility goal to care team and patient/family/caregiver     - Collaborate with rehabilitation services on mobility goals if consulted  - Out of bed for toileting  - Record patient progress and toleration of activity level   Outcome: Progressing     Problem: Nutrition/Hydration-ADULT  Goal: Nutrient/Hydration intake appropriate for improving, restoring or maintaining nutritional needs  Description: Monitor and assess patient's nutrition/hydration status for malnutrition  Collaborate with interdisciplinary team and initiate plan and interventions as ordered  Monitor patient's weight and dietary intake as ordered or per policy  Utilize nutrition screening tool and intervene as necessary  Determine patient's food preferences and provide high-protein, high-caloric foods as appropriate       INTERVENTIONS:  - Monitor oral intake, urinary output, labs, and treatment plans  - Assess nutrition and hydration status and recommend course of action  - Evaluate amount of meals eaten  - Assist patient with eating if necessary   - Allow adequate time for meals  - Recommend/ encourage appropriate diets, oral nutritional supplements, and vitamin/mineral supplements  - Order, calculate, and assess calorie counts as needed  - Recommend, monitor, and adjust tube feedings and TPN/PPN based on assessed needs  - Assess need for intravenous fluids  - Provide specific nutrition/hydration education as appropriate  - Include patient/family/caregiver in decisions related to nutrition  Outcome: Progressing

## 2023-06-16 VITALS
DIASTOLIC BLOOD PRESSURE: 61 MMHG | OXYGEN SATURATION: 98 % | RESPIRATION RATE: 18 BRPM | HEIGHT: 59 IN | SYSTOLIC BLOOD PRESSURE: 127 MMHG | TEMPERATURE: 97.5 F | HEART RATE: 70 BPM | WEIGHT: 161.16 LBS | BODY MASS INDEX: 32.49 KG/M2

## 2023-06-16 PROCEDURE — 99239 HOSP IP/OBS DSCHRG MGMT >30: CPT | Performed by: STUDENT IN AN ORGANIZED HEALTH CARE EDUCATION/TRAINING PROGRAM

## 2023-06-16 RX ORDER — WARFARIN SODIUM 5 MG/1
TABLET ORAL
Refills: 0
Start: 2023-06-16 | End: 2023-06-20 | Stop reason: SDUPTHER

## 2023-06-16 RX ORDER — WARFARIN SODIUM 7.5 MG/1
TABLET ORAL
Refills: 0
Start: 2023-06-16

## 2023-06-16 RX ADMIN — Medication 2.5 MG: at 15:53

## 2023-06-16 RX ADMIN — PREGABALIN 100 MG: 100 CAPSULE ORAL at 10:06

## 2023-06-16 RX ADMIN — PRAVASTATIN SODIUM 40 MG: 40 TABLET ORAL at 15:53

## 2023-06-16 RX ADMIN — ACETAMINOPHEN 975 MG: 325 TABLET, FILM COATED ORAL at 06:23

## 2023-06-16 RX ADMIN — ACETAMINOPHEN 975 MG: 325 TABLET, FILM COATED ORAL at 13:08

## 2023-06-16 NOTE — DISCHARGE SUMMARY
Discharge Summary - PMR   Stefani Medina 77 y o  female MRN: [id number]  Unit/Bed#: [facility] 961-01 Encounter: [id number]    Admission Date: 5/31/2023     Discharge Date: 6/16/2023     Rehab Diagnosis: Orthopedic Disorders:  08 2  Femur (Shaft) Fracture  Etiologic Diagnosis:  Right Periprosthetic Hip Fracture    HPI: 19-year-old female with a past medical history of A-fib on Coumadin, hypertension, hyperlipidemia, obesity, peripheral neuropathy, aortic stenosis, bilateral total hip arthroplasty, right total hip arthroplasty revision who stood up from kneeling and felt a crack in her right hip and then slipped and fell with resultant right leg pain who was found to have right periprosthetic hip fracture -described by radiology is acute minimally displaced periprosthetic fracture of the right proximal femoral diaphysis adjacent to the femoral stem of the right total hip arthroplasty  CT head was negative for acute findings  Patient underwent ORIF of right periprosthetic hip fracture on 5/28  Patient was started on Lovenox for VTE prophylaxis and cleared for Coumadin on 5/31  Patient has had some acute blood loss anemia, pain, severe constipation, and significant decline in ADLs and mobility  Procedures Performed During ARC Admission: None    Acute Rehabilitation Center Course: Patient participated in a comprehensive interdisciplinary inpatient rehabilitation program which included involvment of MD, therapies (PT, OT), RN, CM  She was able to be advanced to a modified independent level of assist and considered safe for discharge home  Please see below for patient's day to day management of rehabilitation needs  Please refer to Internal Medicine notes during [facility] stay for day to day management of patient's medical co-morbidities       * 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 [facility])  Assessment & Plan  - Function continues to improving; doing well with IRPs > continue   - Plan for d/c Friday now with [facility] RN and PT     Status post surgical repair via ORIF by Dr Evangelina Trinidad  on 5/28/23  • Weight-bearing as tolerated on affected limb   • Monitor incision/area for infection or dehiscence/impaired healing - improved drainage, no signs of infection   • POD#14 is Ivan Samaniego 6/11 > ortho c/s - no concerns  • 6/11 - XR Right hip arthroplasty with cerclage wires for a periprosthetic fracture '  • Patient reported significant pain after transfer earlier in course; right femur x-ray without acute concerns 6/2  • Monitor for signs/symptoms of VTE, atelectasis, acute blood loss anemia, or other infection   • Continue acute comprehensive interdisciplinary inpatient rehabilitation to include intensive skilled therapies (PT, OT) as outlined with oversight and management by rehabilitation physician as well as inpatient rehab level nursing, case management and weekly interdisciplinary team meetings  • Follow-up with Ortho Sx after d/c and ortho if needed during ARC course       Obesity  Assessment & Plan   on appropriate nutrition and activity  Adjustments accordingly during skilled therapy and with rehab nursing  Monitor skin closely for breakdown, wounds, rashes; keep clean and dry, turning Q2H   Follow-up with PCP after d/c      Encounter for skin care  Assessment & Plan  Buttock/sacrum -   - Wound care c/s 6/14  B/L heels intact and blanching, preventative skin care ordered  Sacral/buttocks intact and hyperpigmented and blanching   - They felt no wound at this time  - Recommend  RN      Skin care plans:  1-Hydraguard to bilateral sacrum, buttock and heels BID and PRN  2-Elevate heels to offload pressure  3-Ehob cushion in chair when out of bed    4-Moisturize skin daily with skin nourishing cream   - Skin mobilization program with frequent standing and wt shift   - Increased risk of skin wounds/breakdown/rashes due to recent immobility and co-morbidities   - 2 nurse/staff full skin check for abnormal findings on "admission - obtain photos PRN  - Turn patient Q2H in bed (use pillows or wedges), encourage wt shifts every 10-15 minutes in chair  - Patient and if available caregiver education   - Hydragaurd to buttocks and sacrum BID & PRN  - Optimal bowel/bladder hygiene; keep skin clean and dry   - ROHO cushion to chair/WC when OOB  - Nursing to document in chart and Notify MD if buttock, sacrum, heel, or other skin site develops erythema, skin breakdown, or rashes as soon as possible  If patient is soiling themselves with urine or stool notify MD   If you are unable to maintain skin integrity and prevent erythema due to frequency of soiling notify MD as soon as possible as well  Peripheral neuropathy  Assessment & Plan  Monitor skin for increased infection/breakdown/wounds which patient is at higher risk for  Skilled PT/OT   Fall precautions      Abnormal finding on radiology exam  Assessment & Plan  Reviewed all incidentals and follow-up plan with patient on 6/14/23 and she will follow-up with PCP and OP providers     Per discharging provider to [facility]   \"Findings and Follow up required:                              1) Scattered less than 3 mm nodular densities in the posterior right upper lobe of the lung as well as a calcified granuloma in the right lower lobe of the lung posteriorly were noted incidentally on your trauma imaging  A malignancy (cancer) cannot be completely excluded based on trauma imaging alone  Recommend short-term outpatient follow-up with primary care provider to review the finding and for further surveillance as indicated                               2)  A subcentimeter cystic hypodensity posterior right lobe of the liver was incidentally identified on your trauma imaging and is unchanged compared to prior imaging  A malignancy (cancer) cannot be completely excluded based on trauma imaging alone    Recommend short-term outpatient follow-up with primary care provider to review the finding and for " "further surveillance as indicated                               3) A left adrenal gland 1 4 x 0 9 cm nodule was incidentally identified under trauma imaging and is unchanged compared to prior imaging  A malignancy (cancer) cannot be completely excluded based on trauma imaging alone  Recommend short-term outpatient follow-up with primary care provider to review the finding and for further surveillance as indicated                               4) Subcentimeter hypodensities in your kidneys were incidentally identified under trauma imaging and are suggestive of benign cysts  A malignancy (cancer) cannot be completely excluded based on trauma imaging alone  Recommend short-term outpatient follow-up with primary care provider to review the finding and for further surveillance as indicated                               5) A  was incidentally identified under trauma imaging as well as a prevnew small umbilical hernia containing nonobstructed bowel loops and fatiously seen right para-midline ventral hernia defect containing adherent bowel loops in a nonobstructive pattern  No further work-up or intervention necessary for these findings at this time  Recommend short-term outpatient follow-up with primary care provider to review the finding and for further surveillance as indicated                   Follow up should be done within 2 week(s)     The above noted incidental findings were discussed with the patient    The patient demonstrated understanding of the findings and the follow-up recommendations      Please notify the following clinician to assist with the follow up:              Dr Sylvia Barker"    Anemia  Assessment & Plan  Hb stable at 9 7 on 6/8 > improved to 11 on 6/12  Consult(ed) IM and comanagement with their service during ARC course   Monitor H/H, vitals, signs/symptoms of acute bleeding      At risk for venous thromboembolism (VTE)  Assessment & Plan  SCDs, ambulation, and warfarin       Pain  Assessment " & Plan  Improving   ICE prn   APAP 975mg TID   Oxycodone 2 5-5mg Q4H PRN (1-2 per day)   [place] PDMP website checked and no concerning Rx's or Rx patterns noted    - Will Rx Oxy 5mg tab disp #7 - take 0 5-1 tab BID PRN for mod-severe pain on d/c -- sent in   - Patient reviewed risks of injury/fall - and understands   Lyrica 100mg BID (chronic for neuropathy)  Monitor for oversedation, AMS/delirium, and respiratory depression - none         Constipation  Assessment & Plan  Remains improved  Significant on admission to [facility] with last Bm near a week ago; no current signs of symptoms obstruction  - Continue to monitor for S/S of obstruction; hold stimulant laxatives and obtain imaging if concern develops  - Colace BID, miralax daily (consider BID if needed)  (Declines senna)   - PRN bowel regimen  - Limiting constipating medications if possible  - Ensure adequate hydration       Pure hypercholesterolemia  Assessment & Plan  Statin     PAF (paroxysmal atrial fibrillation) (HCC)  Assessment & Plan  IM consulted and with overall management at their discretion during ARC course  Rate control: Toprol XL 50mg HS  Antithrombotic: Warfarin dosing per IM   - Start with [facility] RN labs; CM assistance with ensure adequate transport resources   (patient is frustrated with checking her INR and was not always checking it and adjusting med dose on own at time; she did voice she would be open to consider NOAC - spoke to IM who reviewed chart and in past NOACs were cost prohibitive   - New price check about $87/mo for Eliquis - pt declines and will continue warfarin      Non-rheumatic aortic stenosis  Assessment & Plan  EF 65%, G2DD, Mild-mod AS, mod-severe elevated RVSP  IM consulted and with overall management at their discretion during ARC course  Monitor vitals, fluid status  OP Cards follow-up        Discharge Physical Examination:  Vitals:    06/15/23 0538 06/15/23 1438 06/15/23 2011 06/16/23 0704   BP: 131/97 114/55 121/82 157/64   BP Location: Right arm Right arm Left arm Right arm   Pulse: 75 70 72 63   Resp: 18 16 18 20   Temp: 97 8 °F (36 6 °C) 97 9 °F (36 6 °C) 98 1 °F (36 7 °C) 98 °F (36 7 °C)   TempSrc: Oral Oral Oral Oral   SpO2: 98% 98% 97% 98%   Weight:       Height:           Physical Exam  Vitals reviewed  Constitutional:       General: She is not in acute distress  Appearance: She is obese  HENT:      Head: Normocephalic and atraumatic  Right Ear: External ear normal       Left Ear: External ear normal       Nose: Nose normal  No rhinorrhea  Mouth/Throat:      Mouth: Mucous membranes are moist       Pharynx: Oropharynx is clear  Eyes:      General: No scleral icterus  Cardiovascular:      Rate and Rhythm: Normal rate  Pulses: Normal pulses  Pulmonary:      Effort: Pulmonary effort is normal  No respiratory distress  Abdominal:      General: There is no distension  Palpations: Abdomen is soft  Musculoskeletal:      Cervical back: Normal range of motion and neck supple  Right lower leg: No edema  Left lower leg: No edema  Skin:     General: Skin is warm and dry  Neurological:      Mental Status: She is alert and oriented to person, place, and time        Gait: Gait abnormal    Psychiatric:         Mood and Affect: Mood normal          Behavior: Behavior normal          Significant Findings, Care, Treatment and Services Provided: Acute comprehensive interdisciplinary inpatient rehabilitation including PT, OT, RN, CM    Complications: none    Functional Status Upon Admission to [facility]:  Mobility:  Transfers moderate assist x2  Ambulation/Mobility 3 feet moderate assist x2     ADLs:  Max assist lower body dressing, toileting, lower body bathing  Min assist upper body bathing upper body dressing    Functional Status Upon Discharge from [facility]:  Physical Therapy Occupational Therapy Speech Therapy   Weight Bearing Status: Weight Bearing as Tolerated  Transfers: Independent  Bed Mobility: Independent  Amulation Distance (ft): 20 feet  Ambulation: Independent (inc distances at [place])  Assistive Device for Ambulation: [place]  Number of Stairs: 1  Assistive Device for Stairs: Bilateral [employer]  Stair Assistance: Supervision  Assistive Device for Ramp: [place]  Discharge Recommendations: Home with:  [address] with[de-identified] Outpatient Physical Therapy, Home Physical Therapy   Eating: Independent  Grooming: Supervision  Bathing: Supervision  Bathing: Supervision  Upper Body Dressing: Supervision  Lower Body Dressing: Supervision (Yadiel for footwear mgmt)  Toileting: Independent  Tub/Shower Transfer: Incidental Touching  Toilet Transfer: Independent  Cognition: Within Defined Limits  Orientation: Person, Place, Time, Situation                 Rehab Diagnosis: Orthopedic Disorders:  08 2  Femur (Shaft) Fracture  Etiologic Diagnosis:  Right Periprosthetic Hip Fracture    Discharge Medications:   See after visit summary for reconciled discharge medications provided to patient and family  Condition at Discharge: good     Discharge instructions/Information to patient and family:   See after visit summary for information provided to patient and family  Provisions for Follow-Up Care:  See after visit summary for information related to follow-up care and any pertinent home health orders  Future Appointments   Date Time Provider Emmett Huerta   9/5/2023 12:30 PM [facility] ECHO 1 [facility] [address]       Disposition: Home    Planned Readmission: No    Discharge Medications:  See after visit summary for reconciled discharge medications provided to patient and family        Facility Administered Medications Prior to Discharge:    Current Facility-Administered Medications   Medication Dose Route Frequency Provider Last Rate   • acetaminophen  975 mg Oral [facility] Bob Ortiz MD     • bisacodyl  10 mg Rectal Daily PRN Bob Ortiz MD     • docusate sodium  100 mg Oral BID Amada Ellington Aston Loza MD     • latanoprost  1 drop Both Eyes HS RAYMON Olivas     • lidocaine   Topical Q4H PRN Benjamín Stroud MD     • metoprolol succinate  50 mg Oral HS Benjamín Stroud MD     • montelukast  10 mg Oral HS Benjamín Stroud MD     • oxyCODONE  5 mg Oral Q4H PRN Benjamín Stroud MD     • oxyCODONE  2 5 mg Oral Q4H PRN Benjamín Stroud MD     • polyethylene glycol  17 g Oral Daily PRN Benjamín Stroud MD     • polyethylene glycol  17 g Oral Daily Benjamín Stroud MD     • pravastatin  40 mg Oral Daily With Surya Weinstein MD     • pregabalin  100 mg Oral BID Benjamín Stroud MD     • senna  1 tablet Oral BID Benjamín Stroud MD     • warfarin  5 mg Oral Once per day on Sun Tue Thu Sat RAYMON Olivas     • warfarin  7 5 mg Oral Once per day on Mon Wed Fri RAYMON Loving DO  Physical Medicine and [place] [id number]    I have spent a total time of 45 minutes on 06/16/23 in caring for this patient including Instructions for management, Patient and family education, Counseling / Coordination of care, Documenting in the medical record, Reviewing / ordering tests, medicine, procedures   and Communicating with other healthcare professionals

## 2023-06-16 NOTE — NURSING NOTE
D/c instructions reviewed with patient and her sister  Verbalized understanding  Pushed in wheelchair down to lobby  This nurse and PCA assisted pt into sister's car  Patient stable upon discharge

## 2023-06-16 NOTE — PLAN OF CARE
Problem: PAIN - ADULT  Goal: Verbalizes/displays adequate comfort level or baseline comfort level  Description: Interventions:  - Encourage patient to monitor pain and request assistance  - Assess pain using appropriate pain scale  - Administer analgesics based on type and severity of pain and evaluate response  - Implement non-pharmacological measures as appropriate and evaluate response  - Consider cultural and social influences on pain and pain management  - Notify physician/advanced practitioner if interventions unsuccessful or patient reports new pain  Outcome: Progressing     Problem: INFECTION - ADULT  Goal: Absence or prevention of progression during hospitalization  Description: INTERVENTIONS:  - Assess and monitor for signs and symptoms of infection  - Monitor lab/diagnostic results  - Monitor all insertion sites, i e  indwelling lines, tubes, and drains  - Monitor endotracheal if appropriate and nasal secretions for changes in amount and color  - [place] appropriate cooling/warming therapies per order  - Administer medications as ordered  - Instruct and encourage patient and family to use good hand hygiene technique  - Identify and instruct in appropriate isolation precautions for identified infection/condition  Outcome: Progressing     Problem: SAFETY ADULT  Goal: Patient will remain free of falls  Description: INTERVENTIONS:  - Educate patient/family on patient safety including physical limitations  - Instruct patient to call for assistance with activity   - Consult OT/PT to assist with strengthening/mobility   - Keep Call bell within reach  - Keep bed low and locked with side rails adjusted as appropriate  - Keep care items and personal belongings within reach  - Initiate and maintain comfort rounds  - Make Fall Risk Sign visible to staff  - Apply yellow socks and bracelet for high fall risk patients  - Consider moving patient to room near nurses station  Outcome: Progressing  Goal: Maintain or return to baseline ADL function  Description: INTERVENTIONS:  -  Assess patient's ability to carry out ADLs; assess patient's baseline for ADL function and identify physical deficits which impact ability to perform ADLs (bathing, care of mouth/teeth, toileting, grooming, dressing, etc )  - Assess/evaluate cause of self-care deficits   - Assess range of motion  - Assess patient's mobility; develop plan if impaired  - Assess patient's need for assistive devices and provide as appropriate  - Encourage maximum independence but intervene and supervise when necessary  - Involve family in performance of ADLs  - Assess for home care needs following discharge   - Consider OT consult to assist with ADL evaluation and planning for discharge  - Provide patient education as appropriate  Outcome: Progressing  Goal: Maintains/Returns to pre admission functional level  Description: INTERVENTIONS:  - Perform BMAT or MOVE assessment daily    - Set and communicate daily mobility goal to care team and patient/family/caregiver     - Collaborate with rehabilitation services on mobility goals if consulted  - Out of bed for toileting  - Record patient progress and toleration of activity level   Outcome: Progressing     Problem: DISCHARGE PLANNING  Goal: Discharge to home or other facility with appropriate resources  Description: INTERVENTIONS:  - Identify barriers to discharge w/patient and caregiver  - Arrange for needed discharge resources and transportation as appropriate  - Identify discharge learning needs (meds, wound care, etc )  - Arrange for interpretive services to assist at discharge as needed  - Refer to Case Management Department for coordinating discharge planning if the patient needs post-hospital services based on physician/advanced practitioner order or complex needs related to functional status, cognitive ability, or social support system  Outcome: Progressing     Problem: Prexisting or High Potential for Compromised Skin Integrity  Goal: Skin integrity is maintained or improved  Description: INTERVENTIONS:  - Identify patients at risk for skin breakdown  - Assess and monitor skin integrity  - Assess and monitor nutrition and hydration status  - Monitor labs   - Assess for incontinence   - Turn and reposition patient  - Assist with mobility/ambulation  - Relieve pressure over bony prominences  - Avoid friction and shearing  - Provide appropriate hygiene as needed including keeping skin clean and dry  - Evaluate need for skin moisturizer/barrier cream  - Collaborate with interdisciplinary team   - Patient/family teaching  - Consider wound care consult   Outcome: Progressing     Problem: MOBILITY - ADULT  Goal: Maintain or return to baseline ADL function  Description: INTERVENTIONS:  -  Assess patient's ability to carry out ADLs; assess patient's baseline for ADL function and identify physical deficits which impact ability to perform ADLs (bathing, care of mouth/teeth, toileting, grooming, dressing, etc )  - Assess/evaluate cause of self-care deficits   - Assess range of motion  - Assess patient's mobility; develop plan if impaired  - Assess patient's need for assistive devices and provide as appropriate  - Encourage maximum independence but intervene and supervise when necessary  - Involve family in performance of ADLs  - Assess for home care needs following discharge   - Consider OT consult to assist with ADL evaluation and planning for discharge  - Provide patient education as appropriate  Outcome: Progressing  Goal: Maintains/Returns to pre admission functional level  Description: INTERVENTIONS:  - Perform BMAT or MOVE assessment daily    - Set and communicate daily mobility goal to care team and patient/family/caregiver     - Collaborate with rehabilitation services on mobility goals if consulted  - Out of bed for toileting  - Record patient progress and toleration of activity level   Outcome: Progressing     Problem: Nutrition/Hydration-ADULT  Goal: Nutrient/Hydration intake appropriate for improving, restoring or maintaining nutritional needs  Description: Monitor and assess patient's nutrition/hydration status for malnutrition  Collaborate with interdisciplinary team and initiate plan and interventions as ordered  Monitor patient's weight and dietary intake as ordered or per policy  Utilize nutrition screening tool and intervene as necessary  Determine patient's food preferences and provide high-protein, high-caloric foods as appropriate       INTERVENTIONS:  - Monitor oral intake, urinary output, labs, and treatment plans  - Assess nutrition and hydration status and recommend course of action  - Evaluate amount of meals eaten  - Assist patient with eating if necessary   - Allow adequate time for meals  - Recommend/ encourage appropriate diets, oral nutritional supplements, and vitamin/mineral supplements  - Order, calculate, and assess calorie counts as needed  - Recommend, monitor, and adjust tube feedings and TPN/PPN based on assessed needs  - Assess need for intravenous fluids  - Provide specific nutrition/hydration education as appropriate  - Include patient/family/caregiver in decisions related to nutrition  Outcome: Progressing

## 2023-06-17 ENCOUNTER — HOME CARE VISIT (OUTPATIENT)
Dept: HOME HEALTH SERVICES | Facility: HOME HEALTHCARE | Age: 78
End: 2023-06-17
Payer: MEDICARE

## 2023-06-17 VITALS
RESPIRATION RATE: 16 BRPM | SYSTOLIC BLOOD PRESSURE: 120 MMHG | DIASTOLIC BLOOD PRESSURE: 60 MMHG | HEART RATE: 64 BPM | OXYGEN SATURATION: 97 % | TEMPERATURE: 97.8 F

## 2023-06-17 PROCEDURE — 10330081 VN NO-PAY CLAIM PROCEDURE

## 2023-06-17 PROCEDURE — G0299 HHS/HOSPICE OF RN EA 15 MIN: HCPCS

## 2023-06-17 PROCEDURE — 400013 VN SOC

## 2023-06-17 NOTE — CASE COMMUNICATION
"[facility] has admitted your patient to [facility] service with the following disciplines:      SN, PT, OT and MSW  This report is informational only, no response is needed  Primary focus of home health care: Post op management R hip fx sp ORIF 5/28  Patient stated goals of care \"to walk better\"  Anticipated visit pattern and next visit date: 2w3, 1w6, next anticipated SN visit 6/20/23    See medication list - meds in home differ f rom AVS:  -Pt reports she does not plan to  oxycodone from pharmacy  Pt reports pain is controlled with tylenol and does not like that oxycodone makes her feel \"groggy\"  -Pt has the following medications at home not listed on MAR : loratadine 10 mg once daily prn '' seasonal allergies, niacin 500mg once daily, h[employer] Leg Cramps supplement one tablet daily  Significant clinical findings: A&Ox4, forgetful, easily dis tracted, pleasant  LLL exp wheezes, all other lung fields clear, SOB with mod exertion  HR irreg, asymptomatic  Occasional bowel and bladder incontinence  mid buttocks pink, epithelialized, fragile  Incision to R hip approximated, epithelialized  Potential barriers to goal achievement: lack of adequate transportation and decreased primary caregiver support  MSW consult placed  Thank you for allowing us to participate in the car e of your patient        Jey MORA      "

## 2023-06-17 NOTE — Clinical Note
"Abner Robert, if OT was not originally ordered then no I must've selected OT by accident, thank you!  ----- Message -----  From: Fide Fragoso PTA  Sent: 6/19/2023  10:20 AM EDT  To: Arlette Lozano RN            ----- Message -----  From: Arlette Lozano RN  Sent: 6/17/2023   3:44 PM EDT  To: Cleo Anaya RN; Lorrie Brown, RN; *    [facility] with the following disciplines:      SN, PT, OT and MSW  This report is informational only, no response is needed  Primary focus of home health care: Post op management R hip fx sp ORIF 5/28  Patient stated goals of care \"to walk better\"  Anticipated visit pattern and next visit date: 2w3, 1w6, next anticipated SN visit 6/20/23    See medication list - meds in home differ from AVS:  -Pt reports she does not plan to  oxycodone from pharmacy  Pt reports pain is controlled with tylenol and does not like that oxycodone makes her feel \"groggy\"  -Pt has the following medications at home not listed on MAR : loratadine 10 mg once daily prn '' seasonal allergies, niacin 500mg once daily, h[employer] Leg Cramps supplement one tablet daily  Significant clinical findings: A&Ox4, forgetful, easily distracted, pleasant  LLL exp wheezes, all other lung fields clear, SOB with mod exertion  HR irreg, asymptomatic  Occasional bowel and bladder incontinence  mid buttocks pink, epithelialized, fragile  Incision to R hip approximated, epithelialized  Potential barriers to goal achievement: lack of adequate transportation and decreased primary caregiver support  MSW consult placed  Thank you for allowing us to participate in the care of your patient        Arlette Lozano RN  [address] [facility]      "

## 2023-06-19 NOTE — PROGRESS NOTES
06/19/23 1223   Hello, [Guardian’s Name / Patient’s Karlos Juarez, this is [Caller Karlos Juarez from [facility], and our clinical care team wanted to check on you / your child after your recent visit to the hospital  It will only take 3-5 minutes  Is this a good time? Discharge Call Type/ Specific Diagnosis: General Call   General Discharge Phone Call   Were your/your child's discharge instructions clear and understandable? Please tell me in your own words how to care for yourself/your child now that you're home Yes;Patient understood instructions   Have you filled your/your child's new prescriptions yet? Yes   What questions do you have about those medications? No questions   Are you/your child having any unusual symptoms or problems? (Specific to problem- i e , dressing, pain, bruising or swelling, procedure, etc ) No reported symptoms/problems   Do you have follow up appointment with your/your child's physician? Yes   Is there anything preventing you from keeping that appointment? No;Patient able to keep appointment   Are there any physicians, nurses, or hospital staff you would like us to recognize for doing a very good job? Nurse;PCA/Tech;PT/OT/RT/SLP   Thank you for taking the time to share with me about your care and recovery  Do you have any suggestions for us? No   This call resulted in: No interventions needed   Call Complete   Attempted Number of Calls 1   Discharge phone call complete?  Complete

## 2023-06-19 NOTE — CASE MANAGEMENT
Team dc summary - pt made good progress and returned home w/contd hhc services through [facility] for rn pt and ot  Pt received a youth roller walker through [facility] prior to dc  pts sister in law was present for dc instructions

## 2023-06-20 ENCOUNTER — ANTICOAG VISIT (OUTPATIENT)
Dept: CARDIOLOGY CLINIC | Facility: CLINIC | Age: 78
End: 2023-06-20

## 2023-06-20 ENCOUNTER — HOME CARE VISIT (OUTPATIENT)
Dept: HOME HEALTH SERVICES | Facility: HOME HEALTHCARE | Age: 78
End: 2023-06-20
Payer: MEDICARE

## 2023-06-20 VITALS
TEMPERATURE: 97.3 F | RESPIRATION RATE: 20 BRPM | HEART RATE: 72 BPM | SYSTOLIC BLOOD PRESSURE: 146 MMHG | OXYGEN SATURATION: 97 % | DIASTOLIC BLOOD PRESSURE: 74 MMHG

## 2023-06-20 VITALS — OXYGEN SATURATION: 98 % | HEART RATE: 67 BPM

## 2023-06-20 DIAGNOSIS — I48.0 PAF (PAROXYSMAL ATRIAL FIBRILLATION) (HCC): Primary | ICD-10-CM

## 2023-06-20 LAB
DME PARACHUTE DELIVERY DATE ACTUAL: NORMAL
DME PARACHUTE DELIVERY DATE REQUESTED: NORMAL
DME PARACHUTE ITEM DESCRIPTION: NORMAL
DME PARACHUTE ORDER STATUS: NORMAL
DME PARACHUTE SUPPLIER NAME: NORMAL
DME PARACHUTE SUPPLIER PHONE: NORMAL
INR PPP: 3.5 (ref 0.84–1.19)

## 2023-06-20 PROCEDURE — G0151 HHCP-SERV OF PT,EA 15 MIN: HCPCS

## 2023-06-20 PROCEDURE — G0299 HHS/HOSPICE OF RN EA 15 MIN: HCPCS

## 2023-06-20 RX ORDER — WARFARIN SODIUM 5 MG/1
TABLET ORAL
Qty: 90 TABLET | Refills: 3 | Status: SHIPPED | OUTPATIENT
Start: 2023-06-20 | End: 2023-06-22 | Stop reason: SDUPTHER

## 2023-06-20 RX ORDER — WARFARIN SODIUM 5 MG/1
2.5 TABLET ORAL
Refills: 0 | Status: CANCELLED | OUTPATIENT
Start: 2023-06-20

## 2023-06-21 ENCOUNTER — HOME CARE VISIT (OUTPATIENT)
Dept: HOME HEALTH SERVICES | Facility: HOME HEALTHCARE | Age: 78
End: 2023-06-21
Payer: MEDICARE

## 2023-06-21 PROCEDURE — G0155 HHCP-SVS OF CSW,EA 15 MIN: HCPCS

## 2023-06-21 NOTE — PHYSICAL THERAPY NOTE
PHYSICAL THERAPY DISCHARGE SUMMARY    Patient made good progress during her stay at the acute rehab center where she presented with Impairment of mobility, safety and Activities of Daily Living (ADLs) due to Orthopedic Disorders  She presented initially  w/ 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 ([address] ) and underwent an ORIF 5/28/23  On evaluation, patient  with impairments and limitations including weakness, decreased ROM, impaired balance, decreased endurance, gait deviations, pain, decreased activity tolerance, decreased functional mobility tolerance, decreased safety awareness, fall risk, orthopedic restrictions and decreased skin integrity  She responded well to therapeutic activity and exercise, neuro re-ed and transfer, gait and stair training  She was able to progress to a mod(I) level for bed mobility, transfers, and ambulation and was set up assist for car transfers, uneven terrain and stairs  She was provided with a HEP and was recommended to follow up with HHPT services to continue to maximize her overall strength and functional mobility (I) using LRAD  Order placed for Youth RW prior to discharge was delivered to room prior to discharge   Otherwise, patient with all necessary DME    Kayode Zurita PT, DPT

## 2023-06-22 ENCOUNTER — ANTICOAG VISIT (OUTPATIENT)
Dept: CARDIOLOGY CLINIC | Facility: CLINIC | Age: 78
End: 2023-06-22

## 2023-06-22 ENCOUNTER — HOME CARE VISIT (OUTPATIENT)
Dept: HOME HEALTH SERVICES | Facility: HOME HEALTHCARE | Age: 78
End: 2023-06-22
Payer: MEDICARE

## 2023-06-22 VITALS
DIASTOLIC BLOOD PRESSURE: 80 MMHG | TEMPERATURE: 96.3 F | OXYGEN SATURATION: 100 % | RESPIRATION RATE: 1 BRPM | HEART RATE: 60 BPM | SYSTOLIC BLOOD PRESSURE: 142 MMHG

## 2023-06-22 DIAGNOSIS — I48.0 PAF (PAROXYSMAL ATRIAL FIBRILLATION) (HCC): Primary | ICD-10-CM

## 2023-06-22 LAB — INR PPP: 3.2 (ref 0.84–1.19)

## 2023-06-22 PROCEDURE — G0299 HHS/HOSPICE OF RN EA 15 MIN: HCPCS

## 2023-06-22 RX ORDER — WARFARIN SODIUM 5 MG/1
TABLET ORAL
Qty: 90 TABLET | Refills: 3 | Status: SHIPPED | OUTPATIENT
Start: 2023-06-22 | End: 2023-06-27 | Stop reason: SDUPTHER

## 2023-06-27 ENCOUNTER — LAB REQUISITION (OUTPATIENT)
Dept: LAB | Facility: HOSPITAL | Age: 78
End: 2023-06-27
Payer: MEDICARE

## 2023-06-27 ENCOUNTER — ANTICOAG VISIT (OUTPATIENT)
Dept: CARDIOLOGY CLINIC | Facility: CLINIC | Age: 78
End: 2023-06-27

## 2023-06-27 ENCOUNTER — HOME CARE VISIT (OUTPATIENT)
Dept: HOME HEALTH SERVICES | Facility: HOME HEALTHCARE | Age: 78
End: 2023-06-27
Payer: MEDICARE

## 2023-06-27 ENCOUNTER — TELEPHONE (OUTPATIENT)
Dept: LAB | Facility: HOSPITAL | Age: 78
End: 2023-06-27

## 2023-06-27 VITALS
SYSTOLIC BLOOD PRESSURE: 128 MMHG | RESPIRATION RATE: 20 BRPM | TEMPERATURE: 96.3 F | OXYGEN SATURATION: 98 % | DIASTOLIC BLOOD PRESSURE: 70 MMHG | HEART RATE: 76 BPM

## 2023-06-27 VITALS — HEART RATE: 70 BPM | OXYGEN SATURATION: 97 %

## 2023-06-27 DIAGNOSIS — D64.9 ANEMIA, UNSPECIFIED: ICD-10-CM

## 2023-06-27 DIAGNOSIS — I48.0 PAF (PAROXYSMAL ATRIAL FIBRILLATION) (HCC): Primary | ICD-10-CM

## 2023-06-27 LAB
ERYTHROCYTE [DISTWIDTH] IN BLOOD BY AUTOMATED COUNT: 16.2 % (ref 11.6–15.1)
HCT VFR BLD AUTO: 36.8 % (ref 34.8–46.1)
HGB BLD-MCNC: 11.4 G/DL (ref 11.5–15.4)
INR PPP: 2.9 (ref 0.84–1.19)
MCH RBC QN AUTO: 31.2 PG (ref 26.8–34.3)
MCHC RBC AUTO-ENTMCNC: 31 G/DL (ref 31.4–37.4)
MCV RBC AUTO: 101 FL (ref 82–98)
PLATELET # BLD AUTO: 295 THOUSANDS/UL (ref 149–390)
PMV BLD AUTO: 8.6 FL (ref 8.9–12.7)
RBC # BLD AUTO: 3.65 MILLION/UL (ref 3.81–5.12)
WBC # BLD AUTO: 4.98 THOUSAND/UL (ref 4.31–10.16)

## 2023-06-27 PROCEDURE — G0299 HHS/HOSPICE OF RN EA 15 MIN: HCPCS

## 2023-06-27 PROCEDURE — 85027 COMPLETE CBC AUTOMATED: CPT | Performed by: FAMILY MEDICINE

## 2023-06-27 PROCEDURE — G0151 HHCP-SERV OF PT,EA 15 MIN: HCPCS

## 2023-06-27 RX ORDER — WARFARIN SODIUM 5 MG/1
TABLET ORAL
Qty: 90 TABLET | Refills: 3 | Status: SHIPPED | OUTPATIENT
Start: 2023-06-27

## 2023-06-29 ENCOUNTER — HOME CARE VISIT (OUTPATIENT)
Dept: HOME HEALTH SERVICES | Facility: HOME HEALTHCARE | Age: 78
End: 2023-06-29
Payer: MEDICARE

## 2023-06-29 VITALS — HEART RATE: 60 BPM | OXYGEN SATURATION: 97 %

## 2023-06-29 PROCEDURE — G0151 HHCP-SERV OF PT,EA 15 MIN: HCPCS

## 2023-06-30 ENCOUNTER — HOME CARE VISIT (OUTPATIENT)
Dept: HOME HEALTH SERVICES | Facility: HOME HEALTHCARE | Age: 78
End: 2023-06-30
Payer: MEDICARE

## 2023-06-30 VITALS
OXYGEN SATURATION: 97 % | RESPIRATION RATE: 20 BRPM | SYSTOLIC BLOOD PRESSURE: 116 MMHG | TEMPERATURE: 97.2 F | HEART RATE: 64 BPM | DIASTOLIC BLOOD PRESSURE: 64 MMHG

## 2023-06-30 PROCEDURE — G0299 HHS/HOSPICE OF RN EA 15 MIN: HCPCS

## 2023-07-03 ENCOUNTER — HOME CARE VISIT (OUTPATIENT)
Dept: HOME HEALTH SERVICES | Facility: HOME HEALTHCARE | Age: 78
End: 2023-07-03
Payer: MEDICARE

## 2023-07-03 VITALS — HEART RATE: 68 BPM | OXYGEN SATURATION: 98 %

## 2023-07-03 PROCEDURE — G0151 HHCP-SERV OF PT,EA 15 MIN: HCPCS

## 2023-07-05 ENCOUNTER — HOME CARE VISIT (OUTPATIENT)
Dept: HOME HEALTH SERVICES | Facility: HOME HEALTHCARE | Age: 78
End: 2023-07-05
Payer: MEDICARE

## 2023-07-05 VITALS — HEART RATE: 67 BPM | OXYGEN SATURATION: 98 %

## 2023-07-05 PROCEDURE — G0151 HHCP-SERV OF PT,EA 15 MIN: HCPCS

## 2023-07-10 ENCOUNTER — ANTICOAG VISIT (OUTPATIENT)
Dept: CARDIOLOGY CLINIC | Facility: CLINIC | Age: 78
End: 2023-07-10

## 2023-07-10 ENCOUNTER — APPOINTMENT (OUTPATIENT)
Dept: LAB | Facility: HOSPITAL | Age: 78
End: 2023-07-10
Attending: INTERNAL MEDICINE
Payer: MEDICARE

## 2023-07-17 ENCOUNTER — APPOINTMENT (OUTPATIENT)
Dept: LAB | Facility: HOSPITAL | Age: 78
End: 2023-07-17
Attending: INTERNAL MEDICINE
Payer: MEDICARE

## 2023-07-17 ENCOUNTER — ANTICOAG VISIT (OUTPATIENT)
Dept: CARDIOLOGY CLINIC | Facility: CLINIC | Age: 78
End: 2023-07-17

## 2023-07-18 NOTE — PROGRESS NOTES
Cardiology Follow Up    Jose Parra  1945  [id number]  [facility] [place]  [address] [id number] [phone]    1. PAF (paroxysmal atrial fibrillation) ([address])        2. Non-rheumatic aortic stenosis        3. Anemia, unspecified type        4. Status post open reduction and internal fixation (ORIF) of fracture            Interval History:    Ms Jose Parra presents to our office for a follow up visit. She is home a lone. Her sister is helping her. Lindsey Sims admits to continued discomfort of her right hip. She denies CP, lightheadedness or dizziness. Lindsey Sims admits to one episode of palpitations since discharge with lying down at night. No further episodes.             7/17/23 INR 2.07 re test on 7/24/23       Medical History   Primary Cardiologist Dr Bill Waldrop  Resides alone on her own home   Paroxysmal atrial fibrillation [id number]= 3 ( age, Female) on coumadin followed by [facility]  Dyslipidemia 7/29/22 , , HDL 30, LDL 46   Spinal stenosis   Coumadin in the past she cannot remember reason    COVID -19 + 6/2020      On 7/27/22 Ms Audrey Mejia underwent elective hernia repair,   Lysis of adhesions, explantation of mesh, repair of enterotomy by Dr Santos Edwards    Ms Jose Parra was admitted to [facility] on 5/27 - 5/31/23 with perioprothetic fracture arount dinternal prosthetic right hip joint. Sp ORIF of right periprosthetic hip on 5/28/23. She experienced acute blood loss anemia, pain, severe constipation and decline in ADLS. She was discharged to [facility] on 5/13 - 6/16/23. Incidental findings:   She was found to have scattered < 3mm nodular densities in the posterior right upper lobe of the lung, Op follow up with PCP. Cystic hypodensity posterior right lobe of the liver. Left adrenal gland 1.4 x 0.9 cm nodule, small umbilical hernia  .     7/29/22 TTE:   Left ventricle cavity size normal,  Wall thickness mildly increased mild concentric hypertrophy.  LVEF 65% grade 2 pseudo normal relaxation.  Aortic valve mild to moderate stenosis valve mean gradient 13 mmHg.  Tricuspid valve mild regurgitation                Patient Active Problem List   Diagnosis   • Spinal stenosis of lumbar region   • Mixed hyperlipidemia   • Other chest pain   • Intestinal adhesions   • Incisional hernia, without obstruction or gangrene   • Postoperative visit   • Non-rheumatic aortic stenosis   • PAF (paroxysmal atrial fibrillation) ([address])   • Fall   • Pure hypercholesterolemia   • 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 (HCC)   • Constipation   • Pain   • At risk for venous thromboembolism (VTE)   • Anemia   • Abnormal finding on radiology exam   • Peripheral neuropathy   • Encounter for skin care   • Obesity     Past Medical History:   Diagnosis Date   • Chronic kidney disease     Horse shoe kidney   • Colitis    • Hyperlipidemia      Social History     Socioeconomic History   • Marital status:       Spouse name: Not on file   • Number of children: Not on file   • Years of education: Not on file   • Highest education level: Not on file   Occupational History   • Not on file   Tobacco Use   • Smoking status: Former     Types: Cigarettes     Quit date: 5     Years since quittin.5   • Smokeless tobacco: Never   Vaping Use   • Vaping Use: Never used   Substance and Sexual Activity   • Alcohol use: Yes     Comment: once in awhile   • Drug use: No   • Sexual activity: Not on file   Other Topics Concern   • Not on file   Social History Narrative   • Not on file     Social Determinants of Health     Financial Resource Strain: Not on file   Food Insecurity: No Food Insecurity (2022)    Hunger Vital Sign    • Worried About Running Out of Food in the Last Year: Never true    • Ran Out of Food in the Last Year: Never true   Transportation Needs: No Transportation Needs (2022) PRAPARE - Transportation    • Lack of Transportation (Medical): No    • Lack of Transportation (Non-Medical): No   Physical Activity: Not on file   Stress: Not on file   Social Connections: Not on file   Intimate Partner Violence: Not on file   Housing Stability: Low Risk  (7/28/2022)    Housing Stability Vital Sign    • Unable to Pay for Housing in the Last Year: No    • Number of Places Lived in the Last Year: 1    • Unstable Housing in the Last Year: No      Family History   Problem Relation Age of Onset   • No Known Problems Mother    • No Known Problems Father      Past Surgical History:   Procedure Laterality Date   • ABDOMINAL ADHESION SURGERY N/A 11/27/2021    Procedure: LYSIS ADHESIONS;  Surgeon: Rama Villagomez MD;  Location: [facility];  Service: General   • BACK SURGERY      2 rods placed in back   • COLON SURGERY     • COLONOSCOPY N/A 5/4/2018    Procedure: COLONOSCOPY;  Surgeon: Faustino Skinner MD;  Location: [facility];   Service: Colorectal   • JOINT REPLACEMENT Bilateral    • LAPAROTOMY N/A 11/27/2021    Procedure: LAPAROTOMY EXPLORATORY; EXPLANTATION OF MESH;  Surgeon: Rama Villagomez MD;  Location: [facility];  Service: General   • ORIF FEMUR FRACTURE Right 5/28/2023    Procedure: OPEN REDUCTION W/ INTERNAL FIXATION (ORIF) FEMUR- ORIF right siri-prosthetic hip fracture;  Surgeon: Barbie Ross MD;  Location: [facility];  Service: Orthopedics   • AR REPAIR FIRST ABDOMINAL WALL HERNIA N/A 7/27/2022    Procedure: REPAIR HERNIA INCISIONAL, LYSIS OF ADHESIONS, EXPLANTATION OF MESH, REPAIR OF ENTEROTOMY;  Surgeon: Samuel Tuttle MD;  Location: [facility];  Service: General       Current Outpatient Medications:   •  acetaminophen (TYLENOL) 325 mg tablet, Take 2 tablets (650 mg total) by mouth 3 (three) times a day as needed for mild pain, Disp: , Rfl: 0  •  metoprolol succinate (TOPROL-XL) 50 mg 24 hr tablet, Take 1 tablet (50 mg total) by mouth daily at bedtime, Disp: 30 tablet, Rfl: 0  • montelukast (SINGULAIR) 10 mg tablet, Take 10 mg by mouth daily at bedtime  , Disp: , Rfl:   •  oxyCODONE (ROXICODONE) 5 immediate release tablet, Take 0.5 tab (2.5mg) to 1 tab (5mg) by mouth as needed for moderate to severe pain, Disp: 7 tablet, Rfl: 0  •  polyethylene glycol (MIRALAX) 17 g packet, Take 17 g by mouth daily as needed (Constipation), Disp: , Rfl: 0  •  pregabalin (LYRICA) 100 mg capsule, Take 100 mg by mouth 2 (two) times a day  , Disp: , Rfl:   •  simvastatin (ZOCOR) 20 mg tablet, Take 20 mg by mouth daily at bedtime, Disp: , Rfl:   •  Tafluprost, PF, 0.0015 % SOLN, Apply 1 drop to eye in the morning BOTH EYES, Disp: , Rfl:   •  warfarin (COUMADIN) 5 mg tablet, 6/27/23 3.75mg--5mg daily Repeat PT/INR by mobile lab 7/723 or 7/10/23, Disp: 90 tablet, Rfl: 3  •  warfarin (COUMADIN) 7.5 mg tablet, Take 7.5 mg 3 times weekly on Monday, Wednesday and Friday. Take 5 mg on all other days, Disp: , Rfl: 0  Allergies   Allergen Reactions   • Hydromorphone Other (See Comments)     "it stopped my heart"  hallucinations         Labs:   Ancillary Orders on 07/14/2023   Component Date Value   • Protime 07/17/2023 23.6 (H)    • INR 07/17/2023 2.07 (H)    Ancillary Orders on 07/07/2023   Component Date Value   • Protime 07/10/2023 22.6 (H)    • INR 07/10/2023 1.96 (H)    Lab Requisition on 06/27/2023   Component Date Value   • WBC 06/27/2023 4.98    • RBC 06/27/2023 3.65 (L)    • Hemoglobin 06/27/2023 11.4 (L)    • Hematocrit 06/27/2023 36.8    • MCV 06/27/2023 101 (H)    • MCH 06/27/2023 31.2    • MCHC 06/27/2023 31.0 (L)    • RDW 06/27/2023 16.2 (H)    • Platelets 60/74/2901 295    • MPV 06/27/2023 8.6 (L)    Anticoag visit on 06/27/2023   Component Date Value   • INR 06/27/2023 2.90 (A)    Anticoag visit on 06/22/2023   Component Date Value   • INR 06/22/2023 3.20 (A)    Anticoag visit on 06/20/2023   Component Date Value   • INR 06/20/2023 3.50 (A)    Admission on 05/31/2023, Discharged on 06/16/2023   Component Date Value   • Protime 05/31/2023 14.1    • INR 05/31/2023 1.07    • Protime 06/01/2023 15.0 (H)    • INR 06/01/2023 1.15    • Protime 06/02/2023 14.3    • INR 06/02/2023 1.09    • Protime 06/04/2023 15.3 (H)    • INR 06/04/2023 1.19    • WBC 06/05/2023 8. 11    • RBC 06/05/2023 3.26 (L)    • Hemoglobin 06/05/2023 10.0 (L)    • Hematocrit 06/05/2023 32.1 (L)    • MCV 06/05/2023 99 (H)    • MCH 06/05/2023 30.7    • MCHC 06/05/2023 31.2 (L)    • RDW 06/05/2023 15.7 (H)    • MPV 06/05/2023 8.8 (L)    • Platelets 21/28/5164 313    • nRBC 06/05/2023 0    • Neutrophils Relative 06/05/2023 55    • Immat GRANS % 06/05/2023 1    • Lymphocytes Relative 06/05/2023 23    • Monocytes Relative 06/05/2023 13 (H)    • Eosinophils Relative 06/05/2023 7 (H)    • Basophils Relative 06/05/2023 1    • Neutrophils Absolute 06/05/2023 4.47    • Immature Grans Absolute 06/05/2023 0.04    • Lymphocytes Absolute 06/05/2023 1.86    • Monocytes Absolute 06/05/2023 1.09    • Eosinophils Absolute 06/05/2023 0.58    • Basophils Absolute 06/05/2023 0.07    • Sodium 06/05/2023 137    • Potassium 06/05/2023 4.5    • Chloride 06/05/2023 107    • CO2 06/05/2023 29    • ANION GAP 06/05/2023 1 (L)    • BUN 06/05/2023 14    • Creatinine 06/05/2023 0.43 (L)    • Glucose 06/05/2023 93    • Glucose, Fasting 06/05/2023 93    • Calcium 06/05/2023 9.0    • eGFR 06/05/2023 97    • Protime 06/06/2023 17.3 (H)    • INR 06/06/2023 1.39 (H)    • Protime 06/08/2023 22.0 (H)    • INR 06/08/2023 1.90 (H)    • WBC 06/08/2023 7.29    • RBC 06/08/2023 3.11 (L)    • Hemoglobin 06/08/2023 9.7 (L)    • Hematocrit 06/08/2023 30.9 (L)    • MCV 06/08/2023 99 (H)    • MCH 06/08/2023 31.2    • MCHC 06/08/2023 31.4    • RDW 06/08/2023 15.8 (H)    • MPV 06/08/2023 8.8 (L)    • Platelets 34/47/8961 362    • nRBC 06/08/2023 0    • Neutrophils Relative 06/08/2023 58    • Immat GRANS % 06/08/2023 0    • Lymphocytes Relative 06/08/2023 20    • Monocytes Relative 06/08/2023 14 (H)    • Eosinophils Relative 06/08/2023 7 (H)    • Basophils Relative 06/08/2023 1    • Neutrophils Absolute 06/08/2023 4.23    • Immature Grans Absolute 06/08/2023 0.03    • Lymphocytes Absolute 06/08/2023 1.43    • Monocytes Absolute 06/08/2023 1.05    • Eosinophils Absolute 06/08/2023 0.49    • Basophils Absolute 06/08/2023 0.06    • Sodium 06/08/2023 139    • Potassium 06/08/2023 4.2    • Chloride 06/08/2023 109 (H)    • CO2 06/08/2023 28    • ANION GAP 06/08/2023 2 (L)    • BUN 06/08/2023 12    • Creatinine 06/08/2023 0.39 (L)    • Glucose 06/08/2023 96    • Glucose, Fasting 06/08/2023 96    • Calcium 06/08/2023 8.8    • eGFR 06/08/2023 100    • Protime 06/09/2023 24.3 (H)    • INR 06/09/2023 2.15 (H)    • Supplier Name 06/09/2023 [employer]    • Supplier Phone Number 06/09/2023 [phone]    • Order Status 06/09/2023 Delivery Successful    • Delivery Request Date 06/09/2023 06/15/2023    • Date Delivered  06/09/2023 06/18/2023    • Item Description 06/09/2023 Yonis Gleason Junior    • WBC 06/12/2023 7.20    • RBC 06/12/2023 3.52 (L)    • Hemoglobin 06/12/2023 11.0 (L)    • Hematocrit 06/12/2023 35.7    • MCV 06/12/2023 101 (H)    • MCH 06/12/2023 31.3    • MCHC 06/12/2023 30.8 (L)    • RDW 06/12/2023 16.0 (H)    • MPV 06/12/2023 8.4 (L)    • Platelets 74/81/0460 443 (H)    • nRBC 06/12/2023 0    • Neutrophils Relative 06/12/2023 63    • Immat GRANS % 06/12/2023 0    • Lymphocytes Relative 06/12/2023 21    • Monocytes Relative 06/12/2023 10    • Eosinophils Relative 06/12/2023 5    • Basophils Relative 06/12/2023 1    • Neutrophils Absolute 06/12/2023 4.49    • Immature Grans Absolute 06/12/2023 0.02    • Lymphocytes Absolute 06/12/2023 1.54    • Monocytes Absolute 06/12/2023 0.71    • Eosinophils Absolute 06/12/2023 0.36    • Basophils Absolute 06/12/2023 0.08    • Sodium 06/12/2023 141    • Potassium 06/12/2023 4.9    • Chloride 06/12/2023 110 (H)    • CO2 06/12/2023 30    • ANION GAP 06/12/2023 1 (L)    • BUN 06/12/2023 9    • Creatinine 06/12/2023 0.46 (L)    • Glucose 06/12/2023 82    • Glucose, Fasting 06/12/2023 82    • Calcium 06/12/2023 9.4    • eGFR 06/12/2023 94    • Protime 06/12/2023 25.1 (H)    • INR 06/12/2023 2.25 (H)    • WBC 06/15/2023 6.12    • RBC 06/15/2023 3.66 (L)    • Hemoglobin 06/15/2023 11.5    • Hematocrit 06/15/2023 36.7    • MCV 06/15/2023 100 (H)    • MCH 06/15/2023 31.4    • MCHC 06/15/2023 31.3 (L)    • RDW 06/15/2023 15.8 (H)    • MPV 06/15/2023 8.5 (L)    • Platelets 21/15/6430 485 (H)    • nRBC 06/15/2023 0    • Neutrophils Relative 06/15/2023 49    • Immat GRANS % 06/15/2023 0    • Lymphocytes Relative 06/15/2023 30    • Monocytes Relative 06/15/2023 14 (H)    • Eosinophils Relative 06/15/2023 6    • Basophils Relative 06/15/2023 1    • Neutrophils Absolute 06/15/2023 2.97    • Immature Grans Absolute 06/15/2023 0.02    • Lymphocytes Absolute 06/15/2023 1.84    • Monocytes Absolute 06/15/2023 0.83    • Eosinophils Absolute 06/15/2023 0.38    • Basophils Absolute 06/15/2023 0.08    • Sodium 06/15/2023 140    • Potassium 06/15/2023 4.5    • Chloride 06/15/2023 107    • CO2 06/15/2023 30    • ANION GAP 06/15/2023 3 (L)    • BUN 06/15/2023 13    • Creatinine 06/15/2023 0.47 (L)    • Glucose 06/15/2023 90    • Glucose, Fasting 06/15/2023 90    • Calcium 06/15/2023 9.6    • eGFR 06/15/2023 94    • Protime 06/15/2023 26.6 (H)    • INR 06/15/2023 2.42 (H)    Admission on 05/27/2023, Discharged on 05/31/2023   Component Date Value   • WBC 05/27/2023 7.02    • RBC 05/27/2023 4.01    • Hemoglobin 05/27/2023 12.4    • Hematocrit 05/27/2023 39.0    • MCV 05/27/2023 97    • MCH 05/27/2023 30.9    • MCHC 05/27/2023 31.8    • RDW 05/27/2023 14.4    • MPV 05/27/2023 8.7 (L)    • Platelets 77/29/6059 290    • nRBC 05/27/2023 0    • Neutrophils Relative 05/27/2023 49    • Immat GRANS % 05/27/2023 0    • Lymphocytes Relative 05/27/2023 34    • Monocytes Relative 05/27/2023 12    • Eosinophils Relative 05/27/2023 4    • Basophils Relative 05/27/2023 1    • Neutrophils Absolute 05/27/2023 3.39    • Immature Grans Absolute 05/27/2023 0.03    • Lymphocytes Absolute 05/27/2023 2.41    • Monocytes Absolute 05/27/2023 0.85    • Eosinophils Absolute 05/27/2023 0.27    • Basophils Absolute 05/27/2023 0.07    • Sodium 05/27/2023 137    • Potassium 05/27/2023 4.2    • Chloride 05/27/2023 109 (H)    • CO2 05/27/2023 24    • ANION GAP 05/27/2023 4    • BUN 05/27/2023 15    • Creatinine 05/27/2023 0.57 (L)    • Glucose 05/27/2023 139    • Calcium 05/27/2023 8.5    • Corrected Calcium 05/27/2023 9.1    • AST 05/27/2023 24    • ALT 05/27/2023 30    • Alkaline Phosphatase 05/27/2023 59    • Total Protein 05/27/2023 7.0    • Albumin 05/27/2023 3.3 (L)    • Total Bilirubin 05/27/2023 0.31    • eGFR 05/27/2023 88    • ph, [place] ISTAT 05/27/2023 7.339    • pCO2, [place] i-STAT 05/27/2023 46.1    • pO2, [place] i-STAT 05/27/2023 57.0 (H)    • BE, i-STAT 05/27/2023 -1    • HCO3, [place] i-STAT 05/27/2023 24.8    • CO2, i-STAT 05/27/2023 26    • O2 Sat, i-STAT 05/27/2023 87 (H)    • SODIUM, I-STAT 05/27/2023 140    • Potassium, i-STAT 05/27/2023 4.3    • Calcium, Ionized i-STAT 05/27/2023 1.25    • Hct, i-STAT 05/27/2023 37    • Hgb, i-STAT 05/27/2023 12.6    • Glucose, i-STAT 05/27/2023 140    • Specimen Type 05/27/2023 VENOUS    • Protime 05/27/2023 25.8 (H)    • INR 05/27/2023 2.33 (H)    • PTT 05/27/2023 33    • Ventricular Rate 05/27/2023 54    • QRSD Interval 05/27/2023 64    • QT Interval 05/27/2023 450    • QTC Interval 05/27/2023 426    • QRS Axis 05/27/2023 40    • T Wave Axis 05/27/2023 -8    • ABO Grouping 05/27/2023 A    • Rh Factor 05/27/2023 Positive    • Antibody Screen 05/27/2023 Negative    • Specimen Expiration Date 05/27/2023 [id number]    • Protime 05/28/2023 18.3 (H)    • INR 05/28/2023 1.49 (H)    • Sodium 05/28/2023 136    • Potassium 05/28/2023 4.0    • Chloride 05/28/2023 108    • CO2 05/28/2023 27    • ANION GAP 05/28/2023 1 (L)    • BUN 05/28/2023 12    • Creatinine 05/28/2023 0.60    • Glucose 05/28/2023 87    • Calcium 05/28/2023 8.3    • eGFR 05/28/2023 86    • WBC 05/28/2023 7.22    • RBC 05/28/2023 3.60 (L)    • Hemoglobin 05/28/2023 11.0 (L)    • Hematocrit 05/28/2023 34.6 (L)    • MCV 05/28/2023 96    • MCH 05/28/2023 30.6    • MCHC 05/28/2023 31.8    • RDW 05/28/2023 14.5    • MPV 05/28/2023 8.9    • Platelets 07/49/4587 247    • nRBC 05/28/2023 0    • Neutrophils Relative 05/28/2023 57    • Immat GRANS % 05/28/2023 0    • Lymphocytes Relative 05/28/2023 27    • Monocytes Relative 05/28/2023 12    • Eosinophils Relative 05/28/2023 3    • Basophils Relative 05/28/2023 1    • Neutrophils Absolute 05/28/2023 4.13    • Immature Grans Absolute 05/28/2023 0.02    • Lymphocytes Absolute 05/28/2023 1.98    • Monocytes Absolute 05/28/2023 0.87    • Eosinophils Absolute 05/28/2023 0.18    • Basophils Absolute 05/28/2023 0.04    • WBC 05/30/2023 11.26 (H)    • RBC 05/30/2023 2.95 (L)    • Hemoglobin 05/30/2023 9.0 (L)    • Hematocrit 05/30/2023 28.5 (L)    • MCV 05/30/2023 97    • MCH 05/30/2023 30.5    • MCHC 05/30/2023 31.6    • RDW 05/30/2023 14.6    • MPV 05/30/2023 9.2    • Platelets 16/33/1121 219    • nRBC 05/30/2023 0    • Neutrophils Relative 05/30/2023 69    • Immat GRANS % 05/30/2023 0    • Lymphocytes Relative 05/30/2023 18    • Monocytes Relative 05/30/2023 13 (H)    • Eosinophils Relative 05/30/2023 0    • Basophils Relative 05/30/2023 0    • Neutrophils Absolute 05/30/2023 7.68 (H)    • Immature Grans Absolute 05/30/2023 0.04    • Lymphocytes Absolute 05/30/2023 1.99    • Monocytes Absolute 05/30/2023 1.50 (H)    • Eosinophils Absolute 05/30/2023 0.02    • Basophils Absolute 05/30/2023 0.03    • Sodium 05/30/2023 139    • Potassium 05/30/2023 4.2    • Chloride 05/30/2023 109 (H)    • CO2 05/30/2023 30    • ANION GAP 05/30/2023 0 (L)    • BUN 05/30/2023 14    • Creatinine 05/30/2023 0.58 (L)    • Glucose 05/30/2023 101    • Calcium 05/30/2023 8.6    • eGFR 05/30/2023 87    • WBC 05/31/2023 8.70    • RBC 05/31/2023 3.17 (L)    • Hemoglobin 05/31/2023 9.8 (L)    • Hematocrit 05/31/2023 30.9 (L)    • MCV 05/31/2023 98    • MCH 05/31/2023 30.9    • MCHC 05/31/2023 31.7    • RDW 05/31/2023 14.7    • MPV 05/31/2023 9.2    • Platelets 42/14/4337 218    • nRBC 05/31/2023 0    • Neutrophils Relative 05/31/2023 62    • Immat GRANS % 05/31/2023 1    • Lymphocytes Relative 05/31/2023 20    • Monocytes Relative 05/31/2023 14 (H)    • Eosinophils Relative 05/31/2023 2    • Basophils Relative 05/31/2023 1    • Neutrophils Absolute 05/31/2023 5.51    • Immature Grans Absolute 05/31/2023 0.04    • Lymphocytes Absolute 05/31/2023 1.71    • Monocytes Absolute 05/31/2023 1.19    • Eosinophils Absolute 05/31/2023 0.21    • Basophils Absolute 05/31/2023 0.04    • Sodium 05/31/2023 139    • Potassium 05/31/2023 4.1    • Chloride 05/31/2023 108    • CO2 05/31/2023 27    • ANION GAP 05/31/2023 4    • BUN 05/31/2023 14    • Creatinine 05/31/2023 0.42 (L)    • Glucose 05/31/2023 94    • Calcium 05/31/2023 8.6    • eGFR 05/31/2023 97    • SARS-CoV-2 05/31/2023 Negative      Imaging: No results found. Review of Systems:  Review of Systems   Musculoskeletal: Positive for arthralgias, gait problem and myalgias. Using a cane to assist with ambulation    All other systems reviewed and are negative. Physical Exam:  Physical Exam  Vitals reviewed. Constitutional:       Appearance: Normal appearance. Cardiovascular:      Rate and Rhythm: Normal rate and regular rhythm. Pulses: Normal pulses. Heart sounds: Murmur heard. Comments: 2/6 RASHAUN  Pulmonary:      Effort: Pulmonary effort is normal.      Breath sounds: Normal breath sounds. Abdominal:      Palpations: Abdomen is soft. Musculoskeletal:         General: Normal range of motion. Cervical back: Normal range of motion and neck supple.       Right lower leg: No edema. Left lower leg: No edema. Skin:     General: Skin is warm and dry. Capillary Refill: Capillary refill takes less than 2 seconds. Neurological:      General: No focal deficit present. Mental Status: She is alert and oriented to person, place, and time.    Psychiatric:         Mood and Affect: Mood normal.         Behavior: Behavior normal.         Discussion/Summary:  # Paroxysmal atrial fibrillation [id number]= 3 on Coumadin followed by [facility], goal INR 2.0- 3.0, 7/17/23 INR 2.07 re test on 7/24/23   # Mild to mod AS asymptomatic at this time   # Anemia 6/27/23 Hgb 11.4/Hct 36.8   # Sp ORIF of right periprosthetic hip on 5/28/23 continues healing, using walker, follow up with Orthopedics on 7/25/23

## 2023-07-19 ENCOUNTER — OFFICE VISIT (OUTPATIENT)
Dept: CARDIOLOGY CLINIC | Facility: CLINIC | Age: 78
End: 2023-07-19
Payer: MEDICARE

## 2023-07-19 VITALS
SYSTOLIC BLOOD PRESSURE: 140 MMHG | HEART RATE: 63 BPM | WEIGHT: 170.5 LBS | OXYGEN SATURATION: 97 % | HEIGHT: 59 IN | DIASTOLIC BLOOD PRESSURE: 60 MMHG | BODY MASS INDEX: 34.37 KG/M2

## 2023-07-19 DIAGNOSIS — Z87.81 STATUS POST OPEN REDUCTION AND INTERNAL FIXATION (ORIF) OF FRACTURE: ICD-10-CM

## 2023-07-19 DIAGNOSIS — Z98.890 STATUS POST OPEN REDUCTION AND INTERNAL FIXATION (ORIF) OF FRACTURE: ICD-10-CM

## 2023-07-19 DIAGNOSIS — D64.9 ANEMIA, UNSPECIFIED TYPE: ICD-10-CM

## 2023-07-19 DIAGNOSIS — I35.0 NON-RHEUMATIC AORTIC STENOSIS: ICD-10-CM

## 2023-07-19 DIAGNOSIS — I48.0 PAF (PAROXYSMAL ATRIAL FIBRILLATION) (HCC): Primary | ICD-10-CM

## 2023-07-19 PROCEDURE — 99214 OFFICE O/P EST MOD 30 MIN: CPT | Performed by: NURSE PRACTITIONER

## 2023-07-24 ENCOUNTER — APPOINTMENT (OUTPATIENT)
Dept: LAB | Facility: HOSPITAL | Age: 78
End: 2023-07-24
Attending: INTERNAL MEDICINE
Payer: MEDICARE

## 2023-07-24 ENCOUNTER — ANTICOAG VISIT (OUTPATIENT)
Dept: CARDIOLOGY CLINIC | Facility: CLINIC | Age: 78
End: 2023-07-24

## 2023-07-25 ENCOUNTER — OFFICE VISIT (OUTPATIENT)
Dept: OBGYN CLINIC | Facility: HOSPITAL | Age: 78
End: 2023-07-25

## 2023-07-25 ENCOUNTER — HOSPITAL ENCOUNTER (OUTPATIENT)
Dept: RADIOLOGY | Facility: HOSPITAL | Age: 78
Discharge: HOME | End: 2023-07-25
Attending: ORTHOPAEDIC SURGERY
Payer: MEDICARE

## 2023-07-25 VITALS
SYSTOLIC BLOOD PRESSURE: 124 MMHG | WEIGHT: 170 LBS | DIASTOLIC BLOOD PRESSURE: 69 MMHG | HEIGHT: 59 IN | BODY MASS INDEX: 34.27 KG/M2 | HEART RATE: 60 BPM

## 2023-07-25 DIAGNOSIS — M97.01XA PERIPROSTHETIC FRACTURE AROUND INTERNAL PROSTHETIC RIGHT HIP JOINT, INITIAL ENCOUNTER (HCC): Primary | ICD-10-CM

## 2023-07-25 DIAGNOSIS — M97.01XA PERIPROSTHETIC FRACTURE AROUND INTERNAL PROSTHETIC RIGHT HIP JOINT, INITIAL ENCOUNTER (HCC): ICD-10-CM

## 2023-07-25 PROCEDURE — 99024 POSTOP FOLLOW-UP VISIT: CPT | Performed by: ORTHOPAEDIC SURGERY

## 2023-07-25 PROCEDURE — 73552 X-RAY EXAM OF FEMUR 2/>: CPT

## 2023-07-25 NOTE — PROGRESS NOTES
Assessment:  1. 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, initial encounter ([address])  XR femur 2 vw right            Surgery: OPEN REDUCTION W/ INTERNAL FIXATION (ORIF) FEMUR- ORIF right siri-prosthetic hip fracture - Right  Date of Surgery: 5/28/2023        Discussion and Plan:   · Patient is progressing nicely on exam today. · WBAT  · May transition from walker to cane. · Patient declined physical therapy    Follow Up:  Return in about 3 months (around 10/25/2023) for x-rays. CHIEF COMPLAINT:  Chief Complaint   Patient presents with   • Right Hip - Post-op         SUBJECTIVE:  Deedee Tom is a 77y.o. year old female who presents for her 1st follow up 8 weeks after OPEN REDUCTION W/ INTERNAL FIXATION (ORIF) FEMUR- ORIF right siri-prosthetic hip fracture - Right. Today patient states overall she is doing well. She is able to perform ADLs. Patient drove herself here today because she had no one to drive her. She states that is why this is her first PO.        PHYSICAL EXAMINATION:  General: well developed and well nourished, alert, oriented times 3 and appears comfortable  Psychiatric: Normal    MUSCULOSKELETAL EXAMINATION:  Incision: Clean, dry, intact  Surgery Site: normal, no evidence of infection   Range of Motion: As expected  Neurovascular status: Neuro intact, good cap refill  Activity Restrictions: walker WBAT       I have personally reviewed pertinent films in PACS and my interpretation is as follows.   Right hip x-rays demonstrates progressive healing of fracture, hardware in acceptable alignment and position    Scribe Attestation    I,:  Aminata Cameron am acting as a scribe while in the presence of the attending physician.:       I,:  Roberto Sands MD personally performed the services described in this documentation    as scribed in my presence.:

## 2023-07-27 ENCOUNTER — TELEPHONE (OUTPATIENT)
Dept: LAB | Facility: HOSPITAL | Age: 78
End: 2023-07-27

## 2023-07-31 ENCOUNTER — APPOINTMENT (OUTPATIENT)
Dept: LAB | Facility: HOSPITAL | Age: 78
End: 2023-07-31
Attending: INTERNAL MEDICINE
Payer: MEDICARE

## 2023-07-31 ENCOUNTER — ANTICOAG VISIT (OUTPATIENT)
Dept: CARDIOLOGY CLINIC | Facility: CLINIC | Age: 78
End: 2023-07-31

## 2023-08-07 ENCOUNTER — ANTICOAG VISIT (OUTPATIENT)
Dept: CARDIOLOGY CLINIC | Facility: CLINIC | Age: 78
End: 2023-08-07

## 2023-08-07 ENCOUNTER — APPOINTMENT (OUTPATIENT)
Dept: LAB | Facility: CLINIC | Age: 78
End: 2023-08-07
Payer: MEDICARE

## 2023-08-14 ENCOUNTER — ANTICOAG VISIT (OUTPATIENT)
Dept: CARDIOLOGY CLINIC | Facility: CLINIC | Age: 78
End: 2023-08-14

## 2023-08-14 ENCOUNTER — APPOINTMENT (OUTPATIENT)
Dept: LAB | Facility: CLINIC | Age: 78
End: 2023-08-14
Payer: MEDICARE

## 2023-08-14 ENCOUNTER — TELEPHONE (OUTPATIENT)
Dept: CARDIOLOGY CLINIC | Facility: CLINIC | Age: 78
End: 2023-08-14

## 2023-08-14 NOTE — TELEPHONE ENCOUNTER
Pt called asking why she is short of breath only when she bends over? I suggested an appt to be evaluated.  She will call the PCP office for an appt

## 2023-08-28 ENCOUNTER — APPOINTMENT (OUTPATIENT)
Dept: LAB | Facility: CLINIC | Age: 78
End: 2023-08-28
Payer: MEDICARE

## 2023-08-28 ENCOUNTER — ANTICOAG VISIT (OUTPATIENT)
Dept: CARDIOLOGY CLINIC | Facility: CLINIC | Age: 78
End: 2023-08-28

## 2023-09-05 ENCOUNTER — ANTICOAG VISIT (OUTPATIENT)
Dept: CARDIOLOGY CLINIC | Facility: CLINIC | Age: 78
End: 2023-09-05

## 2023-09-05 ENCOUNTER — HOSPITAL ENCOUNTER (OUTPATIENT)
Dept: NON INVASIVE DIAGNOSTICS | Facility: CLINIC | Age: 78
Discharge: HOME | End: 2023-09-05
Payer: MEDICARE

## 2023-09-05 ENCOUNTER — APPOINTMENT (OUTPATIENT)
Dept: LAB | Facility: CLINIC | Age: 78
End: 2023-09-05
Payer: MEDICARE

## 2023-09-05 VITALS
DIASTOLIC BLOOD PRESSURE: 69 MMHG | WEIGHT: 170 LBS | HEIGHT: 59 IN | BODY MASS INDEX: 34.27 KG/M2 | SYSTOLIC BLOOD PRESSURE: 124 MMHG | HEART RATE: 60 BPM

## 2023-09-05 DIAGNOSIS — I48.0 PAF (PAROXYSMAL ATRIAL FIBRILLATION) (HCC): ICD-10-CM

## 2023-09-05 DIAGNOSIS — I35.0 NON-RHEUMATIC AORTIC STENOSIS: ICD-10-CM

## 2023-09-05 LAB
AORTIC ROOT: 2.4 CM
AORTIC VALVE MEAN VELOCITY: 12.9 M/S
APICAL FOUR CHAMBER EJECTION FRACTION: 55 %
ASCENDING AORTA: 2.8 CM
AV AREA BY CONTINUOUS VTI: 1.4 CM2
AV AREA PEAK VELOCITY: 1.3 CM2
AV LVOT MEAN GRADIENT: 2 MMHG
AV LVOT PEAK GRADIENT: 3 MMHG
AV MEAN GRADIENT: 7 MMHG
AV PEAK GRADIENT: 12 MMHG
AV VALVE AREA: 1.41 CM2
AV VELOCITY RATIO: 0.47
DOP CALC AO PEAK VEL: 1.72 M/S
DOP CALC AO VTI: 36.5 CM
DOP CALC LVOT AREA: 2.83 CM2
DOP CALC LVOT CARDIAC INDEX: 2.62 L/MIN/M2
DOP CALC LVOT CARDIAC OUTPUT: 4.51 L/MIN
DOP CALC LVOT DIAMETER: 1.9 CM
DOP CALC LVOT PEAK VEL VTI: 18.11 CM
DOP CALC LVOT PEAK VEL: 0.8 M/S
DOP CALC LVOT STROKE INDEX: 28.5 ML/M2
DOP CALC LVOT STROKE VOLUME: 51.32
FRACTIONAL SHORTENING: 29 (ref 28–44)
INR PPP: 2.57 (ref 0.84–1.19)
INTERVENTRICULAR SEPTUM IN DIASTOLE (PARASTERNAL SHORT AXIS VIEW): 1.4 CM
INTERVENTRICULAR SEPTUM: 1.4 CM (ref 0.6–1.1)
IVC: 19 MM
LAAS-AP2: 24.9 CM2
LAAS-AP4: 28.2 CM2
LEFT ATRIUM SIZE: 4.4 CM
LEFT ATRIUM VOLUME (MOD BIPLANE): 92 ML
LEFT INTERNAL DIMENSION IN SYSTOLE: 2.9 CM (ref 2.1–4)
LEFT VENTRICULAR INTERNAL DIMENSION IN DIASTOLE: 4.1 CM (ref 3.5–6)
LEFT VENTRICULAR POSTERIOR WALL IN END DIASTOLE: 1.4 CM
LEFT VENTRICULAR STROKE VOLUME: 41 ML
LVSV (TEICH): 41 ML
PROTHROMBIN TIME: 27.8 SECONDS (ref 11.6–14.5)
RA PRESSURE ESTIMATED: 3 MMHG
RIGHT ATRIUM AREA SYSTOLE A4C: 12.6 CM2
RIGHT VENTRICLE ID DIMENSION: 3.7 CM
RV PSP: 32 MMHG
SL CV LEFT ATRIUM LENGTH A2C: 6.3 CM
SL CV LV EF: 65
SL CV PED ECHO LEFT VENTRICLE DIASTOLIC VOLUME (MOD BIPLANE) 2D: 74 ML
SL CV PED ECHO LEFT VENTRICLE SYSTOLIC VOLUME (MOD BIPLANE) 2D: 33 ML
TR MAX PG: 29 MMHG
TR PEAK VELOCITY: 2.7 M/S
TRICUSPID ANNULAR PLANE SYSTOLIC EXCURSION: 2.1 CM
TRICUSPID VALVE PEAK REGURGITATION VELOCITY: 2.69 M/S

## 2023-09-05 PROCEDURE — 93306 TTE W/DOPPLER COMPLETE: CPT

## 2023-09-05 PROCEDURE — 93306 TTE W/DOPPLER COMPLETE: CPT | Performed by: INTERNAL MEDICINE

## 2023-09-05 PROCEDURE — 85610 PROTHROMBIN TIME: CPT

## 2023-09-18 ENCOUNTER — ANTICOAG VISIT (OUTPATIENT)
Dept: CARDIOLOGY CLINIC | Facility: CLINIC | Age: 78
End: 2023-09-18

## 2023-09-18 ENCOUNTER — APPOINTMENT (OUTPATIENT)
Dept: LAB | Facility: CLINIC | Age: 78
End: 2023-09-18
Payer: MEDICARE

## 2023-10-09 ENCOUNTER — APPOINTMENT (OUTPATIENT)
Dept: LAB | Facility: CLINIC | Age: 78
End: 2023-10-09
Payer: MEDICARE

## 2023-10-09 ENCOUNTER — ANTICOAG VISIT (OUTPATIENT)
Dept: CARDIOLOGY CLINIC | Facility: CLINIC | Age: 78
End: 2023-10-09

## 2023-10-27 ENCOUNTER — ANTICOAG VISIT (OUTPATIENT)
Dept: CARDIOLOGY CLINIC | Facility: CLINIC | Age: 78
End: 2023-10-27

## 2023-10-27 ENCOUNTER — APPOINTMENT (OUTPATIENT)
Dept: LAB | Facility: CLINIC | Age: 78
End: 2023-10-27
Payer: MEDICARE

## 2023-10-30 ENCOUNTER — OFFICE VISIT (OUTPATIENT)
Dept: OBGYN CLINIC | Facility: HOSPITAL | Age: 78
End: 2023-10-30
Payer: MEDICARE

## 2023-10-30 ENCOUNTER — HOSPITAL ENCOUNTER (OUTPATIENT)
Dept: RADIOLOGY | Facility: HOSPITAL | Age: 78
Discharge: HOME | End: 2023-10-30
Attending: ORTHOPAEDIC SURGERY
Payer: MEDICARE

## 2023-10-30 VITALS
HEART RATE: 60 BPM | SYSTOLIC BLOOD PRESSURE: 108 MMHG | BODY MASS INDEX: 34.34 KG/M2 | DIASTOLIC BLOOD PRESSURE: 57 MMHG | HEIGHT: 59 IN

## 2023-10-30 DIAGNOSIS — M97.01XA PERIPROSTHETIC FRACTURE AROUND INTERNAL PROSTHETIC RIGHT HIP JOINT, INITIAL ENCOUNTER (HCC): ICD-10-CM

## 2023-10-30 DIAGNOSIS — M97.01XA PERIPROSTHETIC FRACTURE AROUND INTERNAL PROSTHETIC RIGHT HIP JOINT, INITIAL ENCOUNTER (HCC): Primary | ICD-10-CM

## 2023-10-30 PROCEDURE — 99213 OFFICE O/P EST LOW 20 MIN: CPT | Performed by: ORTHOPAEDIC SURGERY

## 2023-10-30 PROCEDURE — 73552 X-RAY EXAM OF FEMUR 2/>: CPT

## 2023-10-31 ENCOUNTER — OFFICE VISIT (OUTPATIENT)
Dept: CARDIOLOGY CLINIC | Facility: CLINIC | Age: 78
End: 2023-10-31
Payer: MEDICARE

## 2023-10-31 VITALS
DIASTOLIC BLOOD PRESSURE: 66 MMHG | HEART RATE: 64 BPM | BODY MASS INDEX: 33.47 KG/M2 | HEIGHT: 59 IN | SYSTOLIC BLOOD PRESSURE: 122 MMHG | WEIGHT: 166 LBS

## 2023-10-31 DIAGNOSIS — I48.0 PAF (PAROXYSMAL ATRIAL FIBRILLATION) (HCC): Primary | ICD-10-CM

## 2023-10-31 DIAGNOSIS — E78.2 MIXED HYPERLIPIDEMIA: ICD-10-CM

## 2023-10-31 DIAGNOSIS — I35.0 NON-RHEUMATIC AORTIC STENOSIS: ICD-10-CM

## 2023-10-31 PROCEDURE — 99214 OFFICE O/P EST MOD 30 MIN: CPT | Performed by: INTERNAL MEDICINE

## 2023-10-31 PROCEDURE — 93000 ELECTROCARDIOGRAM COMPLETE: CPT | Performed by: INTERNAL MEDICINE

## 2023-10-31 NOTE — PATIENT INSTRUCTIONS
Vitamin K in Foods   WHAT YOU NEED TO KNOW:   Warfarin is a type of medicine called a blood thinner. It makes your blood clot more slowly. This can help prevent dangerous problems, such as a stroke (a blood clot in the brain). Vitamin K helps your blood to clot (thicken to stop bleeding). Warfarin works by making it harder for your body to use vitamin K to clot blood. Changes in the amount of vitamin K that you normally eat can affect how warfarin works. Your healthcare provider can tell how well warfarin is working from a blood test that you will have regularly. This test is called an international normalized ratio (INR). It shows how quickly your blood clots. To keep your INR at a healthy level, you need to manage how much vitamin K you eat. DISCHARGE INSTRUCTIONS:   While you take warfarin:  Eat the same amount of vitamin K each day. Do not change the amount of vitamin K you normally have from foods or supplements. This helps keep your INR at the same healthy level. A big increase in vitamin K can lower your INR. This can cause dangerous clotting in your blood. A big decrease in vitamin K can raise your INR. This can make it harder for your blood to clot. It can cause you to bleed too much. Do not avoid foods that contain vitamin K. Foods that contain vitamin K:  Dark green leafy vegetables have the highest amounts of vitamin K.  Foods that contain vitamin K include the following:  Foods with more than 100 mcg per serving:      ½ cup of cooked kale (531 mcg)    ½ cup of cooked spinach (444 mcg)    ½ cup of cooked rafi greens (418 mcg)    1 cup of cooked broccoli (220 mcg)    1 cup of cooked brussels sprouts (219 mcg)    1 cup of raw rafi greens (184 mcg)    1 cup of raw spinach (145 mcg)    1 cup of raw endive (116 mcg)    Foods with 50 to 100 mcg per servin cup of raw broccoli (89 mcg)    ½ cup of cooked cabbage (82 mcg)    1 cup of green leaf lettuce (71 mcg)    1 cup of sarah lettuce (57 mcg)    Foods with 15 to 50 mcg per servin chilel of asparagus (48 mcg)    1 medium kiwi fruit (31 mcg)    1 cup of raw blackberries or blueberries (29 mcg)    1 cup of red or green grapes (23 mcg)    ½ cup of cooked peas (19 mcg)       Other sources of vitamin K:  Multivitamins and other supplements may contain 10 to 80 mcg of vitamin K. These amounts can cause changes in your INR. Read the labels of any supplements you take. Do not take more than 1 supplement that contains vitamin K. Talk to your healthcare provider about the supplements you are taking. Contact your healthcare provider if:   You have a poor appetite. You have an upset stomach. You have hair loss. You bruise more easily than normal.     You have questions about your medicines, supplements, or the amount of vitamin K you eat. Return to the emergency department if:   You cough up blood. You have red or black bowel movements. Your urine is red or dark brown. You have bleeding from your gums or nose. You have heavy bleeding from a wound that does not stop, or unusually heavy monthly periods. You have a severe headache. © Copyright Shanna Nipple  Information is for End User's use only and may not be sold, redistributed or otherwise used for commercial purposes. The above information is an  only. It is not intended as medical advice for individual conditions or treatments. Talk to your doctor, nurse or pharmacist before following any medical regimen to see if it is safe and effective for you.

## 2023-10-31 NOTE — PROGRESS NOTES
Cardiology Follow Up    Inga Frye  1945  [id number]  [facility], [facility] by [facility] [place]  [address] [address] [phone]    1. PAF (paroxysmal atrial fibrillation) (HCC)  POCT ECG      2. Non-rheumatic aortic stenosis        3. Mixed hyperlipidemia            Discussion/Summary:    PAF - identified post op after hernia surgery. Slight irregularity on auscultation secondary to sinus arrhythmia and PACs. She is not sinus rhythm currently. On warfarin. Has some nosebleeds when she bends over for longer periods of time. Advised with the colder weather coming up, to use a humidifier to avoid further nosebleeds. Continue metoprolol. Aortic stenosis: Echocardiogram done in September showed that this remains pretty mild. Borderline moderate. No symptoms. Does not need echo next year. History of Present Illness:   77year old female. In July 2022, I met her in the hospital. She had postoperative afib after hernia repair, VINNY. She was controlled with metoprolol. She was anemic post op, so anticoagulation wasn't initially started. Subsequently she saw the NP and had a zio patch which showed ongoing PAF, about 9% burden. Is on coumadin because of cost.    Echos with mild-moderate AS. Had hip surgery in May 2023. Interval History:  Returns having had hip surgery. Using walker. Echo done beginning of Sept unchanged. Mild (-moderate) AS. EF preserved. Therapeutic INR with warfarin. No new cardiac symptoms.           Medical Problems       Problem List       Spinal stenosis    Mixed hyperlipidemia    COVID-19    Other chest pain    Intestinal adhesions    Incisional hernia, without obstruction or gangrene    Postoperative visit    Non-rheumatic aortic stenosis    PAF (paroxysmal atrial fibrillation) (HCC)        Past Medical History:   Diagnosis Date   • Chronic kidney disease     Horse shoe kidney   • Colitis    • Hyperlipidemia      Social History     Tobacco Use   • Smoking status: Former     Types: Cigarettes     Quit date: 1970     Years since quittin.8   • Smokeless tobacco: Never   Vaping Use   • Vaping Use: Never used   Substance Use Topics   • Alcohol use: Yes     Comment: once in awhile   • Drug use: No      Family History   Problem Relation Age of Onset   • No Known Problems Mother    • No Known Problems Father      Past Surgical History:   Procedure Laterality Date   • ABDOMINAL ADHESION SURGERY N/A 2021    Procedure: LYSIS ADHESIONS;  Surgeon: Cee Ojeda MD;  [facility];  Service: General   • BACK SURGERY      2 rods placed in back   • COLON SURGERY     • COLONOSCOPY N/A 2018    Procedure: COLONOSCOPY;  Surgeon: Jen Ge MD;  [facility];   Service: Colorectal   • JOINT REPLACEMENT Bilateral    • LAPAROTOMY N/A 2021    Procedure: LAPAROTOMY EXPLORATORY; EXPLANTATION OF MESH;  Surgeon: Cee Ojeda MD;  [facility];  Service: General   • ORIF FEMUR FRACTURE Right 2023    Procedure: OPEN REDUCTION W/ INTERNAL FIXATION (ORIF) FEMUR- ORIF right siri-prosthetic hip fracture;  Surgeon: Melo Caruso MD;  [facility];  Service: Orthopedics   • [place] REPAIR FIRST ABDOMINAL WALL HERNIA N/A 2022    Procedure: REPAIR HERNIA INCISIONAL, LYSIS OF ADHESIONS, EXPLANTATION OF MESH, REPAIR OF ENTEROTOMY;  Surgeon: Katty Toure MD;  [facility];  Service: General       Current Outpatient Medications:   •  acetaminophen (TYLENOL) 325 mg tablet, Take 2 tablets (650 mg total) by mouth 3 (three) times a day as needed for mild pain, Disp: , Rfl: 0  •  Diclofenac Sodium (VOLTAREN) 1 %, , Disp: , Rfl:   •  metoprolol succinate (TOPROL-XL) 50 mg 24 hr tablet, Take 1 tablet (50 mg total) by mouth daily at bedtime, Disp: 30 tablet, Rfl: 0  •  montelukast (SINGULAIR) 10 mg tablet, Take 10 mg by mouth daily at bedtime  , Disp: , Rfl:   • polyethylene glycol (MIRALAX) 17 g packet, Take 17 g by mouth daily as needed (Constipation), Disp: , Rfl: 0  •  pregabalin (LYRICA) 100 mg capsule, Take 100 mg by mouth 2 (two) times a day  , Disp: , Rfl:   •  simvastatin (ZOCOR) 20 mg tablet, Take 20 mg by mouth daily at bedtime, Disp: , Rfl:   •  Tafluprost, PF, 0.0015 % SOLN, Apply 1 drop to eye in the morning BOTH EYES, Disp: , Rfl:   •  warfarin (COUMADIN) 5 mg tablet, 6/27/23 3.75mg--5mg daily Repeat PT/INR by mobile lab 7/723 or 7/10/23, Disp: 90 tablet, Rfl: 3  •  warfarin (COUMADIN) 7.5 mg tablet, Take 7.5 mg 3 times weekly on Monday, Wednesday and Friday. Take 5 mg on all other days, Disp: , Rfl: 0  Allergies   Allergen Reactions   • Hydromorphone Other (See Comments)     "it stopped my heart"  hallucinations         Vitals:    10/31/23 0909   BP: 122/66   BP Location: Right arm   Patient Position: Sitting   Cuff Size: Standard   Pulse: 64   Weight: 75.3 kg (166 lb)   Height: 4' 11" (1.499 m)     Vitals:    10/31/23 0909   Weight: 75.3 kg (166 lb)      Height: 4' 11" (149.9 cm)   Body mass index is 33.53 kg/m². Physical Exam:  GEN: Sukhdeep Blas is an elderly female, NAD  HEENT: pupils equal, round, and reactive to light; extraocular muscles intact  NECK: supple, no carotid bruits   HEART: regular rhythm, 2/6 RASHAUN  LUNGS: clear to auscultation bilaterally; no wheezes, rales, or rhonchi   ABDOMEN: normal bowel sounds, soft, no tenderness, no distention  EXTREMITIES: peripheral pulses normal; no clubbing, cyanosis, or edema  NEURO: uses walker  SKIN: normal without suspicious lesions on exposed skin      ROS:  Positive for arthritis, difficulty walking, abdominal pain, diarrhea. Except as noted in HPI, is otherwise reviewed in detail and a 12 point review of systems is negative.   ROS reviewed and is unchanged    Labs:  Lab Results   Component Value Date    SODIUM 140 06/15/2023    K 4.5 06/15/2023     06/15/2023    CREATININE 0.47 (L) 06/15/2023 BUN 13 06/15/2023    CO2 30 06/15/2023    ALT 30 05/27/2023    AST 24 05/27/2023    INR 2.28 (H) 10/27/2023    GLUF 90 06/15/2023    WBC 4.98 06/27/2023    HGB 11.4 (L) 06/27/2023    HCT 36.8 06/27/2023     06/27/2023       No results found for: "CHOL"  Lab Results   Component Value Date    LDLCALC 46 07/29/2022    LDLCALC 76 04/19/2019     Lab Results   Component Value Date    HDL 30 (L) 07/29/2022    HDL 36 (L) 04/19/2019     Lab Results   Component Value Date    TRIG 216 (H) 07/29/2022    TRIG 232 (H) 04/19/2019       Testing:  Echo 9/2023:  Left Ventricle: Left ventricular cavity size is normal. Wall thickness is mild to moderately increased. There is mild to moderate concentric hypertrophy. The left ventricular ejection fraction is 65%. Systolic function is normal. Wall motion is normal.  •  Left Atrium: The atrium is moderately dilated. •  Atrial Septum: A mid origin, atrial septal aneurysm is noted and bows into the right atrium, suggesting increased left atrial pressure. •  Aortic Valve: There is mild stenosis. The aortic valve mean gradient is 7 mmHg. The aortic valve area is 1.41 cm2. •  Mitral Valve: There is mild to moderate regurgitation. •  Tricuspid Valve: There is mild regurgitation. The right ventricular systolic pressure is mildly elevated. The estimated right ventricular systolic pressure is 31.80 mmHg. •  Pulmonic Valve: There is trace regurgitation. [facility] 8/2022  Patient had a min HR of 52 bpm, max HR of 197 bpm, and avg HR of 67 bpm.  Predominant underlying rhythm was Sinus Rhythm.  4 Ventricular Tachycardia runs  occurred, the run with the fastest interval lasting 8 beats with a max rate of 197  bpm, the longest lasting 16 beats with an avg rate of 102 bpm. 162  Supraventricular Tachycardia runs occurred, the run with the fastest interval lasting  5 beats with a max rate of 148 bpm, the longest lasting 13.4 secs with an avg rate  of 97 bpm. Atrial Fibrillation occurred (9% burden), ranging from  bpm (avg  of 80 bpm), the longest lasting 11 hours 42 mins with an avg rate of 77 bpm.  Isolated SVEs were occasional (1.2%, 83327), SVE Couplets were rare (<1.0%,  2199), and SVE Triplets were rare (<1.0%, 346). Isolated VEs were rare (<1.0%), VE  Couplets were rare (<1.0%), and no VE Triplets were present. Agree with above. Afib is seen, known from office. To start on a/c    Echo \7/2022  Left Ventricle: Left ventricular cavity size is normal. Wall thickness is mildly increased. There is mild concentric hypertrophy. The left ventricular ejection fraction is 65% by visual estimation. Systolic function is normal. Although no diagnostic regional wall motion abnormality was identified, this possibility cannot be completely excluded on the basis of this study. Diastolic function is moderately abnormal, consistent with grade II (pseudonormal) relaxation. Left atrial filling pressure is elevated. •  Aortic Valve: There is mild to moderate stenosis. The aortic valve peak velocity is 2.35 m/s. The aortic valve mean gradient is 13 mmHg. The DVI is 0.49. •  Tricuspid Valve: There is mild regurgitation. The right ventricular systolic pressure is moderately to severely elevated. The estimated right ventricular systolic pressure is 15.42 mmHg. EKG:  Sinus rhythm, 64 BPM. Sinus arrhythmia, PACs. PRWP.

## 2023-11-20 ENCOUNTER — APPOINTMENT (OUTPATIENT)
Dept: LAB | Facility: CLINIC | Age: 78
End: 2023-11-20
Payer: MEDICARE

## 2023-11-20 ENCOUNTER — ANTICOAG VISIT (OUTPATIENT)
Dept: CARDIOLOGY CLINIC | Facility: CLINIC | Age: 78
End: 2023-11-20

## 2023-12-14 ENCOUNTER — ANTICOAG VISIT (OUTPATIENT)
Dept: CARDIOLOGY CLINIC | Facility: CLINIC | Age: 78
End: 2023-12-14

## 2023-12-14 ENCOUNTER — APPOINTMENT (OUTPATIENT)
Dept: LAB | Facility: CLINIC | Age: 78
End: 2023-12-14
Payer: MEDICARE

## 2024-01-04 ENCOUNTER — ANTICOAG VISIT (OUTPATIENT)
Dept: CARDIOLOGY CLINIC | Facility: CLINIC | Age: 79
End: 2024-01-04

## 2024-01-04 ENCOUNTER — APPOINTMENT (OUTPATIENT)
Dept: LAB | Facility: CLINIC | Age: 79
End: 2024-01-04
Payer: MEDICARE

## 2024-01-23 ENCOUNTER — ANTICOAG VISIT (OUTPATIENT)
Dept: CARDIOLOGY CLINIC | Facility: CLINIC | Age: 79
End: 2024-01-23

## 2024-01-23 ENCOUNTER — APPOINTMENT (OUTPATIENT)
Dept: LAB | Facility: CLINIC | Age: 79
End: 2024-01-23
Payer: MEDICARE

## 2024-02-20 ENCOUNTER — ANTICOAG VISIT (OUTPATIENT)
Dept: CARDIOLOGY CLINIC | Facility: CLINIC | Age: 79
End: 2024-02-20

## 2024-02-20 ENCOUNTER — APPOINTMENT (OUTPATIENT)
Dept: LAB | Facility: CLINIC | Age: 79
End: 2024-02-20
Payer: MEDICARE

## 2024-03-20 ENCOUNTER — APPOINTMENT (EMERGENCY)
Dept: RADIOLOGY | Facility: HOSPITAL | Age: 79
End: 2024-03-20
Payer: MEDICARE

## 2024-03-20 ENCOUNTER — HOSPITAL ENCOUNTER (EMERGENCY)
Facility: HOSPITAL | Age: 79
Discharge: HOME | End: 2024-03-20
Attending: EMERGENCY MEDICINE
Payer: MEDICARE

## 2024-03-20 VITALS
DIASTOLIC BLOOD PRESSURE: 58 MMHG | SYSTOLIC BLOOD PRESSURE: 119 MMHG | RESPIRATION RATE: 17 BRPM | TEMPERATURE: 97.9 F | OXYGEN SATURATION: 94 % | HEART RATE: 82 BPM

## 2024-03-20 DIAGNOSIS — L02.211 ABSCESS OF ABDOMINAL WALL: Primary | ICD-10-CM

## 2024-03-20 DIAGNOSIS — K43.2 INCISIONAL HERNIA, WITHOUT OBSTRUCTION OR GANGRENE: ICD-10-CM

## 2024-03-20 DIAGNOSIS — Z76.89 ENCOUNTER FOR SKIN CARE: ICD-10-CM

## 2024-03-20 DIAGNOSIS — L02.91 ABSCESS: ICD-10-CM

## 2024-03-20 DIAGNOSIS — R10.9 ABDOMINAL PAIN: ICD-10-CM

## 2024-03-20 LAB
ALBUMIN SERPL BCP-MCNC: 3.7 G/DL (ref 3.5–5)
ALP SERPL-CCNC: 63 U/L (ref 34–104)
ALT SERPL W P-5'-P-CCNC: 18 U/L (ref 7–52)
ANION GAP SERPL CALCULATED.3IONS-SCNC: 7 MMOL/L (ref 4–13)
AST SERPL W P-5'-P-CCNC: 20 U/L (ref 13–39)
BACTERIA UR QL AUTO: NORMAL /HPF
BASOPHILS # BLD AUTO: 0.04 THOUSANDS/ÂΜL (ref 0–0.1)
BASOPHILS NFR BLD AUTO: 1 % (ref 0–1)
BILIRUB SERPL-MCNC: 0.67 MG/DL (ref 0.2–1)
BILIRUB UR QL STRIP: NEGATIVE
BUN SERPL-MCNC: 14 MG/DL (ref 5–25)
CALCIUM SERPL-MCNC: 9.2 MG/DL (ref 8.4–10.2)
CHLORIDE SERPL-SCNC: 101 MMOL/L (ref 96–108)
CLARITY UR: CLEAR
CO2 SERPL-SCNC: 28 MMOL/L (ref 21–32)
COLOR UR: YELLOW
COLOR, POC: YELLOW
CREAT SERPL-MCNC: 0.49 MG/DL (ref 0.6–1.3)
EOSINOPHIL # BLD AUTO: 0.17 THOUSAND/ÂΜL (ref 0–0.61)
EOSINOPHIL NFR BLD AUTO: 2 % (ref 0–6)
ERYTHROCYTE [DISTWIDTH] IN BLOOD BY AUTOMATED COUNT: 13.7 % (ref 11.6–15.1)
GFR SERPL CREATININE-BSD FRML MDRD: 92 ML/MIN/1.73SQ M
GLUCOSE SERPL-MCNC: 89 MG/DL (ref 65–140)
GLUCOSE UR STRIP-MCNC: NEGATIVE MG/DL
HCT VFR BLD AUTO: 37.2 % (ref 34.8–46.1)
HGB BLD-MCNC: 11.6 G/DL (ref 11.5–15.4)
HGB UR QL STRIP.AUTO: ABNORMAL
IMM GRANULOCYTES # BLD AUTO: 0.02 THOUSAND/UL (ref 0–0.2)
IMM GRANULOCYTES NFR BLD AUTO: 0 % (ref 0–2)
KETONES UR STRIP-MCNC: NEGATIVE MG/DL
LEUKOCYTE ESTERASE UR QL STRIP: NEGATIVE
LYMPHOCYTES # BLD AUTO: 1.69 THOUSANDS/ÂΜL (ref 0.6–4.47)
LYMPHOCYTES NFR BLD AUTO: 20 % (ref 14–44)
MCH RBC QN AUTO: 31.1 PG (ref 26.8–34.3)
MCHC RBC AUTO-ENTMCNC: 31.2 G/DL (ref 31.4–37.4)
MCV RBC AUTO: 100 FL (ref 82–98)
MONOCYTES # BLD AUTO: 1.32 THOUSAND/ÂΜL (ref 0.17–1.22)
MONOCYTES NFR BLD AUTO: 15 % (ref 4–12)
NEUTROPHILS # BLD AUTO: 5.41 THOUSANDS/ÂΜL (ref 1.85–7.62)
NEUTS SEG NFR BLD AUTO: 62 % (ref 43–75)
NITRITE UR QL STRIP: NEGATIVE
NON-SQ EPI CELLS URNS QL MICRO: NORMAL /HPF
NRBC BLD AUTO-RTO: 0 /100 WBCS
PH UR STRIP.AUTO: 7.5 [PH] (ref 4.5–8)
PLATELET # BLD AUTO: 287 THOUSANDS/UL (ref 149–390)
PMV BLD AUTO: 8.5 FL (ref 8.9–12.7)
POTASSIUM SERPL-SCNC: 5.1 MMOL/L (ref 3.5–5.3)
PROT SERPL-MCNC: 6.9 G/DL (ref 6.4–8.4)
PROT UR STRIP-MCNC: NEGATIVE MG/DL
RBC # BLD AUTO: 3.73 MILLION/UL (ref 3.81–5.12)
RBC #/AREA URNS AUTO: NORMAL /HPF
SODIUM SERPL-SCNC: 136 MMOL/L (ref 135–147)
SP GR UR STRIP.AUTO: 1.02 (ref 1–1.03)
UROBILINOGEN UR QL STRIP.AUTO: 0.2 E.U./DL
WBC # BLD AUTO: 8.65 THOUSAND/UL (ref 4.31–10.16)
WBC #/AREA URNS AUTO: NORMAL /HPF

## 2024-03-20 PROCEDURE — 99285 EMERGENCY DEPT VISIT HI MDM: CPT | Performed by: SURGERY

## 2024-03-20 PROCEDURE — 99285 EMERGENCY DEPT VISIT HI MDM: CPT | Performed by: EMERGENCY MEDICINE

## 2024-03-20 PROCEDURE — 10060 I&D ABSCESS SIMPLE/SINGLE: CPT | Performed by: SURGERY

## 2024-03-20 PROCEDURE — 81001 URINALYSIS AUTO W/SCOPE: CPT

## 2024-03-20 PROCEDURE — 80053 COMPREHEN METABOLIC PANEL: CPT

## 2024-03-20 PROCEDURE — NC001 PR NO CHARGE: Performed by: SURGERY

## 2024-03-20 PROCEDURE — 87077 CULTURE AEROBIC IDENTIFY: CPT | Performed by: SURGERY

## 2024-03-20 PROCEDURE — 87070 CULTURE OTHR SPECIMN AEROBIC: CPT | Performed by: SURGERY

## 2024-03-20 PROCEDURE — 74177 CT ABD & PELVIS W/CONTRAST: CPT

## 2024-03-20 PROCEDURE — 99284 EMERGENCY DEPT VISIT MOD MDM: CPT

## 2024-03-20 PROCEDURE — 85025 COMPLETE CBC W/AUTO DIFF WBC: CPT

## 2024-03-20 PROCEDURE — 87075 CULTR BACTERIA EXCEPT BLOOD: CPT | Performed by: SURGERY

## 2024-03-20 PROCEDURE — 87205 SMEAR GRAM STAIN: CPT | Performed by: SURGERY

## 2024-03-20 PROCEDURE — 87186 SC STD MICRODIL/AGAR DIL: CPT | Performed by: SURGERY

## 2024-03-20 PROCEDURE — 96374 THER/PROPH/DIAG INJ IV PUSH: CPT

## 2024-03-20 PROCEDURE — 36415 COLL VENOUS BLD VENIPUNCTURE: CPT

## 2024-03-20 RX ORDER — MORPHINE SULFATE 4 MG/ML
4 INJECTION, SOLUTION INTRAMUSCULAR; INTRAVENOUS ONCE
Status: COMPLETED | OUTPATIENT
Start: 2024-03-20 | End: 2024-03-20

## 2024-03-20 RX ORDER — SULFAMETHOXAZOLE AND TRIMETHOPRIM 800; 160 MG/1; MG/1
1 TABLET ORAL 2 TIMES DAILY
Qty: 14 TABLET | Refills: 0 | Status: SHIPPED | OUTPATIENT
Start: 2024-03-20 | End: 2024-03-27

## 2024-03-20 RX ORDER — LIDOCAINE HYDROCHLORIDE AND EPINEPHRINE 10; 10 MG/ML; UG/ML
20 INJECTION, SOLUTION INFILTRATION; PERINEURAL ONCE
Status: COMPLETED | OUTPATIENT
Start: 2024-03-20 | End: 2024-03-20

## 2024-03-20 RX ORDER — SULFAMETHOXAZOLE AND TRIMETHOPRIM 800; 160 MG/1; MG/1
1 TABLET ORAL ONCE
Status: COMPLETED | OUTPATIENT
Start: 2024-03-20 | End: 2024-03-20

## 2024-03-20 RX ADMIN — LIDOCAINE HYDROCHLORIDE,EPINEPHRINE BITARTRATE 20 ML: 10; .01 INJECTION, SOLUTION INFILTRATION; PERINEURAL at 15:41

## 2024-03-20 RX ADMIN — IOHEXOL 100 ML: 350 INJECTION, SOLUTION INTRAVENOUS at 13:46

## 2024-03-20 RX ADMIN — SULFAMETHOXAZOLE AND TRIMETHOPRIM 1 TABLET: 800; 160 TABLET ORAL at 17:10

## 2024-03-20 RX ADMIN — MORPHINE SULFATE 4 MG: 4 INJECTION INTRAVENOUS at 11:45

## 2024-03-20 NOTE — CONSULTS
Consultation - General Surgery  Lucero Terrell 79 y.o. female MRN: [id number]  Unit/Bed#: ED 25 Encounter: [id number]        Assessment/Plan     Assessment:  79 F with history of multiple ventral repairs with abdominal wall abscess    Plan:  Bedside I&D with wound cultures  1 week antibiotics Augmentin  Follow-up with visiting nurses on Friday and surgery clinic next week  Maintain packing until Friday-wound care instructions provided  Return precautions provided  Please tigertext on call [facility] red surgery resident role or [facility] acute care floor call role with any questions      History of Present Illness     HPI:  Lucero Terrell is a 79 y.o. female who presents 1 day of worsening abdominal pain and redness.  Patient denies any fevers.  Patient has a history of multiple ventral hernia repairs most recent surgery in November 2022.  At that time, there was an attempt at a ventral hernia repair but a prior mesh was stuck to the small bowel and an avoidable enterotomy was made.  The fascia was repaired with PDS at that time.  The patient has not had any surgery since that time.  She stated she was scheduled to follow-up shortly with her surgeon for evaluation for further hernia repair.  Patient denies any obstructive symptoms including nausea or vomiting.  Patient has been having bowel function.  Denies fevers.  Workup in ED shows no leukocytosis or fevers.  CT scan demonstrates ventral hernia with a superficial fluid collection that has an intra-abdominal component.     Review of Systems   Constitutional:  Negative for activity change, chills and fever.   HENT:  Negative for congestion, ear pain and sore throat.    Eyes:  Negative for pain and visual disturbance.   Respiratory:  Negative for chest tightness and shortness of breath.    Cardiovascular:  Negative for chest pain and palpitations.   Gastrointestinal:  Negative for abdominal distention and abdominal pain.   Endocrine: Negative for cold intolerance and heat  intolerance.   Genitourinary:  Negative for difficulty urinating and dysuria.   Musculoskeletal:  Negative for arthralgias and myalgias.   Skin:  Negative for color change and pallor.   Neurological:  Negative for dizziness and weakness.   Hematological:  Negative for adenopathy. Does not bruise/bleed easily.   Psychiatric/Behavioral:  Negative for agitation and behavioral problems.    All other systems reviewed and are negative.        Historical Information   Past Medical History:   Diagnosis Date    Chronic kidney disease     Horse shoe kidney    Colitis     Hyperlipidemia      Past Surgical History:   Procedure Laterality Date    ABDOMINAL ADHESION SURGERY N/A 11/27/2021    Procedure: LYSIS ADHESIONS;  Surgeon: Alessio Mg MD;  [facility];  Service: General    BACK SURGERY      2 rods placed in back    COLON SURGERY      COLONOSCOPY N/A 5/4/2018    Procedure: COLONOSCOPY;  Surgeon: Julieth Bocanegra MD;  [facility];  Service: Colorectal    JOINT REPLACEMENT Bilateral     LAPAROTOMY N/A 11/27/2021    Procedure: LAPAROTOMY EXPLORATORY; EXPLANTATION OF MESH;  Surgeon: Alessio Mg MD;  [facility];  Service: General    ORIF FEMUR FRACTURE Right 5/28/2023    Procedure: OPEN REDUCTION W/ INTERNAL FIXATION (ORIF) FEMUR- ORIF right siri-prosthetic hip fracture;  Surgeon: Jesse Gary MD;  [facility];  Service: Orthopedics    [place] REPAIR FIRST ABDOMINAL WALL HERNIA N/A 7/27/2022    Procedure: REPAIR HERNIA INCISIONAL, LYSIS OF ADHESIONS, EXPLANTATION OF MESH, REPAIR OF ENTEROTOMY;  Surgeon: Ivan Cook MD;  [facility];  Service: General     Social History   Social History     Substance and Sexual Activity   Alcohol Use Yes    Comment: once in awhile     Social History     Substance and Sexual Activity   Drug Use No     Social History     Tobacco Use   Smoking Status Former    Current packs/day: 0.00    Types: Cigarettes    Quit date: 1970    Years since  "quittin.2   Smokeless Tobacco Never     Family History:   Family History   Problem Relation Age of Onset    No Known Problems Mother     No Known Problems Father        Meds/Allergies   all medications and allergies reviewed  Allergies   Allergen Reactions    Hydromorphone Other (See Comments)     \"it stopped my heart\"  hallucinations         Objective   First Vitals:   Blood Pressure: 131/61 (24 0956)  Pulse: 85 (24 0956)  Temperature: 97.9 °F (36.6 °C) (24 0956)  Temp Source: Temporal (24 0956)  Respirations: 18 (24 1200)  SpO2: 97 % (24 1200)    Current Vitals:   Blood Pressure: 120/52 (24 1600)  Pulse: 82 (24 1600)  Temperature: 97.9 °F (36.6 °C) (24 0956)  Temp Source: Temporal (24 0956)  Respirations: 18 (24 1600)  SpO2: 91 % (24 1600)      Intake/Output Summary (Last 24 hours) at 3/20/2024 1638  Last data filed at 3/20/2024 1232  Gross per 24 hour   Intake --   Output 350 ml   Net -350 ml       Invasive Devices       Peripheral Intravenous Line  Duration             Peripheral IV 24 Left;Proximal;Ventral (anterior) Antecubital <1 day                    Physical Exam  Vitals reviewed.   Constitutional:       General: She is not in acute distress.     Appearance: She is not toxic-appearing.   HENT:      Head: Normocephalic and atraumatic.      Right Ear: External ear normal.      Left Ear: External ear normal.      Nose: Nose normal. No rhinorrhea.      Mouth/Throat:      Mouth: Mucous membranes are moist.      Pharynx: Oropharynx is clear.   Eyes:      General: No scleral icterus.     Extraocular Movements: Extraocular movements intact.      Conjunctiva/sclera: Conjunctivae normal.      Pupils: Pupils are equal, round, and reactive to light.   Cardiovascular:      Rate and Rhythm: Normal rate and regular rhythm.      Pulses: Normal pulses.   Pulmonary:      Effort: Pulmonary effort is normal. No respiratory distress. "   Abdominal:      General: There is no distension.      Palpations: Abdomen is soft.      Comments: See below for picture.  Erythema in the lower abdomen.  Tender to the touch.  Indurated area at the mid incision of the lower midline incision   Musculoskeletal:         General: No swelling or deformity.      Cervical back: Normal range of motion and neck supple.   Skin:     General: Skin is warm.      Coloration: Skin is not jaundiced.   Neurological:      General: No focal deficit present.      Mental Status: She is alert and oriented to person, place, and time.   Psychiatric:         Mood and Affect: Mood normal.         Behavior: Behavior normal.       Lab Results: I have personally reviewed pertinent lab results.    Imaging: I have personally reviewed pertinent reports.    EKG, Pathology, and Other Studies: I have personally reviewed pertinent reports.      Code Status: Prior  Advance Directive and Living Will:      Power of : Yes  POLST:

## 2024-03-20 NOTE — ED PROVIDER NOTES
History  Chief Complaint   Patient presents with    Abdominal Pain     Hernia is too big and it hurts really bad, started about 4 days ago but now it feels like its going to burst. Denies nausea or vomiting. C/o constipation      79-year-old female with a history of multiple ventral hernia repairs, A-fib, hyperlipidemia, and CKD who presents for evaluation of lower abdominal pain which she attributes to a hernia.  The patient states that she has been having pain in the center of her lower abdomen for the past 4 days.  The patient states that she has taken Tylenol without relief of her symptoms.  The patient states that she has been constipated but had a bowel movement today.  The patient has been passing gas.  The patient reports she has erythema over her lower abdomen which is chronic.  The patient denies fever, chills, chest pain, shortness of breath, nausea, vomiting, dysuria.        Prior to Admission Medications   Prescriptions Last Dose Informant Patient Reported? Taking?   Diclofenac Sodium (VOLTAREN) 1 %  Self Yes No   Tafluprost, PF, 0.0015 % SOLN  Self Yes No   Sig: Apply 1 drop to eye in the morning BOTH EYES   acetaminophen (TYLENOL) 325 mg tablet  Self No No   Sig: Take 2 tablets (650 mg total) by mouth 3 (three) times a day as needed for mild pain   metoprolol succinate (TOPROL-XL) 50 mg 24 hr tablet  Self No No   Sig: Take 1 tablet (50 mg total) by mouth daily at bedtime   montelukast (SINGULAIR) 10 mg tablet  Self Yes No   Sig: Take 10 mg by mouth daily at bedtime     polyethylene glycol (MIRALAX) 17 g packet  Self No No   Sig: Take 17 g by mouth daily as needed (Constipation)   pregabalin (LYRICA) 100 mg capsule  Self Yes No   Sig: Take 100 mg by mouth 2 (two) times a day     simvastatin (ZOCOR) 20 mg tablet  Self Yes No   Sig: Take 20 mg by mouth daily at bedtime   warfarin (COUMADIN) 5 mg tablet  Self No No   Si23 3.75mg--5mg daily Repeat PT/INR by mobile lab  or 7/10/23   warfarin  (COUMADIN) 7.5 mg tablet  Self No No   Sig: Take 7.5 mg 3 times weekly on Monday, Wednesday and Friday.  Take 5 mg on all other days      Facility-Administered Medications: None       Past Medical History:   Diagnosis Date    Chronic kidney disease     Horse shoe kidney    Colitis     Hyperlipidemia        Past Surgical History:   Procedure Laterality Date    ABDOMINAL ADHESION SURGERY N/A 2021    Procedure: LYSIS ADHESIONS;  Surgeon: Alessio Mg MD;  [facility];  Service: General    BACK SURGERY      2 rods placed in back    COLON SURGERY      COLONOSCOPY N/A 2018    Procedure: COLONOSCOPY;  Surgeon: Julieth Bocanegra MD;  Location: [facility];  Service: Colorectal    JOINT REPLACEMENT Bilateral     LAPAROTOMY N/A 2021    Procedure: LAPAROTOMY EXPLORATORY; EXPLANTATION OF MESH;  Surgeon: Alessio Mg MD;  [facility];  Service: General    ORIF FEMUR FRACTURE Right 2023    Procedure: OPEN REDUCTION W/ INTERNAL FIXATION (ORIF) FEMUR- ORIF right siri-prosthetic hip fracture;  Surgeon: Jesse Gary MD;  [facility];  Service: Orthopedics    UT REPAIR FIRST ABDOMINAL WALL HERNIA N/A 2022    Procedure: REPAIR HERNIA INCISIONAL, LYSIS OF ADHESIONS, EXPLANTATION OF MESH, REPAIR OF ENTEROTOMY;  Surgeon: Ivan Cook MD;  [facility];  Service: General       Family History   Problem Relation Age of Onset    No Known Problems Mother     No Known Problems Father      I have reviewed and agree with the history as documented.    E-Cigarette/Vaping    E-Cigarette Use Never User      E-Cigarette/Vaping Substances    Nicotine No     THC No     CBD No     Flavoring No     Other No     Unknown No      Social History     Tobacco Use    Smoking status: Former     Current packs/day: 0.00     Types: Cigarettes     Quit date:      Years since quittin.2    Smokeless tobacco: Never   Vaping Use    Vaping status: Never Used   Substance Use Topics     Alcohol use: Yes     Comment: once in awhile    Drug use: No        Review of Systems   Constitutional:  Negative for chills, diaphoresis and fever.   HENT:  Negative for congestion and sore throat.    Eyes:  Negative for pain and redness.   Respiratory:  Negative for cough and shortness of breath.    Cardiovascular:  Negative for chest pain, palpitations and leg swelling.   Gastrointestinal:  Positive for abdominal pain and constipation. Negative for diarrhea, nausea and vomiting.   Genitourinary:  Negative for dysuria, flank pain and hematuria.   Musculoskeletal:  Negative for arthralgias and myalgias.   Skin:  Negative for color change, pallor and rash.   Neurological:  Negative for dizziness, syncope, weakness, light-headedness, numbness and headaches.   All other systems reviewed and are negative.      Physical Exam  ED Triage Vitals   Temperature Pulse Respirations Blood Pressure SpO2   03/20/24 0956 03/20/24 0956 03/20/24 1200 03/20/24 0956 03/20/24 1200   97.9 °F (36.6 °C) 85 18 131/61 97 %      Temp Source Heart Rate Source Patient Position - Orthostatic VS BP Location FiO2 (%)   03/20/24 0956 03/20/24 1200 03/20/24 0956 03/20/24 0956 --   Temporal Monitor Sitting Left arm       Pain Score       03/20/24 0956       9             Orthostatic Vital Signs  Vitals:    03/20/24 1200 03/20/24 1400 03/20/24 1600 03/20/24 1700   BP: 121/61 117/58 120/52 119/58   Pulse: 80 78 82 82   Patient Position - Orthostatic VS: Lying  Lying Lying       Physical Exam  Vitals and nursing note reviewed.   Constitutional:       General: She is not in acute distress.     Appearance: Normal appearance. She is not ill-appearing, toxic-appearing or diaphoretic.   HENT:      Head: Normocephalic and atraumatic.      Nose: Nose normal. No congestion or rhinorrhea.      Mouth/Throat:      Mouth: Mucous membranes are moist.      Pharynx: Oropharynx is clear.   Eyes:      General: No scleral icterus.     Extraocular Movements: Extraocular  movements intact.      Conjunctiva/sclera: Conjunctivae normal.      Pupils: Pupils are equal, round, and reactive to light.   Cardiovascular:      Rate and Rhythm: Normal rate and regular rhythm.      Pulses: Normal pulses.      Heart sounds: Normal heart sounds. No murmur heard.     No friction rub. No gallop.   Pulmonary:      Effort: Pulmonary effort is normal.      Breath sounds: Normal breath sounds. No wheezing, rhonchi or rales.   Abdominal:      General: Abdomen is flat. There is no distension.      Palpations: Abdomen is soft.      Tenderness: There is abdominal tenderness in the suprapubic area. There is no right CVA tenderness, left CVA tenderness, guarding or rebound.   Musculoskeletal:         General: No swelling, tenderness, deformity or signs of injury. Normal range of motion.      Cervical back: Normal range of motion and neck supple. No rigidity or tenderness.      Right lower leg: No edema.      Left lower leg: No edema.   Lymphadenopathy:      Cervical: No cervical adenopathy.   Skin:     General: Skin is warm and dry.      Capillary Refill: Capillary refill takes less than 2 seconds.      Coloration: Skin is not jaundiced or pale.      Findings: Erythema present. No bruising, lesion or rash.      Comments: Erythema overlying lower abdomen with centrally located area of induration.   Neurological:      General: No focal deficit present.      Mental Status: She is alert and oriented to person, place, and time.         ED Medications  Medications   morphine injection 4 mg (4 mg Intravenous Given 3/20/24 1145)   iohexol (OMNIPAQUE) 350 MG/ML injection (MULTI-DOSE) 100 mL (100 mL Intravenous Given 3/20/24 1346)   lidocaine-epinephrine (XYLOCAINE/EPINEPHRINE) 1 %-1:100,000 injection 20 mL (20 mL Infiltration Given by Other 3/20/24 1541)   sulfamethoxazole-trimethoprim (BACTRIM DS) 800-160 mg per tablet 1 tablet (1 tablet Oral Given 3/20/24 1710)       Diagnostic Studies  Results Reviewed        Procedure Component Value Units Date/Time    Anaerobic culture and Gram stain [[id number]] Collected: 03/20/24 1625    Lab Status: In process Specimen: Body Fluid from Wound Updated: 03/20/24 1628    Wound culture and Gram stain [[id number]] Collected: 03/20/24 1625    Lab Status: In process Specimen: Wound from Abdominal Updated: 03/20/24 1628    Urine Microscopic [[id number]]  (Normal) Collected: 03/20/24 1232    Lab Status: Final result Specimen: Urine, Clean Catch Updated: 03/20/24 1334     RBC, UA None Seen /hpf      WBC, UA None Seen /hpf      Epithelial Cells Occasional /hpf      Bacteria, UA None Seen /hpf     POCT urinalysis dipstick [[id number]]  (Normal) Resulted: 03/20/24 1235    Lab Status: Final result Specimen: Urine Updated: 03/20/24 1235     Color, UA Yellow     Clarity, UA --     EXT Leukocytes, UA --     Nitrite, UA --     Protein, UA -- mg/dl      Glucose, UA --     Ketones, UA -- mg/dl      EXT Urobilinogen, UA --      Bilirubin, UA --     Blood, UA --    Urine Macroscopic, POC [[id number]]  (Abnormal) Collected: 03/20/24 1232    Lab Status: Final result Specimen: Urine Updated: 03/20/24 1233     Color, UA Yellow     Clarity, UA Clear     pH, UA 7.5     Leukocytes, UA Negative     Nitrite, UA Negative     Protein, UA Negative mg/dl      Glucose, UA Negative mg/dl      Ketones, UA Negative mg/dl      Urobilinogen, UA 0.2 E.U./dl      Bilirubin, UA Negative     Occult Blood, UA Trace     Specific Gravity, UA 1.020    Narrative:      CLINITEK RESULT    Comprehensive metabolic panel [[id number]]  (Abnormal) Collected: 03/20/24 1153    Lab Status: Final result Specimen: Blood from Arm, Left Updated: 03/20/24 1231     Sodium 136 mmol/L      Potassium 5.1 mmol/L      Chloride 101 mmol/L      CO2 28 mmol/L      ANION GAP 7 mmol/L      BUN 14 mg/dL      Creatinine 0.49 mg/dL      Glucose 89 mg/dL      Calcium 9.2 mg/dL      AST 20 U/L      ALT 18 U/L      Alkaline Phosphatase 63 U/L      Total Protein 6.9 g/dL       Albumin 3.7 g/dL      Total Bilirubin 0.67 mg/dL      eGFR 92 ml/min/1.73sq m     Narrative:      National Kidney Disease Foundation guidelines for Chronic Kidney Disease (CKD):     Stage 1 with normal or high GFR (GFR > 90 mL/min/1.73 square meters)    Stage 2 Mild CKD (GFR = 60-89 mL/min/1.73 square meters)    Stage 3A Moderate CKD (GFR = 45-59 mL/min/1.73 square meters)    Stage 3B Moderate CKD (GFR = 30-44 mL/min/1.73 square meters)    Stage 4 Severe CKD (GFR = 15-29 mL/min/1.73 square meters)    Stage 5 End Stage CKD (GFR <15 mL/min/1.73 square meters)  Note: GFR calculation is accurate only with a steady state creatinine    CBC and differential [[id number]]  (Abnormal) Collected: 03/20/24 1153    Lab Status: Final result Specimen: Blood from Arm, Left Updated: 03/20/24 1203     WBC 8.65 Thousand/uL      RBC 3.73 Million/uL      Hemoglobin 11.6 g/dL      Hematocrit 37.2 %       fL      MCH 31.1 pg      MCHC 31.2 g/dL      RDW 13.7 %      MPV 8.5 fL      Platelets 287 Thousands/uL      nRBC 0 /100 WBCs      Neutrophils Relative 62 %      Immature Grans % 0 %      Lymphocytes Relative 20 %      Monocytes Relative 15 %      Eosinophils Relative 2 %      Basophils Relative 1 %      Neutrophils Absolute 5.41 Thousands/µL      Absolute Immature Grans 0.02 Thousand/uL      Absolute Lymphocytes 1.69 Thousands/µL      Absolute Monocytes 1.32 Thousand/µL      Eosinophils Absolute 0.17 Thousand/µL      Basophils Absolute 0.04 Thousands/µL                    CT abdomen pelvis with contrast   Final Result by Oliver Morin MD (03/20 8882)      Reidentified small bowel containing ventral hernias. A superficial abscess is located in the most inferior hernia, a periumbilical hernia with a tubular component extending into the abdominal cavity abutting a segment of small bowel. An enterocutaneous    fistula is not excluded.      Numerous segments of small bowel are present within these hernias however  there is no bowel obstruction or signs of strangulation.      The study was marked in EPIC for immediate notification.         Workstation performed: [id number]               Procedures  Procedures      ED Course                             SBIRT 20yo+      Flowsheet Row Most Recent Value   Initial Alcohol Screen: US AUDIT-C     1. How often do you have a drink containing alcohol? 0 Filed at: 03/20/2024 115   2. How many drinks containing alcohol do you have on a typical day you are drinking?  0 Filed at: 03/20/2024 1159   3a. Male UNDER 65: How often do you have five or more drinks on one occasion? 0 Filed at: 03/20/2024 1153   3b. FEMALE Any Age, or MALE 65+: How often do you have 4 or more drinks on one occassion? 0 Filed at: 03/20/2024 1154   Audit-C Score 0 Filed at: 03/20/2024 1152   SUSIE: How many times in the past year have you...    Used an illegal drug or used a prescription medication for non-medical reasons? Never Filed at: 03/20/2024 1157                  Medical Decision Making  79-year-old female with a history of multiple ventral hernia repairs, A-fib, hyperlipidemia, and CKD who presents for evaluation of lower abdominal pain which she attributes to a hernia.  Vitals are within the normal limits.  Exam is significant for lower abdominal tenderness, erythema across the lower abdomen with centrally located area of induration.  Differential diagnosis includes incarcerated hernia, bowel obstruction, abdominal wall cellulitis, cystitis.  Will order CBC, CMP, UA, and CT of the abdomen and pelvis with IV contrast.  Will treat patient's pain with morphine.    Lab work is reviewed without actionable derangements.  CT shows 2 ventral hernias containing small bowel.  The inferior hernia is a fluid collection concerning for abscess.  The patient is discussed with general surgery who evaluates the patient and performed bedside I&D.  They additionally recommend starting the patient on Bactrim with follow-up in  their clinic in 1 week.  They additionally arrange for a nurse visit for wound check in 2 days.  A prescription for Bactrim is sent to the patient's pharmacy.  Return precautions given the patient is discharged.    Amount and/or Complexity of Data Reviewed  Labs: ordered.  Radiology: ordered.    Risk  Prescription drug management.          Disposition  Final diagnoses:   Abscess of abdominal wall   Abdominal pain     Time reflects when diagnosis was documented in both MDM as applicable and the Disposition within this note       Time User Action Codes Description Comment    3/20/2024  4:22 PM Tommy Mark Add [Z76.89] Encounter for skin care     3/20/2024  4:22 PM Tommy Mark Add [K43.2] Incisional hernia, without obstruction or gangrene     3/20/2024  4:38 PM Tommy Mark Add [L02.91] Abscess     3/20/2024  4:52 PM Tommy Mark Modify [Z76.89] Encounter for skin care     3/20/2024  4:52 PM Tommy Mark Modify [K43.2] Incisional hernia, without obstruction or gangrene     3/20/2024  4:52 PM Tommy Mark Modify [L02.91] Abscess     3/20/2024  4:59 PM Sagar Preciado Add [L02.211] Abscess of abdominal wall     3/20/2024  5:00 PM Katharine Preciadoer CURLY Add [R10.9] Abdominal pain     3/20/2024  5:00 PM Van Katharine Boldener A Modify [L02.91] Abscess     3/20/2024  5:00 PM Sagar Preciado Modify [L02.211] Abscess of abdominal wall           ED Disposition       ED Disposition   Discharge    Condition   Stable    Date/Time   Wed Mar 20, 2024 1659    Comment   Lucero Terrell discharge to home/self care.                   Follow-up Information       Follow up With Specialties Details Why Contact Info Additional Information    [facility] Wound Care Call  [address] [address] [phone] [facility], [address] [place], [place], [address] [phone]    [facility] Trauma  Surgery Follow up call on thursday or friday for a follow up appointment next week [address]  [facility] [id number] [phone] [facility], [address] [facility] [address], [place], [place], [address]   [phone]            Discharge Medication List as of 3/20/2024  5:07 PM        START taking these medications    Details   sulfamethoxazole-trimethoprim (BACTRIM DS) 800-160 mg per tablet Take 1 tablet by mouth 2 (two) times a day for 7 days smx-tmp DS (BACTRIM) 800-160 mg tabs (1tab q12 D10), Starting Wed 3/20/2024, Until Wed 3/27/2024, Normal           CONTINUE these medications which have NOT CHANGED    Details   acetaminophen (TYLENOL) 325 mg tablet Take 2 tablets (650 mg total) by mouth 3 (three) times a day as needed for mild pain, Starting Wed 6/14/2023, No Print      Diclofenac Sodium (VOLTAREN) 1 % Starting Mon 7/24/2023, Historical Med      metoprolol succinate (TOPROL-XL) 50 mg 24 hr tablet Take 1 tablet (50 mg total) by mouth daily at bedtime, Starting Wed 6/14/2023, No Print      montelukast (SINGULAIR) 10 mg tablet Take 10 mg by mouth daily at bedtime  , Historical Med      polyethylene glycol (MIRALAX) 17 g packet Take 17 g by mouth daily as needed (Constipation), Starting Wed 5/31/2023, No Print      pregabalin (LYRICA) 100 mg capsule Take 100 mg by mouth 2 (two) times a day  , Historical Med      simvastatin (ZOCOR) 20 mg tablet Take 20 mg by mouth daily at bedtime, Historical Med      Tafluprost, PF, 0.0015 % SOLN Apply 1 drop to eye in the morning BOTH EYES, Historical Med      !! warfarin (COUMADIN) 5 mg tablet 6/27/23 3.75mg--5mg daily Repeat PT/INR by mobile lab 7/723 or 7/10/23, Normal      !! warfarin (COUMADIN) 7.5 mg tablet Take 7.5 mg 3 times weekly on Monday, Wednesday and Friday.  Take 5 mg on all other days, No Print       !! - Potential duplicate medications found. Please discuss with provider.        No discharge procedures on  file.    PDMP Review         Value Time User    PDMP Reviewed  Yes 6/15/2023  8:10 AM Sundar Jones MD             ED Provider  Attending physically available and evaluated Lucero Terrell. I managed the patient along with the ED Attending.    Electronically Signed by           Sagar Preciado DO  03/20/24 2186

## 2024-03-20 NOTE — CASE MANAGEMENT
Case Management ED Assessment    Patient name Lucero Terrell  Location ED 25/ED 25 MRN [id number]  : 1945 Date 3/20/2024        OBJECTIVE:  Predictive Model Details          79%  Factor Value    Risk of Hospital Admission or ED Visit Model 36% Number of ED Visits 2     11% Is in Relationship No     11% Number of Hospitalizations 1     11% Has Chronic Kidney Disease Yes     9% Has Atrial Fibrillation Yes     7% Has Anemia Yes     7% Has Medicare Yes     7% Has CVD Yes     2% Has PCP Yes          Chief Complaint: Abdominal pain .  Patient Class: Emergency  Preferred Pharmacy:   [facility] - [place] - [address]  [address] [facility] [address]  Phone: [phone] Fax: [phone]    Primary Care Provider: Chang Clayton DO    Primary Insurance: MEDICARE  Secondary Insurance: [facility]    ED ASSESSMENT:  Active Health Care Proxies       W[facility] Health Care Representative - Sister In-Law   Primary Phone: [phone] (Home)                  Patient Information  Admitted from:: Home  Mental Status: Alert  During Assessment patient was accompanied by: Not accompanied during assessment  Assessment information provided by:: Patient  Primary Caregiver: Self  Support Systems: Self  What city do you live in?: [place]  Living Arrangements: Lives Alone    Patient Information Continued  Income Source: Pension/halfway  Does patient have prescription coverage?: Yes  Does patient receive dialysis treatments?: No  Does patient have a history of substance abuse?: No  Does patient have a history of Mental Health Diagnosis?: No    Food and Transportation  What is patient's usual means of transportation?: Drives Self (Unable with wound and packing)  Ask patient: How would you describe your eating habits?: Good

## 2024-03-20 NOTE — PROCEDURES
"Incision and drain    Date/Time: 3/20/2024 4:51 PM    Performed by: Tommy Mark MD  Authorized by: Tommy Mark MD  Universal Protocol:  Consent given by: patient  Time out: Immediately prior to procedure a \"time out\" was called to verify the correct patient, procedure, equipment, support staff and site/side marked as required.  Patient identity confirmed: verbally with patient and arm band    Patient location:  ED  Location:     Type:  Abscess    Location:  Trunk    Trunk location:  Abdomen  Pre-procedure details:     Skin preparation:  Chloraprep  Anesthesia (see MAR for exact dosages):     Anesthesia method:  Local infiltration    Local anesthetic:  Lidocaine 1% WITH epi  Procedure details:     Complexity:  Simple    Incision types:  Stab incision    Scalpel blade:  11    Incision depth:  Dermal    Wound management:  Probed and deloculated, irrigated with saline and extensive cleaning    Drainage:  Purulent    Wound treatment:  Wound left open    Packing materials:  1/2 in iodoform gauze  Post-procedure details:     Patient tolerance of procedure:  Tolerated well, no immediate complications  Comments:      Pocket of purulence encountered.  Aerobic and anaerobic cultures taken.  Patient tolerated the procedure well. Packing placed. Covered with ABD.          "

## 2024-03-20 NOTE — CASE MANAGEMENT
Case Management ED Discharge Planning Note    Patient name Lucero Terrell  Location ED 25/ED 25 MRN [id number]  : 1945 Date 3/20/2024        OBJECTIVE:  Predictive Model Details          79%  Factor Value    Risk of Hospital Admission or ED Visit Model 36% Number of ED Visits 2     11% Is in Relationship No     11% Number of Hospitalizations 1     11% Has Chronic Kidney Disease Yes     9% Has Atrial Fibrillation Yes     7% Has Anemia Yes     7% Has Medicare Yes     7% Has CVD Yes     2% Has PCP Yes       Chief Complaint: Abdominal pain .  Patient Class: Emergency  Preferred Pharmacy:   [facility] [address]  [address]  [place] [address]  Phone: [phone] Fax: [phone]    Primary Care Provider: Chang Clayton DO    Primary Insurance: MEDICARE  Secondary Insurance: [facility]    ED Discharge Details:    Discharge planning discussed with:: Patient  Freedom of Choice: Yes     CM contacted family/caregiver?: No- see comments (States no family)  Were Treatment Team discharge recommendations reviewed with patient/caregiver?: Yes  Did patient/caregiver verbalize understanding of patient care needs?: Yes  Were patient/caregiver advised of the risks associated with not following Treatment Team discharge recommendations?: Yes         Requested Home Health Care         Is the patient interested in HHC at discharge?: Yes  Home Health Discipline requested:: Nursing, Other (comment) (Wound Care)  Home Health Agency Name:: Other  Home Health Follow-Up Provider:: PCP  Home Health Services Needed:: Wound/Ostomy Care  Homebound Criteria Met:: Uses an Assist Device (i.e. cane, walker, etc) (Pt ambulates with Walker)  Supporting Clincal Findings:: Limited Endurance, Fatigues Easliy in Short Distances         Other Referral/Resources/Interventions Provided:  Interventions: HHC  Referral Comments: CM sent referral for [facility].  Pt states no preference for  provider.  Pt agreeable to wound care at home.  Pt lives alone but is teachable.  CM requested that providers write detailed instructions for wound care.         Treatment Team Recommendation: Home with Home Health Care  Discharge Destination Plan:: Home with Home Health Care     Transport at Discharge : Family

## 2024-03-20 NOTE — DISCHARGE INSTR - AVS FIRST PAGE
Visiting nurses will call you tomorrow to arrange a visit on Friday 3/22.  If any concerns before then please call our office or return to the emergency room.  Maintain wound dressing until Friday.  If it gets soiled, change the outer pad for a bandage or another piece of gauze.  Maintain the yellow packing in the wound bed.    Monitor for any signs of increasing redness, fevers, uncontrolled drainage, purulence.    Please call the office of wound care or trauma at [place] for a follow-up appointment next week for a wound check.  You may follow-up with either.  First choice would be to call the trauma office and ask for an appointment on Monday, 3/25/2024.     Take Augmentin (antibiotic) for 1 week.    Wound care instructions  Maintain current packing for 2 days until Friday, 3/22/2024  You may change the top pads for soilage as needed.  Cover with gauze, abdominal pad, or bandage  Allow visiting nurses to examine the wound on Friday, if drainage is minimal and no signs of purulence packing may be removed and wound may be covered with bandage or gauze  If continued signs of infection including purulent drainage, erythema, fevers please have the patient return to the emergency room for evaluation  On Friday 3/22 you may shower, let soap and water run over the incision.  Patient will follow-up with surgery office next week

## 2024-03-20 NOTE — DISCHARGE INSTRUCTIONS
Visiting nurses will call you tomorrow to arrange a visit on Friday 3/22.  If any concerns before then please call our office or return to the emergency room.  Maintain wound dressing until Friday.  If it gets soiled, change the outer pad for a bandage or another piece of gauze.  Maintain the yellow packing in the wound bed.    Monitor for any signs of increasing redness, fevers, uncontrolled drainage, purulence.    Please call the office of wound care or trauma at [facility] for a follow-up appointment next week for a wound check.  You may follow-up with either.  First choice would be to call the trauma office and ask for an appointment on Monday, 3/25/2024.     Take Augmentin (antibiotic) for 1 week.    Wound care instructions   Maintain current packing for 2 days until Friday, 3/22/2024   You may change the top pads for soilage as needed.  Cover with gauze, abdominal pad, or bandage   Allow visiting nurses to examine the wound on Friday, if drainage is minimal and no signs of purulence packing may be removed and wound may be covered with bandage or gauze   If continued signs of infection including purulent drainage, erythema, fevers please have the patient return to the emergency room for evaluation   On Friday 3/22 you may shower, let soap and water run over the incision.   Patient will follow-up with surgery office next week

## 2024-03-20 NOTE — ED ATTENDING ATTESTATION
3/20/2024  I, Chang Collado DO, saw and evaluated the patient. I have discussed the patient with the resident/non-physician practitioner and agree with the resident's/non-physician practitioner's findings, Plan of Care, and MDM as documented in the resident's/non-physician practitioner's note, except where noted. All available labs and Radiology studies were reviewed.  I was present for key portions of any procedure(s) performed by the resident/non-physician practitioner and I was immediately available to provide assistance.       At this point I agree with the current assessment done in the Emergency Department.  I have conducted an independent evaluation of this patient a history and physical is as follows    79-year-old female with incisional hernia presents for worsening pain 4 days ago denies nausea vomiting but is constipated, no dysuria no fever no chills.  On exam has a palpable hernia that is significantly tender to palpation no skin changes no acute distress no evidence of peritonitis plan CT abdomen pelvis to evaluate for small bowel obstruction hernia, pancreatitis, enteritis reassess for disposition    ED Course         Critical Care Time  Procedures

## 2024-03-22 ENCOUNTER — HOME HEALTH ADMISSION (OUTPATIENT)
Dept: HOME HEALTH SERVICES | Facility: HOME HEALTHCARE | Age: 79
End: 2024-03-22
Payer: MEDICARE

## 2024-03-22 ENCOUNTER — TELEPHONE (OUTPATIENT)
Dept: EMERGENCY DEPT | Facility: HOSPITAL | Age: 79
End: 2024-03-22

## 2024-03-22 DIAGNOSIS — Z51.89 WOUND CHECK, ABSCESS: Primary | ICD-10-CM

## 2024-03-22 LAB — BACTERIA SPEC ANAEROBE CULT: NORMAL

## 2024-03-22 NOTE — CASE MANAGEMENT
Case Management ED Discharge Planning Note    Patient name Lucero Terrell  Location ED 25/ED 25 MRN [id number]  : 1945 Date 3/22/2024        OBJECTIVE:  Predictive Model Details          79%  Factor Value    Risk of Hospital Admission or ED Visit Model 36% Number of ED Visits 2     11% Is in Relationship No     11% Number of Hospitalizations 1     11% Has Chronic Kidney Disease Yes     9% Has Atrial Fibrillation Yes     7% Has Anemia Yes     7% Has Medicare Yes     7% Has CVD Yes     2% Has PCP Yes       Chief Complaint: Abdominal pain .  Patient Class: Emergency  Preferred Pharmacy:   [facility]  [place] - [address]  [id number] [facility] [address]  Phone: [phone] Fax: [phone]    Primary Care Provider: Chang Clayton DO    Primary Insurance: MEDICARE  Secondary Insurance: [facility]    ED Discharge Details:       Requested Home Health Care         Home Health Agency Name:: [facility]         Other Referral/Resources/Interventions Provided:  Interventions: HHC  Referral Comments: Pt provided with choice list of HHC.  Pt selected  [facility] for her Wound Care.  Pt verbalized that she had noted darinage from her wound.  CM told pt that if she feels uncomfortable with the amount of drainage or she is have trouble she should come back to the ED.

## 2024-03-23 ENCOUNTER — TELEPHONE (OUTPATIENT)
Dept: OTHER | Facility: OTHER | Age: 79
End: 2024-03-23

## 2024-03-23 ENCOUNTER — HOME CARE VISIT (OUTPATIENT)
Dept: HOME HEALTH SERVICES | Facility: HOME HEALTHCARE | Age: 79
End: 2024-03-23
Payer: MEDICARE

## 2024-03-23 VITALS
TEMPERATURE: 98.9 F | DIASTOLIC BLOOD PRESSURE: 70 MMHG | WEIGHT: 169 LBS | HEIGHT: 59 IN | OXYGEN SATURATION: 98 % | RESPIRATION RATE: 18 BRPM | BODY MASS INDEX: 34.07 KG/M2 | SYSTOLIC BLOOD PRESSURE: 130 MMHG | HEART RATE: 56 BPM

## 2024-03-23 LAB
BACTERIA WND AEROBE CULT: ABNORMAL
BACTERIA WND AEROBE CULT: ABNORMAL
GRAM STN SPEC: ABNORMAL
GRAM STN SPEC: ABNORMAL

## 2024-03-23 PROCEDURE — G0299 HHS/HOSPICE OF RN EA 15 MIN: HCPCS

## 2024-03-23 PROCEDURE — 400013 VN SOC

## 2024-03-23 PROCEDURE — 10330081 VN NO-PAY CLAIM PROCEDURE

## 2024-03-23 NOTE — TELEPHONE ENCOUNTER
Natalie called stating patient has a large wound that was packed with Iodoform packing. It is about a 7 inch tunnel and she would like to know if it is ok to put an order in for it to be repacked daily. On call notified via TC.

## 2024-03-24 ENCOUNTER — HOME CARE VISIT (OUTPATIENT)
Dept: HOME HEALTH SERVICES | Facility: HOME HEALTHCARE | Age: 79
End: 2024-03-24
Payer: MEDICARE

## 2024-03-24 VITALS
SYSTOLIC BLOOD PRESSURE: 110 MMHG | HEART RATE: 80 BPM | DIASTOLIC BLOOD PRESSURE: 50 MMHG | RESPIRATION RATE: 18 BRPM | TEMPERATURE: 97.4 F

## 2024-03-24 PROCEDURE — G0299 HHS/HOSPICE OF RN EA 15 MIN: HCPCS

## 2024-03-25 ENCOUNTER — HOME CARE VISIT (OUTPATIENT)
Dept: HOME HEALTH SERVICES | Facility: HOME HEALTHCARE | Age: 79
End: 2024-03-25
Payer: MEDICARE

## 2024-03-25 ENCOUNTER — TELEPHONE (OUTPATIENT)
Age: 79
End: 2024-03-25

## 2024-03-25 VITALS
SYSTOLIC BLOOD PRESSURE: 110 MMHG | TEMPERATURE: 97.7 F | DIASTOLIC BLOOD PRESSURE: 60 MMHG | RESPIRATION RATE: 16 BRPM | HEART RATE: 76 BPM

## 2024-03-25 PROCEDURE — 10330064 PAD, ABD 5X9" STR LF (1/PK 20PK/BX) MGM1

## 2024-03-25 PROCEDURE — 10330064

## 2024-03-25 PROCEDURE — G0300 HHS/HOSPICE OF LPN EA 15 MIN: HCPCS

## 2024-03-25 PROCEDURE — 10330064 TAPE, RETENTION 2"X10YDS (1/BX24BX/CS)

## 2024-03-25 PROCEDURE — 10330064 SPONGE, GAUZE 8PLY N/S 4"X4" (200/PK 20P

## 2024-03-26 ENCOUNTER — HOME CARE VISIT (OUTPATIENT)
Dept: HOME HEALTH SERVICES | Facility: HOME HEALTHCARE | Age: 79
End: 2024-03-26
Payer: MEDICARE

## 2024-03-26 ENCOUNTER — APPOINTMENT (OUTPATIENT)
Dept: LAB | Facility: CLINIC | Age: 79
End: 2024-03-26
Payer: MEDICARE

## 2024-03-26 ENCOUNTER — ANTICOAG VISIT (OUTPATIENT)
Dept: CARDIOLOGY CLINIC | Facility: CLINIC | Age: 79
End: 2024-03-26

## 2024-03-26 VITALS
TEMPERATURE: 97.7 F | RESPIRATION RATE: 16 BRPM | HEART RATE: 72 BPM | SYSTOLIC BLOOD PRESSURE: 122 MMHG | DIASTOLIC BLOOD PRESSURE: 72 MMHG

## 2024-03-26 VITALS — HEART RATE: 71 BPM | SYSTOLIC BLOOD PRESSURE: 139 MMHG | DIASTOLIC BLOOD PRESSURE: 69 MMHG

## 2024-03-26 PROCEDURE — G0152 HHCP-SERV OF OT,EA 15 MIN: HCPCS

## 2024-03-26 PROCEDURE — G0300 HHS/HOSPICE OF LPN EA 15 MIN: HCPCS

## 2024-03-27 ENCOUNTER — HOME CARE VISIT (OUTPATIENT)
Dept: HOME HEALTH SERVICES | Facility: HOME HEALTHCARE | Age: 79
End: 2024-03-27
Payer: MEDICARE

## 2024-03-27 VITALS
DIASTOLIC BLOOD PRESSURE: 70 MMHG | OXYGEN SATURATION: 95 % | RESPIRATION RATE: 20 BRPM | HEART RATE: 82 BPM | SYSTOLIC BLOOD PRESSURE: 130 MMHG

## 2024-03-27 VITALS
TEMPERATURE: 98 F | HEART RATE: 76 BPM | DIASTOLIC BLOOD PRESSURE: 60 MMHG | RESPIRATION RATE: 20 BRPM | SYSTOLIC BLOOD PRESSURE: 132 MMHG

## 2024-03-27 PROCEDURE — G0151 HHCP-SERV OF PT,EA 15 MIN: HCPCS

## 2024-03-27 PROCEDURE — G0299 HHS/HOSPICE OF RN EA 15 MIN: HCPCS

## 2024-03-27 NOTE — CASE COMMUNICATION
PT mary ellen completed this date.  POC 1x1wk 2 x3wks for gait training with use of spc including environmental surfaces. Ther ex and hep instruction and progression to improve hip girdle stability and strength.

## 2024-03-28 ENCOUNTER — HOME CARE VISIT (OUTPATIENT)
Dept: HOME HEALTH SERVICES | Facility: HOME HEALTHCARE | Age: 79
End: 2024-03-28
Payer: MEDICARE

## 2024-03-28 ENCOUNTER — ANTICOAG VISIT (OUTPATIENT)
Dept: CARDIOLOGY CLINIC | Facility: CLINIC | Age: 79
End: 2024-03-28

## 2024-03-28 ENCOUNTER — LAB REQUISITION (OUTPATIENT)
Dept: LAB | Facility: HOSPITAL | Age: 79
End: 2024-03-28
Payer: MEDICARE

## 2024-03-28 VITALS
TEMPERATURE: 95.3 F | HEART RATE: 64 BPM | DIASTOLIC BLOOD PRESSURE: 60 MMHG | SYSTOLIC BLOOD PRESSURE: 106 MMHG | OXYGEN SATURATION: 94 % | RESPIRATION RATE: 20 BRPM

## 2024-03-28 VITALS — DIASTOLIC BLOOD PRESSURE: 60 MMHG | SYSTOLIC BLOOD PRESSURE: 132 MMHG

## 2024-03-28 DIAGNOSIS — I48.91 UNSPECIFIED ATRIAL FIBRILLATION (HCC): ICD-10-CM

## 2024-03-28 LAB
INR PPP: 2.94 (ref 0.84–1.19)
PROTHROMBIN TIME: 30.1 SECONDS (ref 11.6–14.5)

## 2024-03-28 PROCEDURE — G0158 HHC OT ASSISTANT EA 15: HCPCS

## 2024-03-28 PROCEDURE — 85610 PROTHROMBIN TIME: CPT | Performed by: INTERNAL MEDICINE

## 2024-03-28 PROCEDURE — G0299 HHS/HOSPICE OF RN EA 15 MIN: HCPCS

## 2024-03-29 ENCOUNTER — HOME CARE VISIT (OUTPATIENT)
Dept: HOME HEALTH SERVICES | Facility: HOME HEALTHCARE | Age: 79
End: 2024-03-29
Payer: MEDICARE

## 2024-03-29 VITALS
RESPIRATION RATE: 20 BRPM | SYSTOLIC BLOOD PRESSURE: 124 MMHG | TEMPERATURE: 96.1 F | HEART RATE: 60 BPM | DIASTOLIC BLOOD PRESSURE: 50 MMHG

## 2024-03-29 VITALS — SYSTOLIC BLOOD PRESSURE: 120 MMHG | RESPIRATION RATE: 20 BRPM | DIASTOLIC BLOOD PRESSURE: 70 MMHG

## 2024-03-29 PROCEDURE — G0151 HHCP-SERV OF PT,EA 15 MIN: HCPCS

## 2024-03-29 PROCEDURE — G0299 HHS/HOSPICE OF RN EA 15 MIN: HCPCS

## 2024-03-30 ENCOUNTER — HOME CARE VISIT (OUTPATIENT)
Dept: HOME HEALTH SERVICES | Facility: HOME HEALTHCARE | Age: 79
End: 2024-03-30
Payer: MEDICARE

## 2024-03-30 VITALS
RESPIRATION RATE: 20 BRPM | HEART RATE: 72 BPM | SYSTOLIC BLOOD PRESSURE: 122 MMHG | TEMPERATURE: 98 F | DIASTOLIC BLOOD PRESSURE: 66 MMHG

## 2024-03-30 PROCEDURE — G0299 HHS/HOSPICE OF RN EA 15 MIN: HCPCS

## 2024-03-31 ENCOUNTER — HOME CARE VISIT (OUTPATIENT)
Dept: HOME HEALTH SERVICES | Facility: HOME HEALTHCARE | Age: 79
End: 2024-03-31
Payer: MEDICARE

## 2024-03-31 VITALS
HEART RATE: 60 BPM | SYSTOLIC BLOOD PRESSURE: 122 MMHG | RESPIRATION RATE: 20 BRPM | TEMPERATURE: 98.3 F | DIASTOLIC BLOOD PRESSURE: 70 MMHG | OXYGEN SATURATION: 92 %

## 2024-03-31 PROCEDURE — G0299 HHS/HOSPICE OF RN EA 15 MIN: HCPCS

## 2024-04-01 ENCOUNTER — OFFICE VISIT (OUTPATIENT)
Dept: SURGERY | Facility: CLINIC | Age: 79
End: 2024-04-01

## 2024-04-01 VITALS
HEIGHT: 60 IN | WEIGHT: 169 LBS | BODY MASS INDEX: 33.18 KG/M2 | HEART RATE: 99 BPM | DIASTOLIC BLOOD PRESSURE: 77 MMHG | SYSTOLIC BLOOD PRESSURE: 147 MMHG

## 2024-04-01 DIAGNOSIS — Z48.89 POSTOPERATIVE VISIT: Primary | ICD-10-CM

## 2024-04-01 PROCEDURE — 99024 POSTOP FOLLOW-UP VISIT: CPT | Performed by: SURGERY

## 2024-04-01 NOTE — PROGRESS NOTES
Assessment/Plan: Patient has a history of her complex abdominal wall.  She has a history for mesh implantation and excision of mesh secondary to complications.  She was recently in the emergency room with a superficial skin abscess in the midline.  Staphylococcus was cultured.  Wound packing was placed.    She feels well.  She has no fevers or chills.  Overall she is progressing well.  The incision and drainage site is 1 cm wide and 2.5 cm in depth.  Packing was replaced.    Of some concern is an updated CAT scan demonstrating 2 hernias.  These contain small bowel.  I explained that she would need to go to [facility] to a hernia center of excellence for an opinion if she would like to undergo repair.  She is not optimistic about traveling to [place], and prefers to observe.  I did recommend that she wear an abdominal binder on a daily basis.  She is agreeable.    A follow-up visit is requested in 2 weeks.    There are no diagnoses linked to this encounter.    Subjective:      Patient ID: Lucero Terrell is a 79 y.o. female.    Patient presents for hernia consult.  States she was seen in the Emergency Room on 3/20/2024 and had an I&D done of an abdominal wall abscess.  CT abdomen pelvis 3/20/2024  IMPRESSION:  Re identified small bowel containing ventral hernias. A superficial abscess is located in the most inferior hernia, a periumbilical hernia with a tubular component extending into the abdominal cavity abutting a segment of small bowel. An enterocutaneous   fistula is not excluded.  Numerous segments of small bowel are present within these hernias however there is no bowel obstruction or signs of strangulation.             The following portions of the patient's history were reviewed and updated as appropriate:     She  has a past medical history of Chronic kidney disease, Colitis, and Hyperlipidemia.  She  has a past surgical history that includes Joint replacement (Bilateral); Colon surgery;  Back surgery; Colonoscopy (N/A, 5/4/2018); LAPAROTOMY (N/A, 11/27/2021); Abdominal adhesion surgery (N/A, 11/27/2021); pr repair first abdominal wall hernia (N/A, 7/27/2022); and ORIF femur fracture (Right, 5/28/2023).  Her family history includes No Known Problems in her father and mother.  She  reports that she quit smoking about 54 years ago. Her smoking use included cigarettes. She has never used smokeless tobacco. She reports current alcohol use. She reports that she does not use drugs.  Current Outpatient Medications   Medication Sig Dispense Refill    acetaminophen (TYLENOL) 325 mg tablet Take 2 tablets (650 mg total) by mouth 3 (three) times a day as needed for mild pain  0    Diclofenac Sodium (VOLTAREN) 1 %       ketorolac (Acular) 0.5 % ophthalmic solution Administer 1 drop to the right eye 4 (four) times a day. Not using  Indications: Seasonal Allergic Conjunctivitis      metoprolol succinate (TOPROL-XL) 50 mg 24 hr tablet Take 1 tablet (50 mg total) by mouth daily at bedtime 30 tablet 0    montelukast (SINGULAIR) 10 mg tablet Take 10 mg by mouth daily at bedtime        polyethylene glycol (MIRALAX) 17 g packet Take 17 g by mouth daily as needed (Constipation)  0    pregabalin (LYRICA) 100 mg capsule Take 100 mg by mouth 2 (two) times a day        simvastatin (ZOCOR) 20 mg tablet Take 20 mg by mouth daily at bedtime      Tafluprost, PF, 0.0015 % SOLN Apply 1 drop to eye in the morning BOTH EYES      warfarin (COUMADIN) 5 mg tablet 6/27/23 3.75mg--5mg daily Repeat PT/INR by mobile lab 7/723 or 7/10/23 (Patient taking differently: Tues 3.26.24 and Wed 3.27.24  Hold Coumadin.   Thurs 3.28.24   PT.INR.  Indications: Atrial Fibrillation) 90 tablet 3    warfarin (COUMADIN) 7.5 mg tablet Take 7.5 mg 3 times weekly on Monday, Wednesday and Friday.  Take 5 mg on all other days  0     No current facility-administered medications for this visit.     She is allergic to hydromorphone..    Review of Systems   "    Objective:      /77   Pulse 99   Ht 4' 11.75\" (1.518 m)   Wt 76.7 kg (169 lb)   BMI 33.28 kg/m²          Physical Exam    "

## 2024-04-02 ENCOUNTER — HOME CARE VISIT (OUTPATIENT)
Dept: HOME HEALTH SERVICES | Facility: HOME HEALTHCARE | Age: 79
End: 2024-04-02
Payer: MEDICARE

## 2024-04-02 ENCOUNTER — ANTICOAG VISIT (OUTPATIENT)
Dept: CARDIOLOGY CLINIC | Facility: CLINIC | Age: 79
End: 2024-04-02

## 2024-04-02 VITALS
DIASTOLIC BLOOD PRESSURE: 50 MMHG | TEMPERATURE: 96.3 F | RESPIRATION RATE: 20 BRPM | SYSTOLIC BLOOD PRESSURE: 114 MMHG | HEART RATE: 76 BPM

## 2024-04-02 DIAGNOSIS — I48.0 PAF (PAROXYSMAL ATRIAL FIBRILLATION) (HCC): Primary | ICD-10-CM

## 2024-04-02 LAB — INR PPP: 1.8 (ref 0.84–1.19)

## 2024-04-02 PROCEDURE — G0299 HHS/HOSPICE OF RN EA 15 MIN: HCPCS

## 2024-04-02 PROCEDURE — G0152 HHCP-SERV OF OT,EA 15 MIN: HCPCS

## 2024-04-02 RX ORDER — WARFARIN SODIUM 5 MG/1
5 TABLET ORAL
Qty: 90 TABLET | Refills: 3 | Status: CANCELLED | OUTPATIENT
Start: 2024-04-02

## 2024-04-02 RX ORDER — WARFARIN SODIUM 5 MG/1
5 TABLET ORAL
Qty: 90 TABLET | Refills: 3 | Status: SHIPPED | OUTPATIENT
Start: 2024-04-02 | End: 2024-04-09 | Stop reason: SDUPTHER

## 2024-04-03 ENCOUNTER — HOME CARE VISIT (OUTPATIENT)
Dept: HOME HEALTH SERVICES | Facility: HOME HEALTHCARE | Age: 79
End: 2024-04-03
Payer: MEDICARE

## 2024-04-03 VITALS
RESPIRATION RATE: 20 BRPM | HEART RATE: 76 BPM | OXYGEN SATURATION: 94 % | TEMPERATURE: 95.9 F | DIASTOLIC BLOOD PRESSURE: 62 MMHG | SYSTOLIC BLOOD PRESSURE: 126 MMHG

## 2024-04-03 VITALS — RESPIRATION RATE: 20 BRPM | SYSTOLIC BLOOD PRESSURE: 138 MMHG | DIASTOLIC BLOOD PRESSURE: 78 MMHG

## 2024-04-03 PROCEDURE — G0299 HHS/HOSPICE OF RN EA 15 MIN: HCPCS

## 2024-04-03 PROCEDURE — G0151 HHCP-SERV OF PT,EA 15 MIN: HCPCS

## 2024-04-04 ENCOUNTER — HOME CARE VISIT (OUTPATIENT)
Dept: HOME HEALTH SERVICES | Facility: HOME HEALTHCARE | Age: 79
End: 2024-04-04
Payer: MEDICARE

## 2024-04-04 VITALS
TEMPERATURE: 95.9 F | SYSTOLIC BLOOD PRESSURE: 146 MMHG | DIASTOLIC BLOOD PRESSURE: 72 MMHG | HEART RATE: 68 BPM | OXYGEN SATURATION: 92 % | RESPIRATION RATE: 24 BRPM

## 2024-04-04 DIAGNOSIS — I48.0 PAF (PAROXYSMAL ATRIAL FIBRILLATION) (HCC): ICD-10-CM

## 2024-04-04 PROCEDURE — G0299 HHS/HOSPICE OF RN EA 15 MIN: HCPCS

## 2024-04-04 NOTE — TELEPHONE ENCOUNTER
Medication: metoprolol succinate     Dose/Frequency: 50mg    Quantity: 30    Pharmacy: metoprolol succinate     Office:   [] PCP/Provider -   [x] Speciality/Provider -     Does the patient have enough for 3 days?   [] Yes   [x] No - Send as HP to POD

## 2024-04-05 ENCOUNTER — HOME CARE VISIT (OUTPATIENT)
Dept: HOME HEALTH SERVICES | Facility: HOME HEALTHCARE | Age: 79
End: 2024-04-05
Payer: MEDICARE

## 2024-04-05 VITALS
RESPIRATION RATE: 20 BRPM | OXYGEN SATURATION: 98 % | DIASTOLIC BLOOD PRESSURE: 60 MMHG | SYSTOLIC BLOOD PRESSURE: 120 MMHG | HEART RATE: 80 BPM

## 2024-04-05 VITALS
HEART RATE: 64 BPM | SYSTOLIC BLOOD PRESSURE: 124 MMHG | OXYGEN SATURATION: 99 % | DIASTOLIC BLOOD PRESSURE: 60 MMHG | RESPIRATION RATE: 22 BRPM | TEMPERATURE: 96.4 F

## 2024-04-05 PROCEDURE — G0299 HHS/HOSPICE OF RN EA 15 MIN: HCPCS

## 2024-04-05 PROCEDURE — G0151 HHCP-SERV OF PT,EA 15 MIN: HCPCS

## 2024-04-05 RX ORDER — METOPROLOL SUCCINATE 50 MG/1
50 TABLET, EXTENDED RELEASE ORAL
Qty: 90 TABLET | Refills: 3 | Status: SHIPPED | OUTPATIENT
Start: 2024-04-05

## 2024-04-06 ENCOUNTER — HOME CARE VISIT (OUTPATIENT)
Dept: HOME HEALTH SERVICES | Facility: HOME HEALTHCARE | Age: 79
End: 2024-04-06
Payer: MEDICARE

## 2024-04-06 VITALS
OXYGEN SATURATION: 9 % | SYSTOLIC BLOOD PRESSURE: 122 MMHG | TEMPERATURE: 97.1 F | HEART RATE: 60 BPM | DIASTOLIC BLOOD PRESSURE: 60 MMHG | RESPIRATION RATE: 24 BRPM

## 2024-04-06 PROCEDURE — G0299 HHS/HOSPICE OF RN EA 15 MIN: HCPCS

## 2024-04-07 ENCOUNTER — HOME CARE VISIT (OUTPATIENT)
Dept: HOME HEALTH SERVICES | Facility: HOME HEALTHCARE | Age: 79
End: 2024-04-07
Payer: MEDICARE

## 2024-04-07 VITALS
RESPIRATION RATE: 18 BRPM | HEART RATE: 70 BPM | TEMPERATURE: 96.6 F | OXYGEN SATURATION: 98 % | SYSTOLIC BLOOD PRESSURE: 120 MMHG | DIASTOLIC BLOOD PRESSURE: 60 MMHG

## 2024-04-07 PROCEDURE — G0299 HHS/HOSPICE OF RN EA 15 MIN: HCPCS

## 2024-04-08 ENCOUNTER — HOME CARE VISIT (OUTPATIENT)
Dept: HOME HEALTH SERVICES | Facility: HOME HEALTHCARE | Age: 79
End: 2024-04-08
Payer: MEDICARE

## 2024-04-08 VITALS
DIASTOLIC BLOOD PRESSURE: 64 MMHG | TEMPERATURE: 96.3 F | SYSTOLIC BLOOD PRESSURE: 118 MMHG | RESPIRATION RATE: 24 BRPM | HEART RATE: 72 BPM | OXYGEN SATURATION: 100 %

## 2024-04-08 PROCEDURE — G0299 HHS/HOSPICE OF RN EA 15 MIN: HCPCS

## 2024-04-09 ENCOUNTER — ANTICOAG VISIT (OUTPATIENT)
Dept: CARDIOLOGY CLINIC | Facility: CLINIC | Age: 79
End: 2024-04-09

## 2024-04-09 ENCOUNTER — HOME CARE VISIT (OUTPATIENT)
Dept: HOME HEALTH SERVICES | Facility: HOME HEALTHCARE | Age: 79
End: 2024-04-09
Payer: MEDICARE

## 2024-04-09 ENCOUNTER — NURSE TRIAGE (OUTPATIENT)
Age: 79
End: 2024-04-09

## 2024-04-09 VITALS
HEART RATE: 72 BPM | SYSTOLIC BLOOD PRESSURE: 110 MMHG | RESPIRATION RATE: 22 BRPM | DIASTOLIC BLOOD PRESSURE: 62 MMHG | OXYGEN SATURATION: 87 % | TEMPERATURE: 97.2 F

## 2024-04-09 DIAGNOSIS — I48.0 PAF (PAROXYSMAL ATRIAL FIBRILLATION) (HCC): Primary | ICD-10-CM

## 2024-04-09 LAB — INR PPP: 1.8 (ref 0.84–1.19)

## 2024-04-09 PROCEDURE — G0299 HHS/HOSPICE OF RN EA 15 MIN: HCPCS

## 2024-04-09 RX ORDER — WARFARIN SODIUM 5 MG/1
5 TABLET ORAL
Qty: 90 TABLET | Refills: 3 | Status: SHIPPED | OUTPATIENT
Start: 2024-04-09

## 2024-04-09 NOTE — TELEPHONE ENCOUNTER
"Patient of Dr. Cook.  Patient has abdominal wound after abdominal abscess.  [facility] Visiting nurse calling d/t increased drainage from wound.  Patient stating that she is constantly adding padding to wound d/t drainage.  Is being seen every day by [facility] but patient unable to always get showers and keep area clean.  Visiting nurse transferred to office to set up appointment.    Reason for Disposition   Clear or blood-tinged fluid draining from wound and no fever    Answer Assessment - Initial Assessment Questions  1. SYMPTOM: \"What's the main symptom you're concerned about?\" (e.g., redness, pain, drainage)      drainage  2. ONSET: \"When did drainage  start?\"      Increasing over last few days.   3. SURGERY: \"What surgery was performed?\"      Abscess at surgical site.   4. DATE of SURGERY: \"When was surgery performed?\"       March 2024  5. INCISION SITE: \"Where is the incision located?\"       abd  6. REDNESS: \"Is there any redness at the incision site?\" If yes, ask: \"How wide across is the redness?\" (Inches, centimeters)       denies  7. PAIN: \"Is there any pain?\" If Yes, ask: \"How bad is it?\"  (Scale 1-10; or mild, moderate, severe)      denies  8. BLEEDING: \"Is there any bleeding?\" If Yes, ask: \"How much?\" and \"Where?\"      denies  9. DRAINAGE: \"Is there any drainage from the incision site?\" If yes, ask: \"What color and how much?\" (e.g., red, cloudy, pus; drops, teaspoon)      Pus, drainage increasing over last few days    Protocols used: Post-Op Incision Symptoms and Questions-ADULT-[place]    "

## 2024-04-10 ENCOUNTER — HOME CARE VISIT (OUTPATIENT)
Dept: HOME HEALTH SERVICES | Facility: HOME HEALTHCARE | Age: 79
End: 2024-04-10
Payer: MEDICARE

## 2024-04-10 VITALS
RESPIRATION RATE: 20 BRPM | HEART RATE: 84 BPM | TEMPERATURE: 96.9 F | SYSTOLIC BLOOD PRESSURE: 126 MMHG | DIASTOLIC BLOOD PRESSURE: 72 MMHG | OXYGEN SATURATION: 98 %

## 2024-04-10 VITALS
HEART RATE: 103 BPM | SYSTOLIC BLOOD PRESSURE: 110 MMHG | RESPIRATION RATE: 20 BRPM | OXYGEN SATURATION: 97 % | DIASTOLIC BLOOD PRESSURE: 60 MMHG

## 2024-04-10 PROCEDURE — G0299 HHS/HOSPICE OF RN EA 15 MIN: HCPCS

## 2024-04-10 PROCEDURE — G0151 HHCP-SERV OF PT,EA 15 MIN: HCPCS

## 2024-04-10 NOTE — TELEPHONE ENCOUNTER
Spoke with patient this morning; offered her an appointment with Dr. Cochran today; she would prefer to wait and see Dr. Cook on Monday, 4/15.

## 2024-04-11 ENCOUNTER — HOME CARE VISIT (OUTPATIENT)
Dept: HOME HEALTH SERVICES | Facility: HOME HEALTHCARE | Age: 79
End: 2024-04-11
Payer: MEDICARE

## 2024-04-11 VITALS
SYSTOLIC BLOOD PRESSURE: 128 MMHG | HEART RATE: 84 BPM | OXYGEN SATURATION: 94 % | RESPIRATION RATE: 20 BRPM | TEMPERATURE: 96.6 F | DIASTOLIC BLOOD PRESSURE: 72 MMHG

## 2024-04-11 PROCEDURE — 10330064 TAPE, ADHSV TRANSPORE WHT 2" (6RL/BX 10B

## 2024-04-11 PROCEDURE — G0299 HHS/HOSPICE OF RN EA 15 MIN: HCPCS

## 2024-04-12 ENCOUNTER — HOME CARE VISIT (OUTPATIENT)
Dept: HOME HEALTH SERVICES | Facility: HOME HEALTHCARE | Age: 79
End: 2024-04-12
Payer: MEDICARE

## 2024-04-12 VITALS
HEART RATE: 96 BPM | RESPIRATION RATE: 20 BRPM | SYSTOLIC BLOOD PRESSURE: 122 MMHG | DIASTOLIC BLOOD PRESSURE: 60 MMHG | OXYGEN SATURATION: 96 %

## 2024-04-12 VITALS
HEART RATE: 76 BPM | TEMPERATURE: 97.6 F | RESPIRATION RATE: 20 BRPM | SYSTOLIC BLOOD PRESSURE: 104 MMHG | OXYGEN SATURATION: 92 % | DIASTOLIC BLOOD PRESSURE: 58 MMHG

## 2024-04-12 PROCEDURE — G0151 HHCP-SERV OF PT,EA 15 MIN: HCPCS

## 2024-04-12 PROCEDURE — G0299 HHS/HOSPICE OF RN EA 15 MIN: HCPCS

## 2024-04-13 ENCOUNTER — HOME CARE VISIT (OUTPATIENT)
Dept: HOME HEALTH SERVICES | Facility: HOME HEALTHCARE | Age: 79
End: 2024-04-13
Payer: MEDICARE

## 2024-04-13 VITALS
OXYGEN SATURATION: 96 % | SYSTOLIC BLOOD PRESSURE: 118 MMHG | TEMPERATURE: 97.6 F | RESPIRATION RATE: 20 BRPM | DIASTOLIC BLOOD PRESSURE: 68 MMHG | HEART RATE: 76 BPM

## 2024-04-13 PROCEDURE — G0299 HHS/HOSPICE OF RN EA 15 MIN: HCPCS

## 2024-04-14 ENCOUNTER — HOME CARE VISIT (OUTPATIENT)
Dept: HOME HEALTH SERVICES | Facility: HOME HEALTHCARE | Age: 79
End: 2024-04-14
Payer: MEDICARE

## 2024-04-14 VITALS — HEART RATE: 72 BPM | TEMPERATURE: 97.7 F | RESPIRATION RATE: 18 BRPM | OXYGEN SATURATION: 94 %

## 2024-04-14 PROCEDURE — 10330064 FOAM, ADH SIL W/BORDER 4"X4" (10/BX 20BX

## 2024-04-14 PROCEDURE — G0299 HHS/HOSPICE OF RN EA 15 MIN: HCPCS

## 2024-04-14 PROCEDURE — 10330064 TAPE, ADHSV TRANSPORE WHT 2" (6RL/BX 10B

## 2024-04-14 NOTE — CASE COMMUNICATION
1600...Patient called into office regarding visit time for today. Notified SN planning to arrive within the hour. Patient verbalized understanding.

## 2024-04-15 ENCOUNTER — OFFICE VISIT (OUTPATIENT)
Dept: SURGERY | Facility: CLINIC | Age: 79
End: 2024-04-15
Payer: MEDICARE

## 2024-04-15 ENCOUNTER — HOME CARE VISIT (OUTPATIENT)
Dept: HOME HEALTH SERVICES | Facility: HOME HEALTHCARE | Age: 79
End: 2024-04-15
Payer: MEDICARE

## 2024-04-15 DIAGNOSIS — K43.2 INCISIONAL HERNIA, WITHOUT OBSTRUCTION OR GANGRENE: Primary | ICD-10-CM

## 2024-04-15 PROCEDURE — 99213 OFFICE O/P EST LOW 20 MIN: CPT | Performed by: SURGERY

## 2024-04-15 RX ORDER — CEPHALEXIN 250 MG/1
250 CAPSULE ORAL 4 TIMES DAILY
Qty: 56 CAPSULE | Refills: 0 | Status: SHIPPED | OUTPATIENT
Start: 2024-04-15 | End: 2024-04-29

## 2024-04-15 NOTE — PROGRESS NOTES
Assessment/Plan: Patient returns for follow-up visit.  She was seen 2 weeks ago with an abscess on the umbilical region.  Wound packing was placed.  Antibiotics provided.    She returns for follow-up visit.  Her drainage persists.  Original cultures were Streptococcus.  Not have a gram-negative odor.    She has a complex abdominal wall history.  She had undergone surgery in [place] over 20 years ago where she had extensive removal of small bowel and incisional hernia repair with mesh.  In 2021 she developed small bowel obstruction secondary to adhesions to the mesh.  She underwent lysis of adhesions.  In 2022 she presented with a hernia.  Dense adhesions between the small bowel and the mesh were also encountered.  The wound was closed primarily.    I am concerned about the ongoing drainage.  It could be that she has a fistula between the small bowel and retained mesh.  There is drainage to the umbilicus and she has no secondary signs for infection.    I asked her to trial another course of Keflex.  Asked her to obtain prothrombin times weekly.  She is in agreement.  A follow-up visit at 2 weeks is requested.  All questions answered.  I explained that if the mesh is infected she would have 2 choices.  1 would be to simply observe and have daily change of her dressing twice a day.  An exploratory laparotomy would be a large undertaking.  It would involve small bowel resection with explantation of mesh and a large incision.  I am unsure how well she would fare given her age and overall medical status.  Further discussion to be take place at her next visit.    There are no diagnoses linked to this encounter.    Subjective:      Patient ID: Lucero Terrell is a 79 y.o. female.    Patient presents for two week follow up.  S/P I&D abdominal wall abscess in ER on 3/20/2024.        The following portions of the patient's history were reviewed and updated as appropriate:     She  has a past medical history of Chronic kidney  disease, Colitis, and Hyperlipidemia.  She  has a past surgical history that includes Joint replacement (Bilateral); Colon surgery; Back surgery; Colonoscopy (N/A, 5/4/2018); LAPAROTOMY (N/A, 11/27/2021); Abdominal adhesion surgery (N/A, 11/27/2021); pr repair first abdominal wall hernia (N/A, 7/27/2022); and ORIF femur fracture (Right, 5/28/2023).  Her family history includes No Known Problems in her father and mother.  She  reports that she quit smoking about 54 years ago. Her smoking use included cigarettes. She has never used smokeless tobacco. She reports current alcohol use. She reports that she does not use drugs.  Current Outpatient Medications   Medication Sig Dispense Refill    acetaminophen (TYLENOL) 325 mg tablet Take 2 tablets (650 mg total) by mouth 3 (three) times a day as needed for mild pain  0    Diclofenac Sodium (VOLTAREN) 1 %       ketorolac (Acular) 0.5 % ophthalmic solution Administer 1 drop to the right eye 4 (four) times a day. Not using  Indications: Seasonal Allergic Conjunctivitis      metoprolol succinate (TOPROL-XL) 50 mg 24 hr tablet Take 1 tablet (50 mg total) by mouth daily at bedtime 90 tablet 3    montelukast (SINGULAIR) 10 mg tablet Take 10 mg by mouth daily at bedtime        polyethylene glycol (MIRALAX) 17 g packet Take 17 g by mouth daily as needed (Constipation)  0    pregabalin (LYRICA) 100 mg capsule Take 100 mg by mouth 2 (two) times a day        simvastatin (ZOCOR) 20 mg tablet Take 20 mg by mouth daily at bedtime      Tafluprost, PF, 0.0015 % SOLN Apply 1 drop to eye in the morning BOTH EYES      warfarin (COUMADIN) 5 mg tablet Take 1 tablet (5 mg total) by mouth daily 4/9/24 take 5mg po daily. repeat PT/INR on 4/16/24 90 tablet 3    warfarin (COUMADIN) 7.5 mg tablet Take 7.5 mg 3 times weekly on Monday, Wednesday and Friday.  Take 5 mg on all other days  0     No current facility-administered medications for this visit.     She is allergic to hydromorphone..    Review of  Systems   Constitutional: Negative.  Negative for activity change.   HENT: Negative.     Eyes: Negative.    Respiratory: Negative.     Cardiovascular: Negative.    Gastrointestinal: Negative.    Endocrine: Negative.    Genitourinary: Negative.    Musculoskeletal: Negative.    Skin: Negative.    Allergic/Immunologic: Negative.    Neurological:  Positive for weakness.   Psychiatric/Behavioral:  Negative for agitation, behavioral problems and confusion. The patient is not nervous/anxious.          Objective:      There were no vitals taken for this visit.         Physical Exam  Constitutional:       Appearance: Normal appearance. She is well-developed.   HENT:      Head: Atraumatic.   Eyes:      Extraocular Movements: Extraocular movements intact.   Cardiovascular:      Rate and Rhythm: Normal rate.   Pulmonary:      Effort: Pulmonary effort is normal.   Abdominal:      General: Abdomen is flat.      Palpations: Abdomen is soft.      Comments:  Persistent drainage from the umbilicus.  Grossly appears to represent Staphylococcus.   Skin:     General: Skin is warm and dry.   Neurological:      Mental Status: She is alert and oriented to person, place, and time.   Psychiatric:         Mood and Affect: Mood normal.

## 2024-04-16 ENCOUNTER — TELEPHONE (OUTPATIENT)
Age: 79
End: 2024-04-16

## 2024-04-16 ENCOUNTER — TREATMENT (OUTPATIENT)
Dept: SURGERY | Facility: CLINIC | Age: 79
End: 2024-04-16

## 2024-04-16 ENCOUNTER — ANTICOAG VISIT (OUTPATIENT)
Dept: CARDIOLOGY CLINIC | Facility: CLINIC | Age: 79
End: 2024-04-16

## 2024-04-16 ENCOUNTER — HOME CARE VISIT (OUTPATIENT)
Dept: HOME HEALTH SERVICES | Facility: HOME HEALTHCARE | Age: 79
End: 2024-04-16
Payer: MEDICARE

## 2024-04-16 ENCOUNTER — NURSE TRIAGE (OUTPATIENT)
Age: 79
End: 2024-04-16

## 2024-04-16 VITALS
OXYGEN SATURATION: 96 % | TEMPERATURE: 96.6 F | DIASTOLIC BLOOD PRESSURE: 58 MMHG | RESPIRATION RATE: 24 BRPM | HEART RATE: 76 BPM | SYSTOLIC BLOOD PRESSURE: 114 MMHG

## 2024-04-16 DIAGNOSIS — K43.2 INCISIONAL HERNIA, WITHOUT OBSTRUCTION OR GANGRENE: Primary | ICD-10-CM

## 2024-04-16 LAB — INR PPP: 2.3 (ref 0.84–1.19)

## 2024-04-16 PROCEDURE — G0299 HHS/HOSPICE OF RN EA 15 MIN: HCPCS

## 2024-04-16 RX ORDER — NYSTATIN 100000 [USP'U]/G
POWDER TOPICAL 2 TIMES DAILY
Qty: 30 G | Refills: 4 | Status: SHIPPED | OUTPATIENT
Start: 2024-04-16

## 2024-04-16 NOTE — TELEPHONE ENCOUNTER
Yuni with [facility] YOLIEA calling about patients recent visit with Dr Cook.    Patient stating she has new wound care orders and YOLIEA would like to clarify those orders.    Would like to know if now  just using plain gauze and dressing. Also would like to know if she is able to irrigate with saline and how often daily.    Patient gets a shower once a day.    Please advise  Yuni MORA # [phone]

## 2024-04-16 NOTE — TELEPHONE ENCOUNTER
Patient of Dr. Cook.  Yuni with [facility] called back regarding patients wound.  Sandra states that the wound has what appears to be a yeast rash around it.  Would like something ordered, either a cream or powder for this.  Also wondering if should go back to packing instead of having dressing the way it is now.  Please advise.  Contact Sandra: [phone]  Reason for Disposition  • Nursing judgment    Protocols used: Information Only Call - No Triage-ADULT-[place]

## 2024-04-16 NOTE — PROGRESS NOTES
Visiting nurse requested clarifications of increased dressing changes.  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  If her drainage is excessive then a urostomy pouch over the small fistula opening would be more ideal.  Nystatin powder was called and secondary to rash.

## 2024-04-17 ENCOUNTER — HOME CARE VISIT (OUTPATIENT)
Dept: HOME HEALTH SERVICES | Facility: HOME HEALTHCARE | Age: 79
End: 2024-04-17
Payer: MEDICARE

## 2024-04-17 VITALS
RESPIRATION RATE: 20 BRPM | DIASTOLIC BLOOD PRESSURE: 60 MMHG | OXYGEN SATURATION: 98 % | HEART RATE: 80 BPM | SYSTOLIC BLOOD PRESSURE: 118 MMHG

## 2024-04-17 VITALS
OXYGEN SATURATION: 92 % | TEMPERATURE: 95.8 F | HEART RATE: 72 BPM | DIASTOLIC BLOOD PRESSURE: 62 MMHG | RESPIRATION RATE: 20 BRPM | SYSTOLIC BLOOD PRESSURE: 122 MMHG

## 2024-04-17 PROCEDURE — G0299 HHS/HOSPICE OF RN EA 15 MIN: HCPCS

## 2024-04-17 PROCEDURE — G0151 HHCP-SERV OF PT,EA 15 MIN: HCPCS

## 2024-04-18 ENCOUNTER — HOME CARE VISIT (OUTPATIENT)
Dept: HOME HEALTH SERVICES | Facility: HOME HEALTHCARE | Age: 79
End: 2024-04-18
Payer: MEDICARE

## 2024-04-18 VITALS
TEMPERATURE: 95.3 F | SYSTOLIC BLOOD PRESSURE: 156 MMHG | OXYGEN SATURATION: 99 % | RESPIRATION RATE: 20 BRPM | DIASTOLIC BLOOD PRESSURE: 88 MMHG | HEART RATE: 64 BPM

## 2024-04-18 PROCEDURE — G0299 HHS/HOSPICE OF RN EA 15 MIN: HCPCS

## 2024-04-19 ENCOUNTER — ANTICOAG VISIT (OUTPATIENT)
Dept: CARDIOLOGY CLINIC | Facility: CLINIC | Age: 79
End: 2024-04-19

## 2024-04-19 ENCOUNTER — HOME CARE VISIT (OUTPATIENT)
Dept: HOME HEALTH SERVICES | Facility: HOME HEALTHCARE | Age: 79
End: 2024-04-19
Payer: MEDICARE

## 2024-04-19 VITALS
RESPIRATION RATE: 20 BRPM | SYSTOLIC BLOOD PRESSURE: 120 MMHG | OXYGEN SATURATION: 98 % | HEART RATE: 80 BPM | DIASTOLIC BLOOD PRESSURE: 70 MMHG

## 2024-04-19 VITALS
DIASTOLIC BLOOD PRESSURE: 80 MMHG | HEART RATE: 80 BPM | TEMPERATURE: 96.9 F | SYSTOLIC BLOOD PRESSURE: 136 MMHG | RESPIRATION RATE: 20 BRPM | OXYGEN SATURATION: 97 %

## 2024-04-19 DIAGNOSIS — I48.0 PAF (PAROXYSMAL ATRIAL FIBRILLATION) (HCC): Primary | ICD-10-CM

## 2024-04-19 LAB — INR PPP: 2.6 (ref 0.84–1.19)

## 2024-04-19 PROCEDURE — G0151 HHCP-SERV OF PT,EA 15 MIN: HCPCS

## 2024-04-19 PROCEDURE — G0299 HHS/HOSPICE OF RN EA 15 MIN: HCPCS

## 2024-04-19 RX ORDER — WARFARIN SODIUM 5 MG/1
5 TABLET ORAL
Qty: 90 TABLET | Refills: 3 | Status: SHIPPED | OUTPATIENT
Start: 2024-04-19 | End: 2024-04-26 | Stop reason: SDUPTHER

## 2024-04-22 ENCOUNTER — HOME CARE VISIT (OUTPATIENT)
Dept: HOME HEALTH SERVICES | Facility: HOME HEALTHCARE | Age: 79
End: 2024-04-22
Payer: MEDICARE

## 2024-04-22 ENCOUNTER — ANTICOAG VISIT (OUTPATIENT)
Dept: CARDIOLOGY CLINIC | Facility: CLINIC | Age: 79
End: 2024-04-22

## 2024-04-22 VITALS
TEMPERATURE: 97 F | RESPIRATION RATE: 20 BRPM | DIASTOLIC BLOOD PRESSURE: 62 MMHG | HEART RATE: 64 BPM | SYSTOLIC BLOOD PRESSURE: 114 MMHG | OXYGEN SATURATION: 97 %

## 2024-04-22 LAB — INR PPP: 2.7 (ref 0.84–1.19)

## 2024-04-22 PROCEDURE — G0299 HHS/HOSPICE OF RN EA 15 MIN: HCPCS

## 2024-04-22 PROCEDURE — 10330064 DRESSING, HYDROCELLULAR ADH STR FOAM 4"X

## 2024-04-26 ENCOUNTER — HOME CARE VISIT (OUTPATIENT)
Dept: HOME HEALTH SERVICES | Facility: HOME HEALTHCARE | Age: 79
End: 2024-04-26
Payer: MEDICARE

## 2024-04-26 VITALS
SYSTOLIC BLOOD PRESSURE: 128 MMHG | RESPIRATION RATE: 24 BRPM | HEART RATE: 76 BPM | DIASTOLIC BLOOD PRESSURE: 64 MMHG | OXYGEN SATURATION: 98 % | TEMPERATURE: 96.3 F

## 2024-04-26 DIAGNOSIS — I48.0 PAF (PAROXYSMAL ATRIAL FIBRILLATION) (HCC): Primary | ICD-10-CM

## 2024-04-26 PROCEDURE — G0299 HHS/HOSPICE OF RN EA 15 MIN: HCPCS

## 2024-04-26 RX ORDER — WARFARIN SODIUM 5 MG/1
5 TABLET ORAL
Qty: 90 TABLET | Refills: 3 | Status: SHIPPED | OUTPATIENT
Start: 2024-04-26 | End: 2024-05-01 | Stop reason: SDUPTHER

## 2024-04-28 PROCEDURE — 10330064 TAPE, ADHSV TRANSPORE WHT 2" (6RL/BX 10B

## 2024-04-28 PROCEDURE — 10330064 SPONGE, GAUZE 8PLY N/S 4"X4" (200/PK 20P

## 2024-04-28 PROCEDURE — 10330064 PAD, ABD 5X9" STR LF (1/PK 20PK/BX) MGM1

## 2024-04-29 ENCOUNTER — HOME CARE VISIT (OUTPATIENT)
Dept: HOME HEALTH SERVICES | Facility: HOME HEALTHCARE | Age: 79
End: 2024-04-29
Payer: MEDICARE

## 2024-04-29 ENCOUNTER — OFFICE VISIT (OUTPATIENT)
Dept: SURGERY | Facility: CLINIC | Age: 79
End: 2024-04-29
Payer: MEDICARE

## 2024-04-29 DIAGNOSIS — K63.2 ENTEROCUTANEOUS FISTULA: Primary | ICD-10-CM

## 2024-04-29 PROCEDURE — 99213 OFFICE O/P EST LOW 20 MIN: CPT | Performed by: SURGERY

## 2024-04-29 NOTE — PROGRESS NOTES
Assessment/Plan:  Patient returns for follow-up visit.  She has concerns for enterocutaneous fistula.  2 weeks ago an abscess was noted on CT there was spontaneous drainage to the umbilicus.  It was hopeful that this was superficial.  Her drainage persists.  It is consistent with a small fistula.    She has a complex abdominal wall history.  She had abdominal wall reconstruction at [facility] approximately 20 years ago which involves extensive removal of small bowel as well as incisional hernia repair with mesh.  She has a history for small bowel obstruction x 2 and recurrent incisional hernia.    Although she is 79, she is quite active.  I am concerned that attempts at repairing this may be a life-threatening endeavor.  It would involve removing the mesh within the abdomen.  Extensive lysis of adhesions with bowel resection is anticipated.  Overall I do not believe that the risk is worth the benefits.    I explained this to Lucero.  Naturally she is disappointed.  Believe it represents good advice.  I asked her to return should she develop fever or worsening abdominal pain.  She is agreeable.  There are no diagnoses linked to this encounter.    Subjective:      Patient ID: Lucero Terrell is a 79 y.o. female.    Patient presents for two week wound check.        The following portions of the patient's history were reviewed and updated as appropriate:     She  has a past medical history of Chronic kidney disease, Colitis, and Hyperlipidemia.  She  has a past surgical history that includes Joint replacement (Bilateral); Colon surgery; Back surgery; Colonoscopy (N/A, 5/4/2018); LAPAROTOMY (N/A, 11/27/2021); Abdominal adhesion surgery (N/A, 11/27/2021); pr repair first abdominal wall hernia (N/A, 7/27/2022); and ORIF femur fracture (Right, 5/28/2023).  Her family history includes No Known Problems in her father and mother.  She  reports that she quit smoking about 54 years ago. Her smoking use included cigarettes. She has  never used smokeless tobacco. She reports current alcohol use. She reports that she does not use drugs.  Current Outpatient Medications   Medication Sig Dispense Refill    acetaminophen (TYLENOL) 325 mg tablet Take 2 tablets (650 mg total) by mouth 3 (three) times a day as needed for mild pain  0    cephalexin (KEFLEX) 250 mg capsule Take 1 capsule (250 mg total) by mouth 4 (four) times a day for 14 days 56 capsule 0    Diclofenac Sodium (VOLTAREN) 1 %       ketorolac (Acular) 0.5 % ophthalmic solution Administer 1 drop to the right eye 4 (four) times a day. Not using  Indications: Seasonal Allergic Conjunctivitis      metoprolol succinate (TOPROL-XL) 50 mg 24 hr tablet Take 1 tablet (50 mg total) by mouth daily at bedtime 90 tablet 3    montelukast (SINGULAIR) 10 mg tablet Take 10 mg by mouth daily at bedtime        nystatin (MYCOSTATIN) powder Apply topically 2 (two) times a day 30 g 4    polyethylene glycol (MIRALAX) 17 g packet Take 17 g by mouth daily as needed (Constipation)  0    pregabalin (LYRICA) 100 mg capsule Take 100 mg by mouth 2 (two) times a day        simvastatin (ZOCOR) 20 mg tablet Take 20 mg by mouth daily at bedtime      sodium chloride (OCEAN) 0.65 % nasal spray into each nostril as needed (nosebleed). 1-2 times as needed for nosebleeds  Indications: nosebleeds      Tafluprost, PF, 0.0015 % SOLN Apply 1 drop to eye in the morning BOTH EYES      warfarin (COUMADIN) 5 mg tablet Take 1 tablet (5 mg total) by mouth daily 4/22/24 Continue 5mg po daily Repeat PT/INR week of 4/29/24 90 tablet 3    warfarin (COUMADIN) 7.5 mg tablet Take 7.5 mg 3 times weekly on Monday, Wednesday and Friday.  Take 5 mg on all other days  0     No current facility-administered medications for this visit.     She is allergic to hydromorphone..    Review of Systems   Constitutional: Negative.  Negative for activity change.   HENT: Negative.     Eyes: Negative.    Respiratory: Negative.     Cardiovascular: Negative.     Gastrointestinal: Negative.    Endocrine: Negative.    Genitourinary: Negative.    Musculoskeletal: Negative.    Skin: Negative.    Allergic/Immunologic: Negative.    Neurological:  Positive for weakness.   Psychiatric/Behavioral:  Negative for agitation, behavioral problems and confusion. The patient is not nervous/anxious.          Objective:      There were no vitals taken for this visit.         Physical Exam

## 2024-04-30 ENCOUNTER — HOME CARE VISIT (OUTPATIENT)
Dept: HOME HEALTH SERVICES | Facility: HOME HEALTHCARE | Age: 79
End: 2024-04-30
Payer: MEDICARE

## 2024-04-30 ENCOUNTER — ANTICOAG VISIT (OUTPATIENT)
Dept: CARDIOLOGY CLINIC | Facility: CLINIC | Age: 79
End: 2024-04-30

## 2024-04-30 VITALS
DIASTOLIC BLOOD PRESSURE: 68 MMHG | SYSTOLIC BLOOD PRESSURE: 134 MMHG | OXYGEN SATURATION: 97 % | TEMPERATURE: 96.6 F | HEART RATE: 80 BPM | RESPIRATION RATE: 20 BRPM

## 2024-04-30 LAB — INR PPP: 2 (ref 0.84–1.19)

## 2024-04-30 PROCEDURE — G0299 HHS/HOSPICE OF RN EA 15 MIN: HCPCS

## 2024-05-01 DIAGNOSIS — I48.0 PAF (PAROXYSMAL ATRIAL FIBRILLATION) (HCC): Primary | ICD-10-CM

## 2024-05-01 RX ORDER — WARFARIN SODIUM 5 MG/1
5 TABLET ORAL
Qty: 90 TABLET | Refills: 3 | Status: SHIPPED | OUTPATIENT
Start: 2024-05-01 | End: 2024-05-06 | Stop reason: SDUPTHER

## 2024-05-02 ENCOUNTER — HOME CARE VISIT (OUTPATIENT)
Dept: HOME HEALTH SERVICES | Facility: HOME HEALTHCARE | Age: 79
End: 2024-05-02
Payer: MEDICARE

## 2024-05-02 VITALS
DIASTOLIC BLOOD PRESSURE: 64 MMHG | OXYGEN SATURATION: 95 % | HEART RATE: 76 BPM | TEMPERATURE: 97.2 F | SYSTOLIC BLOOD PRESSURE: 118 MMHG

## 2024-05-02 PROCEDURE — G0299 HHS/HOSPICE OF RN EA 15 MIN: HCPCS

## 2024-05-03 PROCEDURE — 10330064 TAPE, RETENTION 2"X10YDS (1/BX24BX/CS)

## 2024-05-03 PROCEDURE — 10330064 DRESSING, HYDROCELLULAR ADH STR FOAM 4"X

## 2024-05-06 ENCOUNTER — ANTICOAG VISIT (OUTPATIENT)
Dept: CARDIOLOGY CLINIC | Facility: CLINIC | Age: 79
End: 2024-05-06

## 2024-05-06 ENCOUNTER — HOME CARE VISIT (OUTPATIENT)
Dept: HOME HEALTH SERVICES | Facility: HOME HEALTHCARE | Age: 79
End: 2024-05-06
Payer: MEDICARE

## 2024-05-06 VITALS
OXYGEN SATURATION: 96 % | RESPIRATION RATE: 20 BRPM | HEART RATE: 68 BPM | SYSTOLIC BLOOD PRESSURE: 116 MMHG | TEMPERATURE: 97.4 F | DIASTOLIC BLOOD PRESSURE: 62 MMHG

## 2024-05-06 DIAGNOSIS — I48.0 PAF (PAROXYSMAL ATRIAL FIBRILLATION) (HCC): Primary | ICD-10-CM

## 2024-05-06 LAB — INR PPP: 2.5 (ref 0.84–1.19)

## 2024-05-06 PROCEDURE — G0299 HHS/HOSPICE OF RN EA 15 MIN: HCPCS

## 2024-05-06 RX ORDER — WARFARIN SODIUM 5 MG/1
5 TABLET ORAL
Start: 2024-05-06

## 2024-05-17 ENCOUNTER — APPOINTMENT (OUTPATIENT)
Dept: LAB | Facility: CLINIC | Age: 79
End: 2024-05-17
Payer: MEDICARE

## 2024-05-17 ENCOUNTER — ANTICOAG VISIT (OUTPATIENT)
Dept: CARDIOLOGY CLINIC | Facility: CLINIC | Age: 79
End: 2024-05-17

## 2024-05-17 DIAGNOSIS — I48.0 PAF (PAROXYSMAL ATRIAL FIBRILLATION) (HCC): ICD-10-CM

## 2024-05-30 ENCOUNTER — ANTICOAG VISIT (OUTPATIENT)
Dept: CARDIOLOGY CLINIC | Facility: CLINIC | Age: 79
End: 2024-05-30

## 2024-05-30 ENCOUNTER — APPOINTMENT (OUTPATIENT)
Dept: LAB | Facility: CLINIC | Age: 79
End: 2024-05-30
Payer: MEDICARE

## 2024-05-30 DIAGNOSIS — I48.0 PAF (PAROXYSMAL ATRIAL FIBRILLATION) (HCC): ICD-10-CM

## 2024-06-10 ENCOUNTER — TELEPHONE (OUTPATIENT)
Dept: CARDIOLOGY CLINIC | Facility: CLINIC | Age: 79
End: 2024-06-10

## 2024-06-10 NOTE — TELEPHONE ENCOUNTER
Pt has an [facility] app[facility] scheduled w/ Dr. Duong on 6/28/24...left voicemail asking pt to call back to reschedule as Dr. Duong will not be in office that day.

## 2024-06-20 ENCOUNTER — APPOINTMENT (OUTPATIENT)
Dept: LAB | Facility: CLINIC | Age: 79
End: 2024-06-20
Payer: MEDICARE

## 2024-06-20 ENCOUNTER — ANTICOAG VISIT (OUTPATIENT)
Dept: CARDIOLOGY CLINIC | Facility: CLINIC | Age: 79
End: 2024-06-20

## 2024-06-20 DIAGNOSIS — I48.0 PAF (PAROXYSMAL ATRIAL FIBRILLATION) (HCC): ICD-10-CM

## 2024-06-30 NOTE — PROGRESS NOTES
Cardiology Follow Up    Lucero Terrell  1945  [id number]  [facility]  [address] [place] [id number] [phone]    1. Atrial fibrillation, unspecified type (HCC)  POCT ECG    simvastatin (ZOCOR) 20 mg tablet      2. Dyslipidemia  Lipid panel    simvastatin (ZOCOR) 20 mg tablet      3. Obesity (BMI 30.0-34.9)  metoprolol succinate (TOPROL-XL) 50 mg 24 hr tablet          Interval History:   Ms Lucero Terrell presents to our office for a follow up visit.  She admits to shortness of breath with rushing which recovers quickly.  She denies chest pain palpitations lightheadedness or dizziness.  Lucero is walking with a walker.  She admits to nosebleeds and underwent cauterization.  Last nosebleed was 3 weeks ago.      Medical History   Primary Cardiologist Dr Duong  Resides alone on her own home   Paroxysmal atrial fibrillation [id number]= 3 ( age, Female) on coumadin [facility]  Dyslipidemia 7/29/22 , , HDL 30, LDL 46   Spinal stenosis   Coumadin in the past she cannot remember reason    COVID -19 + 6/2020  Epistaxis with a hx of nasal cauterization   Patient Active Problem List   Diagnosis    Spinal stenosis of lumbar region    Mixed hyperlipidemia    Other chest pain    Intestinal adhesions    Incisional hernia, without obstruction or gangrene    Postoperative visit    Non-rheumatic aortic stenosis    PAF (paroxysmal atrial fibrillation) (HCC)    Fall    Pure hypercholesterolemia    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 (HCC)    Constipation    Pain    At risk for venous thromboembolism (VTE)    Anemia    Abnormal finding on radiology exam    Peripheral neuropathy    Encounter for skin care    Obesity    Enterocutaneous fistula     Past Medical History:   Diagnosis Date    Chronic kidney disease     Horse shoe kidney    Colitis     Hyperlipidemia      Social History      Socioeconomic History    Marital status:      Spouse name: Not on file    Number of children: Not on file    Years of education: Not on file    Highest education level: Not on file   Occupational History    Not on file   Tobacco Use    Smoking status: Former     Current packs/day: 0.00     Types: Cigarettes     Quit date: 1970     Years since quittin.5    Smokeless tobacco: Never   Vaping Use    Vaping status: Never Used   Substance and Sexual Activity    Alcohol use: Yes     Comment: once in awhile    Drug use: No    Sexual activity: Not on file   Other Topics Concern    Not on file   Social History Narrative    Not on file     Social Determinants of Health     Financial Resource Strain: Not on file   Food Insecurity: No Food Insecurity (2022)    Hunger Vital Sign     Worried About Running Out of Food in the Last Year: Never true     Ran Out of Food in the Last Year: Never true   Transportation Needs: No Transportation Needs (2022)    PRAPARE - Transportation     Lack of Transportation (Medical): No     Lack of Transportation (Non-Medical): No   Physical Activity: Not on file   Stress: Not on file   Social Connections: Not on file   Intimate Partner Violence: Not on file   Housing Stability: Low Risk  (2022)    Housing Stability Vital Sign     Unable to Pay for Housing in the Last Year: No     Number of Places Lived in the Last Year: 1     Unstable Housing in the Last Year: No      Family History   Problem Relation Age of Onset    No Known Problems Mother     No Known Problems Father      Past Surgical History:   Procedure Laterality Date    ABDOMINAL ADHESION SURGERY N/A 2021    Procedure: LYSIS ADHESIONS;  Surgeon: Alessio Mg MD;  [facility];  Service: General    BACK SURGERY      2 rods placed in back    COLON SURGERY      COLONOSCOPY N/A 2018    Procedure: COLONOSCOPY;  Surgeon: Julieth Bocanegra MD;  [facility];  Service: Colorectal    JOINT  REPLACEMENT Bilateral     LAPAROTOMY N/A 11/27/2021    Procedure: LAPAROTOMY EXPLORATORY; EXPLANTATION OF MESH;  Surgeon: Alessio Mg MD;  [facility];  Service: General    ORIF FEMUR FRACTURE Right 5/28/2023    Procedure: OPEN REDUCTION W/ INTERNAL FIXATION (ORIF) FEMUR- ORIF right siri-prosthetic hip fracture;  Surgeon: Jesse Gary MD;  [facility];  Service: Orthopedics    IN REPAIR FIRST ABDOMINAL WALL HERNIA N/A 7/27/2022    Procedure: REPAIR HERNIA INCISIONAL, LYSIS OF ADHESIONS, EXPLANTATION OF MESH, REPAIR OF ENTEROTOMY;  Surgeon: Ivan Cook MD;  [facility];  Service: General       Current Outpatient Medications:     acetaminophen (TYLENOL) 325 mg tablet, Take 2 tablets (650 mg total) by mouth 3 (three) times a day as needed for mild pain, Disp: , Rfl: 0    Diclofenac Sodium (VOLTAREN) 1 %, , Disp: , Rfl:     ketorolac (Acular) 0.5 % ophthalmic solution, Administer 1 drop to the right eye 4 (four) times a day. Not using  Indications: Seasonal Allergic Conjunctivitis, Disp: , Rfl:     metoprolol succinate (TOPROL-XL) 50 mg 24 hr tablet, Take 1 tablet (50 mg total) by mouth daily at bedtime, Disp: 90 tablet, Rfl: 3    montelukast (SINGULAIR) 10 mg tablet, Take 10 mg by mouth daily at bedtime  , Disp: , Rfl:     nystatin (MYCOSTATIN) powder, Apply topically 2 (two) times a day, Disp: 30 g, Rfl: 4    polyethylene glycol (MIRALAX) 17 g packet, Take 17 g by mouth daily as needed (Constipation), Disp: , Rfl: 0    pregabalin (LYRICA) 100 mg capsule, Take 100 mg by mouth 2 (two) times a day  , Disp: , Rfl:     simvastatin (ZOCOR) 20 mg tablet, Take 20 mg by mouth daily at bedtime, Disp: , Rfl:     sodium chloride (OCEAN) 0.65 % nasal spray, into each nostril as needed (nosebleed). 1-2 times as needed for nosebleeds  Indications: nosebleeds, Disp: , Rfl:     Tafluprost, PF, 0.0015 % SOLN, Apply 1 drop to eye in the morning BOTH EYES, Disp: , Rfl:     warfarin (COUMADIN) 5 mg  "tablet, Take 1 tablet (5 mg total) by mouth daily 5/6/24  Continue 7.5mg po tonight(monday) and then continue Warfarin 5mg po all other nights. Repeat PT/INR week of 5/20/24, Disp: , Rfl:     warfarin (COUMADIN) 7.5 mg tablet, Take 7.5 mg 3 times weekly on Monday, Wednesday and Friday.  Take 5 mg on all other days, Disp: , Rfl: 0  Allergies   Allergen Reactions    Hydromorphone Other (See Comments)     \"it stopped my heart\"  hallucinations         Labs:  Ancillary Orders on 05/31/2024   Component Date Value    Protime 06/20/2024 28.3 (H)     INR 06/20/2024 2.72 (H)    Ancillary Orders on 05/24/2024   Component Date Value    Protime 05/30/2024 25.4 (H)     INR 05/30/2024 2.37 (H)    Anticoag visit on 05/06/2024   Component Date Value    INR 05/06/2024 2.50 (A)      Imaging: No results found.    Review of Systems:  Review of Systems   Respiratory:  Positive for shortness of breath.    Musculoskeletal:  Positive for arthralgias, gait problem and myalgias.        Using a walker to assist with ambulation    All other systems reviewed and are negative.      Physical Exam:  Physical Exam  Vitals reviewed.   Constitutional:       Appearance: She is obese.   Cardiovascular:      Rate and Rhythm: Normal rate. Rhythm irregular.      Pulses: Normal pulses.      Heart sounds: Normal heart sounds.   Pulmonary:      Effort: Pulmonary effort is normal.      Breath sounds: Normal breath sounds.   Musculoskeletal:         General: Normal range of motion.      Cervical back: Normal range of motion and neck supple.      Right lower leg: Edema present.      Left lower leg: Edema present.      Comments: Trace bile lateral lower extremity edema   Skin:     General: Skin is warm and dry.      Capillary Refill: Capillary refill takes less than 2 seconds.   Neurological:      General: No focal deficit present.      Mental Status: She is alert and oriented to person, place, and time.   Psychiatric:         Mood and Affect: Mood normal.        "  Behavior: Behavior normal.         Discussion/Summary:  # Paroxysmal atrial fibrillation [id number]= 3 ( age, Female) on coumadin goal INR 2.0- 3.0, 6/20/24 INR 2.72, followed by [facility], rate controlled on Toprol succinate 50 mg daily at bedtime  # Dyslipidemia 7/29/22 , , HDL 30, LDL 46 continue on simvastatin 20 mg daily, heart healthy diet.  Follow-up fasting lipid profile in the near future.  # Obesity BMI 34.82

## 2024-07-01 ENCOUNTER — OFFICE VISIT (OUTPATIENT)
Dept: CARDIOLOGY CLINIC | Facility: CLINIC | Age: 79
End: 2024-07-01
Payer: MEDICARE

## 2024-07-01 VITALS
DIASTOLIC BLOOD PRESSURE: 56 MMHG | OXYGEN SATURATION: 100 % | BODY MASS INDEX: 34.76 KG/M2 | HEIGHT: 59 IN | WEIGHT: 172.4 LBS | HEART RATE: 85 BPM | SYSTOLIC BLOOD PRESSURE: 122 MMHG

## 2024-07-01 DIAGNOSIS — I48.91 ATRIAL FIBRILLATION, UNSPECIFIED TYPE (HCC): Primary | ICD-10-CM

## 2024-07-01 DIAGNOSIS — E66.9 OBESITY (BMI 30.0-34.9): ICD-10-CM

## 2024-07-01 DIAGNOSIS — E78.5 DYSLIPIDEMIA: ICD-10-CM

## 2024-07-01 PROCEDURE — 99215 OFFICE O/P EST HI 40 MIN: CPT | Performed by: NURSE PRACTITIONER

## 2024-07-01 PROCEDURE — 93000 ELECTROCARDIOGRAM COMPLETE: CPT | Performed by: NURSE PRACTITIONER

## 2024-07-01 RX ORDER — METOPROLOL SUCCINATE 50 MG/1
50 TABLET, EXTENDED RELEASE ORAL
Qty: 90 TABLET | Refills: 3 | Status: SHIPPED | OUTPATIENT
Start: 2024-07-01

## 2024-07-01 RX ORDER — SIMVASTATIN 20 MG
20 TABLET ORAL
Qty: 90 TABLET | Refills: 3 | Status: SHIPPED | OUTPATIENT
Start: 2024-07-01

## 2024-07-23 ENCOUNTER — ANTICOAG VISIT (OUTPATIENT)
Dept: CARDIOLOGY CLINIC | Facility: CLINIC | Age: 79
End: 2024-07-23

## 2024-07-23 ENCOUNTER — APPOINTMENT (OUTPATIENT)
Dept: LAB | Facility: CLINIC | Age: 79
End: 2024-07-23
Payer: MEDICARE

## 2024-07-23 DIAGNOSIS — I48.0 PAF (PAROXYSMAL ATRIAL FIBRILLATION) (HCC): ICD-10-CM

## 2024-07-23 DIAGNOSIS — E78.5 DYSLIPIDEMIA: ICD-10-CM

## 2024-07-23 LAB
CHOLEST SERPL-MCNC: 125 MG/DL
HDLC SERPL-MCNC: 32 MG/DL
INR PPP: 2.5 (ref 0.84–1.19)
LDLC SERPL CALC-MCNC: 34 MG/DL (ref 0–100)
NONHDLC SERPL-MCNC: 93 MG/DL
PROTHROMBIN TIME: 26.5 SECONDS (ref 11.6–14.5)
TRIGL SERPL-MCNC: 294 MG/DL

## 2024-07-23 PROCEDURE — 80061 LIPID PANEL: CPT

## 2024-07-23 PROCEDURE — 36415 COLL VENOUS BLD VENIPUNCTURE: CPT

## 2024-07-23 PROCEDURE — 85610 PROTHROMBIN TIME: CPT

## 2024-08-26 ENCOUNTER — ANTICOAG VISIT (OUTPATIENT)
Dept: CARDIOLOGY CLINIC | Facility: CLINIC | Age: 79
End: 2024-08-26

## 2024-08-26 ENCOUNTER — APPOINTMENT (OUTPATIENT)
Dept: LAB | Facility: CLINIC | Age: 79
End: 2024-08-26
Payer: MEDICARE

## 2024-08-26 DIAGNOSIS — I48.0 PAF (PAROXYSMAL ATRIAL FIBRILLATION) (HCC): ICD-10-CM

## 2024-08-26 LAB
INR PPP: 2.3 (ref 0.85–1.19)
PROTHROMBIN TIME: 25.3 SECONDS (ref 12.3–15)

## 2024-08-26 PROCEDURE — 36415 COLL VENOUS BLD VENIPUNCTURE: CPT

## 2024-08-26 PROCEDURE — 85610 PROTHROMBIN TIME: CPT

## 2024-09-26 ENCOUNTER — APPOINTMENT (OUTPATIENT)
Dept: LAB | Facility: CLINIC | Age: 79
End: 2024-09-26
Payer: MEDICARE

## 2024-09-26 ENCOUNTER — ANTICOAG VISIT (OUTPATIENT)
Dept: CARDIOLOGY CLINIC | Facility: CLINIC | Age: 79
End: 2024-09-26

## 2024-09-26 DIAGNOSIS — I48.0 PAF (PAROXYSMAL ATRIAL FIBRILLATION) (HCC): ICD-10-CM

## 2024-09-26 LAB
INR PPP: 3.27 (ref 0.85–1.19)
PROTHROMBIN TIME: 32.9 SECONDS (ref 12.3–15)

## 2024-09-26 PROCEDURE — 36415 COLL VENOUS BLD VENIPUNCTURE: CPT

## 2024-09-26 PROCEDURE — 85610 PROTHROMBIN TIME: CPT

## 2024-10-03 NOTE — PROGRESS NOTES
Assessment:   Diagnosis ICD-10-CM Associated Orders   1. 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, initial encounter ([address])  M97. 01XA XR femur 2 vw right          Plan:  WBAT RLE   Activity as tolerated  Follow up as needed      To do next visit:  Return if symptoms worsen or fail to improve. The above stated was discussed in layman's terms and the patient expressed understanding. All questions were answered to the patient's satisfaction. Subjective:   Ruben Mari is a 77 y.o. female who presents around 5 months s/p ORIF right proximal femur PPFX. She is doing well and resuming her ADLs. She has minimal pain and has started walking without her walker at times. Review of systems negative unless otherwise specified in HPI    Past Medical History:   Diagnosis Date    Chronic kidney disease     Horse shoe kidney    Colitis     Hyperlipidemia        Past Surgical History:   Procedure Laterality Date    ABDOMINAL ADHESION SURGERY N/A 11/27/2021    Procedure: LYSIS ADHESIONS;  Surgeon: Brien Thorne MD;  [facility];  Service: General    BACK SURGERY      2 rods placed in back    COLON SURGERY      COLONOSCOPY N/A 5/4/2018    Procedure: COLONOSCOPY;  Surgeon: Myrtle Campbell MD;  [facility];   Service: Colorectal    JOINT REPLACEMENT Bilateral     LAPAROTOMY N/A 11/27/2021    Procedure: LAPAROTOMY EXPLORATORY; EXPLANTATION OF MESH;  Surgeon: Brien Thorne MD;  [facility];  Service: General    ORIF FEMUR FRACTURE Right 5/28/2023    Procedure: OPEN REDUCTION W/ INTERNAL FIXATION (ORIF) FEMUR- ORIF right siri-prosthetic hip fracture;  Surgeon: Abiola Biggs MD;  [facility];  Service: Orthopedics    AK REPAIR FIRST ABDOMINAL WALL HERNIA N/A 7/27/2022    Procedure: REPAIR HERNIA INCISIONAL, LYSIS OF ADHESIONS, EXPLANTATION OF MESH, REPAIR OF ENTEROTOMY;  Surgeon: Mariaelena Mosqueda MD;  [facility];  Service: General Detail Level: Zone Family History   Problem Relation Age of Onset    No Known Problems Mother     No Known Problems Father        Social History     Occupational History    Not on file   Tobacco Use    Smoking status: Former     Types: Cigarettes     Quit date: 1970     Years since quittin.8    Smokeless tobacco: Never   Vaping Use    Vaping Use: Never used   Substance and Sexual Activity    Alcohol use: Yes     Comment: once in awhile    Drug use: No    Sexual activity: Not on file         Current Outpatient Medications:     acetaminophen (TYLENOL) 325 mg tablet, Take 2 tablets (650 mg total) by mouth 3 (three) times a day as needed for mild pain, Disp: , Rfl: 0    Diclofenac Sodium (VOLTAREN) 1 %, , Disp: , Rfl:     metoprolol succinate (TOPROL-XL) 50 mg 24 hr tablet, Take 1 tablet (50 mg total) by mouth daily at bedtime, Disp: 30 tablet, Rfl: 0    montelukast (SINGULAIR) 10 mg tablet, Take 10 mg by mouth daily at bedtime  , Disp: , Rfl:     polyethylene glycol (MIRALAX) 17 g packet, Take 17 g by mouth daily as needed (Constipation), Disp: , Rfl: 0    pregabalin (LYRICA) 100 mg capsule, Take 100 mg by mouth 2 (two) times a day  , Disp: , Rfl:     simvastatin (ZOCOR) 20 mg tablet, Take 20 mg by mouth daily at bedtime, Disp: , Rfl:     Tafluprost, PF, 0.0015 % SOLN, Apply 1 drop to eye in the morning BOTH EYES, Disp: , Rfl:     warfarin (COUMADIN) 5 mg tablet, 23 3.75mg--5mg daily Repeat PT/INR by mobile lab  or 7/10/23, Disp: 90 tablet, Rfl: 3    warfarin (COUMADIN) 7.5 mg tablet, Take 7.5 mg 3 times weekly on Monday, Wednesday and Friday.   Take 5 mg on all other days, Disp: , Rfl: 0    Allergies   Allergen Reactions    Hydromorphone Other (See Comments)     "it stopped my heart"  hallucinations              Vitals:    10/30/23 1308   BP: 108/57   Pulse: 60       Objective:    /57   Pulse 60   Ht 4' 11" (1.499 m)   BMI 34.34 kg/m²   Gen: No acute distress, resting comfortably in bed  HEENT: Eyes clear, moist mucus membranes, hearing intact  Respiratory: No audible wheezing or stridor  Cardiovascular: Well Perfused peripherally, 2+ distal pulse  Abdomen: nondistended, no peritoneal signs    MSK RLE  Skin intact, incision c/d/I. No TTP  AROM to 120 degrees hip flexion. Limited IR and ER due to stiffness  Sensation intact to guardado,sa,spn, dpn, t nerves  Motor intact to EHL, FHL, DF, PF  Palpable DP pulse                   Diagnostics, reviewed and taken today if performed as documented:    X-rays of the right hip demonstrate intact AUGUST components and cables with no signs of acute fracture or dislocation      The attending physician has personally reviewed the pertinent films in PACS and interpretation is as follows:      Procedures, if performed today:    Procedures    None performed      Portions of the record may have been created with voice recognition software. Occasional wrong word or "sound a like" substitutions may have occurred due to the inherent limitations of voice recognition software. Read the chart carefully and recognize, using context, where substitutions have occurred.

## 2024-10-07 ENCOUNTER — ANTICOAG VISIT (OUTPATIENT)
Dept: CARDIOLOGY CLINIC | Facility: CLINIC | Age: 79
End: 2024-10-07

## 2024-10-07 ENCOUNTER — APPOINTMENT (OUTPATIENT)
Dept: LAB | Facility: CLINIC | Age: 79
End: 2024-10-07
Payer: MEDICARE

## 2024-10-07 DIAGNOSIS — I48.0 PAF (PAROXYSMAL ATRIAL FIBRILLATION) (HCC): ICD-10-CM

## 2024-10-07 LAB
INR PPP: 2.1 (ref 0.85–1.19)
PROTHROMBIN TIME: 23.6 SECONDS (ref 12.3–15)

## 2024-10-07 PROCEDURE — 85610 PROTHROMBIN TIME: CPT

## 2024-10-07 PROCEDURE — 36415 COLL VENOUS BLD VENIPUNCTURE: CPT

## 2024-10-22 ENCOUNTER — ANTICOAG VISIT (OUTPATIENT)
Dept: CARDIOLOGY CLINIC | Facility: CLINIC | Age: 79
End: 2024-10-22

## 2024-10-22 ENCOUNTER — APPOINTMENT (OUTPATIENT)
Dept: LAB | Facility: CLINIC | Age: 79
End: 2024-10-22
Payer: MEDICARE

## 2024-10-22 DIAGNOSIS — I48.0 PAF (PAROXYSMAL ATRIAL FIBRILLATION) (HCC): ICD-10-CM

## 2024-10-22 LAB
INR PPP: 2.61 (ref 0.85–1.19)
PROTHROMBIN TIME: 27.8 SECONDS (ref 12.3–15)

## 2024-10-22 PROCEDURE — 85610 PROTHROMBIN TIME: CPT

## 2024-10-22 PROCEDURE — 36415 COLL VENOUS BLD VENIPUNCTURE: CPT

## 2024-11-04 ENCOUNTER — ANTICOAG VISIT (OUTPATIENT)
Dept: CARDIOLOGY CLINIC | Facility: CLINIC | Age: 79
End: 2024-11-04

## 2024-11-04 ENCOUNTER — APPOINTMENT (OUTPATIENT)
Dept: LAB | Facility: CLINIC | Age: 79
End: 2024-11-04
Payer: MEDICARE

## 2024-11-04 DIAGNOSIS — I48.0 PAF (PAROXYSMAL ATRIAL FIBRILLATION) (HCC): ICD-10-CM

## 2024-11-04 LAB
INR PPP: 2.14 (ref 0.85–1.19)
PROTHROMBIN TIME: 23.9 SECONDS (ref 12.3–15)

## 2024-11-04 PROCEDURE — 36415 COLL VENOUS BLD VENIPUNCTURE: CPT

## 2024-11-04 PROCEDURE — 85610 PROTHROMBIN TIME: CPT

## 2024-11-25 ENCOUNTER — APPOINTMENT (OUTPATIENT)
Dept: LAB | Facility: CLINIC | Age: 79
End: 2024-11-25
Payer: MEDICARE

## 2024-11-25 ENCOUNTER — ANTICOAG VISIT (OUTPATIENT)
Dept: CARDIOLOGY CLINIC | Facility: CLINIC | Age: 79
End: 2024-11-25

## 2024-11-25 DIAGNOSIS — I48.0 PAF (PAROXYSMAL ATRIAL FIBRILLATION) (HCC): ICD-10-CM

## 2024-11-25 LAB
INR PPP: 2.58 (ref 0.85–1.19)
PROTHROMBIN TIME: 27.6 SECONDS (ref 12.3–15)

## 2024-11-25 PROCEDURE — 36415 COLL VENOUS BLD VENIPUNCTURE: CPT

## 2024-11-25 PROCEDURE — 85610 PROTHROMBIN TIME: CPT

## 2024-12-16 ENCOUNTER — ANTICOAG VISIT (OUTPATIENT)
Dept: CARDIOLOGY CLINIC | Facility: CLINIC | Age: 79
End: 2024-12-16

## 2024-12-16 ENCOUNTER — APPOINTMENT (OUTPATIENT)
Dept: LAB | Facility: CLINIC | Age: 79
End: 2024-12-16
Payer: MEDICARE

## 2024-12-16 DIAGNOSIS — I48.0 PAF (PAROXYSMAL ATRIAL FIBRILLATION) (HCC): ICD-10-CM

## 2024-12-16 LAB
INR PPP: 2.69 (ref 0.85–1.19)
PROTHROMBIN TIME: 28.4 SECONDS (ref 12.3–15)

## 2024-12-16 PROCEDURE — 85610 PROTHROMBIN TIME: CPT

## 2024-12-16 PROCEDURE — 36415 COLL VENOUS BLD VENIPUNCTURE: CPT

## 2025-01-08 ENCOUNTER — OFFICE VISIT (OUTPATIENT)
Dept: CARDIOLOGY CLINIC | Facility: CLINIC | Age: 80
End: 2025-01-08
Payer: MEDICARE

## 2025-01-08 VITALS
HEART RATE: 83 BPM | DIASTOLIC BLOOD PRESSURE: 62 MMHG | WEIGHT: 175 LBS | BODY MASS INDEX: 35.28 KG/M2 | SYSTOLIC BLOOD PRESSURE: 120 MMHG | HEIGHT: 59 IN

## 2025-01-08 DIAGNOSIS — I48.19 PERSISTENT ATRIAL FIBRILLATION (HCC): ICD-10-CM

## 2025-01-08 DIAGNOSIS — I35.0 NON-RHEUMATIC AORTIC STENOSIS: ICD-10-CM

## 2025-01-08 DIAGNOSIS — I48.0 PAF (PAROXYSMAL ATRIAL FIBRILLATION) (HCC): Primary | ICD-10-CM

## 2025-01-08 PROCEDURE — 99214 OFFICE O/P EST MOD 30 MIN: CPT | Performed by: INTERNAL MEDICINE

## 2025-01-08 PROCEDURE — 93000 ELECTROCARDIOGRAM COMPLETE: CPT | Performed by: INTERNAL MEDICINE

## 2025-01-08 NOTE — PROGRESS NOTES
Cardiology Follow Up    Lucero Terrell  1945  [id number]  [facility] [place]  [address]  [place] [id number] [phone]    1. PAF (paroxysmal atrial fibrillation) ([facility])  POCT ECG      2. Non-rheumatic aortic stenosis  Echo complete w/ contrast if indicated          Discussion/Summary:    Atrial fibrillation now persistent. Rate controlled with metoprolol. Continue 50 mg daily. She is on warfarin for anticoagulation given cost issues with the DOAC's. Previously having issues with nosebleeds but these have been controlled currently.      Aortic stenosis: Echo from September 2023 showed mild borderline moderate aortic stenosis repeat echo next year prior to visit with me.        History of Present Illness:   79 year old female.  In July 2022, I met her in the hospital. She had postoperative afib after hernia repair, VINNY.    She was controlled with metoprolol.  She was anemic post op, so anticoagulation wasn't initially started.  Subsequently she saw the NP and had a zio patch which showed ongoing PAF, about 9% burden.  Is on coumadin because of cost.    Echos with mild-moderate AS.    Had hip surgery in May 2023.    Interval History:    When she last saw me in the office in October 2023, she was in sinus rhythm. She did have follow-up with advanced practitioner in July 2024 and she was in rate controlled atrial fibrillation at that time.    She has been feeling well. Remains in atrial fibrillation on her ECG today. INR has been therapeutic mostly from warfarin.    Rate controlled with metoprolol. Initially did not have this listed down on her medications, but knows she is taking 50 mg daily.            Medical Problems       Problem List       Spinal stenosis    Mixed hyperlipidemia    COVID-19    Other chest pain    Intestinal adhesions    Incisional hernia, without obstruction or gangrene    Postoperative visit     Non-rheumatic aortic stenosis    PAF (paroxysmal atrial fibrillation) ([facility])        Past Medical History:   Diagnosis Date    Chronic kidney disease     Horse shoe kidney    Colitis     Hyperlipidemia      Social History     Tobacco Use    Smoking status: Former     Current packs/day: 0.00     Types: Cigarettes     Quit date: 1970     Years since quittin.0    Smokeless tobacco: Never   Vaping Use    Vaping status: Never Used   Substance Use Topics    Alcohol use: Yes     Comment: once in awhile    Drug use: No      Family History   Problem Relation Age of Onset    No Known Problems Mother     No Known Problems Father      Past Surgical History:   Procedure Laterality Date    ABDOMINAL ADHESION SURGERY N/A 2021    Procedure: LYSIS ADHESIONS;  Surgeon: Alessio Mg MD;  [facility];  Service: General    BACK SURGERY      2 rods placed in back    COLON SURGERY      COLONOSCOPY N/A 2018    Procedure: COLONOSCOPY;  Surgeon: Julieth Bocanegra MD;  [facility];  Service: Colorectal    JOINT REPLACEMENT Bilateral     LAPAROTOMY N/A 2021    Procedure: LAPAROTOMY EXPLORATORY; EXPLANTATION OF MESH;  Surgeon: Alessio Mg MD;  [facility];  Service: General    ORIF FEMUR FRACTURE Right 2023    Procedure: OPEN REDUCTION W/ INTERNAL FIXATION (ORIF) FEMUR- ORIF right siri-prosthetic hip fracture;  Surgeon: Jesse Gary MD;  [facility];  Service: Orthopedics    OK REPAIR FIRST ABDOMINAL WALL HERNIA N/A 2022    Procedure: REPAIR HERNIA INCISIONAL, LYSIS OF ADHESIONS, EXPLANTATION OF MESH, REPAIR OF ENTEROTOMY;  Surgeon: Ivan Cook MD;  [facility];  Service: General       Current Outpatient Medications:     Magnesium 100 MG TABS, Take 100 mg by mouth 3 (three) times a day, Disp: , Rfl:     metoprolol succinate (TOPROL-XL) 50 mg 24 hr tablet, Take 1 tablet (50 mg total) by mouth daily at bedtime, Disp: 90 tablet, Rfl: 3    montelukast  "(SINGULAIR) 10 mg tablet, Take 10 mg by mouth daily at bedtime  , Disp: , Rfl:     pregabalin (LYRICA) 100 mg capsule, Take 100 mg by mouth 2 (two) times a day  , Disp: , Rfl:     simvastatin (ZOCOR) 20 mg tablet, Take 1 tablet (20 mg total) by mouth daily at bedtime, Disp: 90 tablet, Rfl: 3    Tafluprost, PF, 0.0015 % SOLN, Apply 1 drop to eye in the morning BOTH EYES, Disp: , Rfl:     warfarin (COUMADIN) 5 mg tablet, Take 1 tablet (5 mg total) by mouth daily 5/6/24  Continue 7.5mg po tonight(monday) and then continue Warfarin 5mg po all other nights. Repeat PT/INR week of 5/20/24, Disp: , Rfl:     warfarin (COUMADIN) 7.5 mg tablet, Take 7.5 mg 3 times weekly on Monday, Wednesday and Friday.  Take 5 mg on all other days, Disp: , Rfl: 0    acetaminophen (TYLENOL) 325 mg tablet, Take 2 tablets (650 mg total) by mouth 3 (three) times a day as needed for mild pain (Patient not taking: Reported on 1/8/2025), Disp: , Rfl: 0    Diclofenac Sodium (VOLTAREN) 1 %, , Disp: , Rfl:     ketorolac (Acular) 0.5 % ophthalmic solution, Administer 1 drop to the right eye daily Not using (Patient not taking: Reported on 1/8/2025), Disp: , Rfl:     nystatin (MYCOSTATIN) powder, Apply topically 2 (two) times a day (Patient not taking: Reported on 1/8/2025), Disp: 30 g, Rfl: 4    polyethylene glycol (MIRALAX) 17 g packet, Take 17 g by mouth daily as needed (Constipation) (Patient not taking: Reported on 7/1/2024), Disp: , Rfl: 0    sodium chloride (OCEAN) 0.65 % nasal spray, into each nostril as needed (nosebleed). 1-2 times as needed for nosebleeds  Indications: nosebleeds (Patient not taking: Reported on 1/8/2025), Disp: , Rfl:   Allergies   Allergen Reactions    Hydromorphone Other (See Comments)     \"it stopped my heart\"  hallucinations         Vitals:    01/08/25 1411   BP: 120/62   BP Location: Right arm   Patient Position: Sitting   Cuff Size: Large   Pulse: 83   Weight: 79.4 kg (175 lb)   Height: 4' 11\" (1.499 m)     Vitals:    " "01/08/25 1411   Weight: 79.4 kg (175 lb)      Height: 4' 11\" (149.9 cm)   Body mass index is 35.35 kg/m².    Physical Exam:  GEN: Lucero Terrell appears well, alert and oriented x 3, pleasant and cooperative   HEENT: pupils equal, round, and reactive to light; extraocular muscles intact  NECK: supple, no carotid bruits   HEART: irregularly irregular. + 2/6 RASHAUN  LUNGS: clear to auscultation bilaterally; no wheezes, rales, or rhonchi   ABDOMEN: normal bowel sounds, soft, no tenderness, no distention  EXTREMITIES: peripheral pulses normal; no clubbing, cyanosis, or edema  NEURO: no focal findings   SKIN: normal without suspicious lesions on exposed skin      ROS:  Positive for arthritis, difficulty walking, abdominal pain, diarrhea.  Except as noted in HPI, is otherwise reviewed in detail and a 12 point review of systems is negative.  ROS reviewed and is unchanged     Labs:  Lab Results   Component Value Date    SODIUM 136 03/20/2024    K 5.1 03/20/2024     03/20/2024    CREATININE 0.49 (L) 03/20/2024    BUN 14 03/20/2024    CO2 28 03/20/2024    ALT 18 03/20/2024    AST 20 03/20/2024    INR 2.69 (H) 12/16/2024    GLUF 90 06/15/2023    WBC 8.65 03/20/2024    HGB 11.6 03/20/2024    HCT 37.2 03/20/2024     03/20/2024       No results found for: \"CHOL\"  Lab Results   Component Value Date    LDLCALC 34 07/23/2024    LDLCALC 46 07/29/2022    LDLCALC 76 04/19/2019     Lab Results   Component Value Date    HDL 32 (L) 07/23/2024    HDL 30 (L) 07/29/2022    HDL 36 (L) 04/19/2019     Lab Results   Component Value Date    TRIG 294 (H) 07/23/2024    TRIG 216 (H) 07/29/2022    TRIG 232 (H) 04/19/2019       Testing:  Echo 9/2023:  Left Ventricle: Left ventricular cavity size is normal. Wall thickness is mild to moderately increased. There is mild to moderate concentric hypertrophy. The left ventricular ejection fraction is 65%. Systolic function is normal. Wall motion is normal.    Left Atrium: The atrium is moderately " dilated.    Atrial Septum: A mid origin, atrial septal aneurysm is noted and bows into the right atrium, suggesting increased left atrial pressure.    Aortic Valve: There is mild stenosis. The aortic valve mean gradient is 7 mmHg. The aortic valve area is 1.41 cm2.    Mitral Valve: There is mild to moderate regurgitation.    Tricuspid Valve: There is mild regurgitation. The right ventricular systolic pressure is mildly elevated. The estimated right ventricular systolic pressure is 32.00 mmHg.    Pulmonic Valve: There is trace regurgitation.    Zio 8/2022  Patient had a min HR of 52 bpm, max HR of 197 bpm, and avg HR of 67 bpm.  Predominant underlying rhythm was Sinus Rhythm. 4 Ventricular Tachycardia runs  occurred, the run with the fastest interval lasting 8 beats with a max rate of 197  bpm, the longest lasting 16 beats with an avg rate of 102 bpm. 162  Supraventricular Tachycardia runs occurred, the run with the fastest interval lasting  5 beats with a max rate of 148 bpm, the longest lasting 13.4 secs with an avg rate  of 97 bpm. Atrial Fibrillation occurred (9% burden), ranging from  bpm (avg  of 80 bpm), the longest lasting 11 hours 42 mins with an avg rate of 77 bpm.  Isolated SVEs were occasional (1.2%, 39760), SVE Couplets were rare (<1.0%,  2199), and SVE Triplets were rare (<1.0%, 346). Isolated VEs were rare (<1.0%), VE  Couplets were rare (<1.0%), and no VE Triplets were present.     Agree with above. Afib is seen, known from office. To start on a/c    Echo \7/2022  Left Ventricle: Left ventricular cavity size is normal. Wall thickness is mildly increased. There is mild concentric hypertrophy. The left ventricular ejection fraction is 65% by visual estimation. Systolic function is normal. Although no diagnostic regional wall motion abnormality was identified, this possibility cannot be completely excluded on the basis of this study. Diastolic function is moderately abnormal, consistent with grade  II (pseudonormal) relaxation.  Left atrial filling pressure is elevated.    Aortic Valve: There is mild to moderate stenosis. The aortic valve peak velocity is 2.35 m/s. The aortic valve mean gradient is 13 mmHg. The DVI is 0.49.    Tricuspid Valve: There is mild regurgitation. The right ventricular systolic pressure is moderately to severely elevated. The estimated right ventricular systolic pressure is 59.00 mmHg.      EKG:  Atrial fibrillation. 83 bpm. Poor R wave progression.

## 2025-01-24 ENCOUNTER — APPOINTMENT (OUTPATIENT)
Dept: LAB | Facility: CLINIC | Age: 80
End: 2025-01-24
Payer: MEDICARE

## 2025-01-24 ENCOUNTER — ANTICOAG VISIT (OUTPATIENT)
Dept: CARDIOLOGY CLINIC | Facility: CLINIC | Age: 80
End: 2025-01-24

## 2025-01-24 DIAGNOSIS — I48.0 PAF (PAROXYSMAL ATRIAL FIBRILLATION) (HCC): ICD-10-CM

## 2025-01-24 LAB
INR PPP: 2.76 (ref 0.85–1.19)
PROTHROMBIN TIME: 29 SECONDS (ref 12.3–15)

## 2025-01-24 PROCEDURE — 85610 PROTHROMBIN TIME: CPT

## 2025-01-24 PROCEDURE — 36415 COLL VENOUS BLD VENIPUNCTURE: CPT

## 2025-02-20 ENCOUNTER — APPOINTMENT (OUTPATIENT)
Dept: LAB | Facility: CLINIC | Age: 80
End: 2025-02-20
Payer: MEDICARE

## 2025-02-20 ENCOUNTER — ANTICOAG VISIT (OUTPATIENT)
Dept: CARDIOLOGY CLINIC | Facility: CLINIC | Age: 80
End: 2025-02-20

## 2025-02-20 DIAGNOSIS — I48.0 PAF (PAROXYSMAL ATRIAL FIBRILLATION) (HCC): ICD-10-CM

## 2025-02-20 LAB
INR PPP: 2.51 (ref 0.85–1.19)
PROTHROMBIN TIME: 27 SECONDS (ref 12.3–15)

## 2025-02-20 PROCEDURE — 36415 COLL VENOUS BLD VENIPUNCTURE: CPT

## 2025-02-20 PROCEDURE — 85610 PROTHROMBIN TIME: CPT

## 2025-03-24 ENCOUNTER — APPOINTMENT (OUTPATIENT)
Dept: LAB | Facility: CLINIC | Age: 80
End: 2025-03-24
Payer: MEDICARE

## 2025-03-24 ENCOUNTER — ANTICOAG VISIT (OUTPATIENT)
Dept: CARDIOLOGY CLINIC | Facility: CLINIC | Age: 80
End: 2025-03-24

## 2025-03-24 DIAGNOSIS — I48.0 PAF (PAROXYSMAL ATRIAL FIBRILLATION) (HCC): ICD-10-CM

## 2025-03-24 LAB
INR PPP: 2.13 (ref 0.85–1.19)
PROTHROMBIN TIME: 23.9 SECONDS (ref 12.3–15)

## 2025-03-24 PROCEDURE — 36415 COLL VENOUS BLD VENIPUNCTURE: CPT

## 2025-03-24 PROCEDURE — 85610 PROTHROMBIN TIME: CPT

## 2025-03-28 ENCOUNTER — HOSPITAL ENCOUNTER (EMERGENCY)
Facility: HOSPITAL | Age: 80
Discharge: HOME | End: 2025-03-28
Attending: SURGERY
Payer: MEDICARE

## 2025-03-28 ENCOUNTER — APPOINTMENT (EMERGENCY)
Dept: RADIOLOGY | Facility: HOSPITAL | Age: 80
End: 2025-03-28
Payer: MEDICARE

## 2025-03-28 VITALS
SYSTOLIC BLOOD PRESSURE: 133 MMHG | HEART RATE: 90 BPM | DIASTOLIC BLOOD PRESSURE: 77 MMHG | TEMPERATURE: 98.1 F | RESPIRATION RATE: 20 BRPM | OXYGEN SATURATION: 93 %

## 2025-03-28 LAB
ABO GROUP BLD: NORMAL
ALBUMIN SERPL BCG-MCNC: 3.7 G/DL (ref 3.5–5)
ALP SERPL-CCNC: 56 U/L (ref 34–104)
ALT SERPL W P-5'-P-CCNC: 19 U/L (ref 7–52)
ANION GAP SERPL CALCULATED.3IONS-SCNC: 4 MMOL/L (ref 4–13)
APTT PPP: 31 SECONDS (ref 23–34)
AST SERPL W P-5'-P-CCNC: 28 U/L (ref 13–39)
BASE EXCESS BLDA CALC-SCNC: 2 MMOL/L (ref -2–3)
BASOPHILS # BLD AUTO: 0.06 THOUSANDS/ÂΜL (ref 0–0.1)
BASOPHILS NFR BLD AUTO: 1 % (ref 0–1)
BILIRUB SERPL-MCNC: 0.56 MG/DL (ref 0.2–1)
BLD GP AB SCN SERPL QL: NEGATIVE
BUN SERPL-MCNC: 14 MG/DL (ref 5–25)
CA-I BLD-SCNC: 1.23 MMOL/L (ref 1.12–1.32)
CALCIUM SERPL-MCNC: 9 MG/DL (ref 8.4–10.2)
CHLORIDE SERPL-SCNC: 104 MMOL/L (ref 96–108)
CO2 SERPL-SCNC: 30 MMOL/L (ref 21–32)
CREAT SERPL-MCNC: 0.56 MG/DL (ref 0.6–1.3)
EOSINOPHIL # BLD AUTO: 0.33 THOUSAND/ÂΜL (ref 0–0.61)
EOSINOPHIL NFR BLD AUTO: 6 % (ref 0–6)
ERYTHROCYTE [DISTWIDTH] IN BLOOD BY AUTOMATED COUNT: 16.4 % (ref 11.6–15.1)
GFR SERPL CREATININE-BSD FRML MDRD: 87 ML/MIN/1.73SQ M
GLUCOSE SERPL-MCNC: 97 MG/DL (ref 65–140)
GLUCOSE SERPL-MCNC: 99 MG/DL (ref 65–140)
HCO3 BLDA-SCNC: 28.4 MMOL/L (ref 24–30)
HCT VFR BLD AUTO: 41.4 % (ref 34.8–46.1)
HCT VFR BLD CALC: 41 % (ref 34.8–46.1)
HGB BLD-MCNC: 12.9 G/DL (ref 11.5–15.4)
HGB BLDA-MCNC: 13.9 G/DL (ref 11.5–15.4)
IMM GRANULOCYTES # BLD AUTO: 0.02 THOUSAND/UL (ref 0–0.2)
IMM GRANULOCYTES NFR BLD AUTO: 0 % (ref 0–2)
INR PPP: 1.89 (ref 0.85–1.19)
LYMPHOCYTES # BLD AUTO: 1.49 THOUSANDS/ÂΜL (ref 0.6–4.47)
LYMPHOCYTES NFR BLD AUTO: 25 % (ref 14–44)
MCH RBC QN AUTO: 29.9 PG (ref 26.8–34.3)
MCHC RBC AUTO-ENTMCNC: 31.2 G/DL (ref 31.4–37.4)
MCV RBC AUTO: 96 FL (ref 82–98)
MONOCYTES # BLD AUTO: 0.81 THOUSAND/ÂΜL (ref 0.17–1.22)
MONOCYTES NFR BLD AUTO: 13 % (ref 4–12)
NEUTROPHILS # BLD AUTO: 3.32 THOUSANDS/ÂΜL (ref 1.85–7.62)
NEUTS SEG NFR BLD AUTO: 55 % (ref 43–75)
NRBC BLD AUTO-RTO: 0 /100 WBCS
PCO2 BLD: 30 MMOL/L (ref 21–32)
PCO2 BLD: 51.7 MM HG (ref 42–50)
PH BLD: 7.35 [PH] (ref 7.3–7.4)
PLATELET # BLD AUTO: 256 THOUSANDS/UL (ref 149–390)
PMV BLD AUTO: 8.4 FL (ref 8.9–12.7)
PO2 BLD: 20 MM HG (ref 35–45)
POTASSIUM BLD-SCNC: 4.7 MMOL/L (ref 3.5–5.3)
POTASSIUM SERPL-SCNC: 5 MMOL/L (ref 3.5–5.3)
PROT SERPL-MCNC: 7.4 G/DL (ref 6.4–8.4)
PROTHROMBIN TIME: 21.8 SECONDS (ref 12.3–15)
RBC # BLD AUTO: 4.31 MILLION/UL (ref 3.81–5.12)
RH BLD: POSITIVE
SAO2 % BLD FROM PO2: 28 % (ref 60–85)
SODIUM BLD-SCNC: 141 MMOL/L (ref 136–145)
SODIUM SERPL-SCNC: 138 MMOL/L (ref 135–147)
SPECIMEN EXPIRATION DATE: NORMAL
SPECIMEN SOURCE: ABNORMAL
WBC # BLD AUTO: 6.03 THOUSAND/UL (ref 4.31–10.16)

## 2025-03-28 PROCEDURE — 80053 COMPREHEN METABOLIC PANEL: CPT | Performed by: SURGERY

## 2025-03-28 PROCEDURE — 90715 TDAP VACCINE 7 YRS/> IM: CPT | Performed by: SURGERY

## 2025-03-28 PROCEDURE — 74176 CT ABD & PELVIS W/O CONTRAST: CPT

## 2025-03-28 PROCEDURE — 85014 HEMATOCRIT: CPT

## 2025-03-28 PROCEDURE — 73590 X-RAY EXAM OF LOWER LEG: CPT

## 2025-03-28 PROCEDURE — 99285 EMERGENCY DEPT VISIT HI MDM: CPT | Performed by: SURGERY

## 2025-03-28 PROCEDURE — 71250 CT THORAX DX C-: CPT

## 2025-03-28 PROCEDURE — 70450 CT HEAD/BRAIN W/O DYE: CPT

## 2025-03-28 PROCEDURE — 82803 BLOOD GASES ANY COMBINATION: CPT

## 2025-03-28 PROCEDURE — 86900 BLOOD TYPING SEROLOGIC ABO: CPT | Performed by: SURGERY

## 2025-03-28 PROCEDURE — 99284 EMERGENCY DEPT VISIT MOD MDM: CPT

## 2025-03-28 PROCEDURE — 82330 ASSAY OF CALCIUM: CPT

## 2025-03-28 PROCEDURE — 84295 ASSAY OF SERUM SODIUM: CPT

## 2025-03-28 PROCEDURE — 72170 X-RAY EXAM OF PELVIS: CPT

## 2025-03-28 PROCEDURE — 90471 IMMUNIZATION ADMIN: CPT

## 2025-03-28 PROCEDURE — EDAIR PR ED AIR: Performed by: EMERGENCY MEDICINE

## 2025-03-28 PROCEDURE — 84132 ASSAY OF SERUM POTASSIUM: CPT

## 2025-03-28 PROCEDURE — 71045 X-RAY EXAM CHEST 1 VIEW: CPT

## 2025-03-28 PROCEDURE — 72125 CT NECK SPINE W/O DYE: CPT

## 2025-03-28 PROCEDURE — 85025 COMPLETE CBC W/AUTO DIFF WBC: CPT | Performed by: SURGERY

## 2025-03-28 PROCEDURE — 73552 X-RAY EXAM OF FEMUR 2/>: CPT

## 2025-03-28 PROCEDURE — 85610 PROTHROMBIN TIME: CPT | Performed by: SURGERY

## 2025-03-28 PROCEDURE — 86901 BLOOD TYPING SEROLOGIC RH(D): CPT | Performed by: SURGERY

## 2025-03-28 PROCEDURE — 85730 THROMBOPLASTIN TIME PARTIAL: CPT | Performed by: SURGERY

## 2025-03-28 PROCEDURE — 86850 RBC ANTIBODY SCREEN: CPT | Performed by: SURGERY

## 2025-03-28 PROCEDURE — 36415 COLL VENOUS BLD VENIPUNCTURE: CPT | Performed by: SURGERY

## 2025-03-28 PROCEDURE — 82947 ASSAY GLUCOSE BLOOD QUANT: CPT

## 2025-03-28 RX ORDER — GINSENG 100 MG
1 CAPSULE ORAL 2 TIMES DAILY
Status: DISCONTINUED | OUTPATIENT
Start: 2025-03-28 | End: 2025-03-28 | Stop reason: HOSPADM

## 2025-03-28 RX ADMIN — BACITRACIN 1 SMALL APPLICATION: 500 OINTMENT TOPICAL at 11:49

## 2025-03-28 RX ADMIN — TETANUS TOXOID, REDUCED DIPHTHERIA TOXOID AND ACELLULAR PERTUSSIS VACCINE, ADSORBED 0.5 ML: 5; 2.5; 8; 8; 2.5 SUSPENSION INTRAMUSCULAR at 11:48

## 2025-03-28 NOTE — H&P
H&P - Trauma   Name: Lucero Terrell 80 y.o. female I MRN: [id number]  Unit/Bed#: ED 01 I Date of Admission: 3/28/2025   Date of Service: 3/28/2025 I Hospital Day: 0     Assessment & Plan  Fall  - s/p mechanical fall  - X-rays and CT scans negative for acute injuries  - c-collar cleared  - Patient able to ambulate without significant discomfort  - Patient cleared for ED discharge    Trauma Alert: Level B   Model of Arrival: Ambulance    Trauma Team: Attending Adeel Morgan and Residents Antolin Casiano  Consultants:     None     History of Present Illness   Chief Complaint: Right leg pain status post mechanical fall  Mechanism:Fall     Lucero Terrell is a 80 y.o. female with PMHx A-fib on Eliquis, CKD, HLD, bilateral THAs who presents with right knee and lower leg pain status post mechanical fall.  Patient reports she was bending down to feed her cat this morning and fell forward striking her head and right leg.  Patient was able to ambulate after fall. No LOC.  Denies Headache, CP/SOB, neck or spine pain, N/V, confusion, changes to vision or hearing. No other complaints at this time.      Review of Systems  Medical History Review: I have reviewed the patient's PMH, PSH, Social History, Family History, Meds, and Allergies   Immunization History   Administered Date(s) Administered    COVID-19 PFIZER VACCINE 0.3 ML IM 02/03/2021, 02/24/2021, 11/22/2021    Tdap 03/28/2025     Tetanus administered today    1. Before the illness or injury that brought you to the Emergency, did you need someone to help you on a regular basis? 0=No   2. Since the illness or injury that brought you to the Emergency, have you needed more help than usual to take care of yourself? 1=Yes   3. Have you been hospitalized for one or more nights during the past 6 months (excluding a stay in the Emergency Department)? 0=No   4. In general, do you see well? 0=Yes   5. In general, do you have serious problems with your memory? 0=No   6. Do you  take more than three different medications everyday? 1=Yes   TOTAL   2     Did you order a geriatric consult if the score was 2 or greater?: N/A       Objective :  Temp:  [98.1 °F (36.7 °C)] 98.1 °F (36.7 °C)  HR:  [79-92] 92  BP: (119-137)/(60-80) 128/78  Resp:  [16-20] 20  SpO2:  [88 %-96 %] 95 %  O2 Device: None (Room air)    Initial Vitals:   Temperature: 98.1 °F (36.7 °C) (03/28/25 0829)  Pulse: 79 (03/28/25 0829)  Respirations: 18 (03/28/25 0829)  Blood Pressure: 119/71 (03/28/25 0829)    Primary Survey:   Airway:        Status: patent;        Pre-hospital Interventions: none        Hospital Interventions: none  Breathing:        Pre-hospital Interventions: none       Effort: normal       Right breath sounds: normal       Left breath sounds: normal  Circulation:        Rhythm: regular       Rate: regular   Right Pulses Left Pulses    R radial: 2+    R pedal: 2+     L radial: 2+    L pedal: 2+       Disability:        GCS: Eye: 4; Verbal: 5 Motor: 6 Total: 15       Right Pupil:       Left Pupil:     R Motor Strength L Motor Strength    R : 5/5  R dorsiflex: 5/5  R plantarflex: 5/5 L : 5/5  L dorsiflex: 5/5  L plantarflex: 5/5          Exposure:           Secondary Survey:  Physical Exam  Constitutional:       Appearance: Normal appearance.   HENT:      Head: Normocephalic.      Nose: Nose normal.      Mouth/Throat:      Mouth: Mucous membranes are moist.   Eyes:      Extraocular Movements: Extraocular movements intact.      Pupils: Pupils are equal, round, and reactive to light.   Cardiovascular:      Rate and Rhythm: Normal rate and regular rhythm.      Pulses: Normal pulses.      Heart sounds: Normal heart sounds.   Pulmonary:      Effort: Pulmonary effort is normal.      Breath sounds: Normal breath sounds.   Abdominal:      General: Abdomen is flat.      Palpations: Abdomen is soft.   Musculoskeletal:         General: Tenderness present. No deformity. Normal range of motion.      Cervical back:  Normal range of motion and neck supple.      Comments: TTP over right knee and right lower leg.  2 x 2 abrasion over right knee, 2 x 2 cm skin tear over right tibia   Skin:     General: Skin is warm.   Neurological:      General: No focal deficit present.      Mental Status: She is alert and oriented to person, place, and time.             Lab Results: I have reviewed the following results:  Recent Labs     03/28/25  0838 03/28/25  0840   WBC 6.03  --    HGB 12.9 13.9   HCT 41.4 41     --    SODIUM 138  --    K 5.0  --      --    CO2 30 30   BUN 14  --    CREATININE 0.56*  --    GLUC 97  --    CAIONIZED  --  1.23   AST 28  --    ALT 19  --    ALB 3.7  --    TBILI 0.56  --    ALKPHOS 56  --    PTT 31  --    INR 1.89*  --        Imaging Results: I have personally reviewed pertinent images saved in PACS. CT scan findings (and other pertinent positive findings on images) were discussed with radiology. My interpretation of the images/reports are as follows:  Chest Xray(s): negative for acute findings   FAST exam(s): negative for acute findings   CT Scan(s): negative for acute findings   Additional Xray(s): negative for acute findings     Other Studies:

## 2025-03-28 NOTE — ASSESSMENT & PLAN NOTE
- s/p mechanical fall  - X-rays and CT scans negative for acute injuries  - c-collar cleared  - Patient able to ambulate without significant discomfort  - Patient cleared for ED discharge

## 2025-03-28 NOTE — QUICK NOTE
Cervical Collar Clearance:    The patient had a CT scan of the cervical spine demonstrating no acute injury. On exam, the patient had no midline point tenderness or paresthesias/numbness/weakness in the extremities. The patient had full range of motion (was then able to flex, extend, and rotate head laterally) without pain. There were no distracting injuries and the patient was not intoxicated.      The patient's cervical spine was cleared radiologically and clinically. Cervical collar removed at this time.     Antolin Casiano MD  3/28/2025 12:15 PM

## 2025-03-28 NOTE — ED PROVIDER NOTES
Emergency Department Airway Evaluation and Management Form    History  Obtained from: EMS      Chief Complaint:  Trauma Alert    HPI: Pt is a 80 y.o. female presents s/p fall on warfarin, positive head strike      I have reviewed and agree with the history as documented.    Allergies  Allergies: see nurses notes    Physical Exam    Vitals:    03/28/25 0829   BP: 119/71   Resp: 18   Temp: 98.1 °F (36.7 °C)     Supplemental Oxygen: None    GCS: 15    Neuro: Alert and oriented x 3  Psych: not combative, not anxious, cooperative for exam  Neck: In collar, No JVD, No midline tenderness  Respiratory: Clear to auscultation  Mouth: No signs of trauma  Pharynx: Patent        ED Medications given  See nursing notes    Intubation    No intubation required    Final Diagnosis:  Fall, closed head injury, chronic anticoagulation with warfarin    ED Provider  Electronically Signed by       Frederick Mejia DO  03/28/25 0899

## 2025-03-28 NOTE — TRAUMA DOCUMENTATION
Patient out of bed, ambulatory to bathroom with home walker without assist. Patient returned to stretcher, repositioned self to comfort. RLE abrasions cleansed w/ soap & saline. Bacitracin applied with dry dressing. Patient anticipating DC home with friend coming--pending trauma teams re-eval/dispo orders.

## 2025-04-18 ENCOUNTER — APPOINTMENT (OUTPATIENT)
Dept: LAB | Facility: CLINIC | Age: 80
End: 2025-04-18
Attending: INTERNAL MEDICINE
Payer: MEDICARE

## 2025-04-18 ENCOUNTER — ANTICOAG VISIT (OUTPATIENT)
Dept: CARDIOLOGY CLINIC | Facility: CLINIC | Age: 80
End: 2025-04-18

## 2025-04-18 DIAGNOSIS — I48.0 PAF (PAROXYSMAL ATRIAL FIBRILLATION) (HCC): ICD-10-CM

## 2025-04-18 LAB
INR PPP: 2.13 (ref 0.85–1.19)
PROTHROMBIN TIME: 23.9 SECONDS (ref 12.3–15)

## 2025-04-18 PROCEDURE — 36415 COLL VENOUS BLD VENIPUNCTURE: CPT

## 2025-04-18 PROCEDURE — 85610 PROTHROMBIN TIME: CPT

## 2025-05-29 ENCOUNTER — APPOINTMENT (OUTPATIENT)
Dept: LAB | Facility: CLINIC | Age: 80
End: 2025-05-29
Attending: INTERNAL MEDICINE
Payer: MEDICARE

## 2025-05-29 ENCOUNTER — ANTICOAG VISIT (OUTPATIENT)
Dept: CARDIOLOGY CLINIC | Facility: CLINIC | Age: 80
End: 2025-05-29

## 2025-05-29 DIAGNOSIS — I48.19 PERSISTENT ATRIAL FIBRILLATION (HCC): ICD-10-CM

## 2025-05-29 DIAGNOSIS — I48.19 PERSISTENT ATRIAL FIBRILLATION (HCC): Primary | ICD-10-CM

## 2025-05-29 LAB
INR PPP: 2.62 (ref 0.85–1.19)
PROTHROMBIN TIME: 27.9 SECONDS (ref 12.3–15)

## 2025-05-29 PROCEDURE — 85610 PROTHROMBIN TIME: CPT

## 2025-05-29 PROCEDURE — 36415 COLL VENOUS BLD VENIPUNCTURE: CPT

## 2025-06-17 DIAGNOSIS — I48.0 PAF (PAROXYSMAL ATRIAL FIBRILLATION) (HCC): ICD-10-CM

## 2025-06-17 DIAGNOSIS — E66.811 OBESITY (BMI 30.0-34.9): ICD-10-CM

## 2025-06-17 RX ORDER — WARFARIN SODIUM 5 MG/1
TABLET ORAL
Qty: 90 TABLET | Refills: 2 | Status: SHIPPED | OUTPATIENT
Start: 2025-06-17

## 2025-06-18 RX ORDER — METOPROLOL SUCCINATE 50 MG/1
50 TABLET, EXTENDED RELEASE ORAL
Qty: 90 TABLET | Refills: 3 | Status: SHIPPED | OUTPATIENT
Start: 2025-06-18

## 2025-06-26 ENCOUNTER — ANTICOAG VISIT (OUTPATIENT)
Dept: CARDIOLOGY CLINIC | Facility: CLINIC | Age: 80
End: 2025-06-26

## 2025-06-26 ENCOUNTER — APPOINTMENT (OUTPATIENT)
Dept: LAB | Facility: CLINIC | Age: 80
End: 2025-06-26
Attending: INTERNAL MEDICINE
Payer: MEDICARE

## 2025-06-26 DIAGNOSIS — I48.19 PERSISTENT ATRIAL FIBRILLATION (HCC): ICD-10-CM

## 2025-06-26 LAB
INR PPP: 2.03 (ref 0.85–1.19)
PROTHROMBIN TIME: 23 SECONDS (ref 12.3–15)

## 2025-06-26 PROCEDURE — 36415 COLL VENOUS BLD VENIPUNCTURE: CPT

## 2025-06-26 PROCEDURE — 85610 PROTHROMBIN TIME: CPT

## 2025-07-24 ENCOUNTER — APPOINTMENT (OUTPATIENT)
Dept: LAB | Facility: CLINIC | Age: 80
End: 2025-07-24
Attending: INTERNAL MEDICINE
Payer: MEDICARE

## 2025-07-24 ENCOUNTER — ANTICOAG VISIT (OUTPATIENT)
Dept: CARDIOLOGY CLINIC | Facility: CLINIC | Age: 80
End: 2025-07-24

## 2025-07-24 DIAGNOSIS — I48.19 PERSISTENT ATRIAL FIBRILLATION (HCC): ICD-10-CM

## 2025-07-24 LAB
INR PPP: 2.64 (ref 0.85–1.19)
PROTHROMBIN TIME: 28 SECONDS (ref 12.3–15)

## 2025-07-24 PROCEDURE — 36415 COLL VENOUS BLD VENIPUNCTURE: CPT

## 2025-07-24 PROCEDURE — 85610 PROTHROMBIN TIME: CPT

## (undated) DEVICE — JP PERF DRN SIL FLT 10MM FULL: Brand: CARDINAL HEALTH

## (undated) DEVICE — SCD SEQUENTIAL COMPRESSION COMFORT SLEEVE MEDIUM KNEE LENGTH: Brand: KENDALL SCD

## (undated) DEVICE — UNDYED BRAIDED (POLYGLACTIN 910), SYNTHETIC ABSORBABLE SUTURE: Brand: COATED VICRYL

## (undated) DEVICE — SUT VICRYL 2-0 REEL 54 IN J286G

## (undated) DEVICE — SILVER-COATED ANTIMICROBIAL BARRIER DRESSING: Brand: ACTICOAT   4" X 8"

## (undated) DEVICE — INTENDED FOR TISSUE SEPARATION, AND OTHER PROCEDURES THAT REQUIRE A SHARP SURGICAL BLADE TO PUNCTURE OR CUT.: Brand: BARD-PARKER SAFETY BLADES SIZE 15, STERILE

## (undated) DEVICE — BETHLEHEM UNIVERSAL MINOR GEN: Brand: CARDINAL HEALTH

## (undated) DEVICE — GAUZE SPONGES,16 PLY: Brand: CURITY

## (undated) DEVICE — DRESSING MEPILEX AG BORDER POST-OP 4 X 10 IN

## (undated) DEVICE — POOLE SUCTION HANDLE: Brand: CARDINAL HEALTH

## (undated) DEVICE — 3000CC GUARDIAN II: Brand: GUARDIAN

## (undated) DEVICE — DRAPE C-ARM X-RAY

## (undated) DEVICE — PLUMEPEN PRO 10FT

## (undated) DEVICE — SUT VICRYL PLUS 2-0 CTB-1 27 IN VCPB259H

## (undated) DEVICE — 3M™ TEGADERM™ TRANSPARENT FILM DRESSING FRAME STYLE, 1627, 4 IN X 10 IN (10 CM X 25 CM), 20/CT 4CT/CASE: Brand: 3M™ TEGADERM™

## (undated) DEVICE — HANDPIECE SET WITH RETRACTABLE COAXIAL FAN SPRAY TIP AND SUCTION TUBE: Brand: INTERPULSE

## (undated) DEVICE — GLOVE SRG BIOGEL ECLIPSE 7

## (undated) DEVICE — GLOVE SRG BIOGEL 9

## (undated) DEVICE — PACK MAJOR ORTHO W/SPLITS PBDS

## (undated) DEVICE — SUT VICRYL 3-0 SH 27 IN J416H

## (undated) DEVICE — U-DRAPE: Brand: CONVERTORS

## (undated) DEVICE — NEEDLE 25G X 1 1/2

## (undated) DEVICE — GLOVE INDICATOR PI UNDERGLOVE SZ 9 BLUE

## (undated) DEVICE — VIOLET BRAIDED (POLYGLACTIN 910), SYNTHETIC ABSORBABLE SUTURE: Brand: COATED VICRYL

## (undated) DEVICE — JACKSON-PRATT 100CC BULB RESERVOIR: Brand: CARDINAL HEALTH

## (undated) DEVICE — TUBING SUCTION 5MM X 12 FT

## (undated) DEVICE — GLOVE SRG BIOGEL 8

## (undated) DEVICE — PAD GROUNDING ADULT

## (undated) DEVICE — ANTIBACTERIAL UNDYED BRAIDED (POLYGLACTIN 910), SYNTHETIC ABSORBABLE SUTURE: Brand: COATED VICRYL

## (undated) DEVICE — SUT PDS PLUS 3-0 SH 27IN PDP316H

## (undated) DEVICE — ELECTRODE BLADE MOD E-Z CLEAN 2.5IN 6.4CM -0012M

## (undated) DEVICE — CHLORAPREP HI-LITE 26ML ORANGE

## (undated) DEVICE — SUT ETHILON 3-0 PS-1 18 IN 1663H

## (undated) DEVICE — INTENDED FOR TISSUE SEPARATION, AND OTHER PROCEDURES THAT REQUIRE A SHARP SURGICAL BLADE TO PUNCTURE OR CUT.: Brand: BARD-PARKER SAFETY BLADES SIZE 10, STERILE

## (undated) DEVICE — X-RAY DETECTABLE SPONGES,16 PLY: Brand: VISTEC

## (undated) DEVICE — SUT PDS PLUS 1 CTX 36IN PDP371T

## (undated) DEVICE — PROXIMATE RELOADABLE LINEAR CUTTER WITH SAFETY LOCK-OUT, 100MM: Brand: PROXIMATE

## (undated) DEVICE — SINGLE PORT MANIFOLD: Brand: NEPTUNE 2

## (undated) DEVICE — SUT VICRYL PLUS 1 CTB-1 36 IN VCPB947H

## (undated) DEVICE — SPONGE SCRUB 4 PCT CHLORHEXIDINE

## (undated) DEVICE — STOCKINETTE IMPERVIOUS

## (undated) DEVICE — BETHLEHEM MAJOR GENERAL PACK: Brand: CARDINAL HEALTH

## (undated) DEVICE — TELFA NON-ADHERENT ABSORBENT DRESSING: Brand: TELFA

## (undated) DEVICE — ABDOMINAL PAD: Brand: DERMACEA

## (undated) DEVICE — DRAPE EQUIPMENT RF WAND

## (undated) DEVICE — PADDING CAST 4 IN  COTTON STRL

## (undated) DEVICE — TRAY FOLEY 16FR URIMETER SURESTEP

## (undated) DEVICE — DRAPE C-ARMOUR

## (undated) DEVICE — 3M™ IOBAN™ 2 ANTIMICROBIAL INCISE DRAPE 6650EZ: Brand: IOBAN™ 2

## (undated) DEVICE — ADHESIVE SKIN HIGH VISCOSITY EXOFIN 1ML

## (undated) DEVICE — GLOVE INDICATOR PI UNDERGLOVE SZ 8 BLUE